# Patient Record
Sex: FEMALE | Race: WHITE | Employment: UNEMPLOYED | URBAN - METROPOLITAN AREA
[De-identification: names, ages, dates, MRNs, and addresses within clinical notes are randomized per-mention and may not be internally consistent; named-entity substitution may affect disease eponyms.]

---

## 2019-10-11 ENCOUNTER — TELEPHONE (OUTPATIENT)
Dept: FAMILY MEDICINE CLINIC | Age: 74
End: 2019-10-11

## 2019-11-04 ENCOUNTER — OFFICE VISIT (OUTPATIENT)
Dept: FAMILY MEDICINE CLINIC | Age: 74
End: 2019-11-04
Payer: MEDICARE

## 2019-11-04 ENCOUNTER — TELEPHONE (OUTPATIENT)
Dept: FAMILY MEDICINE CLINIC | Age: 74
End: 2019-11-04

## 2019-11-04 VITALS
TEMPERATURE: 98.2 F | HEART RATE: 76 BPM | DIASTOLIC BLOOD PRESSURE: 78 MMHG | OXYGEN SATURATION: 98 % | SYSTOLIC BLOOD PRESSURE: 164 MMHG | WEIGHT: 236.38 LBS

## 2019-11-04 DIAGNOSIS — G89.29 CHRONIC MIDLINE LOW BACK PAIN WITHOUT SCIATICA: ICD-10-CM

## 2019-11-04 DIAGNOSIS — G25.81 RESTLESS LEG SYNDROME: ICD-10-CM

## 2019-11-04 DIAGNOSIS — M54.50 CHRONIC MIDLINE LOW BACK PAIN WITHOUT SCIATICA: ICD-10-CM

## 2019-11-04 DIAGNOSIS — K27.9 PEPTIC ULCER: ICD-10-CM

## 2019-11-04 DIAGNOSIS — K58.0 IRRITABLE BOWEL SYNDROME WITH DIARRHEA: ICD-10-CM

## 2019-11-04 DIAGNOSIS — F41.9 ANXIETY: Primary | ICD-10-CM

## 2019-11-04 DIAGNOSIS — I10 ESSENTIAL HYPERTENSION: ICD-10-CM

## 2019-11-04 DIAGNOSIS — G44.89 OTHER HEADACHE SYNDROME: ICD-10-CM

## 2019-11-04 PROBLEM — M54.9 CHRONIC BACK PAIN: Status: ACTIVE | Noted: 2019-11-04

## 2019-11-04 LAB
A/G RATIO: 1.4 (ref 1.1–2.2)
ALBUMIN SERPL-MCNC: 4.6 G/DL (ref 3.4–5)
ALP BLD-CCNC: 87 U/L (ref 40–129)
ALT SERPL-CCNC: 22 U/L (ref 10–40)
ANION GAP SERPL CALCULATED.3IONS-SCNC: 14 MMOL/L (ref 3–16)
AST SERPL-CCNC: 22 U/L (ref 15–37)
BASOPHILS ABSOLUTE: 0.1 K/UL (ref 0–0.2)
BASOPHILS RELATIVE PERCENT: 0.8 %
BILIRUB SERPL-MCNC: 0.6 MG/DL (ref 0–1)
BUN BLDV-MCNC: 8 MG/DL (ref 7–20)
CALCIUM SERPL-MCNC: 9.6 MG/DL (ref 8.3–10.6)
CHLORIDE BLD-SCNC: 92 MMOL/L (ref 99–110)
CO2: 29 MMOL/L (ref 21–32)
CREAT SERPL-MCNC: 0.6 MG/DL (ref 0.6–1.2)
EOSINOPHILS ABSOLUTE: 0.2 K/UL (ref 0–0.6)
EOSINOPHILS RELATIVE PERCENT: 3.5 %
GFR AFRICAN AMERICAN: >60
GFR NON-AFRICAN AMERICAN: >60
GLOBULIN: 3.2 G/DL
GLUCOSE BLD-MCNC: 129 MG/DL (ref 70–99)
HCT VFR BLD CALC: 42.5 % (ref 36–48)
HEMOGLOBIN: 14.2 G/DL (ref 12–16)
LYMPHOCYTES ABSOLUTE: 2 K/UL (ref 1–5.1)
LYMPHOCYTES RELATIVE PERCENT: 28.3 %
MCH RBC QN AUTO: 29.5 PG (ref 26–34)
MCHC RBC AUTO-ENTMCNC: 33.4 G/DL (ref 31–36)
MCV RBC AUTO: 88.4 FL (ref 80–100)
MONOCYTES ABSOLUTE: 0.6 K/UL (ref 0–1.3)
MONOCYTES RELATIVE PERCENT: 8 %
NEUTROPHILS ABSOLUTE: 4.2 K/UL (ref 1.7–7.7)
NEUTROPHILS RELATIVE PERCENT: 59.4 %
PDW BLD-RTO: 13.8 % (ref 12.4–15.4)
PLATELET # BLD: 244 K/UL (ref 135–450)
PMV BLD AUTO: 8.3 FL (ref 5–10.5)
POTASSIUM SERPL-SCNC: 4.1 MMOL/L (ref 3.5–5.1)
RBC # BLD: 4.81 M/UL (ref 4–5.2)
SODIUM BLD-SCNC: 135 MMOL/L (ref 136–145)
TOTAL PROTEIN: 7.8 G/DL (ref 6.4–8.2)
WBC # BLD: 7.1 K/UL (ref 4–11)

## 2019-11-04 PROCEDURE — 90653 IIV ADJUVANT VACCINE IM: CPT | Performed by: NURSE PRACTITIONER

## 2019-11-04 PROCEDURE — G0008 ADMIN INFLUENZA VIRUS VAC: HCPCS | Performed by: NURSE PRACTITIONER

## 2019-11-04 PROCEDURE — 99204 OFFICE O/P NEW MOD 45 MIN: CPT | Performed by: NURSE PRACTITIONER

## 2019-11-04 RX ORDER — DOXAZOSIN MESYLATE 4 MG/1
4 TABLET ORAL NIGHTLY
COMMUNITY
End: 2020-09-29 | Stop reason: SDUPTHER

## 2019-11-04 RX ORDER — SIMVASTATIN 20 MG
20 TABLET ORAL NIGHTLY
COMMUNITY
End: 2020-09-29 | Stop reason: SDUPTHER

## 2019-11-04 RX ORDER — LORAZEPAM 1 MG/1
1 TABLET ORAL DAILY PRN
Qty: 30 TABLET | Refills: 2 | Status: SHIPPED | OUTPATIENT
Start: 2019-11-04 | End: 2019-12-04

## 2019-11-04 RX ORDER — DULOXETIN HYDROCHLORIDE 60 MG/1
60 CAPSULE, DELAYED RELEASE ORAL DAILY
Qty: 30 CAPSULE | Refills: 0 | Status: SHIPPED | OUTPATIENT
Start: 2019-11-04 | End: 2019-12-02 | Stop reason: SDUPTHER

## 2019-11-04 RX ORDER — TRAMADOL HYDROCHLORIDE 50 MG/1
50 TABLET ORAL EVERY 12 HOURS PRN
COMMUNITY
End: 2019-11-04

## 2019-11-04 RX ORDER — ASPIRIN 81 MG/1
81 TABLET ORAL DAILY
COMMUNITY

## 2019-11-04 RX ORDER — ROPINIROLE 1 MG/1
1 TABLET, FILM COATED ORAL NIGHTLY
COMMUNITY
End: 2019-11-04

## 2019-11-04 RX ORDER — METOPROLOL SUCCINATE 100 MG/1
100 TABLET, EXTENDED RELEASE ORAL DAILY
COMMUNITY
End: 2020-09-29 | Stop reason: SDUPTHER

## 2019-11-04 RX ORDER — OMEPRAZOLE 40 MG/1
40 CAPSULE, DELAYED RELEASE ORAL DAILY
COMMUNITY
End: 2020-02-10 | Stop reason: SDUPTHER

## 2019-11-04 RX ORDER — FUROSEMIDE 20 MG/1
20 TABLET ORAL DAILY
COMMUNITY
End: 2020-09-29 | Stop reason: SDUPTHER

## 2019-11-04 RX ORDER — BUTALBITAL, ACETAMINOPHEN AND CAFFEINE 50; 325; 40 MG/1; MG/1; MG/1
1-2 TABLET ORAL DAILY PRN
Qty: 30 TABLET | Refills: 2 | Status: SHIPPED | OUTPATIENT
Start: 2019-11-04 | End: 2019-11-04 | Stop reason: SDUPTHER

## 2019-11-04 RX ORDER — DULOXETIN HYDROCHLORIDE 60 MG/1
60 CAPSULE, DELAYED RELEASE ORAL DAILY
COMMUNITY
End: 2019-11-04

## 2019-11-04 RX ORDER — BUTALBITAL, ACETAMINOPHEN AND CAFFEINE 50; 325; 40 MG/1; MG/1; MG/1
1-2 TABLET ORAL EVERY 6 HOURS PRN
COMMUNITY
End: 2019-11-04 | Stop reason: SDUPTHER

## 2019-11-04 RX ORDER — NITROGLYCERIN 0.4 MG/1
0.4 TABLET SUBLINGUAL EVERY 5 MIN PRN
COMMUNITY
End: 2019-12-03 | Stop reason: SDUPTHER

## 2019-11-04 RX ORDER — LISINOPRIL 40 MG/1
40 TABLET ORAL DAILY
COMMUNITY
End: 2019-12-03 | Stop reason: SDUPTHER

## 2019-11-04 RX ORDER — ISOSORBIDE MONONITRATE 30 MG/1
60 TABLET, EXTENDED RELEASE ORAL DAILY
COMMUNITY
End: 2020-09-29 | Stop reason: SDUPTHER

## 2019-11-04 RX ORDER — DICYCLOMINE HYDROCHLORIDE 10 MG/1
20 CAPSULE ORAL
COMMUNITY
End: 2019-11-04

## 2019-11-04 RX ORDER — LORAZEPAM 1 MG/1
1 TABLET ORAL EVERY 12 HOURS
COMMUNITY
End: 2019-11-04

## 2019-11-04 RX ORDER — BUTALBITAL, ACETAMINOPHEN AND CAFFEINE 50; 325; 40 MG/1; MG/1; MG/1
1-2 TABLET ORAL DAILY PRN
Qty: 10 TABLET | Refills: 0 | Status: SHIPPED | OUTPATIENT
Start: 2019-11-04 | End: 2019-11-12 | Stop reason: SDUPTHER

## 2019-11-04 SDOH — HEALTH STABILITY: MENTAL HEALTH: HOW OFTEN DO YOU HAVE A DRINK CONTAINING ALCOHOL?: NEVER

## 2019-11-04 ASSESSMENT — ENCOUNTER SYMPTOMS
WHEEZING: 0
NAUSEA: 1
BACK PAIN: 1
SHORTNESS OF BREATH: 1
RHINORRHEA: 0
TROUBLE SWALLOWING: 0
PHOTOPHOBIA: 0
DIARRHEA: 1
ABDOMINAL PAIN: 1
COUGH: 0
SORE THROAT: 0
BLOOD IN STOOL: 0

## 2019-11-05 ENCOUNTER — TELEPHONE (OUTPATIENT)
Dept: GASTROENTEROLOGY | Age: 74
End: 2019-11-05

## 2019-11-05 ENCOUNTER — TELEPHONE (OUTPATIENT)
Dept: FAMILY MEDICINE CLINIC | Age: 74
End: 2019-11-05

## 2019-11-07 ENCOUNTER — TELEPHONE (OUTPATIENT)
Dept: FAMILY MEDICINE CLINIC | Age: 74
End: 2019-11-07

## 2019-11-12 DIAGNOSIS — G44.89 OTHER HEADACHE SYNDROME: ICD-10-CM

## 2019-11-12 RX ORDER — BUTALBITAL, ACETAMINOPHEN AND CAFFEINE 50; 325; 40 MG/1; MG/1; MG/1
1-2 TABLET ORAL DAILY PRN
Qty: 180 TABLET | Refills: 1 | Status: SHIPPED | OUTPATIENT
Start: 2019-11-12 | End: 2020-10-11

## 2019-11-19 ENCOUNTER — INITIAL CONSULT (OUTPATIENT)
Dept: GASTROENTEROLOGY | Age: 74
End: 2019-11-19
Payer: MEDICARE

## 2019-11-19 VITALS
HEIGHT: 64 IN | HEART RATE: 68 BPM | DIASTOLIC BLOOD PRESSURE: 78 MMHG | BODY MASS INDEX: 40.63 KG/M2 | WEIGHT: 238 LBS | OXYGEN SATURATION: 97 % | SYSTOLIC BLOOD PRESSURE: 146 MMHG

## 2019-11-19 DIAGNOSIS — R13.19 ESOPHAGEAL DYSPHAGIA: ICD-10-CM

## 2019-11-19 DIAGNOSIS — R12 HEARTBURN: Primary | ICD-10-CM

## 2019-11-19 PROCEDURE — 99204 OFFICE O/P NEW MOD 45 MIN: CPT | Performed by: INTERNAL MEDICINE

## 2019-12-02 ENCOUNTER — ANESTHESIA EVENT (OUTPATIENT)
Dept: ENDOSCOPY | Age: 74
End: 2019-12-02
Payer: MEDICARE

## 2019-12-02 DIAGNOSIS — M54.50 CHRONIC MIDLINE LOW BACK PAIN WITHOUT SCIATICA: ICD-10-CM

## 2019-12-02 DIAGNOSIS — G89.29 CHRONIC MIDLINE LOW BACK PAIN WITHOUT SCIATICA: ICD-10-CM

## 2019-12-02 DIAGNOSIS — G25.81 RESTLESS LEG SYNDROME: ICD-10-CM

## 2019-12-02 RX ORDER — DULOXETIN HYDROCHLORIDE 60 MG/1
CAPSULE, DELAYED RELEASE ORAL
Qty: 90 CAPSULE | Refills: 0 | Status: SHIPPED | OUTPATIENT
Start: 2019-12-02 | End: 2019-12-16

## 2019-12-03 RX ORDER — NITROGLYCERIN 0.4 MG/1
0.4 TABLET SUBLINGUAL EVERY 5 MIN PRN
Qty: 25 TABLET | Refills: 0 | Status: SHIPPED | OUTPATIENT
Start: 2019-12-03 | End: 2020-03-25 | Stop reason: SDUPTHER

## 2019-12-03 RX ORDER — LISINOPRIL 40 MG/1
40 TABLET ORAL DAILY
Qty: 90 TABLET | Refills: 0 | Status: SHIPPED | OUTPATIENT
Start: 2019-12-03 | End: 2020-09-29 | Stop reason: SDUPTHER

## 2019-12-05 ENCOUNTER — HOSPITAL ENCOUNTER (OUTPATIENT)
Age: 74
Discharge: HOME | End: 2019-12-05
Payer: MEDICARE

## 2019-12-05 ENCOUNTER — TELEPHONE (OUTPATIENT)
Dept: SURGERY | Age: 74
End: 2019-12-05

## 2019-12-05 ENCOUNTER — HOSPITAL ENCOUNTER (OUTPATIENT)
Dept: GENERAL RADIOLOGY | Age: 74
Discharge: HOME | End: 2019-12-05
Payer: MEDICARE

## 2019-12-05 ENCOUNTER — OFFICE VISIT (OUTPATIENT)
Dept: FAMILY MEDICINE CLINIC | Age: 74
End: 2019-12-05
Payer: MEDICARE

## 2019-12-05 VITALS
DIASTOLIC BLOOD PRESSURE: 80 MMHG | OXYGEN SATURATION: 98 % | HEART RATE: 79 BPM | SYSTOLIC BLOOD PRESSURE: 156 MMHG | WEIGHT: 233.25 LBS | TEMPERATURE: 97.7 F | BODY MASS INDEX: 40.04 KG/M2
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
## 2019-12-05 DIAGNOSIS — R59.0 LYMPHADENOPATHY OF RIGHT CERVICAL REGION: ICD-10-CM

## 2019-12-05 DIAGNOSIS — R07.9 CHEST PAIN, UNSPECIFIED TYPE: ICD-10-CM

## 2019-12-05 DIAGNOSIS — R05.9 COUGH: ICD-10-CM

## 2019-12-05 DIAGNOSIS — R82.90 ABNORMAL URINE ODOR: ICD-10-CM

## 2019-12-05 LAB
BILIRUBIN, POC: ABNORMAL
BLOOD URINE, POC: ABNORMAL
CLARITY, POC: ABNORMAL
COLOR, POC: YELLOW
GLUCOSE URINE, POC: ABNORMAL
KETONES, POC: ABNORMAL
LEUKOCYTE EST, POC: ABNORMAL
NITRITE, POC: ABNORMAL
PH, POC: 7
PROTEIN, POC: ABNORMAL
SPECIFIC GRAVITY, POC: 1.01
UROBILINOGEN, POC: 0.2

## 2019-12-05 PROCEDURE — 71046 X-RAY EXAM CHEST 2 VIEWS: CPT

## 2019-12-05 PROCEDURE — 99214 OFFICE O/P EST MOD 30 MIN: CPT | Performed by: NURSE PRACTITIONER

## 2019-12-05 PROCEDURE — 81003 URINALYSIS AUTO W/O SCOPE: CPT | Performed by: NURSE PRACTITIONER

## 2019-12-05 RX ORDER — AMOXICILLIN AND CLAVULANATE POTASSIUM 875; 125 MG/1; MG/1
1 TABLET, FILM COATED ORAL 2 TIMES DAILY
Qty: 20 TABLET | Refills: 0 | Status: SHIPPED | OUTPATIENT
Start: 2019-12-05 | End: 2019-12-15

## 2019-12-06 ENCOUNTER — ANESTHESIA (OUTPATIENT)
Dept: ENDOSCOPY | Age: 74
End: 2019-12-06
Payer: MEDICARE

## 2019-12-06 ENCOUNTER — HOSPITAL ENCOUNTER (OUTPATIENT)
Age: 74
Setting detail: OUTPATIENT SURGERY
Discharge: HOME | End: 2019-12-06
Attending: INTERNAL MEDICINE | Admitting: INTERNAL MEDICINE
Payer: MEDICARE

## 2019-12-06 VITALS — OXYGEN SATURATION: 98 % | RESPIRATION RATE: 15 BRPM

## 2019-12-06 VITALS
BODY MASS INDEX: 39.78 KG/M2 | TEMPERATURE: 97.1 F | RESPIRATION RATE: 16 BRPM | SYSTOLIC BLOOD PRESSURE: 139 MMHG | DIASTOLIC BLOOD PRESSURE: 64 MMHG | HEART RATE: 65 BPM | HEIGHT: 64 IN | WEIGHT: 233 LBS | OXYGEN SATURATION: 96 %

## 2019-12-06 PROCEDURE — 2580000003 HC RX 258: Performed by: ANESTHESIOLOGY

## 2019-12-06 PROCEDURE — 2709999900 HC NON-CHARGEABLE SUPPLY: Performed by: INTERNAL MEDICINE

## 2019-12-06 PROCEDURE — 7100000010 HC PHASE II RECOVERY - FIRST 15 MIN: Performed by: INTERNAL MEDICINE

## 2019-12-06 PROCEDURE — 3700000001 HC ADD 15 MINUTES (ANESTHESIA): Performed by: INTERNAL MEDICINE

## 2019-12-06 PROCEDURE — 3700000000 HC ANESTHESIA ATTENDED CARE: Performed by: INTERNAL MEDICINE

## 2019-12-06 PROCEDURE — 43235 EGD DIAGNOSTIC BRUSH WASH: CPT | Performed by: INTERNAL MEDICINE

## 2019-12-06 PROCEDURE — 7100000011 HC PHASE II RECOVERY - ADDTL 15 MIN: Performed by: INTERNAL MEDICINE

## 2019-12-06 PROCEDURE — 2500000003 HC RX 250 WO HCPCS: Performed by: NURSE ANESTHETIST, CERTIFIED REGISTERED

## 2019-12-06 PROCEDURE — 6360000002 HC RX W HCPCS: Performed by: NURSE ANESTHETIST, CERTIFIED REGISTERED

## 2019-12-06 PROCEDURE — 3609017100 HC EGD: Performed by: INTERNAL MEDICINE

## 2019-12-06 RX ORDER — MORPHINE SULFATE 10 MG/ML
2 INJECTION, SOLUTION INTRAMUSCULAR; INTRAVENOUS EVERY 5 MIN PRN
Status: DISCONTINUED | OUTPATIENT
Start: 2019-12-06 | End: 2019-12-06 | Stop reason: HOSPADM

## 2019-12-06 RX ORDER — ONDANSETRON 2 MG/ML
4 INJECTION INTRAMUSCULAR; INTRAVENOUS
Status: DISCONTINUED | OUTPATIENT
Start: 2019-12-06 | End: 2019-12-06 | Stop reason: HOSPADM

## 2019-12-06 RX ORDER — OXYCODONE HYDROCHLORIDE AND ACETAMINOPHEN 5; 325 MG/1; MG/1
1 TABLET ORAL PRN
Status: DISCONTINUED | OUTPATIENT
Start: 2019-12-06 | End: 2019-12-06 | Stop reason: HOSPADM

## 2019-12-06 RX ORDER — LIDOCAINE HYDROCHLORIDE 20 MG/ML
INJECTION, SOLUTION INFILTRATION; PERINEURAL PRN
Status: DISCONTINUED | OUTPATIENT
Start: 2019-12-06 | End: 2019-12-06 | Stop reason: SDUPTHER

## 2019-12-06 RX ORDER — PROPOFOL 10 MG/ML
INJECTION, EMULSION INTRAVENOUS PRN
Status: DISCONTINUED | OUTPATIENT
Start: 2019-12-06 | End: 2019-12-06 | Stop reason: SDUPTHER

## 2019-12-06 RX ORDER — SODIUM CHLORIDE 0.9 % (FLUSH) 0.9 %
10 SYRINGE (ML) INJECTION EVERY 12 HOURS SCHEDULED
Status: DISCONTINUED | OUTPATIENT
Start: 2019-12-06 | End: 2019-12-06 | Stop reason: HOSPADM

## 2019-12-06 RX ORDER — OXYCODONE HYDROCHLORIDE AND ACETAMINOPHEN 5; 325 MG/1; MG/1
2 TABLET ORAL PRN
Status: DISCONTINUED | OUTPATIENT
Start: 2019-12-06 | End: 2019-12-06 | Stop reason: HOSPADM

## 2019-12-06 RX ORDER — SODIUM CHLORIDE, SODIUM LACTATE, POTASSIUM CHLORIDE, CALCIUM CHLORIDE 600; 310; 30; 20 MG/100ML; MG/100ML; MG/100ML; MG/100ML
INJECTION, SOLUTION INTRAVENOUS CONTINUOUS
Status: DISCONTINUED | OUTPATIENT
Start: 2019-12-06 | End: 2019-12-06 | Stop reason: HOSPADM

## 2019-12-06 RX ORDER — MORPHINE SULFATE 10 MG/ML
1 INJECTION, SOLUTION INTRAMUSCULAR; INTRAVENOUS EVERY 5 MIN PRN
Status: DISCONTINUED | OUTPATIENT
Start: 2019-12-06 | End: 2019-12-06 | Stop reason: HOSPADM

## 2019-12-06 RX ORDER — SODIUM CHLORIDE 0.9 % (FLUSH) 0.9 %
10 SYRINGE (ML) INJECTION PRN
Status: DISCONTINUED | OUTPATIENT
Start: 2019-12-06 | End: 2019-12-06 | Stop reason: HOSPADM

## 2019-12-06 RX ADMIN — SODIUM CHLORIDE, POTASSIUM CHLORIDE, SODIUM LACTATE AND CALCIUM CHLORIDE: 600; 310; 30; 20 INJECTION, SOLUTION INTRAVENOUS at 10:08

## 2019-12-06 RX ADMIN — LIDOCAINE HYDROCHLORIDE 50 MG: 20 INJECTION, SOLUTION INFILTRATION; PERINEURAL at 10:13

## 2019-12-06 RX ADMIN — PROPOFOL 100 MG: 10 INJECTION, EMULSION INTRAVENOUS at 10:13

## 2019-12-06 ASSESSMENT — LIFESTYLE VARIABLES: SMOKING_STATUS: 0

## 2019-12-06 ASSESSMENT — PAIN - FUNCTIONAL ASSESSMENT: PAIN_FUNCTIONAL_ASSESSMENT: 0-10

## 2019-12-07 LAB — URINE CULTURE, ROUTINE: NORMAL

## 2019-12-10 ENCOUNTER — TELEPHONE (OUTPATIENT)
Dept: FAMILY MEDICINE CLINIC | Age: 74
End: 2019-12-10

## 2019-12-12 ENCOUNTER — TELEPHONE (OUTPATIENT)
Dept: ADMINISTRATIVE | Age: 74
End: 2019-12-12

## 2019-12-16 ENCOUNTER — OFFICE VISIT (OUTPATIENT)
Dept: FAMILY MEDICINE CLINIC | Age: 74
End: 2019-12-16
Payer: MEDICARE

## 2019-12-16 VITALS
WEIGHT: 238.5 LBS | DIASTOLIC BLOOD PRESSURE: 72 MMHG | HEART RATE: 76 BPM | SYSTOLIC BLOOD PRESSURE: 148 MMHG | OXYGEN SATURATION: 97 % | TEMPERATURE: 97.4 F | BODY MASS INDEX: 40.94 KG/M2

## 2019-12-16 DIAGNOSIS — R59.0 LYMPHADENOPATHY OF RIGHT CERVICAL REGION: ICD-10-CM

## 2019-12-16 DIAGNOSIS — G25.81 RESTLESS LEG SYNDROME: ICD-10-CM

## 2019-12-16 DIAGNOSIS — F32.A DEPRESSIVE DISORDER: Primary | ICD-10-CM

## 2019-12-16 DIAGNOSIS — J41.8 MIXED SIMPLE AND MUCOPURULENT CHRONIC BRONCHITIS (HCC): ICD-10-CM

## 2019-12-16 PROCEDURE — 99214 OFFICE O/P EST MOD 30 MIN: CPT | Performed by: NURSE PRACTITIONER

## 2019-12-16 PROCEDURE — G8431 POS CLIN DEPRES SCRN F/U DOC: HCPCS | Performed by: NURSE PRACTITIONER

## 2019-12-16 RX ORDER — BUDESONIDE AND FORMOTEROL FUMARATE DIHYDRATE 80; 4.5 UG/1; UG/1
2 AEROSOL RESPIRATORY (INHALATION) 2 TIMES DAILY
Qty: 1 INHALER | Refills: 3 | Status: SHIPPED | OUTPATIENT
Start: 2019-12-16 | End: 2020-03-20

## 2019-12-16 RX ORDER — DULOXETIN HYDROCHLORIDE 30 MG/1
30 CAPSULE, DELAYED RELEASE ORAL 2 TIMES DAILY
Qty: 60 CAPSULE | Refills: 3 | Status: SHIPPED | OUTPATIENT
Start: 2019-12-16 | End: 2020-01-09

## 2019-12-16 ASSESSMENT — PATIENT HEALTH QUESTIONNAIRE - PHQ9
1. LITTLE INTEREST OR PLEASURE IN DOING THINGS: 3
9. THOUGHTS THAT YOU WOULD BE BETTER OFF DEAD, OR OF HURTING YOURSELF: 0
8. MOVING OR SPEAKING SO SLOWLY THAT OTHER PEOPLE COULD HAVE NOTICED. OR THE OPPOSITE, BEING SO FIGETY OR RESTLESS THAT YOU HAVE BEEN MOVING AROUND A LOT MORE THAN USUAL: 3
2. FEELING DOWN, DEPRESSED OR HOPELESS: 3
SUM OF ALL RESPONSES TO PHQ QUESTIONS 1-9: 16
6. FEELING BAD ABOUT YOURSELF - OR THAT YOU ARE A FAILURE OR HAVE LET YOURSELF OR YOUR FAMILY DOWN: 0
4. FEELING TIRED OR HAVING LITTLE ENERGY: 3
5. POOR APPETITE OR OVEREATING: 3
7. TROUBLE CONCENTRATING ON THINGS, SUCH AS READING THE NEWSPAPER OR WATCHING TELEVISION: 0
10. IF YOU CHECKED OFF ANY PROBLEMS, HOW DIFFICULT HAVE THESE PROBLEMS MADE IT FOR YOU TO DO YOUR WORK, TAKE CARE OF THINGS AT HOME, OR GET ALONG WITH OTHER PEOPLE: 1
SUM OF ALL RESPONSES TO PHQ9 QUESTIONS 1 & 2: 6
3. TROUBLE FALLING OR STAYING ASLEEP: 1
SUM OF ALL RESPONSES TO PHQ QUESTIONS 1-9: 16

## 2019-12-17 ENCOUNTER — TELEPHONE (OUTPATIENT)
Dept: FAMILY MEDICINE CLINIC | Age: 74
End: 2019-12-17

## 2019-12-17 PROBLEM — I73.9 PVD (PERIPHERAL VASCULAR DISEASE) (HCC): Status: ACTIVE | Noted: 2019-12-17

## 2019-12-17 PROBLEM — M05.79 RHEUMATOID ARTHRITIS INVOLVING MULTIPLE SITES WITH POSITIVE RHEUMATOID FACTOR (HCC): Status: ACTIVE | Noted: 2019-12-17

## 2019-12-17 PROBLEM — R76.8 ANA POSITIVE: Status: ACTIVE | Noted: 2019-12-17

## 2019-12-17 RX ORDER — ALBUTEROL SULFATE 1.25 MG/3ML
1 SOLUTION RESPIRATORY (INHALATION) EVERY 6 HOURS PRN
Qty: 360 ML | Refills: 3 | Status: SHIPPED | OUTPATIENT
Start: 2019-12-17 | End: 2020-07-28

## 2019-12-26 ENCOUNTER — TELEPHONE (OUTPATIENT)
Dept: FAMILY MEDICINE CLINIC | Age: 74
End: 2019-12-26

## 2019-12-26 DIAGNOSIS — R19.7 DIARRHEA, UNSPECIFIED TYPE: Primary | ICD-10-CM

## 2020-01-02 ENCOUNTER — HOSPITAL ENCOUNTER (OUTPATIENT)
Dept: GENERAL RADIOLOGY | Age: 75
Discharge: HOME | End: 2020-01-02
Payer: MEDICARE

## 2020-01-02 ENCOUNTER — HOSPITAL ENCOUNTER (OUTPATIENT)
Age: 75
Discharge: HOME | End: 2020-01-02
Payer: MEDICARE

## 2020-01-02 PROCEDURE — 72100 X-RAY EXAM L-S SPINE 2/3 VWS: CPT

## 2020-01-09 ENCOUNTER — TELEPHONE (OUTPATIENT)
Dept: GASTROENTEROLOGY | Age: 75
End: 2020-01-09

## 2020-01-09 ENCOUNTER — OFFICE VISIT (OUTPATIENT)
Dept: FAMILY MEDICINE CLINIC | Age: 75
End: 2020-01-09
Payer: MEDICARE

## 2020-01-09 VITALS
WEIGHT: 235.13 LBS | BODY MASS INDEX: 40.36 KG/M2 | TEMPERATURE: 97.9 F | OXYGEN SATURATION: 98 % | SYSTOLIC BLOOD PRESSURE: 148 MMHG | HEART RATE: 84 BPM | DIASTOLIC BLOOD PRESSURE: 88 MMHG

## 2020-01-09 PROCEDURE — G0009 ADMIN PNEUMOCOCCAL VACCINE: HCPCS | Performed by: NURSE PRACTITIONER

## 2020-01-09 PROCEDURE — 99214 OFFICE O/P EST MOD 30 MIN: CPT | Performed by: NURSE PRACTITIONER

## 2020-01-09 PROCEDURE — 90732 PPSV23 VACC 2 YRS+ SUBQ/IM: CPT | Performed by: NURSE PRACTITIONER

## 2020-01-09 RX ORDER — GUAIFENESIN 600 MG/1
600 TABLET, EXTENDED RELEASE ORAL 2 TIMES DAILY
Qty: 60 TABLET | Refills: 0 | Status: SHIPPED | OUTPATIENT
Start: 2020-01-09 | End: 2020-02-08

## 2020-01-09 RX ORDER — LEVOCETIRIZINE DIHYDROCHLORIDE 5 MG/1
5 TABLET, FILM COATED ORAL NIGHTLY
Qty: 30 TABLET | Refills: 2 | Status: SHIPPED | OUTPATIENT
Start: 2020-01-09 | End: 2020-01-28 | Stop reason: SINTOL

## 2020-01-09 RX ORDER — DULOXETIN HYDROCHLORIDE 30 MG/1
60 CAPSULE, DELAYED RELEASE ORAL 2 TIMES DAILY
Qty: 120 CAPSULE | Refills: 0 | Status: SHIPPED | OUTPATIENT
Start: 2020-01-09 | End: 2020-09-29 | Stop reason: SDUPTHER

## 2020-01-09 NOTE — TELEPHONE ENCOUNTER
Findings from EGD in December: -normal esophagus, stomach, and duodenum  -hiatal hernia, with mixed sliding and paraesophageal components, large cm in size. On 12/26, she called her PCP's office complaining of diarrhea. PCP ordered c-diff stool study to which pt said, \"she had already done that in the past.\"  So PCP sent Rx for Lomotil and told her to f/u with GI regarding her \"stomach ulcers and diarrhea\". She needs to come back in to the office for f/u with Dr. Jaydon Stovall re: ongoing symptoms.

## 2020-01-09 NOTE — TELEPHONE ENCOUNTER
Patient states she started coughing shortly having the EGD on 12/6/19. States she gets to coughing so bad she vomits. Stated it feels like her insides are going to fall out when coughing. She has been seen by her pcp whom dx with bronchitis on 12/16/19 and seen her again today. States c/o sharp stomach cramping as well. Please advise.

## 2020-01-09 NOTE — PATIENT INSTRUCTIONS
fruit, ½ cup chopped or canned fruit, 1/4 cup dried fruit, or 4 ounces (½ cup) of fruit juice. Choose fruit more often than fruit juice. · Eat 4 to 5 servings of vegetables each day. A serving is 1 cup of lettuce or raw leafy vegetables, ½ cup of chopped or cooked vegetables, or 4 ounces (½ cup) of vegetable juice. Choose vegetables more often than vegetable juice. · Get 2 to 3 servings of low-fat and fat-free dairy each day. A serving is 8 ounces of milk, 1 cup of yogurt, or 1 ½ ounces of cheese. · Eat 6 to 8 servings of grains each day. A serving is 1 slice of bread, 1 ounce of dry cereal, or ½ cup of cooked rice, pasta, or cooked cereal. Try to choose whole-grain products as much as possible. · Limit lean meat, poultry, and fish to 2 servings each day. A serving is 3 ounces, about the size of a deck of cards. · Eat 4 to 5 servings of nuts, seeds, and legumes (cooked dried beans, lentils, and split peas) each week. A serving is 1/3 cup of nuts, 2 tablespoons of seeds, or ½ cup of cooked beans or peas. · Limit fats and oils to 2 to 3 servings each day. A serving is 1 teaspoon of vegetable oil or 2 tablespoons of salad dressing. · Limit sweets and added sugars to 5 servings or less a week. A serving is 1 tablespoon jelly or jam, ½ cup sorbet, or 1 cup of lemonade. · Eat less than 2,300 milligrams (mg) of sodium a day. If you limit your sodium to 1,500 mg a day, you can lower your blood pressure even more. Tips for success  · Start small. Do not try to make dramatic changes to your diet all at once. You might feel that you are missing out on your favorite foods and then be more likely to not follow the plan. Make small changes, and stick with them. Once those changes become habit, add a few more changes. · Try some of the following:  ? Make it a goal to eat a fruit or vegetable at every meal and at snacks. This will make it easy to get the recommended amount of fruits and vegetables each day.   ? Try yogurt topped with fruit and nuts for a snack or healthy dessert. ? Add lettuce, tomato, cucumber, and onion to sandwiches. ? Combine a ready-made pizza crust with low-fat mozzarella cheese and lots of vegetable toppings. Try using tomatoes, squash, spinach, broccoli, carrots, cauliflower, and onions. ? Have a variety of cut-up vegetables with a low-fat dip as an appetizer instead of chips and dip. ? Sprinkle sunflower seeds or chopped almonds over salads. Or try adding chopped walnuts or almonds to cooked vegetables. ? Try some vegetarian meals using beans and peas. Add garbanzo or kidney beans to salads. Make burritos and tacos with mashed hutchinson beans or black beans. Where can you learn more? Go to https://[email or web address] and sign in to your [employer] account. Enter J294 in the [employer] box to learn more about \"DASH Diet: Care Instructions. \"     If you do not have an account, please click on the \"Sign Up Now\" link. Current as of: April 9, 2019  Content Version: 12.3  © 8489-2972 Healthwise, Incorporated. Care instructions adapted under license by [facility] ([facility]). If you have questions about a medical condition or this instruction, always ask your healthcare professional. [place] [address] any warranty or liability for your use of this information.

## 2020-01-09 NOTE — PROGRESS NOTES
Patient: Hi Farnsworth is a 76 y.o. female who presents today with the following Chief Complaint(s):  Chief Complaint   Patient presents with    Cough    Other     Lips and mouth sore    Other     Follow up after procedure         Assessment & Plan:  1. PND (post-nasal drip)  New problem  Likely the cause of cough  Low suspicion for bacterial infection  Symptomatic treatment  Humidifier  Follow-up as needed  - guaiFENesin (MUCINEX) 600 MG extended release tablet; Take 1 tablet by mouth 2 times daily  Dispense: 60 tablet; Refill: 0  - levocetirizine (XYZAL) 5 MG tablet; Take 1 tablet by mouth nightly  Dispense: 30 tablet; Refill: 2    2. Cough  See #1  Low suspicion for pneumonia or bronchitis    3. Anxiety  Uncontrolled  Use Vaseline on lips. Stop picking and chewing on lips. Sore lips are not likely from inhaler use. Likely from picking and tearing off skin  Increase Cymbalta to 60 mg twice daily. Contracts for safety  Like to the reason for heartburn and nausea. Follow-up in 3 months  - DULoxetine (CYMBALTA) 30 MG extended release capsule; Take 2 capsules by mouth 2 times daily  Dispense: 120 capsule; Refill: 0    4. Need for vaccination with PVAX (pneumococcal vaccination)    - PNEUMOVAX 23 subcutaneous/IM (Pneumococcal polysaccharide vaccine 23-valent >= 1yo)      CHRISTIANO Rasmussen is in the office with complaints of cough. She has been coughing intermittently for the past 2 months. Symptoms will go away and then returned. She states that sometimes cough does keep her awake at night. She has had frequent throat clearing. She has had some nasal congestion. She does cough up thick mucus. Mucus does make her nauseous. She denies fever, hemoptysis, dyspnea, chest pain, palpitations, sore throat, decreased appetite, malaise, unilateral leg swelling. She reports that she has had a sore on her lip. She states this started out as a piece of dry skin and then she ripped it off.   Her lips have been very dry and NAILS FORMULA PO) Take by mouth      Multiple Vitamins-Minerals (OCUVITE PO) Take by mouth      Multiple Vitamin (DAILY VITAMIN PO) Take by mouth       No current facility-administered medications for this visit. Patient's past medical history, surgical history, family history, medications,  and allergies  were all reviewed and updated as appropriate today. Review of Systems  See HPI    Physical Exam  Vitals signs and nursing note reviewed. Constitutional:       General: She is not in acute distress. Appearance: She is well-developed. She is not toxic-appearing. HENT:      Head: Normocephalic and atraumatic. Right Ear: Tympanic membrane normal.      Left Ear: Tympanic membrane normal.      Nose: Congestion present. Mouth/Throat:      Mouth: Mucous membranes are moist. No oral lesions. Pharynx: Uvula midline. No posterior oropharyngeal erythema or uvula swelling. Comments: Postnasal drip  Eyes:      Extraocular Movements: Extraocular movements intact. Neck:      Musculoskeletal: Normal range of motion and neck supple. Cardiovascular:      Rate and Rhythm: Normal rate and regular rhythm. Pulses: Normal pulses. Heart sounds: Normal heart sounds. Pulmonary:      Effort: Pulmonary effort is normal.      Breath sounds: Normal breath sounds. No wheezing or rhonchi. Lymphadenopathy:      Cervical: No cervical adenopathy. Skin:     General: Skin is warm and dry. Capillary Refill: Capillary refill takes 2 to 3 seconds. Neurological:      Mental Status: She is alert and oriented to person, place, and time. Psychiatric:         Behavior: Behavior normal.         Thought Content:  Thought content normal.         Vitals:    01/09/20 1016 01/09/20 1018   BP: (!) 160/90 (!) 148/88   Pulse: 84    Temp: 97.9 °F (36.6 °C)    TempSrc: Oral    SpO2: 98%    Weight: 235 lb 2 oz (106.7 kg)            ROCKY Muniz-CNP    The note was completedusing Dragon voice recognition transcription. Every effort was made to ensure accuracy; however, inadvertent  transcription errors may be present despite my best efforts to edit errors.

## 2020-01-28 ENCOUNTER — OFFICE VISIT (OUTPATIENT)
Dept: FAMILY MEDICINE CLINIC | Age: 75
End: 2020-01-28
Payer: MEDICARE

## 2020-01-28 VITALS
TEMPERATURE: 98 F | SYSTOLIC BLOOD PRESSURE: 126 MMHG | DIASTOLIC BLOOD PRESSURE: 78 MMHG | WEIGHT: 224 LBS | OXYGEN SATURATION: 97 % | BODY MASS INDEX: 38.45 KG/M2 | HEART RATE: 86 BPM

## 2020-01-28 PROCEDURE — 99214 OFFICE O/P EST MOD 30 MIN: CPT | Performed by: NURSE PRACTITIONER

## 2020-01-28 NOTE — PATIENT INSTRUCTIONS
Please read the healthy family handout that you were given and share it with your family. Please compare this printed medication list with your medications at home to be sure they are the same. If you have any medications that are different please contact us immediately at [phone]. Also review your allergies that we have listed, these may also include medications that you have not been able to tolerate, make sure everything listed is correct. If you have any allergies that are different please contact us immediately at [phone].

## 2020-01-28 NOTE — PROGRESS NOTES
nebulizer solution Inhale 3 mLs into the lungs every 6 hours as needed for Wheezing 360 mL 3    budesonide-formoterol (SYMBICORT) 80-4.5 MCG/ACT AERO Inhale 2 puffs into the lungs 2 times daily 1 Inhaler 3    nitroGLYCERIN (NITROSTAT) 0.4 MG SL tablet Place 1 tablet under the tongue every 5 minutes as needed for Chest pain up to max of 3 total doses. If no relief after 1 dose, call 911. 25 tablet 0    lisinopril (PRINIVIL;ZESTRIL) 40 MG tablet Take 1 tablet by mouth daily 90 tablet 0    butalbital-acetaminophen-caffeine (FIORICET, ESGIC) -40 MG per tablet Take 1-2 tablets by mouth daily as needed for Headaches 180 tablet 1    Apoaequorin (PREVAGEN EXTRA STRENGTH PO) Take by mouth      furosemide (LASIX) 20 MG tablet Take 20 mg by mouth daily       doxazosin (CARDURA) 4 MG tablet Take 4 mg by mouth nightly      metoprolol succinate (TOPROL XL) 100 MG extended release tablet Take 100 mg by mouth daily      isosorbide mononitrate (IMDUR) 30 MG extended release tablet Take 60 mg by mouth daily       omeprazole (PRILOSEC) 40 MG delayed release capsule Take 40 mg by mouth daily      aspirin 81 MG EC tablet Take 81 mg by mouth daily      simvastatin (ZOCOR) 20 MG tablet Take 20 mg by mouth nightly      Multiple Vitamins-Minerals (HAIR SKIN AND NAILS FORMULA PO) Take by mouth      Multiple Vitamins-Minerals (OCUVITE PO) Take by mouth      Multiple Vitamin (DAILY VITAMIN PO) Take by mouth       No current facility-administered medications for this visit. Patient's past medical history, surgical history, family history, medications,  and allergies  were all reviewed and updated as appropriate today. Review of Systems  See HPI    Physical Exam  Vitals signs reviewed. Constitutional:       General: She is not in acute distress. Appearance: She is well-developed. She is not toxic-appearing. HENT:      Head: Normocephalic.       Nose: Nose normal.      Mouth/Throat:      Mouth: Mucous

## 2020-02-03 ENCOUNTER — HOSPITAL ENCOUNTER (OUTPATIENT)
Dept: GENERAL RADIOLOGY | Age: 75
Discharge: HOME | End: 2020-02-03
Payer: MEDICARE

## 2020-02-03 PROCEDURE — 74240 X-RAY XM UPR GI TRC 1CNTRST: CPT

## 2020-02-10 ENCOUNTER — OFFICE VISIT (OUTPATIENT)
Dept: GASTROENTEROLOGY | Age: 75
End: 2020-02-10
Payer: MEDICARE

## 2020-02-10 VITALS
BODY MASS INDEX: 41.66 KG/M2 | DIASTOLIC BLOOD PRESSURE: 72 MMHG | SYSTOLIC BLOOD PRESSURE: 124 MMHG | HEIGHT: 64 IN | WEIGHT: 244 LBS

## 2020-02-10 PROCEDURE — 99213 OFFICE O/P EST LOW 20 MIN: CPT | Performed by: INTERNAL MEDICINE

## 2020-02-10 RX ORDER — OMEPRAZOLE 40 MG/1
40 CAPSULE, DELAYED RELEASE ORAL
Qty: 60 CAPSULE | Refills: 3 | Status: SHIPPED | OUTPATIENT
Start: 2020-02-10 | End: 2020-03-16 | Stop reason: SDUPTHER

## 2020-02-18 ENCOUNTER — HOSPITAL ENCOUNTER (OUTPATIENT)
Dept: ULTRASOUND IMAGING | Age: 75
Discharge: HOME | End: 2020-02-18
Payer: MEDICARE

## 2020-02-18 PROCEDURE — 93971 EXTREMITY STUDY: CPT

## 2020-02-19 NOTE — RESULT ENCOUNTER NOTE
Please call patient with normal results. Remind about ability to get results, make appointments, and communicate with us through [employer] since not signed up.

## 2020-03-16 RX ORDER — OMEPRAZOLE 40 MG/1
40 CAPSULE, DELAYED RELEASE ORAL
Qty: 180 CAPSULE | Refills: 3 | Status: SHIPPED | OUTPATIENT
Start: 2020-03-16 | End: 2020-09-29 | Stop reason: SDUPTHER

## 2020-03-16 NOTE — TELEPHONE ENCOUNTER
Pt pharmacy called in stating pt needed a 90 day rx of the omeprazole 40mg instead of the 30 day as it is a mail order pharmacy. Please update rx and send back in.

## 2020-03-20 ENCOUNTER — OFFICE VISIT (OUTPATIENT)
Dept: FAMILY MEDICINE CLINIC | Age: 75
End: 2020-03-20
Payer: MEDICARE

## 2020-03-20 VITALS
WEIGHT: 220 LBS | TEMPERATURE: 97.4 F | HEART RATE: 66 BPM | SYSTOLIC BLOOD PRESSURE: 146 MMHG | DIASTOLIC BLOOD PRESSURE: 78 MMHG | OXYGEN SATURATION: 97 % | BODY MASS INDEX: 37.76 KG/M2

## 2020-03-20 PROCEDURE — 99214 OFFICE O/P EST MOD 30 MIN: CPT | Performed by: NURSE PRACTITIONER

## 2020-03-20 RX ORDER — PETROLATUM 42 G/100G
OINTMENT TOPICAL
Qty: 100 G | Refills: 0 | Status: SHIPPED | OUTPATIENT
Start: 2020-03-20 | End: 2020-10-08 | Stop reason: SDUPTHER

## 2020-03-20 RX ORDER — BENZONATATE 100 MG/1
100 CAPSULE ORAL 3 TIMES DAILY PRN
Qty: 15 CAPSULE | Refills: 0 | Status: SHIPPED | OUTPATIENT
Start: 2020-03-20 | End: 2020-03-25

## 2020-03-20 RX ORDER — HYDROCODONE BITARTRATE AND ACETAMINOPHEN 5; 325 MG/1; MG/1
TABLET ORAL
COMMUNITY
Start: 2020-03-17 | End: 2020-10-11

## 2020-03-20 ASSESSMENT — ENCOUNTER SYMPTOMS
VOMITING: 0
DIARRHEA: 0
CHEST TIGHTNESS: 0
ABDOMINAL PAIN: 0
ABDOMINAL DISTENTION: 0
NAUSEA: 0
SHORTNESS OF BREATH: 0
COUGH: 1
WHEEZING: 0
RHINORRHEA: 0
SORE THROAT: 0

## 2020-03-20 NOTE — PROGRESS NOTES
CHIEF COMPLAINT  Chief Complaint   Patient presents with    Cough        HPI   Marija Duron is a 76 y.o. female who presents to the office complaining of intermittent productive cough since before Christmas. Patient reports that she was diagnosed with bronchitis then but symptoms are not getting any better. Patient reports that sometimes coughs up clear foamy sputum. No dizziness or lightheadedness. No headache or blurred vision. No fever or chills. Patient denies any chest pain or chest tightness. No shortness of breath. Patient reports seeing cardiology this week with a well checkup and does not have to see them for 4 more months. No complaints of numbness or tingling in extremities. No unilateral weakness. Patient reports that she did have diarrhea before Christmas but that has cleared up since seeing gastroenterologist.  No nausea, vomiting, or diarrhea. No abdominal pain or discomfort. No urinary complaints. Patient reports taking medications as prescribed. Patient reports that she is had issues ongoing with her lips where they become dry and peel. Patient reports that she is biting on them and has used Vaseline with no relief. Patient also says that she feels like the end of her tongue is numb over the past few months. Patient denies any known sick contacts. Patient reports that she tries to stay healthy and washes her hands frequently. Patient reports that she needs a new nebulizer hose and cup. No hemoptysis. No other complaints, modifying factors or associated symptoms. Nursing notes reviewed.    Past Medical History:   Diagnosis Date    CHF (congestive heart failure) (HCC)     Chronic back pain     Hypertension     Stroke (cerebrum) (HCC)     Ulcer, gastric, acute      Past Surgical History:   Procedure Laterality Date    UPPER GASTROINTESTINAL ENDOSCOPY  12/06/2019    hiatal hernia    UPPER GASTROINTESTINAL ENDOSCOPY N/A 12/6/2019    EGD W/ANES. (9:20) performed by Alexandra Bautista MD at [id number]     No family history on file. Social History     Socioeconomic History    Marital status:       Spouse name: Not on file    Number of children: Not on file    Years of education: Not on file    Highest education level: Not on file   Occupational History    Not on file   Social Needs    Financial resource strain: Not on file    Food insecurity     Worry: Not on file     Inability: Not on file    Transportation needs     Medical: Not on file     Non-medical: Not on file   Tobacco Use    Smoking status: Former Smoker     Years: 35.00     Types: Cigarettes     Last attempt to quit: 1994     Years since quittin.3    Smokeless tobacco: Never Used   Substance and Sexual Activity    Alcohol use: Never     Frequency: Never    Drug use: Never    Sexual activity: Not Currently   Lifestyle    Physical activity     Days per week: Not on file     Minutes per session: Not on file    Stress: Not on file   Relationships    Social connections     Talks on phone: Not on file     Gets together: Not on file     Attends Alevism service: Not on file     Active member of club or organization: Not on file     Attends meetings of clubs or organizations: Not on file     Relationship status: Not on file    Intimate partner violence     Fear of current or ex partner: Not on file     Emotionally abused: Not on file     Physically abused: Not on file     Forced sexual activity: Not on file   Other Topics Concern    Not on file   Social History Narrative    Not on file     Current Outpatient Medications   Medication Sig Dispense Refill    HYDROcodone-acetaminophen (NORCO) 5-325 MG per tablet TAKE 1 TABLET BY MOUTH AS NEEDED DAILY      Probiotic Product (PROBIOTIC DAILY PO) Take by mouth      benzonatate (TESSALON PERLES) 100 MG capsule Take 1 capsule by mouth 3 times daily as needed for Cough 15 capsule 0    mineral oil-hydrophilic petrolatum (HYDROPHOR) chest tightness, shortness of breath and wheezing. Cardiovascular: Negative for chest pain and leg swelling. Gastrointestinal: Negative for abdominal distention, abdominal pain, diarrhea, nausea and vomiting. Genitourinary: Negative for dysuria, frequency, hematuria and urgency. Musculoskeletal: Negative for gait problem and myalgias. Skin: Negative for rash. Dried peeling skin to lower lip   Neurological: Negative for dizziness, weakness, light-headedness, numbness and headaches. Psychiatric/Behavioral: Negative for sleep disturbance. PHYSICAL EXAM  BP (!) 146/78   Pulse 66   Temp 97.4 °F (36.3 °C) (Oral)   Wt 220 lb (99.8 kg)   SpO2 97%   BMI 37.76 kg/m²   Physical Exam  Vitals signs reviewed. Constitutional:       General: She is not in acute distress. Appearance: Normal appearance. She is well-developed. She is not diaphoretic. HENT:      Head: Normocephalic and atraumatic. Right Ear: Tympanic membrane normal.      Left Ear: Tympanic membrane normal.      Nose: Nose normal.      Mouth/Throat:      Mouth: Mucous membranes are moist.      Pharynx: Oropharynx is clear. No oropharyngeal exudate or posterior oropharyngeal erythema. Eyes:      General: No scleral icterus. Right eye: No discharge. Left eye: No discharge. Pupils: Pupils are equal, round, and reactive to light. Neck:      Musculoskeletal: Normal range of motion and neck supple. Vascular: No JVD. Cardiovascular:      Rate and Rhythm: Normal rate and regular rhythm. Pulses: Normal pulses. Pulmonary:      Effort: Pulmonary effort is normal. No respiratory distress. Breath sounds: No stridor. No wheezing, rhonchi or rales. Chest:      Chest wall: No tenderness. Abdominal:      General: Bowel sounds are normal. There is no distension. Palpations: Abdomen is soft. Tenderness: There is no abdominal tenderness. There is no guarding.    Musculoskeletal: Normal

## 2020-03-20 NOTE — PATIENT INSTRUCTIONS
Please read the healthy family handout that you were given and share it with your family. Please compare this printed medication list with your medications at home to be sure they are the same. If you have any medications that are different please contact us immediately at [phone]. Also review your allergies that we have listed, these may also include medications that you have not been able to tolerate, make sure everything listed is correct. If you have any allergies that are different please contact us immediately at [phone].

## 2020-03-25 RX ORDER — NITROGLYCERIN 0.4 MG/1
0.4 TABLET SUBLINGUAL EVERY 5 MIN PRN
Qty: 25 TABLET | Refills: 0 | Status: SHIPPED | OUTPATIENT
Start: 2020-03-25

## 2020-04-02 ENCOUNTER — VIRTUAL VISIT (OUTPATIENT)
Dept: FAMILY MEDICINE CLINIC | Age: 75
End: 2020-04-02
Payer: MEDICARE

## 2020-04-02 PROCEDURE — 99442 PR PHYS/QHP TELEPHONE EVALUATION 11-20 MIN: CPT | Performed by: NURSE PRACTITIONER

## 2020-04-02 RX ORDER — GUAIFENESIN AND CODEINE PHOSPHATE 100; 10 MG/5ML; MG/5ML
5 SOLUTION ORAL 2 TIMES DAILY PRN
Qty: 70 ML | Refills: 0 | Status: SHIPPED | OUTPATIENT
Start: 2020-04-02 | End: 2020-04-09

## 2020-04-02 NOTE — PROGRESS NOTES
the patient and/or health care decision maker about ongoing chronic cough, dry cracked lips, bad taste in mouth, and esophageal dysphasia. Patient has seen GI as well as myself for same symptoms but reports no improvement despite all prescribed medications and over-the-counter therapy. .   Details of this discussion including any medical advice provided: I did speak with patient regarding cough suppressant medication and she reports that [employer] do not work therefore I did speak with patient regarding guaifenesin codeine cough syrup to take only as needed for cough suppressants. Patient verbalizes and acknowledges and aware of side effects that could cause drowsiness. Patient reports that she is coughing nonstop and needs some sort of relief. I did speak with patient regarding home prescribed medications and side effects. Patient has been on lisinopril per her reports for nearly 10 years. I did speak with patient regarding side effects contributed with lisinopril that could be related to her cough. I offered to change her blood pressure medication due to possible side effects and reevaluate however patient declines and does not want to change medication. Patient reports that cough has only been since December. I did speak with patient regarding environmental allergies or irritants that may be causing cough however patient refuses that that is the case as well. Patient instructed to follow-up in 4 weeks regarding cough and we could rediscuss medication changes at that time. Patient was also instructed to follow-up with GI regarding further issues with esophageal dysphasia and decreased appetite. Patient verbalizes and acknowledges. I did speak with patient regarding nystatin solution to see if that would help with poor taste in mouth. Patient unsure if it is thrush or not. I did discuss risks associated with taking medications as well as if improvement to let us know.   Patient verbalizes and acknowledges. I affirm this is a Patient Initiated Episode with an Established Patient who has not had a related appointment within my department in the past 7 days or scheduled within the next 24 hours. Total Time: minutes: 11-20 minutes    Note: not billable if this call serves to triage the patient into an appointment for the relevant concern      [place]       This telephone encounter visit was done with patient's consent to reduce the risk of exposure to COVID-19 and provide continuity of   care for an established patient. This chart was generated using dragon.

## 2020-04-07 ENCOUNTER — TELEPHONE (OUTPATIENT)
Dept: FAMILY MEDICINE CLINIC | Age: 75
End: 2020-04-07

## 2020-04-09 ENCOUNTER — HOSPITAL ENCOUNTER (OUTPATIENT)
Dept: GENERAL RADIOLOGY | Age: 75
Discharge: HOME | End: 2020-04-09
Payer: MEDICARE

## 2020-04-09 ENCOUNTER — HOSPITAL ENCOUNTER (OUTPATIENT)
Age: 75
Discharge: HOME | End: 2020-04-09
Payer: MEDICARE

## 2020-04-09 PROCEDURE — 71046 X-RAY EXAM CHEST 2 VIEWS: CPT

## 2020-04-14 ENCOUNTER — HOSPITAL ENCOUNTER (EMERGENCY)
Age: 75
Discharge: HOME | End: 2020-04-14
Attending: EMERGENCY MEDICINE
Payer: MEDICARE

## 2020-04-14 ENCOUNTER — APPOINTMENT (OUTPATIENT)
Dept: CT IMAGING | Age: 75
End: 2020-04-14
Payer: MEDICARE

## 2020-04-14 ENCOUNTER — VIRTUAL VISIT (OUTPATIENT)
Dept: FAMILY MEDICINE CLINIC | Age: 75
End: 2020-04-14
Payer: MEDICARE

## 2020-04-14 VITALS
TEMPERATURE: 98.9 F | BODY MASS INDEX: 38.45 KG/M2 | OXYGEN SATURATION: 98 % | RESPIRATION RATE: 18 BRPM | DIASTOLIC BLOOD PRESSURE: 82 MMHG | HEIGHT: 64 IN | WEIGHT: 225.25 LBS | SYSTOLIC BLOOD PRESSURE: 187 MMHG | HEART RATE: 64 BPM

## 2020-04-14 LAB
A/G RATIO: 1.1 (ref 1.1–2.2)
ALBUMIN SERPL-MCNC: 4.1 G/DL (ref 3.4–5)
ALP BLD-CCNC: 88 U/L (ref 40–129)
ALT SERPL-CCNC: 13 U/L (ref 10–40)
ANION GAP SERPL CALCULATED.3IONS-SCNC: 12 MMOL/L (ref 3–16)
AST SERPL-CCNC: 19 U/L (ref 15–37)
BASOPHILS ABSOLUTE: 0.1 K/UL (ref 0–0.2)
BASOPHILS RELATIVE PERCENT: 1 %
BILIRUB SERPL-MCNC: 0.4 MG/DL (ref 0–1)
BUN BLDV-MCNC: 14 MG/DL (ref 7–20)
CALCIUM SERPL-MCNC: 10 MG/DL (ref 8.3–10.6)
CHLORIDE BLD-SCNC: 97 MMOL/L (ref 99–110)
CO2: 31 MMOL/L (ref 21–32)
CREAT SERPL-MCNC: 0.6 MG/DL (ref 0.6–1.2)
EOSINOPHILS ABSOLUTE: 0.6 K/UL (ref 0–0.6)
EOSINOPHILS RELATIVE PERCENT: 9.3 %
GFR AFRICAN AMERICAN: >60
GFR NON-AFRICAN AMERICAN: >60
GLOBULIN: 3.7 G/DL
GLUCOSE BLD-MCNC: 102 MG/DL (ref 70–99)
HCT VFR BLD CALC: 43.7 % (ref 36–48)
HEMOGLOBIN: 14.3 G/DL (ref 12–16)
LYMPHOCYTES ABSOLUTE: 2 K/UL (ref 1–5.1)
LYMPHOCYTES RELATIVE PERCENT: 33 %
MCH RBC QN AUTO: 28.7 PG (ref 26–34)
MCHC RBC AUTO-ENTMCNC: 32.8 G/DL (ref 31–36)
MCV RBC AUTO: 87.7 FL (ref 80–100)
MONOCYTES ABSOLUTE: 0.6 K/UL (ref 0–1.3)
MONOCYTES RELATIVE PERCENT: 9.1 %
NEUTROPHILS ABSOLUTE: 2.9 K/UL (ref 1.7–7.7)
NEUTROPHILS RELATIVE PERCENT: 47.6 %
PDW BLD-RTO: 14 % (ref 12.4–15.4)
PLATELET # BLD: 226 K/UL (ref 135–450)
PMV BLD AUTO: 8.3 FL (ref 5–10.5)
POTASSIUM REFLEX MAGNESIUM: 4.1 MMOL/L (ref 3.5–5.1)
RBC # BLD: 4.99 M/UL (ref 4–5.2)
SODIUM BLD-SCNC: 140 MMOL/L (ref 136–145)
TOTAL PROTEIN: 7.8 G/DL (ref 6.4–8.2)
TROPONIN: <0.01 NG/ML
WBC # BLD: 6.1 K/UL (ref 4–11)

## 2020-04-14 PROCEDURE — 85025 COMPLETE CBC W/AUTO DIFF WBC: CPT

## 2020-04-14 PROCEDURE — G2012 BRIEF CHECK IN BY MD/QHP: HCPCS | Performed by: NURSE PRACTITIONER

## 2020-04-14 PROCEDURE — 70450 CT HEAD/BRAIN W/O DYE: CPT

## 2020-04-14 PROCEDURE — 6360000002 HC RX W HCPCS: Performed by: EMERGENCY MEDICINE

## 2020-04-14 PROCEDURE — 2580000003 HC RX 258: Performed by: EMERGENCY MEDICINE

## 2020-04-14 PROCEDURE — 93005 ELECTROCARDIOGRAM TRACING: CPT | Performed by: EMERGENCY MEDICINE

## 2020-04-14 PROCEDURE — 96374 THER/PROPH/DIAG INJ IV PUSH: CPT

## 2020-04-14 PROCEDURE — 80053 COMPREHEN METABOLIC PANEL: CPT

## 2020-04-14 PROCEDURE — 99284 EMERGENCY DEPT VISIT MOD MDM: CPT

## 2020-04-14 PROCEDURE — 36415 COLL VENOUS BLD VENIPUNCTURE: CPT

## 2020-04-14 PROCEDURE — 84484 ASSAY OF TROPONIN QUANT: CPT

## 2020-04-14 RX ORDER — KETOROLAC TROMETHAMINE 30 MG/ML
15 INJECTION, SOLUTION INTRAMUSCULAR; INTRAVENOUS ONCE
Status: COMPLETED | OUTPATIENT
Start: 2020-04-14 | End: 2020-04-14

## 2020-04-14 RX ORDER — 0.9 % SODIUM CHLORIDE 0.9 %
1000 INTRAVENOUS SOLUTION INTRAVENOUS ONCE
Status: COMPLETED | OUTPATIENT
Start: 2020-04-14 | End: 2020-04-14

## 2020-04-14 RX ADMIN — SODIUM CHLORIDE 1000 ML: 9 INJECTION, SOLUTION INTRAVENOUS at 17:17

## 2020-04-14 RX ADMIN — KETOROLAC TROMETHAMINE 15 MG: 30 INJECTION, SOLUTION INTRAMUSCULAR at 17:16

## 2020-04-14 ASSESSMENT — PAIN DESCRIPTION - PAIN TYPE
TYPE: ACUTE PAIN
TYPE: CHRONIC PAIN

## 2020-04-14 ASSESSMENT — PAIN DESCRIPTION - LOCATION
LOCATION: BACK
LOCATION: HEAD

## 2020-04-14 ASSESSMENT — PAIN SCALES - GENERAL
PAINLEVEL_OUTOF10: 9
PAINLEVEL_OUTOF10: 9
PAINLEVEL_OUTOF10: 3

## 2020-04-14 NOTE — ED PROVIDER NOTES
[address]. [address] [facility] COMPLAINT  Headache (pt did tele-visit with pcp today. reports has been having pain in top left side of her head. they have been on and off x 1 week. reports has had 2 episodes of dizziness since headaches started. pt alert and oriented. amb to bed 8 with cane. denies any falls. also concerned of bilat leg lumps in lower legs that are painful. denies any known injury. denies any fall. neuro intact)       HISTORY OF PRESENT ILLNESS  Arden Riley is a 76 y.o. female with past medical history including anxiety, irritable bowel syndrome with diarrhea, chronic low back pain, CHF, hypertension, stroke, gastric ulcer, and as below who presents to the ED with complaint of headache and \"knots\" in bilateral lower extremities. Chart review reveals a lesion completed a tele-visit with her PCP this afternoon. She described a left-sided headache and neck pain over the past month, with symptoms occurring intermittently. She denied vision change or unilateral weakness. She does describe 2 episodes of dizziness without fall, head injury, or loss of consciousness. The leg complaints are chronic problem and in fact patient did undergo a duplex ultrasound of the right lower extremity after complaining of this to her gastroenterologist in February. The study was negative for DVT or SVT. The patient was advised to present to the emergency department for further evaluation and treatment of the headaches and dizzy spells. To me, the patient complains of intermittent headache x2 weeks, poorly characterized, located to the left side of her head, mild to moderate in intensity, no radiation, no exacerbating alleviating factors, associated with occasional lightheadedness. Denies fever, chills, chest pain, shortness of breath, nausea, vomiting, diarrhea, abdominal pain, focal weakness or numbness, vision change. She complains of small lumps in the bilateral lower extremities, as above.   No recent trauma. No other complaints, modifying factors or associated symptoms. I have reviewed the following from the nursing documentation:    Past Medical History:   Diagnosis Date    CHF (congestive heart failure) (HCC)     Chronic back pain     Hypertension     Stroke (cerebrum) (HCC)     Ulcer, gastric, acute      Past Surgical History:   Procedure Laterality Date    UPPER GASTROINTESTINAL ENDOSCOPY  2019    hiatal hernia    UPPER GASTROINTESTINAL ENDOSCOPY N/A 2019    EGD W/ANES. (9:20) performed by Maeve Means MD at [address] [place] [address] Real     History reviewed. No pertinent family history. Social History     Socioeconomic History    Marital status:       Spouse name: Not on file    Number of children: Not on file    Years of education: Not on file    Highest education level: Not on file   Occupational History    Not on file   Social Needs    Financial resource strain: Not on file    Food insecurity     Worry: Not on file     Inability: Not on file    Transportation needs     Medical: Not on file     Non-medical: Not on file   Tobacco Use    Smoking status: Former Smoker     Years: 35.00     Types: Cigarettes     Last attempt to quit: 1994     Years since quittin.4    Smokeless tobacco: Never Used   Substance and Sexual Activity    Alcohol use: Never     Frequency: Never    Drug use: Never    Sexual activity: Not Currently   Lifestyle    Physical activity     Days per week: Not on file     Minutes per session: Not on file    Stress: Not on file   Relationships    Social connections     Talks on phone: Not on file     Gets together: Not on file     Attends [employer] service: Not on file     Active member of club or organization: Not on file     Attends meetings of clubs or organizations: Not on file     Relationship status: Not on file    Intimate partner violence     Fear of current or ex partner: Not on file     Emotionally abused: Not on file Physically abused: Not on file     Forced sexual activity: Not on file   Other Topics Concern    Not on file   Social History Narrative    Not on file     No current facility-administered medications for this encounter. Current Outpatient Medications   Medication Sig Dispense Refill    Cholecalciferol (VITAMIN D3 PO) Take by mouth      nitroGLYCERIN (NITROSTAT) 0.4 MG SL tablet Place 1 tablet under the tongue every 5 minutes as needed for Chest pain up to max of 3 total doses.  If no relief after 1 dose, call 911. 25 tablet 0    HYDROcodone-acetaminophen (NORCO) 5-325 MG per tablet TAKE 1 TABLET BY MOUTH AS NEEDED DAILY      Probiotic Product (PROBIOTIC DAILY PO) Take by mouth      omeprazole (PRILOSEC) 40 MG delayed release capsule Take 1 capsule by mouth 2 times daily (before meals) 180 capsule 3    DULoxetine (CYMBALTA) 30 MG extended release capsule Take 2 capsules by mouth 2 times daily 120 capsule 0    lisinopril (PRINIVIL;ZESTRIL) 40 MG tablet Take 1 tablet by mouth daily 90 tablet 0    butalbital-acetaminophen-caffeine (FIORICET, ESGIC) -40 MG per tablet Take 1-2 tablets by mouth daily as needed for Headaches 180 tablet 1    furosemide (LASIX) 20 MG tablet Take 20 mg by mouth daily       doxazosin (CARDURA) 4 MG tablet Take 4 mg by mouth nightly      metoprolol succinate (TOPROL XL) 100 MG extended release tablet Take 100 mg by mouth daily      isosorbide mononitrate (IMDUR) 30 MG extended release tablet Take 60 mg by mouth daily       aspirin 81 MG EC tablet Take 81 mg by mouth daily      simvastatin (ZOCOR) 20 MG tablet Take 20 mg by mouth nightly      Multiple Vitamins-Minerals (HAIR SKIN AND NAILS FORMULA PO) Take by mouth      Multiple Vitamins-Minerals (OCUVITE PO) Take by mouth      Multiple Vitamin (DAILY VITAMIN PO) Take by mouth      nystatin (MYCOSTATIN) 570858 UNIT/ML suspension Take 5 mLs by mouth 4 times daily 200 mL 0    mineral oil-hydrophilic petrolatum 36.0 - 48.0 %    MCV 87.7 80.0 - 100.0 fL    MCH 28.7 26.0 - 34.0 pg    MCHC 32.8 31.0 - 36.0 g/dL    RDW 14.0 12.4 - 15.4 %    Platelets 149 274 - 720 K/uL    MPV 8.3 5.0 - 10.5 fL    Neutrophils % 47.6 %    Lymphocytes % 33.0 %    Monocytes % 9.1 %    Eosinophils % 9.3 %    Basophils % 1.0 %    Neutrophils Absolute 2.9 1.7 - 7.7 K/uL    Lymphocytes Absolute 2.0 1.0 - 5.1 K/uL    Monocytes Absolute 0.6 0.0 - 1.3 K/uL    Eosinophils Absolute 0.6 0.0 - 0.6 K/uL    Basophils Absolute 0.1 0.0 - 0.2 K/uL   Comprehensive Metabolic Panel w/ Reflex to MG   Result Value Ref Range    Sodium 140 136 - 145 mmol/L    Potassium reflex Magnesium 4.1 3.5 - 5.1 mmol/L    Chloride 97 (L) 99 - 110 mmol/L    CO2 31 21 - 32 mmol/L    Anion Gap 12 3 - 16    Glucose 102 (H) 70 - 99 mg/dL    BUN 14 7 - 20 mg/dL    CREATININE 0.6 0.6 - 1.2 mg/dL    GFR Non-African American >60 >60    GFR African American >60 >60    Calcium 10.0 8.3 - 10.6 mg/dL    Total Protein 7.8 6.4 - 8.2 g/dL    Alb 4.1 3.4 - 5.0 g/dL    Albumin/Globulin Ratio 1.1 1.1 - 2.2    Total Bilirubin 0.4 0.0 - 1.0 mg/dL    Alkaline Phosphatase 88 40 - 129 U/L    ALT 13 10 - 40 U/L    AST 19 15 - 37 U/L    Globulin 3.7 g/dL   Troponin   Result Value Ref Range    Troponin <0.01 <0.01 ng/mL   EKG 12 Lead   Result Value Ref Range    Ventricular Rate 62 BPM    Atrial Rate 62 BPM    P-R Interval 160 ms    QRS Duration 80 ms    Q-T Interval 436 ms    QTc Calculation (Bazett) 442 ms    P Axis 36 degrees    R Axis -11 degrees    T Axis 18 degrees    Diagnosis       Normal sinus rhythmMinimal voltage criteria for LVH, may be normal variantNonspecific ST abnormalityNo previous ECGs availableConfirmed by RON SAMAYOA MD ([id number]) on 4/15/2020 8:16:11 AM       RADIOLOGY  I have reviewed all radiographic studies for this visit. CT Head WO Contrast   Final Result   No acute intracranial findings. ECG  EKG interpreted by myself.    Rate: normal  Rhythm: NSR with minimal voltage criteria for LVH  Axis: normal  Intervals: within normal limits  ST Segments: no acute abnormality  T waves: no acute abnormality  Comparison: no prior  Impression: NSR with minimal voltage criteria for LVH       ED COURSE/MDM  Patient seen and evaluated. Old records reviewed. Labs and imaging reviewed and results discussed with patient/family to extent possible. This is a 66-year-old female who presents with complaint of intermittent headache, intermittent lightheadedness, and bilateral lower extremity \"knots\". On arrival, the patient is mildly hypertensive with otherwise reassuring vital signs. She is afebrile and nontoxic. Her head is atraumatic. Her neurological exam is nonfocal.  With respect to the complaint of headache, the symptoms are intermittent. Headaches are not worst, not first, not sudden in onset. Will obtain CT head to rule out mass. Presentation is not consistent with intracranial hemorrhage and I do not believe the patient would benefit from either lumbar puncture or CT angiography of the head or neck at this time. With respect to the complaint of lightheadedness, the patient is not symptomatic with respect to such at this time. I do not believe her presentation is consistent with TIA. Chart review reveals patient underwent echocardiography in November 2019 that revealed normal ejection fraction with diastolic dysfunction, LVH and dilated left atrium, mild aortic insufficiency and mitral regurgitation. I do not believe the patient would necessitate admission for acquisition of a repeat echocardiogram as her presentation seems less consistent with presyncope and there are no episodes of syncope described. We will also obtain EKG and screening laboratory studies. Will administer IV fluids and Toradol for symptom control.   If labs and imaging diagnostics are reassuring, given reassuring exam and vital signs and only mild symptoms at present, believe the patient would be appropriate for discharged home with follow-up with her PCP in the outpatient setting. She is already prescribed Fioricet for headaches and has not taken the Fioricet today. PCP may consider a neurology referral if patient continues to have headache symptoms. With respect to complaint of lower extremity discomfort, there is no significant peripheral edema and the patient is already undergone duplex ultrasound of the right lower extremity that was negative for DVT. I do not believe the patient is consistent with DVT. The bumps she describes likely represent venous calcifications. There is nothing objective on my examination and there is no history of trauma. No indication for lower extremity imaging at this time. The patient was already advised by her cardiologist to attempt warm compresses to try to treat her symptoms and she has not been doing this consistently. I advised the same. Panel reveals no significant electrolyte abnormalities or creatinine elevation. Glucose is within normal limits. Troponin within normal limits. CBC without leukocytosis or significant anemia. Given reassuring vital signs, exam, diagnostics, believe patient is appropriate for discharged home with further management in the outpatient setting. Advised close follow-up with PCP in several days and usual strict return precautions for new or worsening symptoms communicated. During the patient's ED course, the patient was given:  Medications   ketorolac (TORADOL) injection 15 mg (15 mg Intravenous Given 4/14/20 1716)   0.9 % sodium chloride bolus (0 mLs Intravenous Stopped 4/14/20 1751)        All questions were answered and the patient/family expressed understanding and agreement with the plan. PROCEDURES  None    CRITICAL CARE  N/A    CLINICAL IMPRESSION  1.  Nonintractable episodic headache, unspecified headache type        DISPOSITION   discharge    Condition: stable    Roberto Remy MD    Note: This chart was created using voice recognition dictation software. Efforts were made by me to ensure accuracy, however some errors may be present due to limitations of this technology and occasionally words are not transcribed correctly.         Roberto Remy MD  04/15/20 2006

## 2020-04-14 NOTE — ED NOTES
Instructed to take regular pain  meds fioricet and hydrocodone as needed. Home and rest. Push fluids. F/u with pcp this week. To car in w/c. Pt v/u instructed to take bp daily in am and record and take to pcp this week.  Pt v/u     Luis Kunz RN  04/14/20 Tima Elizondo

## 2020-04-15 ENCOUNTER — CARE COORDINATION (OUTPATIENT)
Dept: CARE COORDINATION | Age: 75
End: 2020-04-15

## 2020-04-15 LAB
EKG ATRIAL RATE: 62 BPM
EKG DIAGNOSIS: NORMAL
EKG P AXIS: 36 DEGREES
EKG P-R INTERVAL: 160 MS
EKG Q-T INTERVAL: 436 MS
EKG QRS DURATION: 80 MS
EKG QTC CALCULATION (BAZETT): 442 MS
EKG R AXIS: -11 DEGREES
EKG T AXIS: 18 DEGREES
EKG VENTRICULAR RATE: 62 BPM

## 2020-04-15 PROCEDURE — 93010 ELECTROCARDIOGRAM REPORT: CPT | Performed by: INTERNAL MEDICINE

## 2020-04-16 ENCOUNTER — TELEPHONE (OUTPATIENT)
Dept: PULMONOLOGY | Age: 75
End: 2020-04-16

## 2020-04-18 ENCOUNTER — CARE COORDINATION (OUTPATIENT)
Dept: CARE COORDINATION | Age: 75
End: 2020-04-18

## 2020-04-18 NOTE — CARE COORDINATION
Patient contacted regarding COVID-19 risk and screening. Care Transition Nurse/ Ambulatory Care Manager contacted the patient by telephone to perform follow-up assessment. Verified name and  with patient as identifiers. Patient has following risk factors of: PVD; HTN, rheumatoid arthritis w/rheumatoid factor; reports she was told she has Lupus also self reports HF. Symptoms reviewed with patient who verbalized the following symptoms: pain or aching joints, cough and no new symptomsStates her cough has been present since the fall. Not worse, not better. Reports pain of joints is severe, rates 9/10    States pain disrupts sleep. Pain has brought her to tears. Out of Norco. Tylenol not sufficiently addressing pain. Spoke w/PCP care team earlier this week, but does not have upcoming appt scheduled w/PCP. States \"I've had a provider tell me every time I talk to them it's something different - I can't help that. \"  States her only upcoming provider visit scheduled is w/[facility] for ophthalmology f/u scheduled 20  Struggling w/ADLs d/t joint pain. Cg support has been spotty - assigned cg's are not consistent in delivery of service. One is more consistent that the other who often reports car problems - she has not reported to Rian Gabriel RN CM @ [address] d/t does not want the cg to lose her job (says cg aide has children). She does have a close friend who helps w/transportation support, taking her to grocery store and other ADLs (taking out garbage) when he is capable. He also has limited availability & functional ability - has unilateral AKA. ACM unable to reach anyone @ [address], noting likeliness d/t no wknd hours. No answering machine activated. ACM encouraged pt to review w/CM nurse Rian Gabriel @ [facility] re concerns about inconsistent cg service. ACM will also reattempt f/u w/[facility] early this coming week. Due to worsening symptoms encounter was routed to provider for escalation.

## 2020-04-20 NOTE — TELEPHONE ENCOUNTER
Patient can establish follow-up appointment if needed however I did have an appointment with her on 4/14 for a follow-up as well as new issues in which she was sent to the emergency department for evaluation and discharged home. Patient has been sent to referrals for other chronic issues and declined the assistance. Also verify if patient is in pain management or who is prescribing Norco and what for?

## 2020-04-20 NOTE — TELEPHONE ENCOUNTER
I called Patient advised her she would need to contact pain management to get her [place] refill. Also advised her we need to schedule her regular appointment with cristal sometime in May when we are able to see patient's back in the office.

## 2020-04-23 ENCOUNTER — CARE COORDINATION (OUTPATIENT)
Dept: CARE COORDINATION | Age: 75
End: 2020-04-23

## 2020-05-19 ENCOUNTER — TELEPHONE (OUTPATIENT)
Dept: FAMILY MEDICINE CLINIC | Age: 75
End: 2020-05-19

## 2020-05-26 ENCOUNTER — OFFICE VISIT (OUTPATIENT)
Dept: FAMILY MEDICINE CLINIC | Age: 75
End: 2020-05-26
Payer: MEDICARE

## 2020-05-26 VITALS
BODY MASS INDEX: 39.65 KG/M2 | OXYGEN SATURATION: 98 % | SYSTOLIC BLOOD PRESSURE: 117 MMHG | WEIGHT: 231 LBS | DIASTOLIC BLOOD PRESSURE: 66 MMHG | TEMPERATURE: 97.3 F | HEART RATE: 62 BPM

## 2020-05-26 PROCEDURE — 99214 OFFICE O/P EST MOD 30 MIN: CPT | Performed by: NURSE PRACTITIONER

## 2020-05-26 ASSESSMENT — ENCOUNTER SYMPTOMS
WHEEZING: 0
COUGH: 1
DIARRHEA: 0
RHINORRHEA: 0
ABDOMINAL PAIN: 0
NAUSEA: 0
SORE THROAT: 0
CHEST TIGHTNESS: 0
SHORTNESS OF BREATH: 0
ABDOMINAL DISTENTION: 0
VOMITING: 0

## 2020-05-26 NOTE — PROGRESS NOTES
[facility]     Ulcer, gastric, acute      Past Surgical History:   Procedure Laterality Date    UPPER GASTROINTESTINAL ENDOSCOPY  2019    hiatal hernia    UPPER GASTROINTESTINAL ENDOSCOPY N/A 2019    EGD W/RUSSELL. (9:20) performed by Jigar Silva MD at [address]     No family history on file. Social History     Socioeconomic History    Marital status:       Spouse name: Not on file    Number of children: Not on file    Years of education: Not on file    Highest education level: Not on file   Occupational History    Not on file   Social Needs    Financial resource strain: Not on file    Food insecurity     Worry: Not on file     Inability: Not on file    Transportation needs     Medical: Not on file     Non-medical: Not on file   Tobacco Use    Smoking status: Former Smoker     Years: 35.00     Types: Cigarettes     Last attempt to quit: 1994     Years since quittin.5    Smokeless tobacco: Never Used   Substance and Sexual Activity    Alcohol use: Never     Frequency: Never    Drug use: Never    Sexual activity: Not Currently   Lifestyle    Physical activity     Days per week: Not on file     Minutes per session: Not on file    Stress: Not on file   Relationships    Social connections     Talks on phone: Not on file     Gets together: Not on file     Attends Scientologist service: Not on file     Active member of club or organization: Not on file     Attends meetings of clubs or organizations: Not on file     Relationship status: Not on file    Intimate partner violence     Fear of current or ex partner: Not on file     Emotionally abused: Not on file     Physically abused: Not on file     Forced sexual activity: Not on file   Other Topics Concern    Not on file   Social History Narrative    Not on file     Current Outpatient Medications   Medication Sig Dispense Refill    Cholecalciferol (VITAMIN D3 PO) Take by mouth      nystatin (MYCOSTATIN) 042148 UNIT/ML suspension Take 5 mLs by mouth 4 times daily 200 mL 0    nitroGLYCERIN (NITROSTAT) 0.4 MG SL tablet Place 1 tablet under the tongue every 5 minutes as needed for Chest pain up to max of 3 total doses. If no relief after 1 dose, call 911. 25 tablet 0    HYDROcodone-acetaminophen (NORCO) 5-325 MG per tablet TAKE 1 TABLET BY MOUTH AS NEEDED DAILY      Probiotic Product (PROBIOTIC DAILY PO) Take by mouth      mineral oil-hydrophilic petrolatum (HYDROPHOR) ointment Apply topically as needed. 100 g 0    omeprazole (PRILOSEC) 40 MG delayed release capsule Take 1 capsule by mouth 2 times daily (before meals) 180 capsule 3    hydrocortisone 2.5 % cream Apply topically 2 times daily. 28 g 1    DULoxetine (CYMBALTA) 30 MG extended release capsule Take 2 capsules by mouth 2 times daily 120 capsule 0    albuterol (ACCUNEB) 1.25 MG/3ML nebulizer solution Inhale 3 mLs into the lungs every 6 hours as needed for Wheezing 360 mL 3    lisinopril (PRINIVIL;ZESTRIL) 40 MG tablet Take 1 tablet by mouth daily 90 tablet 0    butalbital-acetaminophen-caffeine (FIORICET, ESGIC) -40 MG per tablet Take 1-2 tablets by mouth daily as needed for Headaches 180 tablet 1    furosemide (LASIX) 20 MG tablet Take 20 mg by mouth daily       doxazosin (CARDURA) 4 MG tablet Take 4 mg by mouth nightly      metoprolol succinate (TOPROL XL) 100 MG extended release tablet Take 100 mg by mouth daily      isosorbide mononitrate (IMDUR) 30 MG extended release tablet Take 60 mg by mouth daily       aspirin 81 MG EC tablet Take 81 mg by mouth daily      simvastatin (ZOCOR) 20 MG tablet Take 20 mg by mouth nightly      Multiple Vitamins-Minerals (HAIR SKIN AND NAILS FORMULA PO) Take by mouth      Multiple Vitamins-Minerals (OCUVITE PO) Take by mouth      Multiple Vitamin (DAILY VITAMIN PO) Take by mouth       No current facility-administered medications for this visit.       Allergies   Allergen Reactions    Adhesive Tape

## 2020-07-09 ENCOUNTER — OFFICE VISIT (OUTPATIENT)
Dept: VASCULAR SURGERY | Age: 75
End: 2020-07-09
Payer: MEDICARE

## 2020-07-09 VITALS
DIASTOLIC BLOOD PRESSURE: 88 MMHG | WEIGHT: 230 LBS | SYSTOLIC BLOOD PRESSURE: 176 MMHG | HEIGHT: 64 IN | HEART RATE: 76 BPM | BODY MASS INDEX: 39.27 KG/M2 | TEMPERATURE: 96.7 F

## 2020-07-09 PROCEDURE — 99204 OFFICE O/P NEW MOD 45 MIN: CPT | Performed by: SURGERY

## 2020-07-09 ASSESSMENT — ENCOUNTER SYMPTOMS
EYES NEGATIVE: 1
BACK PAIN: 1
GASTROINTESTINAL NEGATIVE: 1

## 2020-07-09 NOTE — PROGRESS NOTES
Subjective:      Patient ID: Naresh Riddle is a 76 y.o. female. HPI Referral from Cade Mills CNP for evaluation of B leg pain and varicose veins. Patient complains of varicose veins and spider veins being present for a significantly long amount of time to the point where she cannot even remember how long they have been there. Her complaints are of bilateral calf and thigh fatigue heaviness and achy discomfort. She also complains of some radiation of pain down her legs. History of significant degenerative back disease for which she has had no treatment other than spinal injections. No history of previous venous interventions. She has not worn compression stockings and reports some swelling in her feet and ankles. Mobility is limited and she spends significant time with her feet dependent. No history of bleeding, ankle ulcerations, dermatitis or SVT. Past Medical History:   Diagnosis Date    Arthritis     [facility])     Chronic back pain     Hypertension     Lupus ([address]     Stroke (cerebrum) ([facility])     Ulcer, gastric, acute      Past Surgical History:   Procedure Laterality Date    UPPER GASTROINTESTINAL ENDOSCOPY  12/06/2019    hiatal hernia    UPPER GASTROINTESTINAL ENDOSCOPY N/A 12/6/2019    EGD W/ANES. (9:20) performed by Dawit Love MD at [facility]   Allergen Reactions    Adhesive Tape      Current Outpatient Medications   Medication Sig Dispense Refill    Cholecalciferol (VITAMIN D3 PO) Take by mouth      nystatin (MYCOSTATIN) 689164 UNIT/ML suspension Take 5 mLs by mouth 4 times daily 200 mL 0    nitroGLYCERIN (NITROSTAT) 0.4 MG SL tablet Place 1 tablet under the tongue every 5 minutes as needed for Chest pain up to max of 3 total doses.  If no relief after 1 dose, call 911. 25 tablet 0    HYDROcodone-acetaminophen (NORCO) 5-325 MG per tablet TAKE 1 TABLET BY MOUTH AS NEEDED DAILY      Probiotic Product (PROBIOTIC DAILY PO) Take by mouth      mineral oil-hydrophilic petrolatum (HYDROPHOR) ointment Apply topically as needed. 100 g 0    hydrocortisone 2.5 % cream Apply topically 2 times daily. 28 g 1    DULoxetine (CYMBALTA) 30 MG extended release capsule Take 2 capsules by mouth 2 times daily 120 capsule 0    albuterol (ACCUNEB) 1.25 MG/3ML nebulizer solution Inhale 3 mLs into the lungs every 6 hours as needed for Wheezing 360 mL 3    lisinopril (PRINIVIL;ZESTRIL) 40 MG tablet Take 1 tablet by mouth daily 90 tablet 0    butalbital-acetaminophen-caffeine (FIORICET, ESGIC) -40 MG per tablet Take 1-2 tablets by mouth daily as needed for Headaches 180 tablet 1    furosemide (LASIX) 20 MG tablet Take 20 mg by mouth daily       doxazosin (CARDURA) 4 MG tablet Take 4 mg by mouth nightly      metoprolol succinate (TOPROL XL) 100 MG extended release tablet Take 100 mg by mouth daily      isosorbide mononitrate (IMDUR) 30 MG extended release tablet Take 60 mg by mouth daily       aspirin 81 MG EC tablet Take 81 mg by mouth daily      simvastatin (ZOCOR) 20 MG tablet Take 20 mg by mouth nightly      Multiple Vitamins-Minerals (HAIR SKIN AND NAILS FORMULA PO) Take by mouth      Multiple Vitamins-Minerals (OCUVITE PO) Take by mouth      Multiple Vitamin (DAILY VITAMIN PO) Take by mouth      omeprazole (PRILOSEC) 40 MG delayed release capsule Take 1 capsule by mouth 2 times daily (before meals) 180 capsule 3     No current facility-administered medications for this visit. Social History     Socioeconomic History    Marital status:       Spouse name: Not on file    Number of children: Not on file    Years of education: Not on file    Highest education level: Not on file   Occupational History    Not on file   Social Needs    Financial resource strain: Not on file    Food insecurity     Worry: Not on file     Inability: Not on file    Transportation needs     Medical: Not on file     Non-medical: Not on file Tobacco Use    Smoking status: Former Smoker     Years: 35.00     Types: Cigarettes     Last attempt to quit: 1994     Years since quittin.6    Smokeless tobacco: Never Used   Substance and Sexual Activity    Alcohol use: Never     Frequency: Never    Drug use: Never    Sexual activity: Not Currently   Lifestyle    Physical activity     Days per week: Not on file     Minutes per session: Not on file    Stress: Not on file   Relationships    Social connections     Talks on phone: Not on file     Gets together: Not on file     Attends Hinduism service: Not on file     Active member of club or organization: Not on file     Attends meetings of clubs or organizations: Not on file     Relationship status: Not on file    Intimate partner violence     Fear of current or ex partner: Not on file     Emotionally abused: Not on file     Physically abused: Not on file     Forced sexual activity: Not on file   Other Topics Concern    Not on file   Social History Narrative    Not on file     No family history on file. Review of Systems   Constitutional: Negative. HENT: Negative. Eyes: Negative. Cardiovascular: Positive for leg swelling. Gastrointestinal: Negative. Endocrine: Negative. Musculoskeletal: Positive for back pain and joint swelling. Skin: Negative. Allergic/Immunologic: Positive for environmental allergies. Neurological: Negative. Hematological: Bruises/bleeds easily. Bruise    Psychiatric/Behavioral: Negative. 15 point review of systems confirmed by this physician personally. Objective:   Physical Exam  Vitals signs and nursing note reviewed. Constitutional:       Appearance: She is obese. HENT:      Head: Normocephalic and atraumatic. Right Ear: External ear normal.      Left Ear: External ear normal.      Nose: Nose normal.      Mouth/Throat:      Mouth: Mucous membranes are moist.      Pharynx: Oropharynx is clear.    Eyes:      Extraocular Movements: Extraocular movements intact. Conjunctiva/sclera: Conjunctivae normal.   Neck:      Musculoskeletal: Normal range of motion and neck supple. Cardiovascular:      Rate and Rhythm: Normal rate and regular rhythm. Pulses: Normal pulses. Heart sounds: Normal heart sounds. Pulmonary:      Effort: Pulmonary effort is normal.      Breath sounds: Normal breath sounds. Abdominal:      General: Bowel sounds are normal.      Palpations: Abdomen is soft. There is no mass. Musculoskeletal:      Right lower leg: Edema (trace) present. Left lower leg: Edema (trace) present. Comments: Water bottle shaped legs   Skin:     General: Skin is warm and dry. Capillary Refill: Capillary refill takes less than 2 seconds. Findings: No lesion or rash. Neurological:      General: No focal deficit present. Mental Status: She is alert. Cranial Nerves: No cranial nerve deficit. Sensory: No sensory deficit. Motor: No weakness. Coordination: Coordination normal.      Gait: Gait normal.   Psychiatric:         Mood and Affect: Mood normal.         Behavior: Behavior normal.         Thought Content: Thought content normal.         Judgment: Judgment normal.       R size  L size   ++  Spider  telangiectasias ++    +  Reticular veins +    Thigh/calf 5-7 mm Varicose   veins Thigh/calf 5-7 mm       Assessment:      1) Nonspecific pain B legs - DDx multiple and unclear  2) Varicose veins B legs  3) Spider veins B legs  4) Swelling B legs      Plan:      In this patient her constellation of symptoms and other medical comorbidities make it difficult to definitively state whether or not she has symptoms attributable both to venous disease that would warrant intervention. She is honest in saying she is against any surgical intervention. Would recommend initiation of knee-high 2030 compression stockings.   A prescription was given and she was encouraged to wear these on a daily basis. Would proceed in the future only if she has complications of venous disease such as stasis dermatitis, stasis ulceration, bleeding or SVT. Patient understands my rationale and will follow-up as needed in the future.         Devang Carpenter MA

## 2020-07-09 NOTE — Clinical Note
Ms. Alfredo Driscoll saw Beena Melton in the office today for evaluation of her leg complaints, varicose veins, spider veins and some edema. While her symptoms may be related to venous insufficiency she has multiple other reasons to explain her uncomfortable legs and edema. At this point I would not recommend any venous intervention but instead have recommended she begin wearing prescription grade compression stockings. A prescription was provided. I will see her back in the future as needed. Thanks for asked me to see her and please let me know if I can help you with any other patients in the future.   Maryjane Cedeño

## 2020-07-28 ENCOUNTER — APPOINTMENT (OUTPATIENT)
Dept: GENERAL RADIOLOGY | Age: 75
End: 2020-07-28
Payer: MEDICARE

## 2020-07-28 ENCOUNTER — HOSPITAL ENCOUNTER (EMERGENCY)
Age: 75
Discharge: HOME | End: 2020-07-28
Attending: EMERGENCY MEDICINE
Payer: MEDICARE

## 2020-07-28 ENCOUNTER — APPOINTMENT (OUTPATIENT)
Dept: CT IMAGING | Age: 75
End: 2020-07-28
Payer: MEDICARE

## 2020-07-28 VITALS
HEIGHT: 64 IN | BODY MASS INDEX: 38.93 KG/M2 | HEART RATE: 89 BPM | OXYGEN SATURATION: 98 % | WEIGHT: 228 LBS | DIASTOLIC BLOOD PRESSURE: 79 MMHG | TEMPERATURE: 98.6 F | RESPIRATION RATE: 16 BRPM | SYSTOLIC BLOOD PRESSURE: 189 MMHG

## 2020-07-28 PROCEDURE — 99284 EMERGENCY DEPT VISIT MOD MDM: CPT

## 2020-07-28 PROCEDURE — 73590 X-RAY EXAM OF LOWER LEG: CPT

## 2020-07-28 PROCEDURE — 6360000002 HC RX W HCPCS: Performed by: EMERGENCY MEDICINE

## 2020-07-28 PROCEDURE — 70450 CT HEAD/BRAIN W/O DYE: CPT

## 2020-07-28 PROCEDURE — 72125 CT NECK SPINE W/O DYE: CPT

## 2020-07-28 PROCEDURE — 90471 IMMUNIZATION ADMIN: CPT | Performed by: EMERGENCY MEDICINE

## 2020-07-28 PROCEDURE — 90715 TDAP VACCINE 7 YRS/> IM: CPT | Performed by: EMERGENCY MEDICINE

## 2020-07-28 RX ADMIN — TETANUS TOXOID, REDUCED DIPHTHERIA TOXOID AND ACELLULAR PERTUSSIS VACCINE, ADSORBED 0.5 ML: 5; 2.5; 8; 8; 2.5 SUSPENSION INTRAMUSCULAR at 20:34

## 2020-07-28 ASSESSMENT — PAIN DESCRIPTION - DESCRIPTORS: DESCRIPTORS: ACHING;DISCOMFORT;HEADACHE

## 2020-07-28 ASSESSMENT — PAIN DESCRIPTION - LOCATION: LOCATION: HEAD;KNEE;BACK

## 2020-07-28 NOTE — ED TRIAGE NOTES
Chief Complaint   Patient presents with    Fall     patient tripped and fell hitting her head on deck railing around one o'clock today    Head Injury

## 2020-07-28 NOTE — ED PROVIDER NOTES
[address] [facility] COMPLAINT  Fall (patient tripped and fell hitting her head on deck railing around one o'clock today) and Head Injury       HISTORY OF PRESENT ILLNESS  Rocky Lin is a 76 y.o. female who presents to the ED following fall. The patient was ambulating and tripped over a piece of wood. Dorothy Bibles to ground onto knees and struck her head on the railing around the deck. Denies LOC. Complains of mild neck pain. Denies new weakness/numbness. Denies chest pain, SOB, nausea, vomiting, diarrhea, abdominal pain. Ambulatory since the accident, which occurred around 1300. No other complaints, modifying factors or associated symptoms. I have reviewed the following from the nursing documentation:    Past Medical History:   Diagnosis Date    Arthritis     [facility]     Chronic back pain     Hypertension     Lupus ([address]     Stroke (cerebrum) ([facility]     Ulcer, gastric, acute      Past Surgical History:   Procedure Laterality Date    UPPER GASTROINTESTINAL ENDOSCOPY  2019    hiatal hernia    UPPER GASTROINTESTINAL ENDOSCOPY N/A 2019    EGD W/ANES. (9:20) performed by Christiano Clayton MD at [id number] [place] [address] Real     History reviewed. No pertinent family history. Social History     Socioeconomic History    Marital status:       Spouse name: Not on file    Number of children: Not on file    Years of education: Not on file    Highest education level: Not on file   Occupational History    Not on file   Social Needs    Financial resource strain: Not on file    Food insecurity     Worry: Not on file     Inability: Not on file    Transportation needs     Medical: Not on file     Non-medical: Not on file   Tobacco Use    Smoking status: Former Smoker     Years: 35.00     Types: Cigarettes     Last attempt to quit: 1994     Years since quittin.7    Smokeless tobacco: Never Used   Substance and Sexual Activity    Alcohol use: Never Frequency: Never    Drug use: Never    Sexual activity: Not Currently   Lifestyle    Physical activity     Days per week: Not on file     Minutes per session: Not on file    Stress: Not on file   Relationships    Social connections     Talks on phone: Not on file     Gets together: Not on file     Attends Jainism service: Not on file     Active member of club or organization: Not on file     Attends meetings of clubs or organizations: Not on file     Relationship status: Not on file    Intimate partner violence     Fear of current or ex partner: Not on file     Emotionally abused: Not on file     Physically abused: Not on file     Forced sexual activity: Not on file   Other Topics Concern    Not on file   Social History Narrative    Not on file     No current facility-administered medications for this encounter. Current Outpatient Medications   Medication Sig Dispense Refill    Cholecalciferol (VITAMIN D3 PO) Take by mouth      nitroGLYCERIN (NITROSTAT) 0.4 MG SL tablet Place 1 tablet under the tongue every 5 minutes as needed for Chest pain up to max of 3 total doses.  If no relief after 1 dose, call 911. 25 tablet 0    HYDROcodone-acetaminophen (NORCO) 5-325 MG per tablet TAKE 1 TABLET BY MOUTH AS NEEDED DAILY      Probiotic Product (PROBIOTIC DAILY PO) Take by mouth      omeprazole (PRILOSEC) 40 MG delayed release capsule Take 1 capsule by mouth 2 times daily (before meals) 180 capsule 3    DULoxetine (CYMBALTA) 30 MG extended release capsule Take 2 capsules by mouth 2 times daily 120 capsule 0    butalbital-acetaminophen-caffeine (FIORICET, ESGIC) -40 MG per tablet Take 1-2 tablets by mouth daily as needed for Headaches 180 tablet 1    furosemide (LASIX) 20 MG tablet Take 20 mg by mouth daily       doxazosin (CARDURA) 4 MG tablet Take 4 mg by mouth nightly      metoprolol succinate (TOPROL XL) 100 MG extended release tablet Take 100 mg by mouth daily      isosorbide mononitrate (IMDUR) 30 MG extended release tablet Take 60 mg by mouth daily       aspirin 81 MG EC tablet Take 81 mg by mouth daily      simvastatin (ZOCOR) 20 MG tablet Take 20 mg by mouth nightly      Multiple Vitamins-Minerals (HAIR SKIN AND NAILS FORMULA PO) Take by mouth      Multiple Vitamins-Minerals (OCUVITE PO) Take by mouth      Multiple Vitamin (DAILY VITAMIN PO) Take by mouth      nystatin (MYCOSTATIN) 933024 UNIT/ML suspension Take 5 mLs by mouth 4 times daily 200 mL 0    mineral oil-hydrophilic petrolatum (HYDROPHOR) ointment Apply topically as needed. 100 g 0    hydrocortisone 2.5 % cream Apply topically 2 times daily. 28 g 1    lisinopril (PRINIVIL;ZESTRIL) 40 MG tablet Take 1 tablet by mouth daily 90 tablet 0     Allergies   Allergen Reactions    Adhesive Tape        REVIEW OF SYSTEMS  10 systems reviewed, pertinent positives and negatives per HPI, otherwise noted to be negative. PHYSICAL EXAM  ED Triage Vitals [07/28/20 1918]   Enc Vitals Group      BP (!) 168/82      Pulse 72      Resp 18      Temp 98.6 °F (37 °C)      Temp Source Oral      SpO2 97 %      Weight 228 lb (103.4 kg)      Height 5' 4\" (1.626 m)      Head Circumference       Peak Flow       Pain Score       Pain Loc       Pain Edu? Excl. in [address]? General appearance: Awake and alert. Cooperative. No acute distress. HENT: Normocephalic. Left frontal contusion without underlying skull defect. Mucous membranes are moist.  Neck: Supple. TTP at the base of the cervical spine without step off or deformity. Eyes: PERRL. EOMI. Heart/Chest: RRR. No murmurs. Lungs: Respirations unlabored. CTAB. Good air exchange. Speaking comfortably in full sentences. Abdomen: Soft. Non-tender. Non-distended. No rebound or guarding. Musculoskeletal: No extremity edema. No deformity. Mild tenderness at the bilateral knees with small ecchymosis overlying the left patella.   Knees are stable to anterior posterior drawer test and valgus and varus stresses. 2+ dorsalis pedis and posterior tibialis pulses. Also mild tenderness to palpation in the bilateral anterior tib-fib without gross deformity noted. All extremities neurovascularly intact. Skin: Warm and dry. No acute rashes. Neurological: Alert and oriented. CN II-XII intact. Strength 5/5 bilateral upper and lower extremities. Sensation intact to light touch. Gait normal.  Psychiatric: Mood/affect: normal    LABS  I have reviewed all labs for this visit. No results found for this visit on 07/28/20. RADIOLOGY  I have reviewed all radiographic studies for this visit. XR TIBIA FIBULA LEFT (2 VIEWS)   Final Result   No acute osseous abnormality of the left tib-fib. Tricompartment   osteoarthritic changes at the knee. XR TIBIA FIBULA RIGHT (2 VIEWS)   Final Result   No acute osseous injury of the right tibia or fibula is identified. CT CERVICAL SPINE WO CONTRAST   Final Result   No acute abnormality of the cervical spine. Multilevel degenerative disc disease and facet arthropathy. CT HEAD WO CONTRAST   Final Result   No acute intracranial abnormality. ED COURSE/MDM  Patient seen and evaluated. Old records reviewed. Labs and imaging reviewed and results discussed with patient/family to extent possible. This is a 69-year-old female who presents following fall. Fall clearly mechanical by history. On arrival patient is GCS 15 with vital signs notable only for mild hypertension. Primary survey intact. Secondary survey notable for contusion at the left forehead, tenderness to palpation at the base of the cervical spine with full strength and sensation of the bilateral upper and lower extremities. Also knee and bilateral lower leg tenderness. Noncontrast head CT no acute intracranial abnormality. CT cervical spine no acute abnormality of the cervical spine.   X-ray of the bilateral lower extremities shows no acute injury of the left or right tibia or fibula. The left knee shows tricompartment osteoarthritic changes of the knee. The right knee shows nothing acute. Tertiary exam with no additional injury identified. Tetanus status updated. Given reassuring exam, vital signs, diagnostics, believe patient appropriate for discharge to home with supportive care, expectant management, and usual strict return precautions for new or worsening symptoms communicated. During the patient's ED course, the patient was given:  Medications   Tetanus-Diphth-Acell Pertussis (BOOSTRIX) injection 0.5 mL (0.5 mLs Intramuscular Given 7/28/20 2034)        All questions were answered and the patient/family expressed understanding and agreement with the plan. PROCEDURES  None    CRITICAL CARE  N/A    CLINICAL IMPRESSION  1. Fall, initial encounter    2. Forehead contusion, initial encounter    3. Knee abrasion, left, initial encounter        DISPOSITION   Discharge     Kris Weir MD    Note: This chart was created using voice recognition dictation software. Efforts were made by me to ensure accuracy, however some errors may be present due to limitations of this technology and occasionally words are not transcribed correctly.        Kris Weir MD  07/28/20 6693

## 2020-08-07 ENCOUNTER — TELEPHONE (OUTPATIENT)
Dept: VASCULAR SURGERY | Age: 75
End: 2020-08-07

## 2020-08-07 NOTE — TELEPHONE ENCOUNTER
Called patient regarding scheduling the venous duplex scan per Dr Walt Kilpatrick. Scheduled Monday at 4:00pm at [facility].  Arrive at 3:30pm.walker

## 2020-08-10 ENCOUNTER — HOSPITAL ENCOUNTER (OUTPATIENT)
Dept: VASCULAR LAB | Age: 75
Discharge: HOME | End: 2020-08-10
Payer: MEDICARE

## 2020-08-10 PROCEDURE — 93971 EXTREMITY STUDY: CPT

## 2020-08-27 ENCOUNTER — OFFICE VISIT (OUTPATIENT)
Dept: FAMILY MEDICINE CLINIC | Age: 75
End: 2020-08-27
Payer: MEDICARE

## 2020-08-27 VITALS
WEIGHT: 229.25 LBS | OXYGEN SATURATION: 95 % | BODY MASS INDEX: 39.35 KG/M2 | TEMPERATURE: 98.5 F | SYSTOLIC BLOOD PRESSURE: 149 MMHG | HEART RATE: 73 BPM | DIASTOLIC BLOOD PRESSURE: 78 MMHG

## 2020-08-27 LAB
A/G RATIO: 1.4 (ref 1.1–2.2)
ALBUMIN SERPL-MCNC: 4 G/DL (ref 3.4–5)
ALP BLD-CCNC: 93 U/L (ref 40–129)
ALT SERPL-CCNC: 14 U/L (ref 10–40)
ANION GAP SERPL CALCULATED.3IONS-SCNC: 11 MMOL/L (ref 3–16)
AST SERPL-CCNC: 17 U/L (ref 15–37)
BASOPHILS ABSOLUTE: 0 K/UL (ref 0–0.2)
BASOPHILS RELATIVE PERCENT: 0.5 %
BILIRUB SERPL-MCNC: 0.3 MG/DL (ref 0–1)
BUN BLDV-MCNC: 16 MG/DL (ref 7–20)
CALCIUM SERPL-MCNC: 9.4 MG/DL (ref 8.3–10.6)
CHLORIDE BLD-SCNC: 97 MMOL/L (ref 99–110)
CHOLESTEROL, FASTING: 155 MG/DL (ref 0–199)
CO2: 30 MMOL/L (ref 21–32)
CREAT SERPL-MCNC: 0.6 MG/DL (ref 0.6–1.2)
EOSINOPHILS ABSOLUTE: 0.4 K/UL (ref 0–0.6)
EOSINOPHILS RELATIVE PERCENT: 5.2 %
GFR AFRICAN AMERICAN: >60
GFR NON-AFRICAN AMERICAN: >60
GLOBULIN: 2.8 G/DL
GLUCOSE BLD-MCNC: 120 MG/DL (ref 70–99)
HCT VFR BLD CALC: 41.5 % (ref 36–48)
HDLC SERPL-MCNC: 57 MG/DL (ref 40–60)
HEMOGLOBIN: 13.9 G/DL (ref 12–16)
LDL CHOLESTEROL CALCULATED: 80 MG/DL
LYMPHOCYTES ABSOLUTE: 1.7 K/UL (ref 1–5.1)
LYMPHOCYTES RELATIVE PERCENT: 24.1 %
MCH RBC QN AUTO: 30.1 PG (ref 26–34)
MCHC RBC AUTO-ENTMCNC: 33.6 G/DL (ref 31–36)
MCV RBC AUTO: 89.7 FL (ref 80–100)
MONOCYTES ABSOLUTE: 0.5 K/UL (ref 0–1.3)
MONOCYTES RELATIVE PERCENT: 7.7 %
NEUTROPHILS ABSOLUTE: 4.4 K/UL (ref 1.7–7.7)
NEUTROPHILS RELATIVE PERCENT: 62.5 %
PDW BLD-RTO: 13.8 % (ref 12.4–15.4)
PLATELET # BLD: 178 K/UL (ref 135–450)
PMV BLD AUTO: 8.8 FL (ref 5–10.5)
POTASSIUM SERPL-SCNC: 4.2 MMOL/L (ref 3.5–5.1)
RBC # BLD: 4.63 M/UL (ref 4–5.2)
SODIUM BLD-SCNC: 138 MMOL/L (ref 136–145)
TOTAL PROTEIN: 6.8 G/DL (ref 6.4–8.2)
TRIGLYCERIDE, FASTING: 88 MG/DL (ref 0–150)
VLDLC SERPL CALC-MCNC: 18 MG/DL
WBC # BLD: 7 K/UL (ref 4–11)

## 2020-08-27 PROCEDURE — 36415 COLL VENOUS BLD VENIPUNCTURE: CPT | Performed by: NURSE PRACTITIONER

## 2020-08-27 PROCEDURE — 99215 OFFICE O/P EST HI 40 MIN: CPT | Performed by: NURSE PRACTITIONER

## 2020-08-27 ASSESSMENT — ENCOUNTER SYMPTOMS
SORE THROAT: 0
ABDOMINAL DISTENTION: 0
RHINORRHEA: 0
ABDOMINAL PAIN: 0
CHEST TIGHTNESS: 0
DIARRHEA: 0
SHORTNESS OF BREATH: 0
NAUSEA: 0
WHEEZING: 0
VOMITING: 0

## 2020-08-27 ASSESSMENT — PATIENT HEALTH QUESTIONNAIRE - PHQ9
SUM OF ALL RESPONSES TO PHQ QUESTIONS 1-9: 2
2. FEELING DOWN, DEPRESSED OR HOPELESS: 1
SUM OF ALL RESPONSES TO PHQ9 QUESTIONS 1 & 2: 2
1. LITTLE INTEREST OR PLEASURE IN DOING THINGS: 1
SUM OF ALL RESPONSES TO PHQ QUESTIONS 1-9: 2

## 2020-08-27 NOTE — PROGRESS NOTES
Cardiovascular: Negative for chest pain and leg swelling. Gastrointestinal: Negative for abdominal distention, abdominal pain, diarrhea, nausea and vomiting. Genitourinary: Negative for dysuria, frequency, hematuria and urgency. Musculoskeletal: Positive for arthralgias. Negative for gait problem and myalgias. Skin: Negative for rash. Neurological: Negative for dizziness, weakness, light-headedness, numbness and headaches. Psychiatric/Behavioral: Negative for self-injury and sleep disturbance. PHYSICAL EXAM  BP (!) 149/78   Pulse 73   Temp 98.5 °F (36.9 °C) (Oral)   Wt 229 lb 4 oz (104 kg)   SpO2 95%   BMI 39.35 kg/m²   Physical Exam  Vitals signs reviewed. Constitutional:       General: She is not in acute distress. Appearance: Normal appearance. She is well-developed. She is obese. She is not diaphoretic. HENT:      Head: Normocephalic and atraumatic. Right Ear: Tympanic membrane normal.      Left Ear: Tympanic membrane normal.      Nose: Nose normal.      Mouth/Throat:      Mouth: Mucous membranes are moist.      Pharynx: Oropharynx is clear. No oropharyngeal exudate or posterior oropharyngeal erythema. Eyes:      General:         Right eye: No discharge. Left eye: No discharge. Pupils: Pupils are equal, round, and reactive to light. Neck:      Musculoskeletal: Normal range of motion and neck supple. Vascular: No JVD. Cardiovascular:      Rate and Rhythm: Normal rate and regular rhythm. Pulses: Normal pulses. Pulmonary:      Effort: Pulmonary effort is normal. No respiratory distress. Breath sounds: No stridor. No wheezing, rhonchi or rales. Chest:      Chest wall: No tenderness. Abdominal:      General: Bowel sounds are normal. There is no distension. Palpations: Abdomen is soft. Tenderness: There is no abdominal tenderness. There is no guarding. Musculoskeletal:         General: No swelling or deformity.    Skin: discomfort. Patient also has intermittent episodes of dizziness but are not related to chest discomfort. Patient instructed that if symptoms were to worsen or change she would need to be reevaluated and I do recommend seeing cardiology sooner. Patient verbalized and acknowledges. Continue with current treatment and management follow-up in 6 months, sooner for new or worsening symptoms.  - CBC Auto Differential  - COMPREHENSIVE METABOLIC PANEL    4. Restless leg syndrome  Stable. Patient takes hydrocodone scribed by pain management. No new or worsening symptoms. Continue with current treatment and management follow-up in 6 months, sooner for new or worsening symptoms. 5. Anxiety  Stable. No new or worsening anxiety or panic attacks. Patient compliant with Cymbalta 30 mg twice daily. Continue with current treatment management follow-up in 6 months, sooner for new or worsening symptoms. 6. PVD (peripheral vascular disease) (HCC)  Stable. Patient did have venous duplex performed of lower extremities this month which was negative for DVT. Patient has been wearing compression stockings but reports that they are difficult to stay up and wrinkles down. We did discuss using alternative options such as Ace wraps. Patient verbalized and acknowledges. Patient aware that if pain were to persist or worsen she will need to follow-up with previously given referral to vascular. 7. Chronic midline low back pain without sciatica  Stable. Patient sees pain management on a regular basis for treatment. Patient is scheduled to see them on 9/17/2020. Patient does use a cane for ambulatory needs. Patient did have recent fall in July where she was evaluated in the emergency department with no acute findings. Patient currently denies any new or worsening back pain, numbness, tingling in extremities. No loss of bowel or bladder functions.   Continue with current treatment and management follow-up in 6 months, sooner for new or worsening symptoms. 8. Other headache syndrome  Stable. Patient denies any syncope or blurred vision associated with headaches. Patient reports that she does have occultly with vision and has an eye appointment on 9/7 to get new glasses. Patient has intermittent episodes of dizziness but does not associate this with her headaches. Patient takes Fioricet for headaches as needed. No nausea, vomiting, or diarrhea. Continue with current treatment and management follow-up in 6 months, sooner for new or worsening symptoms. 9. Lipid screening  Patient due for lipid screening. Patient compliant with simvastatin 20 mg nightly. No adverse side effects or missed doses. Patient is not compliant with monitoring dietary intake. I did recommend low saturated fat, high-fiber diet, exercise and weight loss. Labs ordered and pending. Follow-up in 6 months, sooner for new or worsening symptoms.  - Lipid, Fasting    10. Mammogram declined  Screening discussed with patient patient declines. Patient aware of risks associated with not having testing performed. 11. Colon cancer screening declined  Discussed with patient, patient declines. Patient aware of risks associated with not having proper screening performed. The note was completed using Dragon voice recognition transcription. Every effort was made to ensure accuracy; however, inadvertent  transcription errors may be present despite my best efforts to edit errors.     Jaciel Hendrickson, ROCKY - CNP

## 2020-09-29 RX ORDER — FUROSEMIDE 20 MG/1
20 TABLET ORAL DAILY
Qty: 90 TABLET | Refills: 1 | Status: SHIPPED | OUTPATIENT
Start: 2020-09-29 | End: 2020-10-02 | Stop reason: SDUPTHER

## 2020-09-29 RX ORDER — DOXAZOSIN MESYLATE 4 MG/1
4 TABLET ORAL NIGHTLY
Qty: 90 TABLET | Refills: 1 | Status: SHIPPED | OUTPATIENT
Start: 2020-09-29 | End: 2020-10-02 | Stop reason: SDUPTHER

## 2020-09-29 RX ORDER — ISOSORBIDE MONONITRATE 30 MG/1
60 TABLET, EXTENDED RELEASE ORAL DAILY
Qty: 180 TABLET | Refills: 1 | Status: SHIPPED | OUTPATIENT
Start: 2020-09-29 | End: 2020-10-02 | Stop reason: SDUPTHER

## 2020-09-29 RX ORDER — SIMVASTATIN 20 MG
20 TABLET ORAL NIGHTLY
Qty: 90 TABLET | Refills: 1 | Status: SHIPPED | OUTPATIENT
Start: 2020-09-29 | End: 2020-10-02 | Stop reason: SDUPTHER

## 2020-09-29 RX ORDER — OMEPRAZOLE 40 MG/1
40 CAPSULE, DELAYED RELEASE ORAL
Qty: 180 CAPSULE | Refills: 3 | Status: SHIPPED | OUTPATIENT
Start: 2020-09-29 | End: 2020-10-02 | Stop reason: SDUPTHER

## 2020-09-29 RX ORDER — LISINOPRIL 40 MG/1
40 TABLET ORAL DAILY
Qty: 90 TABLET | Refills: 1 | Status: SHIPPED | OUTPATIENT
Start: 2020-09-29 | End: 2020-10-02 | Stop reason: SDUPTHER

## 2020-09-29 RX ORDER — METOPROLOL SUCCINATE 100 MG/1
100 TABLET, EXTENDED RELEASE ORAL DAILY
Qty: 90 TABLET | Refills: 1 | Status: SHIPPED | OUTPATIENT
Start: 2020-09-29 | End: 2020-10-02 | Stop reason: SDUPTHER

## 2020-09-29 RX ORDER — DULOXETIN HYDROCHLORIDE 30 MG/1
60 CAPSULE, DELAYED RELEASE ORAL 2 TIMES DAILY
Qty: 120 CAPSULE | Refills: 0 | Status: SHIPPED | OUTPATIENT
Start: 2020-09-29 | End: 2020-10-02 | Stop reason: SDUPTHER

## 2020-09-29 NOTE — TELEPHONE ENCOUNTER
Patient states Lorazepam does not help her nerves and is requesting a different medication for her nerves.      Future appt Visit date 3-2-21    Last appt 5-26-20

## 2020-10-02 RX ORDER — ISOSORBIDE MONONITRATE 30 MG/1
60 TABLET, EXTENDED RELEASE ORAL DAILY
Qty: 180 TABLET | Refills: 1 | Status: SHIPPED | OUTPATIENT
Start: 2020-10-02 | End: 2021-02-02

## 2020-10-02 RX ORDER — METOPROLOL SUCCINATE 100 MG/1
100 TABLET, EXTENDED RELEASE ORAL DAILY
Qty: 90 TABLET | Refills: 1 | Status: SHIPPED | OUTPATIENT
Start: 2020-10-02

## 2020-10-02 RX ORDER — LISINOPRIL 40 MG/1
40 TABLET ORAL DAILY
Qty: 90 TABLET | Refills: 1 | Status: SHIPPED | OUTPATIENT
Start: 2020-10-02 | End: 2021-02-02 | Stop reason: ALTCHOICE

## 2020-10-02 RX ORDER — DOXAZOSIN MESYLATE 4 MG/1
4 TABLET ORAL NIGHTLY
Qty: 90 TABLET | Refills: 1 | Status: SHIPPED | OUTPATIENT
Start: 2020-10-02 | End: 2021-02-02

## 2020-10-02 RX ORDER — OMEPRAZOLE 40 MG/1
40 CAPSULE, DELAYED RELEASE ORAL
Qty: 180 CAPSULE | Refills: 3 | Status: SHIPPED | OUTPATIENT
Start: 2020-10-02 | End: 2021-01-28 | Stop reason: SDUPTHER

## 2020-10-02 RX ORDER — SIMVASTATIN 20 MG
20 TABLET ORAL NIGHTLY
Qty: 90 TABLET | Refills: 1 | Status: SHIPPED | OUTPATIENT
Start: 2020-10-02 | End: 2021-06-07

## 2020-10-02 RX ORDER — DULOXETIN HYDROCHLORIDE 30 MG/1
60 CAPSULE, DELAYED RELEASE ORAL 2 TIMES DAILY
Qty: 120 CAPSULE | Refills: 0 | Status: SHIPPED | OUTPATIENT
Start: 2020-10-02 | End: 2020-10-06 | Stop reason: SDUPTHER

## 2020-10-02 RX ORDER — FUROSEMIDE 20 MG/1
20 TABLET ORAL DAILY
Qty: 90 TABLET | Refills: 1 | Status: SHIPPED | OUTPATIENT
Start: 2020-10-02 | End: 2021-02-02

## 2020-10-06 RX ORDER — ALBUTEROL SULFATE 1.25 MG/3ML
1 SOLUTION RESPIRATORY (INHALATION) EVERY 6 HOURS PRN
Qty: 360 ML | Refills: 3 | Status: SHIPPED | OUTPATIENT
Start: 2020-10-06 | End: 2021-04-12

## 2020-10-06 RX ORDER — DULOXETIN HYDROCHLORIDE 30 MG/1
60 CAPSULE, DELAYED RELEASE ORAL 2 TIMES DAILY
Qty: 360 CAPSULE | Refills: 0 | Status: SHIPPED | OUTPATIENT
Start: 2020-10-06 | End: 2021-03-22

## 2020-10-06 NOTE — TELEPHONE ENCOUNTER
Patient needs her Cymbalta sent to mail order because [facility] won't deliver. And she is restarting her nebulizer for her cough.

## 2020-10-08 RX ORDER — PETROLATUM 42 G/100G
OINTMENT TOPICAL
Qty: 454 G | Refills: 0 | Status: SHIPPED | OUTPATIENT
Start: 2020-10-08 | End: 2021-04-12

## 2020-10-09 ENCOUNTER — TELEPHONE (OUTPATIENT)
Dept: FAMILY MEDICINE CLINIC | Age: 75
End: 2020-10-09

## 2020-10-09 RX ORDER — MOMETASONE FUROATE 1 MG/ML
SOLUTION TOPICAL
Qty: 180 ML | Refills: 1 | Status: SHIPPED | OUTPATIENT
Start: 2020-10-09 | End: 2021-04-12

## 2020-10-09 NOTE — TELEPHONE ENCOUNTER
Patient is requesting prescription for Mometasone Furoate 1% lotion she has used this for very long time but we have never prescribed this for her. She says she uses for her the veins in her legs that are painful and used this for many years.

## 2020-10-11 ENCOUNTER — APPOINTMENT (OUTPATIENT)
Dept: CT IMAGING | Age: 75
End: 2020-10-11
Payer: MEDICARE

## 2020-10-11 ENCOUNTER — APPOINTMENT (OUTPATIENT)
Dept: GENERAL RADIOLOGY | Age: 75
End: 2020-10-11
Payer: MEDICARE

## 2020-10-11 ENCOUNTER — HOSPITAL ENCOUNTER (EMERGENCY)
Age: 75
Discharge: HOME | End: 2020-10-12
Attending: EMERGENCY MEDICINE
Payer: MEDICARE

## 2020-10-11 LAB
A/G RATIO: 1.3 (ref 1.1–2.2)
ALBUMIN SERPL-MCNC: 4 G/DL (ref 3.4–5)
ALP BLD-CCNC: 92 U/L (ref 40–129)
ALT SERPL-CCNC: 18 U/L (ref 10–40)
ANION GAP SERPL CALCULATED.3IONS-SCNC: 9 MMOL/L (ref 3–16)
AST SERPL-CCNC: 21 U/L (ref 15–37)
BASOPHILS ABSOLUTE: 0.1 K/UL (ref 0–0.2)
BASOPHILS RELATIVE PERCENT: 1 %
BILIRUB SERPL-MCNC: <0.2 MG/DL (ref 0–1)
BUN BLDV-MCNC: 9 MG/DL (ref 7–20)
CALCIUM SERPL-MCNC: 8.9 MG/DL (ref 8.3–10.6)
CHLORIDE BLD-SCNC: 101 MMOL/L (ref 99–110)
CO2: 32 MMOL/L (ref 21–32)
CREAT SERPL-MCNC: <0.5 MG/DL (ref 0.6–1.2)
EOSINOPHILS ABSOLUTE: 0.5 K/UL (ref 0–0.6)
EOSINOPHILS RELATIVE PERCENT: 7.3 %
GFR AFRICAN AMERICAN: >60
GFR NON-AFRICAN AMERICAN: >60
GLOBULIN: 3.2 G/DL
GLUCOSE BLD-MCNC: 108 MG/DL (ref 70–99)
HCT VFR BLD CALC: 40.4 % (ref 36–48)
HEMOGLOBIN: 13.4 G/DL (ref 12–16)
LYMPHOCYTES ABSOLUTE: 2.1 K/UL (ref 1–5.1)
LYMPHOCYTES RELATIVE PERCENT: 28.9 %
MCH RBC QN AUTO: 29.7 PG (ref 26–34)
MCHC RBC AUTO-ENTMCNC: 33.3 G/DL (ref 31–36)
MCV RBC AUTO: 89.1 FL (ref 80–100)
MONOCYTES ABSOLUTE: 0.5 K/UL (ref 0–1.3)
MONOCYTES RELATIVE PERCENT: 7.6 %
NEUTROPHILS ABSOLUTE: 4 K/UL (ref 1.7–7.7)
NEUTROPHILS RELATIVE PERCENT: 55.2 %
PDW BLD-RTO: 13.7 % (ref 12.4–15.4)
PLATELET # BLD: 183 K/UL (ref 135–450)
PMV BLD AUTO: 8 FL (ref 5–10.5)
POTASSIUM REFLEX MAGNESIUM: 3.9 MMOL/L (ref 3.5–5.1)
PRO-BNP: 484 PG/ML (ref 0–449)
RBC # BLD: 4.53 M/UL (ref 4–5.2)
SODIUM BLD-SCNC: 142 MMOL/L (ref 136–145)
TOTAL PROTEIN: 7.2 G/DL (ref 6.4–8.2)
TROPONIN: <0.01 NG/ML
WBC # BLD: 7.2 K/UL (ref 4–11)

## 2020-10-11 PROCEDURE — 80053 COMPREHEN METABOLIC PANEL: CPT

## 2020-10-11 PROCEDURE — 84484 ASSAY OF TROPONIN QUANT: CPT

## 2020-10-11 PROCEDURE — 85025 COMPLETE CBC W/AUTO DIFF WBC: CPT

## 2020-10-11 PROCEDURE — 2580000003 HC RX 258: Performed by: EMERGENCY MEDICINE

## 2020-10-11 PROCEDURE — 70450 CT HEAD/BRAIN W/O DYE: CPT

## 2020-10-11 PROCEDURE — 96375 TX/PRO/DX INJ NEW DRUG ADDON: CPT

## 2020-10-11 PROCEDURE — 86735 MUMPS ANTIBODY: CPT

## 2020-10-11 PROCEDURE — 36415 COLL VENOUS BLD VENIPUNCTURE: CPT

## 2020-10-11 PROCEDURE — 93005 ELECTROCARDIOGRAM TRACING: CPT | Performed by: EMERGENCY MEDICINE

## 2020-10-11 PROCEDURE — 71045 X-RAY EXAM CHEST 1 VIEW: CPT

## 2020-10-11 PROCEDURE — 96374 THER/PROPH/DIAG INJ IV PUSH: CPT

## 2020-10-11 PROCEDURE — 6360000002 HC RX W HCPCS: Performed by: EMERGENCY MEDICINE

## 2020-10-11 PROCEDURE — 83880 ASSAY OF NATRIURETIC PEPTIDE: CPT

## 2020-10-11 PROCEDURE — 99284 EMERGENCY DEPT VISIT MOD MDM: CPT

## 2020-10-11 PROCEDURE — 70491 CT SOFT TISSUE NECK W/DYE: CPT

## 2020-10-11 PROCEDURE — 6360000004 HC RX CONTRAST MEDICATION: Performed by: EMERGENCY MEDICINE

## 2020-10-11 RX ORDER — CYANOCOBALAMIN/FOLIC AC/VIT B6 0.5-2.2-25
TABLET ORAL
COMMUNITY
End: 2022-02-17

## 2020-10-11 RX ORDER — ONDANSETRON 2 MG/ML
4 INJECTION INTRAMUSCULAR; INTRAVENOUS ONCE
Status: COMPLETED | OUTPATIENT
Start: 2020-10-11 | End: 2020-10-11

## 2020-10-11 RX ORDER — BUTALBITAL, ACETAMINOPHEN AND CAFFEINE 50; 325; 40 MG/1; MG/1; MG/1
2 TABLET ORAL EVERY 4 HOURS PRN
COMMUNITY
End: 2021-02-02

## 2020-10-11 RX ORDER — 0.9 % SODIUM CHLORIDE 0.9 %
500 INTRAVENOUS SOLUTION INTRAVENOUS ONCE
Status: COMPLETED | OUTPATIENT
Start: 2020-10-11 | End: 2020-10-12

## 2020-10-11 RX ORDER — MORPHINE SULFATE 4 MG/ML
4 INJECTION, SOLUTION INTRAMUSCULAR; INTRAVENOUS ONCE
Status: COMPLETED | OUTPATIENT
Start: 2020-10-11 | End: 2020-10-11

## 2020-10-11 RX ORDER — HYDROCODONE BITARTRATE AND ACETAMINOPHEN 7.5; 325 MG/1; MG/1
1 TABLET ORAL EVERY 8 HOURS PRN
COMMUNITY
End: 2021-04-12

## 2020-10-11 RX ADMIN — SODIUM CHLORIDE 500 ML: 9 INJECTION, SOLUTION INTRAVENOUS at 23:54

## 2020-10-11 RX ADMIN — MORPHINE SULFATE 4 MG: 4 INJECTION INTRAVENOUS at 23:55

## 2020-10-11 RX ADMIN — ONDANSETRON HYDROCHLORIDE 4 MG: 2 INJECTION, SOLUTION INTRAMUSCULAR; INTRAVENOUS at 23:55

## 2020-10-11 ASSESSMENT — PAIN DESCRIPTION - PAIN TYPE: TYPE: ACUTE PAIN

## 2020-10-11 ASSESSMENT — PAIN SCALES - GENERAL: PAINLEVEL_OUTOF10: 8

## 2020-10-12 VITALS
HEART RATE: 92 BPM | SYSTOLIC BLOOD PRESSURE: 175 MMHG | TEMPERATURE: 97.8 F | WEIGHT: 229 LBS | OXYGEN SATURATION: 92 % | DIASTOLIC BLOOD PRESSURE: 75 MMHG | RESPIRATION RATE: 16 BRPM | BODY MASS INDEX: 39.09 KG/M2 | HEIGHT: 64 IN

## 2020-10-12 LAB
EKG ATRIAL RATE: 75 BPM
EKG DIAGNOSIS: NORMAL
EKG P AXIS: 40 DEGREES
EKG P-R INTERVAL: 154 MS
EKG Q-T INTERVAL: 418 MS
EKG QRS DURATION: 84 MS
EKG QTC CALCULATION (BAZETT): 466 MS
EKG R AXIS: 7 DEGREES
EKG T AXIS: 9 DEGREES
EKG VENTRICULAR RATE: 75 BPM

## 2020-10-12 PROCEDURE — 6360000004 HC RX CONTRAST MEDICATION: Performed by: EMERGENCY MEDICINE

## 2020-10-12 PROCEDURE — 93010 ELECTROCARDIOGRAM REPORT: CPT | Performed by: INTERNAL MEDICINE

## 2020-10-12 RX ADMIN — IOPAMIDOL 75 ML: 755 INJECTION, SOLUTION INTRAVENOUS at 00:03

## 2020-10-12 ASSESSMENT — PAIN SCALES - GENERAL: PAINLEVEL_OUTOF10: 3

## 2020-10-12 NOTE — DISCHARGE INSTR - COC
Continuity of Care Form    Patient Name: Maame Skinner   :  1945  MRN:  [id number]    Admit date:  10/11/2020  Discharge date:  ***    Code Status Order: No Order   Advance Directives:     Admitting Physician:  No admitting provider for patient encounter.   PCP: ROCKY Sanders CNP    Discharging Nurse: [facility] Unit/Room#:   Discharging Unit Phone Number: ***    Emergency Contact:   Extended Emergency Contact Information  Primary Emergency Contact: [address] [facility] Phone: [phone]  Relation: Other  Secondary Emergency Contact: zechariah thomas  Home Phone: [phone]  Relation: Other    Past Surgical History:  Past Surgical History:   Procedure Laterality Date    UPPER GASTROINTESTINAL ENDOSCOPY  2019    hiatal hernia    UPPER GASTROINTESTINAL ENDOSCOPY N/A 2019    EGD W/ANES. (9:20) performed by Loki Weinberg MD at [address] [facility]       Immunization History:   Immunization History   Administered Date(s) Administered    Influenza, High Dose (Fluzone 65 yrs and older) 10/22/2018    Influenza, [place], IM, PF (6 mo and older Fluzone, Flulaval, Fluarix, and 3 yrs and older Afluria) 2017    Influenza, Triv, inactivated, subunit, adjuvanted, IM (Fluad 65 yrs and older) 2019    Pneumococcal Conjugate 13-valent ([id number]) 01/10/2018    Pneumococcal Polysaccharide (Oxtrjlgsx24) 2020    Tdap (Boostrix, Adacel) 2020       Active Problems:  Patient Active Problem List   Diagnosis Code    Other headache syndrome G44.89    Irritable bowel syndrome with diarrhea K58.0    Chronic back pain M54.9, G89.29    Restless leg syndrome G25.81    Essential hypertension I10    Peptic ulcer K27.9    Anxiety F41.9    Esophageal dysphagia R13.10    Nausea R11.0    PVD (peripheral vascular disease) (HCC) I73.9    NELIA positive R76.8    Rheumatoid arthritis involving multiple sites with positive rheumatoid factor ([facility] [address] M05.79

## 2020-10-12 NOTE — ED PROVIDER NOTES
CHIEF COMPLAINT  Jaw Pain (right neck pain radiating into shoulder. Left arm pain with \"swelling\". Has not taken anything for the pain because \"it don't do no good\") and Arm Pain      HISTORY OF PRESENT ILLNESS  Anders Alvarez is a 76 y.o. female presents to the ED with multiple complaints, R neck/jaw pain, going down toward shoulder, also w/ L arm discomfort, but feels swollen as well, no chest pain, no SOB outside her normal, has hx of CHF, but no LE edema, always has SOMMER, no fevers, reports it is causing a headache, R sided, temporal, no vision changes, no numbness/weakness, no speech changes, no facial droop. Reports she didn't take anything for pain, she has arthritis and lupus and asked that I explain what lupus is because she asks every doctor she sees, since she's had multiple doctors explain it, but she still doesn't understand, she often only takes her medications when she thinks she needs them, no cough/covid concerns, no dysuria/urinary changes, no bowel changes, also c/o L shoulder pain that has been going on for quite a while, saw ortho in the past and had her R shoulder worked on, No other complaints, modifying factors or associated symptoms. I have reviewed the following from the nursing documentation. Past Medical History:   Diagnosis Date    Arthritis     Bronchitis     CHF (congestive heart failure) (HCC)     Chronic back pain     Chronic cough     Hyperlipidemia     Hypertension     Lupus (HCC)     Lupus (HCC)     Restless legs syndrome (RLS)     Stroke (cerebrum) (HCC)     Ulcer, gastric, acute      Past Surgical History:   Procedure Laterality Date    UPPER GASTROINTESTINAL ENDOSCOPY  12/06/2019    hiatal hernia    UPPER GASTROINTESTINAL ENDOSCOPY N/A 12/6/2019    EGD W/ANES. (9:20) performed by Donald Rich MD at [address] [facility]     History reviewed. No pertinent family history. Social History     Socioeconomic History    Marital status:       Spouse name: Not on file    Number of children: Not on file    Years of education: Not on file    Highest education level: Not on file   Occupational History    Not on file   Social Needs    Financial resource strain: Not on file    Food insecurity     Worry: Not on file     Inability: Not on file    Transportation needs     Medical: Not on file     Non-medical: Not on file   Tobacco Use    Smoking status: Former Smoker     Years: 35.00     Types: Cigarettes     Last attempt to quit: 1994     Years since quittin.9    Smokeless tobacco: Never Used   Substance and Sexual Activity    Alcohol use: Never     Frequency: Never    Drug use: Never    Sexual activity: Not Currently   Lifestyle    Physical activity     Days per week: Not on file     Minutes per session: Not on file    Stress: Not on file   Relationships    Social connections     Talks on phone: Not on file     Gets together: Not on file     Attends [employer]ism service: Not on file     Active member of club or organization: Not on file     Attends meetings of clubs or organizations: Not on file     Relationship status: Not on file    Intimate partner violence     Fear of current or ex partner: Not on file     Emotionally abused: Not on file     Physically abused: Not on file     Forced sexual activity: Not on file   Other Topics Concern    Not on file   Social History Narrative    Not on file     No current facility-administered medications for this encounter. Current Outpatient Medications   Medication Sig Dispense Refill    HYDROcodone-acetaminophen (NORCO) 7.5-325 MG per tablet Take 1 tablet by mouth every 8 hours as needed for Pain.       Folic Acid-Vit B9-CQD T11 (FA-VITAMIN B-6-VITAMIN B-12) 2.2-25-0.5 MG TABS Take by mouth      butalbital-acetaminophen-caffeine (FIORICET, ESGIC) -40 MG per tablet Take 2 tablets by mouth every 4 hours as needed for Headaches      DULoxetine (CYMBALTA) 30 MG extended release capsule Take 2 capsules by mouth 2 times daily 360 capsule 0    albuterol (ACCUNEB) 1.25 MG/3ML nebulizer solution Inhale 3 mLs into the lungs every 6 hours as needed for Wheezing 360 mL 3    omeprazole (PRILOSEC) 40 MG delayed release capsule Take 1 capsule by mouth 2 times daily (before meals) 180 capsule 3    furosemide (LASIX) 20 MG tablet Take 1 tablet by mouth daily 90 tablet 1    simvastatin (ZOCOR) 20 MG tablet Take 1 tablet by mouth nightly 90 tablet 1    metoprolol succinate (TOPROL XL) 100 MG extended release tablet Take 1 tablet by mouth daily 90 tablet 1    isosorbide mononitrate (IMDUR) 30 MG extended release tablet Take 2 tablets by mouth daily 180 tablet 1    lisinopril (PRINIVIL;ZESTRIL) 40 MG tablet Take 1 tablet by mouth daily 90 tablet 1    doxazosin (CARDURA) 4 MG tablet Take 1 tablet by mouth nightly 90 tablet 1    Cholecalciferol (VITAMIN D3 PO) Take 400 Units by mouth daily       aspirin 81 MG EC tablet Take 81 mg by mouth daily      Multiple Vitamins-Minerals (HAIR SKIN AND NAILS FORMULA PO) Take by mouth      Multiple Vitamins-Minerals (OCUVITE PO) Take by mouth      Multiple Vitamin (DAILY VITAMIN PO) Take by mouth      mometasone (ELOCON) 0.1 % lotion Apply topically daily. 180 mL 1    mineral oil-hydrophilic petrolatum (HYDROPHOR) ointment Apply topically as needed. 454 g 0    nystatin (MYCOSTATIN) 664133 UNIT/ML suspension Take 5 mLs by mouth 4 times daily 200 mL 0    nitroGLYCERIN (NITROSTAT) 0.4 MG SL tablet Place 1 tablet under the tongue every 5 minutes as needed for Chest pain up to max of 3 total doses. If no relief after 1 dose, call 911. 25 tablet 0    Probiotic Product (PROBIOTIC DAILY PO) Take by mouth      hydrocortisone 2.5 % cream Apply topically 2 times daily. 28 g 1     Allergies   Allergen Reactions    Adhesive Tape        REVIEW OF SYSTEMS  10 systems reviewed, pertinent positives per HPI otherwise noted to be negative.     PHYSICAL EXAM  Vitals:    10/11/20 2300 10/12/20 0002 10/12/20 0101 10/12/20 0145   BP: (!) 205/84 (!) 179/79 (!) 188/96 (!) 175/75   Pulse: 76 74 76 92   Resp: 16 16 18 16   Temp:       TempSrc:       SpO2: 96% 95% 94% 92%   Weight:       Height:         GENERAL APPEARANCE: Awake and alert. Cooperative. No acute distress  HEAD: Normocephalic. Atraumatic. EYES: PERRL. EOM's grossly intact. No conjunctival injection  ENT: Mucous membranes are moist. Airway patent, no stridor, o oropharyngeal erythema/edema, no exudates, midline uvula, no ulcers/lesions, R posterior mandibular region and superior R neck w/ trace edema, and TTP, no erythema/warmth, no induration or fluctuance, no adenopathy  NECK: Supple. No rigidity, no midline TTP  HEART: RRR. No murmurs  LUNGS: Respirations nonlabored. Lungs are w/ coarse breath sounds, no notable wheezes/crackles/rhonchi, no cough. ABDOMEN: Soft. Non-distended. Non-tender. No guarding or rebound. obese  EXTREMITIES: No peripheral edema. B/l shoulder ROM limited by pain. All extremities neurovascularly intact. 2+ distal pulses, distal sensation intact  SKIN: Warm and dry. No acute rashes. NEUROLOGICAL: Alert and oriented x4. No gross facial drooping. Strength 5/5, sensation intact. No truncal ataxia. Normal spech  PSYCHIATRIC: Normal mood and affect. Irritable at times, argumentative and frequently interrupting my questions/explanations. LABS  I have reviewed all labs for this visit.    Results for orders placed or performed during the hospital encounter of 10/11/20   CBC auto differential   Result Value Ref Range    WBC 7.2 4.0 - 11.0 K/uL    RBC 4.53 4.00 - 5.20 M/uL    Hemoglobin 13.4 12.0 - 16.0 g/dL    Hematocrit 40.4 36.0 - 48.0 %    MCV 89.1 80.0 - 100.0 fL    MCH 29.7 26.0 - 34.0 pg    MCHC 33.3 31.0 - 36.0 g/dL    RDW 13.7 12.4 - 15.4 %    Platelets 324 208 - 375 K/uL    MPV 8.0 5.0 - 10.5 fL    Neutrophils % 55.2 %    Lymphocytes % 28.9 %    Monocytes % 7.6 %    Eosinophils % 7.3 %    Basophils % 1.0 % Neutrophils Absolute 4.0 1.7 - 7.7 K/uL    Lymphocytes Absolute 2.1 1.0 - 5.1 K/uL    Monocytes Absolute 0.5 0.0 - 1.3 K/uL    Eosinophils Absolute 0.5 0.0 - 0.6 K/uL    Basophils Absolute 0.1 0.0 - 0.2 K/uL   Comprehensive Metabolic Panel w/ Reflex to MG   Result Value Ref Range    Sodium 142 136 - 145 mmol/L    Potassium reflex Magnesium 3.9 3.5 - 5.1 mmol/L    Chloride 101 99 - 110 mmol/L    CO2 32 21 - 32 mmol/L    Anion Gap 9 3 - 16    Glucose 108 (H) 70 - 99 mg/dL    BUN 9 7 - 20 mg/dL    CREATININE <0.5 (L) 0.6 - 1.2 mg/dL    GFR Non-African American >60 >60    GFR African American >60 >60    Calcium 8.9 8.3 - 10.6 mg/dL    Total Protein 7.2 6.4 - 8.2 g/dL    Alb 4.0 3.4 - 5.0 g/dL    Albumin/Globulin Ratio 1.3 1.1 - 2.2    Total Bilirubin <0.2 0.0 - 1.0 mg/dL    Alkaline Phosphatase 92 40 - 129 U/L    ALT 18 10 - 40 U/L    AST 21 15 - 37 U/L    Globulin 3.2 g/dL   Troponin   Result Value Ref Range    Troponin <0.01 <0.01 ng/mL   Brain Natriuretic Peptide   Result Value Ref Range    Pro- (H) 0 - 449 pg/mL   Mumps antibody, IgG   Result Value Ref Range    Mumps AB IGG Immune    Mumps antibody, IgM   Result Value Ref Range    Mumps IgM 0.13 <=0.79 IV   EKG 12 Lead   Result Value Ref Range    Ventricular Rate 75 BPM    Atrial Rate 75 BPM    P-R Interval 154 ms    QRS Duration 84 ms    Q-T Interval 418 ms    QTc Calculation (Bazett) 466 ms    P Axis 40 degrees    R Axis 7 degrees    T Axis 9 degrees    Diagnosis       Normal sinus rhythmMinimal voltage criteria for LVH, may be normal variantInferior infarct , age undeterminedAnterior infarct , age undeterminedAbnormal ECGsignificant changes since 4.14.20Confirmed by Kendrick Ba MD, [address] ([id number]) on 10/12/2020 7:06:45 AM         RADIOLOGY  Ct Head Wo Contrast    Result Date: 10/12/2020  EXAMINATION: CT OF THE NECK SOFT TISSUE WITH CONTRAST  10/11/2020 TECHNIQUE: CT of the neck was performed with the administration of intravenous contrast. Multiplanar reformatted vasculature. No evidence of hydrocephalus. No acute abnormality seen of the orbits, paranasal sinuses or mastoid air cell. No acute abnormality seen of the calvarium. PHARYNX/LARYNX:  The upper aerodigestive tract appears grossly symmetric. No discrete mass clearly identified. SALIVARY GLANDS/THYROID:  Mild inflammatory changes are seen involving the right parotid gland extending along the right sternocleidomastoid muscle. No drainable abscess. The left parotid and bilateral submandibular glands demonstrate no acute abnormality. The thyroid gland appears unremarkable. LYMPH NODES:  No cervical lymphadenopathy by size criteria. Carotid atherosclerosis is seen bilaterally. LUNG APICES/SUPERIOR MEDIASTINUM:  Mild reticular opacities are seen within the lung apices bilaterally. There is a mildly prominent AP window lymph node measuring up to 11 mm. BONES:  No aggressive appearing lytic or blastic bony lesion. 1. No acute intracranial abnormality. 2. Minimal global parenchymal volume loss with chronic microvascular ischemic changes. 3. Mild inflammatory changes involving the right parotid gland extending along the right sternocleidomastoid muscle. Findings may represent parotiditis. 4. Otherwise, no acute abnormality identified of the soft tissue structures of the neck. 5. Mild reticular opacities in the lung apices bilaterally. 6. Mildly prominent AP window lymph node measuring up to 11 mm. Xr Chest Portable    Result Date: 10/11/2020  EXAMINATION: ONE XRAY VIEW OF THE CHEST 10/11/2020 10:37 pm COMPARISON: None. HISTORY: ORDERING SYSTEM PROVIDED HISTORY: cough TECHNOLOGIST PROVIDED HISTORY: Reason for exam:->cough Reason for Exam: cough, jaw and arm pain Acuity: Acute Type of Exam: Initial Relevant Medical/Surgical History: chf bronchitis FINDINGS: There are interstitial infiltrates within both lungs. The heart size is magnified by portable technique.   There is a calcified lymph node in the mediastinum and there are calcified lymph nodes in the right hilum. Bibasilar interstitial infiltrates. ED COURSE/MDM  Patient seen and evaluated. Old records reviewed. Labs and imaging reviewed and results discussed with patient. 77yo F with numerous concerns, which we discussed at length, but when I asked what bothered her most, she states it is the jaw/neck pain and swelling, CT obtained due to patient's reported severity of neck/head pain, noted w/ concern for parotitis, did not have classic mumps appearance but sent for mumps antibodies, lower suspicion this is infectious, no erythema/warmth, afebrile, I am more suspicion this is sialolithiasis, but I could not see visible stone in ducts, discussed sucking on sour candy to help w/ this, she stated she wasn't supposed to eat candy, encouraged to suck on a lemon instead and she stated she would need to talk to her GI doctor because they told her not to eat anything acidic, any recommendations I gave the patient regarding her care she questioned or argued, I kindly explained that I have low suspicion for an emergent medical condition at this time, though she does have multiple chronic/subacute problems, so I did contact her primary care provider to encourage prompt follow up, she verbalized understanding, but I am concerned about the patient's noncompliance with her medication regimen, she felt better after morphine here, low suspicion for ACS/PE/dissection, her CXR was similar to prior- low suspicion for acute CHF, she had 60% EF last year on echo, HTN uncontrolled- but no current evidence of EOD, she has pain meds at home she can take, Strict return precautions given, all questions answered, will return if any worsening symptoms or new concerns, see AVS for further discharge information, patient verbalized understanding of plan, felt comfortable going home. Friend/neighbor here to take her home.         Orders Placed This Encounter   Procedures    XR CHEST PORTABLE    CT HEAD WO CONTRAST    CT SOFT TISSUE NECK W CONTRAST    CBC auto differential    Comprehensive Metabolic Panel w/ Reflex to MG    Troponin    Brain Natriuretic Peptide    Mumps antibody, IgG    Mumps antibody, IgM    EKG 12 Lead    Saline lock IV     Orders Placed This Encounter   Medications    0.9 % sodium chloride bolus    morphine sulfate (PF) injection 4 mg    ondansetron (ZOFRAN) injection 4 mg    iopamidol (ISOVUE-370) 76 % injection 75 mL     ED Course as of Oct 16 2258   Sun Oct 11, 2020   2246 EKG interpretation by me: Normal sinus rhythm, rate 75, QTc 466, LVH criteria, question left axis deviation, likely old inferior/anterior infarct, no ST segment or T wave changes indicative of acute ischemia   EKG 12 Lead [SY]   Mon Oct 12, 2020   0056 Went to update patient, she was in the bathroom. [SY]      ED Course User Index  [SY] Kemi Ortega,        CLINICAL IMPRESSION  1. Neck pain on right side    2. Parotitis, acute    3. Sialolithiasis    4. Left shoulder pain, unspecified chronicity    5. History of chronic CHF    6. Poor compliance with medication    7. Uncontrolled hypertension    8. Chronic prescription opiate use        Blood pressure (!) 175/75, pulse 92, temperature 97.8 °F (36.6 °C), temperature source Oral, resp. rate 16, height 5' 4\" (1.626 m), weight 229 lb (103.9 kg), SpO2 92 %. DISPOSITION  Nicol Davis was discharged to home in stable condition.                    Kemi Ortega,   10/16/20 0426

## 2020-10-13 LAB — MUMPS AB IGG: NORMAL

## 2020-10-14 LAB — MUMPS IGM ANTIBODY: 0.13 IV

## 2020-11-04 ENCOUNTER — TELEPHONE (OUTPATIENT)
Dept: FAMILY MEDICINE CLINIC | Age: 75
End: 2020-11-04

## 2020-11-04 NOTE — TELEPHONE ENCOUNTER
Patient is asking for medication for thrush. . She states she has white patches on tongue. She has a sore on her nose and says her stomach is cramping. I advised patient she needed to be seen but she states she has no transportation.

## 2020-11-10 ENCOUNTER — OFFICE VISIT (OUTPATIENT)
Dept: FAMILY MEDICINE CLINIC | Age: 75
End: 2020-11-10
Payer: MEDICARE

## 2020-11-10 VITALS
OXYGEN SATURATION: 97 % | SYSTOLIC BLOOD PRESSURE: 120 MMHG | WEIGHT: 234 LBS | TEMPERATURE: 98.3 F | DIASTOLIC BLOOD PRESSURE: 71 MMHG | BODY MASS INDEX: 40.17 KG/M2 | HEART RATE: 71 BPM

## 2020-11-10 PROCEDURE — 90694 VACC AIIV4 NO PRSRV 0.5ML IM: CPT | Performed by: NURSE PRACTITIONER

## 2020-11-10 PROCEDURE — G0008 ADMIN INFLUENZA VIRUS VAC: HCPCS | Performed by: NURSE PRACTITIONER

## 2020-11-10 PROCEDURE — 99214 OFFICE O/P EST MOD 30 MIN: CPT | Performed by: NURSE PRACTITIONER

## 2020-11-10 RX ORDER — BUTALBITAL, ACETAMINOPHEN AND CAFFEINE 50; 325; 40 MG/1; MG/1; MG/1
1-2 TABLET ORAL DAILY PRN
Qty: 180 TABLET | Refills: 0 | Status: SHIPPED | OUTPATIENT
Start: 2020-11-10 | End: 2020-11-24 | Stop reason: SDUPTHER

## 2020-11-10 ASSESSMENT — ENCOUNTER SYMPTOMS
WHEEZING: 0
VOMITING: 0
SHORTNESS OF BREATH: 0
SORE THROAT: 0
COUGH: 0
NAUSEA: 0
DIARRHEA: 0
RHINORRHEA: 0
TROUBLE SWALLOWING: 1
ABDOMINAL PAIN: 1

## 2020-11-10 NOTE — PROGRESS NOTES
on file    Years of education: Not on file    Highest education level: Not on file   Occupational History    Not on file   Social Needs    Financial resource strain: Not on file    Food insecurity     Worry: Not on file     Inability: Not on file    Transportation needs     Medical: Not on file     Non-medical: Not on file   Tobacco Use    Smoking status: Former Smoker     Years: 35.00     Types: Cigarettes     Last attempt to quit: 1994     Years since quittin.9    Smokeless tobacco: Never Used   Substance and Sexual Activity    Alcohol use: Never     Frequency: Never    Drug use: Never    Sexual activity: Not Currently   Lifestyle    Physical activity     Days per week: Not on file     Minutes per session: Not on file    Stress: Not on file   Relationships    Social connections     Talks on phone: Not on file     Gets together: Not on file     Attends Protestant service: Not on file     Active member of club or organization: Not on file     Attends meetings of clubs or organizations: Not on file     Relationship status: Not on file    Intimate partner violence     Fear of current or ex partner: Not on file     Emotionally abused: Not on file     Physically abused: Not on file     Forced sexual activity: Not on file   Other Topics Concern    Not on file   Social History Narrative    Not on file     Current Outpatient Medications   Medication Sig Dispense Refill    nystatin (MYCOSTATIN) 570369 UNIT/ML suspension Take 5 mLs by mouth 4 times daily for 10 days 200 mL 0    HYDROcodone-acetaminophen (NORCO) 7.5-325 MG per tablet Take 1 tablet by mouth every 8 hours as needed for Pain.  Folic Acid-Vit T6-EVG O22 (FA-VITAMIN B-6-VITAMIN B-12) 2.2-25-0.5 MG TABS Take by mouth      butalbital-acetaminophen-caffeine (FIORICET, ESGIC) -40 MG per tablet Take 2 tablets by mouth every 4 hours as needed for Headaches      mometasone (ELOCON) 0.1 % lotion Apply topically daily.  180 mL 1    mineral oil-hydrophilic petrolatum (HYDROPHOR) ointment Apply topically as needed. 454 g 0    DULoxetine (CYMBALTA) 30 MG extended release capsule Take 2 capsules by mouth 2 times daily 360 capsule 0    albuterol (ACCUNEB) 1.25 MG/3ML nebulizer solution Inhale 3 mLs into the lungs every 6 hours as needed for Wheezing 360 mL 3    omeprazole (PRILOSEC) 40 MG delayed release capsule Take 1 capsule by mouth 2 times daily (before meals) 180 capsule 3    furosemide (LASIX) 20 MG tablet Take 1 tablet by mouth daily 90 tablet 1    simvastatin (ZOCOR) 20 MG tablet Take 1 tablet by mouth nightly 90 tablet 1    metoprolol succinate (TOPROL XL) 100 MG extended release tablet Take 1 tablet by mouth daily 90 tablet 1    isosorbide mononitrate (IMDUR) 30 MG extended release tablet Take 2 tablets by mouth daily 180 tablet 1    lisinopril (PRINIVIL;ZESTRIL) 40 MG tablet Take 1 tablet by mouth daily 90 tablet 1    doxazosin (CARDURA) 4 MG tablet Take 1 tablet by mouth nightly 90 tablet 1    Cholecalciferol (VITAMIN D3 PO) Take 400 Units by mouth daily       nitroGLYCERIN (NITROSTAT) 0.4 MG SL tablet Place 1 tablet under the tongue every 5 minutes as needed for Chest pain up to max of 3 total doses. If no relief after 1 dose, call 911. 25 tablet 0    Probiotic Product (PROBIOTIC DAILY PO) Take by mouth      hydrocortisone 2.5 % cream Apply topically 2 times daily. 28 g 1    aspirin 81 MG EC tablet Take 81 mg by mouth daily      Multiple Vitamins-Minerals (HAIR SKIN AND NAILS FORMULA PO) Take by mouth      Multiple Vitamins-Minerals (OCUVITE PO) Take by mouth      Multiple Vitamin (DAILY VITAMIN PO) Take by mouth       No current facility-administered medications for this visit. Allergies   Allergen Reactions    Adhesive Tape        REVIEW OF SYSTEMS  Review of Systems   Constitutional: Negative for activity change, appetite change, chills and fever. HENT: Positive for ear pain and trouble swallowing.  Negative for congestion, rhinorrhea and sore throat. Eyes: Negative for visual disturbance. Respiratory: Negative for cough, shortness of breath and wheezing. Cardiovascular: Negative for chest pain and leg swelling. Gastrointestinal: Positive for abdominal pain. Negative for diarrhea, nausea and vomiting. Genitourinary: Negative for dysuria, frequency, hematuria and urgency. Musculoskeletal: Negative for gait problem and myalgias. Skin: Positive for rash. Neurological: Negative for dizziness, weakness, light-headedness, numbness and headaches. Psychiatric/Behavioral: Negative for sleep disturbance. PHYSICAL EXAM  /71   Pulse 71   Temp 98.3 °F (36.8 °C) (Oral)   Wt 234 lb (106.1 kg)   SpO2 97%   BMI 40.17 kg/m²   Physical Exam  Vitals signs reviewed. Constitutional:       General: She is not in acute distress. Appearance: Normal appearance. She is well-developed. She is obese. She is not diaphoretic. HENT:      Head: Normocephalic and atraumatic. Comments: Scattered scabbed areas noted to scalp. Skin is excoriated and dry     Right Ear: Tympanic membrane normal. No drainage or swelling. Tympanic membrane is not erythematous. Left Ear: Tympanic membrane normal. No drainage or swelling. Tympanic membrane is not erythematous. Ears:        Nose: Nose normal.      Mouth/Throat:      Lips: Pink. No lesions. Mouth: Mucous membranes are moist.      Tongue: No lesions. Pharynx: Oropharynx is clear. No oropharyngeal exudate or posterior oropharyngeal erythema. Comments: No cracks, peeling, fissures noted to lips. Eyes:      General:         Right eye: No discharge. Left eye: No discharge. Pupils: Pupils are equal, round, and reactive to light. Neck:      Musculoskeletal: Normal range of motion and neck supple. Vascular: No JVD. Cardiovascular:      Rate and Rhythm: Normal rate and regular rhythm. Pulses: Normal pulses.    Pulmonary: Effort: Pulmonary effort is normal. No respiratory distress. Breath sounds: No stridor. No wheezing, rhonchi or rales. Chest:      Chest wall: No tenderness. Abdominal:      General: Bowel sounds are normal. There is no distension. Palpations: Abdomen is soft. Tenderness: There is no abdominal tenderness. There is no guarding. Musculoskeletal:         General: No swelling or deformity. Right lower leg: Edema present. Left lower leg: Edema present. Skin:     General: Skin is warm and dry. Capillary Refill: Capillary refill takes 2 to 3 seconds. Coloration: Skin is not pale. Findings: No bruising, lesion or rash. Comments: Multiple varicosities noted scattered bilateral lower extremities. No erythema, circumferential swelling, or streaking noted. Pain upon palpation of varicosities. No unilateral leg swelling. Pulses intact. Neurological:      General: No focal deficit present. Mental Status: She is alert and oriented to person, place, and time. Motor: No weakness. Gait: Gait normal.   Psychiatric:         Mood and Affect: Mood normal.         Behavior: Behavior normal.         ASSESSMENT/PLAN:   1. Esophageal dysphagia  Stable versus chronic condition. Patient has seen Dr. Kal Iraheta in the past.  EGD 2019. Patient reports ongoing difficulty swallowing certain foods and her pills. Patient reports getting abdominal pain after she eats. No nausea, vomiting, diarrhea, choking or difficulty controlling secretions. Patient reports that she was told they cannot do anything else for her. Patient does take omeprazole daily. I did recommend her following up with GI again for further evaluation and possible treatment. I did recommend patient taking pills individually and drinking plenty of fluid in between. Patient also advised to eat smaller of food and make sure she is chewing them up completely prior to swallowing.   Patient will call and schedule appointment. Follow-up as needed. 2. Otalgia of both ears  Patient reports ongoing ear pain since this summer. Patient reports feeling like bugs are crawling on her of her ears as well. Patient reports sometimes she will get earwax out of them. Patient has been itching her left ear with her fingernail which there is evidence of excoriation noted. No exudate, cerumen impaction, insects, bulging TM noted on exam.  Patient encouraged to refrain from scratching ears with fingernails. Continue to monitor and follow-up for any new or worsening symptoms. 3. Bug bite, initial encounter  Patient reports ongoing problem with bugs at her house. Patient reports that they are beetle-like insects that fly in her house that are coming in from outside. Patient does provide a container full of bugs at today's appointment. I did recommend that patient call an  to have her house treated. Patient reports that her insurance will pay for it therefore I did recommend her calling and getting treatment done soon as possible. Patient reports that multiple bug bites to appear on her body. Patient reports some areas on her scalp currently. I did recommend her using hydrocortisone ointments or antihistamines to help with the itching. Patient ports that her chair has the bugs in them as well therefore I did recommend her getting rid of her chair. Patient reports that she is going to get a new chair here soon. Patient will call the office if she needs a note from the provider regarding  for insurance purposes. 4. Need for influenza vaccination  - INFLUENZA, QUADV, ADJUVANTED, 65 YRS =, IM, PF, PREFILL SYR, 0.5ML (FLUAD)       25 minutes was spent with the patient today, greater than 50% of the time was spent in direct face-to-face counseling with the patient in regards to the diagnosis, treatment, and management of current issues.     The note was completed using Dragon voice recognition transcription. Every effort was made to ensure accuracy; however, inadvertent  transcription errors may be present despite my best efforts to edit errors.     ROCKY Delaney - CNP

## 2020-11-10 NOTE — PATIENT INSTRUCTIONS
Please read the healthy family handout that you were given and share it with your family. Please compare this printed medication list with your medications at home to be sure they are the same. If you have any medications that are different please contact us immediately at [phone]. Also review your allergies that we have listed, these may also include medications that you have not been able to tolerate, make sure everything listed is correct. If you have any allergies that are different please contact us immediately at [phone]. Patient Education        Earache: Care Instructions  Your Care Instructions     Even though infection is a common cause of ear pain, not all ear pain means an infection. If you have ear pain and don't have an infection, it could be because of a jaw problem, such as temporomandibular joint (TMJ) pain. Or it could be because of a neck problem. When ear discomfort or pain is mild or comes and goes without other symptoms, home treatment may be all you need. Follow-up care is a key part of your treatment and safety. Be sure to make and go to all appointments, and call your doctor if you are having problems. It's also a good idea to know your test results and keep a list of the medicines you take. How can you care for yourself at home? · Apply heat on the ear to ease pain. To apply heat, put a warm water bottle, a heating pad set on low, or a warm cloth on your ear. Do not go to sleep with a heating pad on your skin. · Take an over-the-counter pain medicine, such as acetaminophen (Tylenol), ibuprofen (Advil, Motrin), or naproxen (Aleve). Be safe with medicines. Read and follow all instructions on the label. · Do not take two or more pain medicines at the same time unless the doctor told you to. Many pain medicines have acetaminophen, which is Tylenol. Too much acetaminophen (Tylenol) can be harmful.   · Never insert anything, such as a cotton swab or a ashley pin, into the ear.  When should you call for help? Call your doctor now or seek immediate medical care if:    · You have new or worse symptoms of infection, such as:  ? Increased pain, swelling, warmth, or redness. ? Red streaks leading from the area. ? Pus draining from the area. ? A fever. Watch closely for changes in your health, and be sure to contact your doctor if:    · You have new or worse discharge coming from the ear.     · You do not get better as expected. Where can you learn more? Go to https://Double RoboticspepicTerabitzeb.[employer]. org and sign in to your OrdrIt account. Enter Y662 in the [employer] box to learn more about \"Earache: Care Instructions. \"     If you do not have an account, please click on the \"Sign Up Now\" link. Current as of: April 15, 2020               Content Version: 12.6  © 2006-2020 [employer]. Care instructions adapted under license by [facility] ([facility]). If you have questions about a medical condition or this instruction, always ask your healthcare professional. [place] [address] any warranty or liability for your use of this information. Patient Education        Learning About Swallowing Problems  What are swallowing problems? Certain health problems that affect the nervous system can cause trouble swallowing. These conditions include stroke, ALS (also known as Nidia Gehrig's disease), Parkinson's disease, and multiple sclerosis. The muscles and nerves that help move food through the throat and esophagus may not work right. Growths, such as cancer, and other problems with your esophagus can also make it hard to swallow. The esophagus is the tube that leads from your throat to your stomach. How are swallowing problems diagnosed? A doctor or speech therapist will examine you to check for swallowing problems. You may get swallowing tests to check how well your throat muscles work.  For these tests, you swallow a special liquid that helps the doctor see your throat and esophagus on an X-ray or video screen. Other tests use a thin, flexible tube called a scope to check for problems with your esophagus. The doctor puts the scope in your mouth and down your throat to look at your esophagus. What are the symptoms? Symptoms of swallowing problems may include:  · Trouble getting food or liquids to go down on the first try. · Gagging, choking, or coughing when you swallow. · Having food or liquids come back up through your throat, mouth, or nose after you swallow. · Feeling like foods or liquids are stuck in some part of your throat or chest.  · Pain when you swallow. How are swallowing problems treated? How swallowing problems are treated depends on the cause. The main goals of treatment will be to help you eat and swallow safely and get good nutrition. This is important for your health and quality of life. You may learn exercises to train your throat muscles to work together so you're able to swallow better. Learning certain ways to put food in your mouth or to position your head while eating may also help. Your doctor or a speech therapist may recommend changes to your diet to help make it easier to swallow. You may need to avoid certain foods or liquids. You also may need to change the thickness of foods or liquids in your diet. To eat and swallow safely, follow any instructions you get from your doctor or therapist. These ideas may help:  · Sit upright when eating, drinking, and taking pills. · Take small bites of food. Chew completely and swallow before taking another bite. · Take small sips of liquids. · If eating makes you tired, eat smaller but more frequent meals. · Tip your chin down when there is food in your mouth. Where can you learn more? Go to https://sid.[employer]. org and sign in to your eFuelDepot account. Enter O896 in the [facility] box to learn more about \"Learning About Swallowing Problems. \" If you do not have an account, please click on the \"Sign Up Now\" link. Current as of: June 26, 2019               Content Version: 12.6  © 2006-2020 [employer] [place]. Care instructions adapted under license by [facility] ([facility]). If you have questions about a medical condition or this instruction, always ask your healthcare professional. [address] any warranty or liability for your use of this information. Patient Education        Insect Stings and Bites: Care Instructions  Your Care Instructions  Stings and bites from bees, wasps, ants, and other insects often cause pain, swelling, redness, and itching. In some people, especially children, the redness and swelling may be worse. It may extend several inches beyond the affected area. But in most cases, stings and bites don't cause reactions all over the body. If you have had a reaction to an insect sting or bite, you are at risk for a reaction if you get stung or bitten again. Follow-up care is a key part of your treatment and safety. Be sure to make and go to all appointments, and call your doctor if you are having problems. It's also a good idea to know your test results and keep a list of the medicines you take. How can you care for yourself at home? · Do not scratch or rub the skin where the sting or bite occurred. · Put a cold pack or ice cube on the area. Put a thin cloth between the ice and your skin. For some people, a paste of baking soda mixed with a little water helps relieve pain and decrease the reaction. · Take an over-the-counter antihistamine, such as diphenhydramine (Benadryl) or loratadine (Claritin), to relieve swelling, redness, and itching. Calamine lotion or hydrocortisone cream may also help. Do not give antihistamines to your child unless you have checked with the doctor first.  · Be safe with medicines. If your doctor prescribed medicine for your allergy, take it exactly as prescribed.  Call your doctor if you think you are having a problem with your medicine. You will get more details on the specific medicines your doctor prescribes. · Your doctor may prescribe a shot of epinephrine to carry with you in case you have a severe reaction. Learn how and when to give yourself the shot, and keep it with you at all times. Make sure it has not . · Go to the emergency room anytime you have a severe reaction. Go even if you have given yourself epinephrine and are feeling better. Symptoms can come back. When should you call for help? Call 911 anytime you think you may need emergency care. For example, call if:    · You have symptoms of a severe allergic reaction. These may include:  ? Sudden raised, red areas (hives) all over your body. ? Swelling of the throat, mouth, lips, or tongue. ? Trouble breathing. ? Passing out (losing consciousness). Or you may feel very lightheaded or suddenly feel weak, confused, or restless. Call your doctor now or seek immediate medical care if:    · You have symptoms of an allergic reaction not right at the sting or bite, such as:  ? A rash or small area of hives (raised, red areas on the skin). ? Itching. ? Swelling. ? Belly pain, nausea, or vomiting.     · You have a lot of swelling around the site (such as your entire arm or leg is swollen).     · You have signs of infection, such as:  ? Increased pain, swelling, redness, or warmth around the sting. ? Red streaks leading from the area. ? Pus draining from the sting. ? A fever. Watch closely for changes in your health, and be sure to contact your doctor if:    · You do not get better as expected. Where can you learn more? Go to https://inEarthpemikePrime Advantageeb.[employer]. org and sign in to your MyVR account. Enter P390 in the [employer] box to learn more about \"Insect Stings and Bites: Care Instructions. \"     If you do not have an account, please click on the \"Sign Up Now\" link.   Current as of: June 26, 2019               Content Version: 12.6  © 6804-1176 [employer]. Care instructions adapted under license by [facility] ([facility]). If you have questions about a medical condition or this instruction, always ask your healthcare professional. [address] any warranty or liability for your use of this information.

## 2020-11-10 NOTE — PROGRESS NOTES
Read flu shot information to patient. She stated she can not see small print.     She verbalized understanding to all

## 2020-11-18 ENCOUNTER — TELEPHONE (OUTPATIENT)
Dept: ADMINISTRATIVE | Age: 75
End: 2020-11-18

## 2020-11-18 NOTE — TELEPHONE ENCOUNTER
PA SUBMITTED VIA [facility] FOR Butalbital-APAP-Caffeine -40MG tablets  Key: [id number] - PA Case ID: [id number] - Rx #: [id number]  STATUS: PENDING

## 2020-11-19 NOTE — TELEPHONE ENCOUNTER
Butalbital-APAP-Caffeine -40MG tablets  Approved on November 18  PA Case: [id number], Status: Approved, Coverage Starts on: 8/19/2020 12:00:00 AM, Coverage Ends on: 11/18/2021 12:00:00 AM

## 2020-11-24 RX ORDER — BUTALBITAL, ACETAMINOPHEN AND CAFFEINE 50; 325; 40 MG/1; MG/1; MG/1
1-2 TABLET ORAL DAILY PRN
Qty: 180 TABLET | Refills: 0 | Status: SHIPPED | OUTPATIENT
Start: 2020-11-24 | End: 2021-03-22

## 2020-11-24 NOTE — TELEPHONE ENCOUNTER
Just verify that prescription was canceled at [facility] prior to submitting new mail order.

## 2020-11-24 NOTE — TELEPHONE ENCOUNTER
Patient is requesting to have her Fioricet sent to mail order, it was refilled on 11/10 but was sent to [facility] but she wanted it to mail order.   I will call and cancel with [facility] if you can resent to [address] [facility]

## 2021-01-13 ENCOUNTER — TELEPHONE (OUTPATIENT)
Dept: PULMONOLOGY | Age: 76
End: 2021-01-13

## 2021-01-13 NOTE — TELEPHONE ENCOUNTER
Patient cancelled appointment on 1/26/21 with Dr. Stoney Owusu for NPT cough ref by Blair Philip. Reason: Pt is unable to do VV    Patient did reschedule appointment. Appointment rescheduled for 4/20/21.      Last OV n/a

## 2021-01-18 NOTE — TELEPHONE ENCOUNTER
Spoke to patient and she said that she does not want to go to [facility] she would like to stay with Dr. Marleni Gan.

## 2021-01-28 ENCOUNTER — TELEPHONE (OUTPATIENT)
Dept: FAMILY MEDICINE CLINIC | Age: 76
End: 2021-01-28

## 2021-01-28 DIAGNOSIS — R12 HEARTBURN: ICD-10-CM

## 2021-01-28 DIAGNOSIS — K44.9 HIATAL HERNIA: ICD-10-CM

## 2021-01-28 DIAGNOSIS — R13.19 ESOPHAGEAL DYSPHAGIA: ICD-10-CM

## 2021-01-28 RX ORDER — OMEPRAZOLE 40 MG/1
40 CAPSULE, DELAYED RELEASE ORAL
Qty: 180 CAPSULE | Refills: 0 | Status: SHIPPED | OUTPATIENT
Start: 2021-01-28 | End: 2021-03-29

## 2021-01-28 NOTE — TELEPHONE ENCOUNTER
Patient wanted to advise that cardiology changed some of her medications. She said he switched her from Lisinopril to Losartan 100 mg daily, Isosorbide was changed to 120 mg daily, Furosemide was changed to 20 mg 2 daily and Doxazosin was changed to 4 mg 1/2 tab daily. Also she said she is not going to take any of the headache medicine because someone told her that I was bad for her liver.   She said she poured water over them in a bottle so they would all \"melt\" to get rid of them

## 2021-02-02 RX ORDER — ISOSORBIDE MONONITRATE 120 MG/1
120 TABLET, EXTENDED RELEASE ORAL DAILY
COMMUNITY
Start: 2021-02-02

## 2021-02-02 RX ORDER — DOXAZOSIN MESYLATE 4 MG/1
2 TABLET ORAL NIGHTLY
COMMUNITY
Start: 2021-02-02

## 2021-02-02 RX ORDER — FUROSEMIDE 20 MG/1
40 TABLET ORAL DAILY
COMMUNITY
Start: 2021-02-02 | End: 2021-03-29

## 2021-02-02 RX ORDER — LOSARTAN POTASSIUM 100 MG/1
100 TABLET ORAL DAILY
COMMUNITY
Start: 2021-02-02 | End: 2022-02-18 | Stop reason: SDUPTHER

## 2021-03-01 ENCOUNTER — TELEPHONE (OUTPATIENT)
Dept: FAMILY MEDICINE CLINIC | Age: 76
End: 2021-03-01

## 2021-03-01 NOTE — TELEPHONE ENCOUNTER
----- Message from Baljit Melgoza sent at 3/1/2021 10:56 AM EST -----  Subject: Message to Provider    QUESTIONS  Information for Provider? Patient is wanting to advise the provider she   cancelled her 6 month follow up due to her sisters death.   ---------------------------------------------------------------------------  --------------  CALL BACK INFO  What is the best way for the office to contact you? OK to leave message on   voicemail  Preferred Call Back Phone Number? [phone]  ---------------------------------------------------------------------------  --------------  SCRIPT ANSWERS  Relationship to Patient?  Self

## 2021-03-10 ENCOUNTER — OFFICE VISIT (OUTPATIENT)
Dept: FAMILY MEDICINE CLINIC | Age: 76
End: 2021-03-10
Payer: MEDICARE

## 2021-03-10 VITALS
HEART RATE: 74 BPM | SYSTOLIC BLOOD PRESSURE: 133 MMHG | DIASTOLIC BLOOD PRESSURE: 77 MMHG | TEMPERATURE: 98.3 F | OXYGEN SATURATION: 98 % | WEIGHT: 230.2 LBS | BODY MASS INDEX: 39.51 KG/M2

## 2021-03-10 DIAGNOSIS — R30.0 DYSURIA: Primary | ICD-10-CM

## 2021-03-10 LAB
BILIRUBIN, POC: ABNORMAL
BLOOD URINE, POC: ABNORMAL
CLARITY, POC: YELLOW
COLOR, POC: ABNORMAL
GLUCOSE URINE, POC: ABNORMAL
KETONES, POC: ABNORMAL
LEUKOCYTE EST, POC: ABNORMAL
NITRITE, POC: ABNORMAL
PH, POC: 5
PROTEIN, POC: 30
SPECIFIC GRAVITY, POC: 1
UROBILINOGEN, POC: 0.2

## 2021-03-10 PROCEDURE — 81003 URINALYSIS AUTO W/O SCOPE: CPT | Performed by: FAMILY MEDICINE

## 2021-03-10 PROCEDURE — 99213 OFFICE O/P EST LOW 20 MIN: CPT | Performed by: FAMILY MEDICINE

## 2021-03-10 RX ORDER — NITROFURANTOIN 25; 75 MG/1; MG/1
100 CAPSULE ORAL 2 TIMES DAILY
Qty: 14 CAPSULE | Refills: 0 | Status: SHIPPED | OUTPATIENT
Start: 2021-03-10 | End: 2021-03-17

## 2021-03-10 ASSESSMENT — PATIENT HEALTH QUESTIONNAIRE - PHQ9
SUM OF ALL RESPONSES TO PHQ9 QUESTIONS 1 & 2: 1
SUM OF ALL RESPONSES TO PHQ QUESTIONS 1-9: 1

## 2021-03-10 NOTE — PATIENT INSTRUCTIONS
Please read the healthy family handout that you were given and share it with your family. Please compare this printed medication list with your medications at home to be sure they are the same. If you have any medications that are different please contact us immediately at [phone]. Also review your allergies that we have listed, these may also include medications that you have not been able to tolerate, make sure everything listed is correct. If you have any allergies that are different please contact us immediately at [phone].

## 2021-03-10 NOTE — PROGRESS NOTES
Assessment and plan  1. Dysuria - likely UTI  - Culture, Urine  - POCT Urinalysis No Micro (Auto)  - nitrofurantoin, macrocrystal-monohydrate, (MACROBID) 100 MG capsule; Take 1 capsule by mouth 2 times daily for 7 days  Dispense: 14 capsule; Refill: 0    RTC prn or call if symptoms don't improve or resolve as discussed    Subjective  Patient presents with a 1 week history of urinary frequency urgency dysuria but no fever or chills. No nausea vomiting or abdominal pain. She has back pain but she reports it is chronic. At the end of the exam she requested a refill her alprazolam that her cardiologist had given her. I advised her she needed to follow-up with her primary care physician for any treatment plan for anxiety    Objective  /77   Pulse 74   Temp 98.3 °F (36.8 °C) (Oral)   Wt 230 lb 3.2 oz (104.4 kg)   SpO2 98%   BMI 39.51 kg/m²   Patient in no acute distress. She does not appear ill. There is no tenderness to percussion of her flanks. Abdomen is obese but soft and nontender. Urinalysis is quite abnormal.    Rakan Austin MD    The note was completed using Dragon voice recognition transcription. Every effort was made to ensure accuracy; however, inadvertent  transcription errors may be present despite my best efforts to edit errors.

## 2021-03-12 LAB
ORGANISM: ABNORMAL
URINE CULTURE, ROUTINE: ABNORMAL

## 2021-03-21 DIAGNOSIS — F41.9 ANXIETY: ICD-10-CM

## 2021-03-21 DIAGNOSIS — G44.89 OTHER HEADACHE SYNDROME: ICD-10-CM

## 2021-03-22 RX ORDER — DULOXETIN HYDROCHLORIDE 30 MG/1
CAPSULE, DELAYED RELEASE ORAL
Qty: 360 CAPSULE | Refills: 0 | Status: SHIPPED | OUTPATIENT
Start: 2021-03-22 | End: 2021-06-07

## 2021-03-22 RX ORDER — BUTALBITAL, ACETAMINOPHEN AND CAFFEINE 50; 325; 40 MG/1; MG/1; MG/1
TABLET ORAL
Qty: 180 TABLET | Refills: 0 | Status: SHIPPED | OUTPATIENT
Start: 2021-03-22 | End: 2021-04-12

## 2021-03-22 NOTE — TELEPHONE ENCOUNTER
Date of last refill of this med was 11/24/2020, # of pills given 180 and # of refills given 0. Their next appointment is 03/30/2021, the last date patient was seen was 08/27/2020. Does patient have medication agreement on file? No  Has drug screen been done in last 12 months if needed?  no

## 2021-03-27 DIAGNOSIS — R13.19 ESOPHAGEAL DYSPHAGIA: ICD-10-CM

## 2021-03-27 DIAGNOSIS — K44.9 HIATAL HERNIA: ICD-10-CM

## 2021-03-27 DIAGNOSIS — R12 HEARTBURN: ICD-10-CM

## 2021-03-29 RX ORDER — FUROSEMIDE 20 MG/1
TABLET ORAL
Qty: 90 TABLET | Refills: 1 | Status: SHIPPED | OUTPATIENT
Start: 2021-03-29 | End: 2021-10-28

## 2021-03-29 RX ORDER — OMEPRAZOLE 40 MG/1
CAPSULE, DELAYED RELEASE ORAL
Qty: 180 CAPSULE | Refills: 0 | Status: SHIPPED | OUTPATIENT
Start: 2021-03-29 | End: 2021-07-26

## 2021-03-30 ENCOUNTER — TELEPHONE (OUTPATIENT)
Dept: FAMILY MEDICINE CLINIC | Age: 76
End: 2021-03-30

## 2021-04-12 ENCOUNTER — OFFICE VISIT (OUTPATIENT)
Dept: FAMILY MEDICINE CLINIC | Age: 76
End: 2021-04-12
Payer: MEDICARE

## 2021-04-12 VITALS
OXYGEN SATURATION: 97 % | SYSTOLIC BLOOD PRESSURE: 111 MMHG | DIASTOLIC BLOOD PRESSURE: 74 MMHG | WEIGHT: 233.25 LBS | HEART RATE: 96 BPM | BODY MASS INDEX: 40.04 KG/M2 | TEMPERATURE: 98.1 F

## 2021-04-12 DIAGNOSIS — E78.5 HYPERLIPIDEMIA, UNSPECIFIED HYPERLIPIDEMIA TYPE: ICD-10-CM

## 2021-04-12 DIAGNOSIS — K58.0 IRRITABLE BOWEL SYNDROME WITH DIARRHEA: ICD-10-CM

## 2021-04-12 DIAGNOSIS — I10 ESSENTIAL HYPERTENSION: Primary | ICD-10-CM

## 2021-04-12 DIAGNOSIS — I73.9 PVD (PERIPHERAL VASCULAR DISEASE) (HCC): ICD-10-CM

## 2021-04-12 DIAGNOSIS — G25.81 RESTLESS LEG SYNDROME: ICD-10-CM

## 2021-04-12 DIAGNOSIS — L29.9 ITCHY SCALP: ICD-10-CM

## 2021-04-12 DIAGNOSIS — M05.79 RHEUMATOID ARTHRITIS INVOLVING MULTIPLE SITES WITH POSITIVE RHEUMATOID FACTOR (HCC): ICD-10-CM

## 2021-04-12 PROCEDURE — 99214 OFFICE O/P EST MOD 30 MIN: CPT | Performed by: NURSE PRACTITIONER

## 2021-04-12 RX ORDER — ALPRAZOLAM 0.5 MG/1
TABLET ORAL
COMMUNITY
Start: 2021-03-20

## 2021-04-12 RX ORDER — BUSPIRONE HYDROCHLORIDE 10 MG/1
15 TABLET ORAL 2 TIMES DAILY
COMMUNITY
Start: 2021-03-19

## 2021-04-12 ASSESSMENT — ENCOUNTER SYMPTOMS
SHORTNESS OF BREATH: 1
VOMITING: 0
COUGH: 1
CHEST TIGHTNESS: 0
ABDOMINAL PAIN: 0
SORE THROAT: 0
NAUSEA: 0
DIARRHEA: 0
RHINORRHEA: 0
WHEEZING: 0

## 2021-04-12 NOTE — PATIENT INSTRUCTIONS
pressure. · Place the blood pressure cuff on the bare skin of your upper arm. You may have to roll up your sleeve, remove your arm from the sleeve, or take your shirt off. · Wrap the blood pressure cuff around your upper arm so that the lower edge of the cuff is about 1 inch above the bend of your elbow. · Do not move, talk, or text while you take your blood pressure. Follow the instructions that came with your blood pressure monitor. They might be different from the following. · Press the on/off button on the automatic monitor. Then you may need to wait until the screen says the monitor is ready. · Press the start button. The cuff will inflate and deflate by itself. · Your blood pressure numbers will appear on the screen. · Wait one minute and take your blood pressure again. · If your monitor does not automatically save your numbers, write them in your log book, along with the date and time. Follow-up care is a key part of your treatment and safety. Be sure to make and go to all appointments, and call your doctor if you are having problems. It's also a good idea to keep a list of the medicines you take. Where can you learn more? Go to https://V2contact.FanLib. org and sign in to your ZUGGI account. Enter C427 in the [facility] box to learn more about \"Home Blood Pressure Test: About This Test.\"     If you do not have an account, please click on the \"Sign Up Now\" link. Current as of: August 31, 2020               Content Version: 12.8  © 2006-2021 Healthwise, Tomorrow. Care instructions adapted under license by [facility] ([facility]). If you have questions about a medical condition or this instruction, always ask your healthcare professional. [place] [address] any warranty or liability for your use of this information.          Patient Education        DASH Diet: Care Instructions  Your Care Instructions     The DASH diet is an eating plan that can help lower your blood pressure. DASH stands for Dietary Approaches to Stop Hypertension. Hypertension is high blood pressure. The DASH diet focuses on eating foods that are high in calcium, potassium, and magnesium. These nutrients can lower blood pressure. The foods that are highest in these nutrients are fruits, vegetables, low-fat dairy products, nuts, seeds, and legumes. But taking calcium, potassium, and magnesium supplements instead of eating foods that are high in those nutrients does not have the same effect. The DASH diet also includes whole grains, fish, and poultry. The DASH diet is one of several lifestyle changes your doctor may recommend to lower your high blood pressure. Your doctor may also want you to decrease the amount of sodium in your diet. Lowering sodium while following the DASH diet can lower blood pressure even further than just the DASH diet alone. Follow-up care is a key part of your treatment and safety. Be sure to make and go to all appointments, and call your doctor if you are having problems. It's also a good idea to know your test results and keep a list of the medicines you take. How can you care for yourself at home? Following the DASH diet  · Eat 4 to 5 servings of fruit each day. A serving is 1 medium-sized piece of fruit, ½ cup chopped or canned fruit, 1/4 cup dried fruit, or 4 ounces (½ cup) of fruit juice. Choose fruit more often than fruit juice. · Eat 4 to 5 servings of vegetables each day. A serving is 1 cup of lettuce or raw leafy vegetables, ½ cup of chopped or cooked vegetables, or 4 ounces (½ cup) of vegetable juice. Choose vegetables more often than vegetable juice. · Get 2 to 3 servings of low-fat and fat-free dairy each day. A serving is 8 ounces of milk, 1 cup of yogurt, or 1 ½ ounces of cheese. · Eat 6 to 8 servings of grains each day.  A serving is 1 slice of bread, 1 ounce of dry cereal, or ½ cup of cooked rice, pasta, or cooked cereal. Try to choose whole-grain products as much as possible. · Limit lean meat, poultry, and fish to 2 servings each day. A serving is 3 ounces, about the size of a deck of cards. · Eat 4 to 5 servings of nuts, seeds, and legumes (cooked dried beans, lentils, and split peas) each week. A serving is 1/3 cup of nuts, 2 tablespoons of seeds, or ½ cup of cooked beans or peas. · Limit fats and oils to 2 to 3 servings each day. A serving is 1 teaspoon of vegetable oil or 2 tablespoons of salad dressing. · Limit sweets and added sugars to 5 servings or less a week. A serving is 1 tablespoon jelly or jam, ½ cup sorbet, or 1 cup of lemonade. · Eat less than 2,300 milligrams (mg) of sodium a day. If you limit your sodium to 1,500 mg a day, you can lower your blood pressure even more. · Be aware that all of these are the suggested number of servings for people who eat 1,800 to 2,000 calories a day. Your recommended number of servings may be different if you need more or fewer calories. Tips for success  · Start small. Do not try to make dramatic changes to your diet all at once. You might feel that you are missing out on your favorite foods and then be more likely to not follow the plan. Make small changes, and stick with them. Once those changes become habit, add a few more changes. · Try some of the following:  ? Make it a goal to eat a fruit or vegetable at every meal and at snacks. This will make it easy to get the recommended amount of fruits and vegetables each day. ? Try yogurt topped with fruit and nuts for a snack or healthy dessert. ? Add lettuce, tomato, cucumber, and onion to sandwiches. ? Combine a ready-made pizza crust with low-fat mozzarella cheese and lots of vegetable toppings. Try using tomatoes, squash, spinach, broccoli, carrots, cauliflower, and onions. ? Have a variety of cut-up vegetables with a low-fat dip as an appetizer instead of chips and dip. ? Sprinkle sunflower seeds or chopped almonds over salads.  Or try adding chopped walnuts or almonds to cooked vegetables. ? Try some vegetarian meals using beans and peas. Add garbanzo or kidney beans to salads. Make burritos and tacos with mashed hutchinson beans or black beans. Where can you learn more? Go to https://chpepiceweb.healthN-able Technologies. org and sign in to your MyBuyshart account. Enter U111 in the [facility] box to learn more about \"DASH Diet: Care Instructions. \"     If you do not have an account, please click on the \"Sign Up Now\" link. Current as of: August 31, 2020               Content Version: 12.8  © 2006-2021 [employer]. Care instructions adapted under license by [facility] ([facility]). If you have questions about a medical condition or this instruction, always ask your healthcare professional. [place] [address] any warranty or liability for your use of this information. Patient Education        High Blood Pressure: Care Instructions  Overview     It's normal for blood pressure to go up and down throughout the day. But if it stays up, you have high blood pressure. Another name for high blood pressure is hypertension. Despite what a lot of people think, high blood pressure usually doesn't cause headaches or make you feel dizzy or lightheaded. It usually has no symptoms. But it does increase your risk of stroke, heart attack, and other problems. You and your doctor will talk about your risks of these problems based on your blood pressure. Your doctor will give you a goal for your blood pressure. Your goal will be based on your health and your age. Lifestyle changes, such as eating healthy and being active, are always important to help lower blood pressure. You might also take medicine to reach your blood pressure goal.  Follow-up care is a key part of your treatment and safety. Be sure to make and go to all appointments, and call your doctor if you are having problems.  It's also a good idea to know your test results and keep a list of the medicines you take. How can you care for yourself at home? Medical treatment  · If you stop taking your medicine, your blood pressure will go back up. You may take one or more types of medicine to lower your blood pressure. Be safe with medicines. Take your medicine exactly as prescribed. Call your doctor if you think you are having a problem with your medicine. · Talk to your doctor before you start taking aspirin every day. Aspirin can help certain people lower their risk of a heart attack or stroke. But taking aspirin isn't right for everyone, because it can cause serious bleeding. · See your doctor regularly. You may need to see the doctor more often at first or until your blood pressure comes down. · If you are taking blood pressure medicine, talk to your doctor before you take decongestants or anti-inflammatory medicine, such as ibuprofen. Some of these medicines can raise blood pressure. · Learn how to check your blood pressure at home. Lifestyle changes  · Stay at a healthy weight. This is especially important if you put on weight around the waist. Losing even 10 pounds can help you lower your blood pressure. · If your doctor recommends it, get more exercise. Walking is a good choice. Bit by bit, increase the amount you walk every day. Try for at least 30 minutes on most days of the week. You also may want to swim, bike, or do other activities. · Avoid or limit alcohol. Talk to your doctor about whether you can drink any alcohol. · Try to limit how much sodium you eat to less than 2,300 milligrams (mg) a day. Your doctor may ask you to try to eat less than 1,500 mg a day. · Eat plenty of fruits (such as bananas and oranges), vegetables, legumes, whole grains, and low-fat dairy products. · Lower the amount of saturated fat in your diet. Saturated fat is found in animal products such as milk, cheese, and meat.  Limiting these foods may help you lose weight and also lower your risk for heart and safety. Be sure to make and go to all appointments, and call your doctor if you are having problems. It's also a good idea to know your test results and keep a list of the medicines you take. How can you care for yourself at home? · Eat a variety of foods every day. Good choices include fruits, vegetables, whole grains (like oatmeal), dried beans and peas, nuts and seeds, soy products (like tofu), and fat-free or low-fat dairy products. · Replace butter, margarine, and hydrogenated or partially hydrogenated oils with olive and canola oils. (Canola oil margarine without trans fat is fine.)  · Replace red meat with fish, poultry, and soy protein (like tofu). · Limit processed and packaged foods like chips, crackers, and cookies. · Bake, broil, or steam foods. Don't hurtado them. · Be physically active. Get at least 30 minutes of exercise on most days of the week. Walking is a good choice. You also may want to do other activities, such as running, swimming, cycling, or playing tennis or team sports. · Stay at a healthy weight or lose weight by making the changes in eating and physical activity listed above. Losing just a small amount of weight, even 5 to 10 pounds, can reduce your risk for having a heart attack or stroke. · Do not smoke. When should you call for help? Watch closely for changes in your health, and be sure to contact your doctor if:    · You need help making lifestyle changes.     · You have questions about your medicine. Where can you learn more? Go to https://Vineloopderrick.Tunes.com. org and sign in to your Curioos account. Enter K257 in the [facility] box to learn more about \"High Cholesterol: Care Instructions. \"     If you do not have an account, please click on the \"Sign Up Now\" link. Current as of: August 31, 2020               Content Version: 12.8  © 0034-7429 Healthwise, Incorporated. Care instructions adapted under license by [facility] ([facility]).  If you have questions about a medical condition or this instruction, always ask your healthcare professional. [place] [address] any warranty or liability for your use of this information.

## 2021-04-12 NOTE — PROGRESS NOTES
CHIEF COMPLAINT  Chief Complaint   Patient presents with    Check-Up     htn, anxiety        HPI   Rashaad Oviedo is a 68 y.o. female who presents to the office checkup. Patient reports seeing cardiologist multiple times since last visit with multiple changes in her medications. Patient reports taking them as prescribed. No new unusual fatigue or unexplained weight loss. Patient denies any chest pain or worsening shortness of breath. No abdominal pain or discomfort. Patient reports chronic lower extremity edema but no new or worsening symptoms. Patient reports itching of the scalp. Patient reports that when she goes outside bugs are attracted to her hair and so she feels that they are biting her scalp causing itching. No other complaints, modifying factors or associated symptoms. Nursing notes reviewed. Past Medical History:   Diagnosis Date    Arthritis     Bronchitis     CHF (congestive heart failure) (HCC)     Chronic back pain     Chronic cough     Hyperlipidemia     Hypertension     Lupus (HCC)     Lupus (HCC)     Restless legs syndrome (RLS)     Stroke (cerebrum) (HCC)     Ulcer, gastric, acute      Past Surgical History:   Procedure Laterality Date    UPPER GASTROINTESTINAL ENDOSCOPY  12/06/2019    hiatal hernia    UPPER GASTROINTESTINAL ENDOSCOPY N/A 12/6/2019    EGD W/ANES. (9:20) performed by Dakota Bryson MD at [address] [facility]     No family history on file. Social History     Socioeconomic History    Marital status:       Spouse name: Not on file    Number of children: Not on file    Years of education: Not on file    Highest education level: Not on file   Occupational History    Not on file   Social Needs    Financial resource strain: Not on file    Food insecurity     Worry: Not on file     Inability: Not on file    Transportation needs     Medical: Not on file     Non-medical: Not on file   Tobacco Use    Smoking status: Former Smoker     Years: 35.00     Types: Cigarettes     Quit date: 1994     Years since quittin.4    Smokeless tobacco: Never Used   Substance and Sexual Activity    Alcohol use: Never     Frequency: Never    Drug use: Never    Sexual activity: Not Currently   Lifestyle    Physical activity     Days per week: Not on file     Minutes per session: Not on file    Stress: Not on file   Relationships    Social connections     Talks on phone: Not on file     Gets together: Not on file     Attends Uatsdin service: Not on file     Active member of club or organization: Not on file     Attends meetings of clubs or organizations: Not on file     Relationship status: Not on file    Intimate partner violence     Fear of current or ex partner: Not on file     Emotionally abused: Not on file     Physically abused: Not on file     Forced sexual activity: Not on file   Other Topics Concern    Not on file   Social History Narrative    Not on file     Current Outpatient Medications   Medication Sig Dispense Refill    busPIRone (BUSPAR) 10 MG tablet       ALPRAZolam (XANAX) 0.5 MG tablet       omeprazole (PRILOSEC) 40 MG delayed release capsule TAKE 1 CAPSULE TWICE DAILY BEFORE MEALS 180 capsule 0    furosemide (LASIX) 20 MG tablet TAKE 1 TABLET DAILY 90 tablet 1    DULoxetine (CYMBALTA) 30 MG extended release capsule TAKE 2 CAPSULES TWICE DAILY 360 capsule 0    isosorbide mononitrate (IMDUR) 120 MG extended release tablet Take 1 tablet by mouth daily      doxazosin (CARDURA) 4 MG tablet Take 0.5 tablets by mouth nightly      losartan (COZAAR) 100 MG tablet Take 1 tablet by mouth daily      Folic Acid-Vit B9-IHD Y35 (FA-VITAMIN B-6-VITAMIN B-12) 2.2-25-0.5 MG TABS Take by mouth      simvastatin (ZOCOR) 20 MG tablet Take 1 tablet by mouth nightly 90 tablet 1    metoprolol succinate (TOPROL XL) 100 MG extended release tablet Take 1 tablet by mouth daily 90 tablet 1    Cholecalciferol (VITAMIN D3 PO) Take 400 Units by mouth daily       nitroGLYCERIN (NITROSTAT) 0.4 MG SL tablet Place 1 tablet under the tongue every 5 minutes as needed for Chest pain up to max of 3 total doses. If no relief after 1 dose, call 911. 25 tablet 0    Probiotic Product (PROBIOTIC DAILY PO) Take by mouth      hydrocortisone 2.5 % cream Apply topically 2 times daily. 28 g 1    aspirin 81 MG EC tablet Take 81 mg by mouth daily      Multiple Vitamins-Minerals (HAIR SKIN AND NAILS FORMULA PO) Take by mouth      Multiple Vitamins-Minerals (OCUVITE PO) Take by mouth      Multiple Vitamin (DAILY VITAMIN PO) Take by mouth       No current facility-administered medications for this visit. Allergies   Allergen Reactions    Adhesive Tape        REVIEW OF SYSTEMS  Review of Systems   Constitutional: Negative for activity change, appetite change, chills and fever. HENT: Negative for congestion, rhinorrhea and sore throat. Eyes: Negative for visual disturbance. Respiratory: Positive for cough and shortness of breath. Negative for chest tightness and wheezing. Cardiovascular: Positive for leg swelling. Negative for chest pain. Gastrointestinal: Negative for abdominal pain, diarrhea, nausea and vomiting. Genitourinary: Negative for dysuria, frequency, hematuria and urgency. Musculoskeletal: Negative for gait problem and myalgias. Skin: Positive for rash. Neurological: Negative for dizziness, weakness, light-headedness, numbness and headaches. Psychiatric/Behavioral: Negative for sleep disturbance. PHYSICAL EXAM  /74   Pulse 96   Temp 98.1 °F (36.7 °C) (Oral)   Wt 233 lb 4 oz (105.8 kg)   SpO2 97%   BMI 40.04 kg/m²   Physical Exam  Vitals signs reviewed. Constitutional:       General: She is not in acute distress. Appearance: Normal appearance. She is well-developed. She is not diaphoretic. HENT:      Head: Normocephalic and atraumatic. Comments: Multiple areas of dry skin noted on scalp.   No evidence of cellulitis or areas of fluctuance. Right Ear: Tympanic membrane normal.      Left Ear: Tympanic membrane normal.      Nose: Nose normal.      Mouth/Throat:      Mouth: Mucous membranes are moist.      Pharynx: Oropharynx is clear. No oropharyngeal exudate or posterior oropharyngeal erythema. Eyes:      General:         Right eye: No discharge. Left eye: No discharge. Pupils: Pupils are equal, round, and reactive to light. Neck:      Musculoskeletal: Normal range of motion and neck supple. Vascular: No JVD. Cardiovascular:      Rate and Rhythm: Normal rate and regular rhythm. Pulses: Normal pulses. Pulmonary:      Effort: Pulmonary effort is normal. No respiratory distress. Breath sounds: No stridor. No wheezing, rhonchi or rales. Chest:      Chest wall: No tenderness. Abdominal:      General: Bowel sounds are normal. There is no distension. Palpations: Abdomen is soft. Tenderness: There is no abdominal tenderness. There is no guarding. Musculoskeletal: Normal range of motion. General: No swelling or deformity. Right lower leg: Edema present. Left lower leg: Edema present. Skin:     General: Skin is warm and dry. Capillary Refill: Capillary refill takes less than 2 seconds. Coloration: Skin is not pale. Findings: No bruising, lesion or rash. Neurological:      Mental Status: She is alert and oriented to person, place, and time. Psychiatric:         Mood and Affect: Mood normal.         Behavior: Behavior normal.        ASSESSMENT/PLAN:   1. Essential hypertension  Stable. Currently on isosorbide 120 mg daily, doxazosin 2 mg daily, and losartan 100 mg daily. Patient was previously on lisinopril but had ongoing chronic cough therefore cardiologist discontinued and placed her on losartan. Patient reports that she still has a cough denies any significant changes with medication.   Patient denies any episodes of chest pain or reports ongoing episodes of itching of the scalp. Patient reports that when she goes outside she has bugs that fly into her hair and bite her. Patient reports that this causes itching of the scalp and irritation. Patient reports using prescription for shampoo but still does not help. I did recommend avoiding itching and using insect repellent. I also recommended deep addition in treatment/moisture to hair to help with itching of the scalp. Follow-up for any new or worsening symptoms. DEXA scan discussed with patient patient declined. The note was completed using Dragon voice recognition transcription. Every effort was made to ensure accuracy; however, inadvertent  transcription errors may be present despite my best efforts to edit errors.     Rachel Vences, APRN - CNP

## 2021-04-20 ENCOUNTER — APPOINTMENT (OUTPATIENT)
Dept: CT IMAGING | Age: 76
End: 2021-04-20
Payer: MEDICARE

## 2021-04-20 ENCOUNTER — TELEPHONE (OUTPATIENT)
Dept: FAMILY MEDICINE CLINIC | Age: 76
End: 2021-04-20

## 2021-04-20 ENCOUNTER — OFFICE VISIT (OUTPATIENT)
Dept: PULMONOLOGY | Age: 76
End: 2021-04-20
Payer: MEDICARE

## 2021-04-20 ENCOUNTER — HOSPITAL ENCOUNTER (EMERGENCY)
Age: 76
Discharge: HOME | End: 2021-04-20
Attending: EMERGENCY MEDICINE
Payer: MEDICARE

## 2021-04-20 VITALS
HEART RATE: 61 BPM | RESPIRATION RATE: 22 BRPM | TEMPERATURE: 97.5 F | OXYGEN SATURATION: 98 % | BODY MASS INDEX: 39.78 KG/M2 | SYSTOLIC BLOOD PRESSURE: 127 MMHG | DIASTOLIC BLOOD PRESSURE: 76 MMHG | HEIGHT: 64 IN | WEIGHT: 233 LBS

## 2021-04-20 VITALS
BODY MASS INDEX: 39.78 KG/M2 | OXYGEN SATURATION: 98 % | HEART RATE: 82 BPM | SYSTOLIC BLOOD PRESSURE: 156 MMHG | HEIGHT: 64 IN | TEMPERATURE: 98.6 F | DIASTOLIC BLOOD PRESSURE: 74 MMHG | WEIGHT: 233 LBS | RESPIRATION RATE: 18 BRPM

## 2021-04-20 DIAGNOSIS — M54.50 CHRONIC BILATERAL LOW BACK PAIN WITHOUT SCIATICA: ICD-10-CM

## 2021-04-20 DIAGNOSIS — M51.36 DEGENERATIVE DISC DISEASE, LUMBAR: ICD-10-CM

## 2021-04-20 DIAGNOSIS — R10.84 GENERALIZED ABDOMINAL PAIN: Primary | ICD-10-CM

## 2021-04-20 DIAGNOSIS — R05.9 COUGH: Primary | ICD-10-CM

## 2021-04-20 DIAGNOSIS — G89.29 CHRONIC BILATERAL LOW BACK PAIN WITHOUT SCIATICA: ICD-10-CM

## 2021-04-20 LAB
A/G RATIO: 1.2 (ref 1.1–2.2)
ALBUMIN SERPL-MCNC: 3.9 G/DL (ref 3.4–5)
ALP BLD-CCNC: 90 U/L (ref 40–129)
ALT SERPL-CCNC: 17 U/L (ref 10–40)
ANION GAP SERPL CALCULATED.3IONS-SCNC: 10 MMOL/L (ref 3–16)
AST SERPL-CCNC: 24 U/L (ref 15–37)
BASOPHILS ABSOLUTE: 0.1 K/UL (ref 0–0.2)
BASOPHILS RELATIVE PERCENT: 0.8 %
BILIRUB SERPL-MCNC: 0.6 MG/DL (ref 0–1)
BILIRUBIN URINE: NEGATIVE
BLOOD, URINE: NEGATIVE
BUN BLDV-MCNC: 7 MG/DL (ref 7–20)
CALCIUM SERPL-MCNC: 9.6 MG/DL (ref 8.3–10.6)
CHLORIDE BLD-SCNC: 99 MMOL/L (ref 99–110)
CLARITY: CLEAR
CO2: 33 MMOL/L (ref 21–32)
COLOR: NORMAL
CREAT SERPL-MCNC: 0.7 MG/DL (ref 0.6–1.2)
EKG ATRIAL RATE: 73 BPM
EKG DIAGNOSIS: NORMAL
EKG P AXIS: 38 DEGREES
EKG P-R INTERVAL: 168 MS
EKG Q-T INTERVAL: 436 MS
EKG QRS DURATION: 84 MS
EKG QTC CALCULATION (BAZETT): 480 MS
EKG R AXIS: -9 DEGREES
EKG T AXIS: 15 DEGREES
EKG VENTRICULAR RATE: 73 BPM
EOSINOPHILS ABSOLUTE: 0.5 K/UL (ref 0–0.6)
EOSINOPHILS RELATIVE PERCENT: 6.4 %
GFR AFRICAN AMERICAN: >60
GFR NON-AFRICAN AMERICAN: >60
GLOBULIN: 3.3 G/DL
GLUCOSE BLD-MCNC: 125 MG/DL (ref 70–99)
GLUCOSE URINE: NEGATIVE MG/DL
HCT VFR BLD CALC: 40.7 % (ref 36–48)
HEMOGLOBIN: 13.3 G/DL (ref 12–16)
KETONES, URINE: NEGATIVE MG/DL
LEUKOCYTE ESTERASE, URINE: NEGATIVE
LIPASE: 11 U/L (ref 13–60)
LYMPHOCYTES ABSOLUTE: 2 K/UL (ref 1–5.1)
LYMPHOCYTES RELATIVE PERCENT: 28.1 %
MCH RBC QN AUTO: 29.2 PG (ref 26–34)
MCHC RBC AUTO-ENTMCNC: 32.6 G/DL (ref 31–36)
MCV RBC AUTO: 89.7 FL (ref 80–100)
MICROSCOPIC EXAMINATION: NORMAL
MONOCYTES ABSOLUTE: 0.5 K/UL (ref 0–1.3)
MONOCYTES RELATIVE PERCENT: 6.4 %
NEUTROPHILS ABSOLUTE: 4.2 K/UL (ref 1.7–7.7)
NEUTROPHILS RELATIVE PERCENT: 58.3 %
NITRITE, URINE: NEGATIVE
PDW BLD-RTO: 13.7 % (ref 12.4–15.4)
PH UA: 6.5 (ref 5–8)
PLATELET # BLD: 218 K/UL (ref 135–450)
PMV BLD AUTO: 8.2 FL (ref 5–10.5)
POTASSIUM REFLEX MAGNESIUM: 4.2 MMOL/L (ref 3.5–5.1)
PROTEIN UA: NEGATIVE MG/DL
RBC # BLD: 4.54 M/UL (ref 4–5.2)
SODIUM BLD-SCNC: 142 MMOL/L (ref 136–145)
SPECIFIC GRAVITY UA: <=1.005 (ref 1–1.03)
TOTAL PROTEIN: 7.2 G/DL (ref 6.4–8.2)
URINE TYPE: NORMAL
UROBILINOGEN, URINE: 0.2 E.U./DL
WBC # BLD: 7.1 K/UL (ref 4–11)

## 2021-04-20 PROCEDURE — 36415 COLL VENOUS BLD VENIPUNCTURE: CPT

## 2021-04-20 PROCEDURE — 85025 COMPLETE CBC W/AUTO DIFF WBC: CPT

## 2021-04-20 PROCEDURE — 93005 ELECTROCARDIOGRAM TRACING: CPT | Performed by: EMERGENCY MEDICINE

## 2021-04-20 PROCEDURE — 6360000004 HC RX CONTRAST MEDICATION: Performed by: EMERGENCY MEDICINE

## 2021-04-20 PROCEDURE — 80053 COMPREHEN METABOLIC PANEL: CPT

## 2021-04-20 PROCEDURE — 81003 URINALYSIS AUTO W/O SCOPE: CPT

## 2021-04-20 PROCEDURE — 6370000000 HC RX 637 (ALT 250 FOR IP): Performed by: EMERGENCY MEDICINE

## 2021-04-20 PROCEDURE — 99284 EMERGENCY DEPT VISIT MOD MDM: CPT

## 2021-04-20 PROCEDURE — 74177 CT ABD & PELVIS W/CONTRAST: CPT

## 2021-04-20 PROCEDURE — 83690 ASSAY OF LIPASE: CPT

## 2021-04-20 PROCEDURE — 93010 ELECTROCARDIOGRAM REPORT: CPT | Performed by: INTERNAL MEDICINE

## 2021-04-20 PROCEDURE — 99203 OFFICE O/P NEW LOW 30 MIN: CPT | Performed by: INTERNAL MEDICINE

## 2021-04-20 RX ORDER — LORATADINE 10 MG/1
10 TABLET ORAL DAILY
Qty: 90 TABLET | Refills: 0 | Status: SHIPPED | OUTPATIENT
Start: 2021-04-20

## 2021-04-20 RX ORDER — METHOCARBAMOL 500 MG/1
500 TABLET, FILM COATED ORAL ONCE
Status: COMPLETED | OUTPATIENT
Start: 2021-04-20 | End: 2021-04-20

## 2021-04-20 RX ADMIN — METHOCARBAMOL TABLETS 500 MG: 500 TABLET, COATED ORAL at 12:36

## 2021-04-20 RX ADMIN — IOPAMIDOL 75 ML: 755 INJECTION, SOLUTION INTRAVENOUS at 13:21

## 2021-04-20 ASSESSMENT — PAIN SCALES - GENERAL: PAINLEVEL_OUTOF10: 9

## 2021-04-20 ASSESSMENT — PAIN DESCRIPTION - LOCATION: LOCATION: FLANK
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
## 2021-04-20 NOTE — TELEPHONE ENCOUNTER
Patient called and stated she was having lower abdominal pain around to her kidneys that she could hardly walk. I advised patient to go to the ER.
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
## 2021-04-23 ENCOUNTER — OFFICE VISIT (OUTPATIENT)
Dept: FAMILY MEDICINE CLINIC | Age: 76
End: 2021-04-23
Payer: MEDICARE

## 2021-04-23 VITALS
WEIGHT: 226.13 LBS | OXYGEN SATURATION: 97 % | BODY MASS INDEX: 38.81 KG/M2 | HEART RATE: 68 BPM | TEMPERATURE: 98 F | DIASTOLIC BLOOD PRESSURE: 90 MMHG | SYSTOLIC BLOOD PRESSURE: 170 MMHG

## 2021-04-23 DIAGNOSIS — M54.50 BILATERAL LOW BACK PAIN WITHOUT SCIATICA, UNSPECIFIED CHRONICITY: ICD-10-CM

## 2021-04-23 DIAGNOSIS — R10.84 GENERALIZED ABDOMINAL PAIN: ICD-10-CM

## 2021-04-23 DIAGNOSIS — Z09 HOSPITAL DISCHARGE FOLLOW-UP: Primary | ICD-10-CM

## 2021-04-23 PROCEDURE — 99213 OFFICE O/P EST LOW 20 MIN: CPT | Performed by: NURSE PRACTITIONER

## 2021-04-23 ASSESSMENT — ENCOUNTER SYMPTOMS
COUGH: 1
CHEST TIGHTNESS: 0
ABDOMINAL PAIN: 0
VOMITING: 0
RHINORRHEA: 0
BACK PAIN: 1
NAUSEA: 0
WHEEZING: 0
SORE THROAT: 0
DIARRHEA: 0
SHORTNESS OF BREATH: 0

## 2021-04-23 NOTE — ED PROVIDER NOTES
Relation Age of Onset    Asthma Neg Hx     Cancer Neg Hx     Diabetes Neg Hx     Emphysema Neg Hx     Sleep Apnea Neg Hx     Hypertension Neg Hx     Heart Failure Neg Hx      Social History     Socioeconomic History    Marital status:      Spouse name: Not on file    Number of children: Not on file    Years of education: Not on file    Highest education level: Not on file   Occupational History    Not on file   Social Needs    Financial resource strain: Not on file    Food insecurity     Worry: Not on file     Inability: Not on file    Transportation needs     Medical: Not on file     Non-medical: Not on file   Tobacco Use    Smoking status: Former Smoker     Packs/day: 1.00     Years: 35.00     Pack years: 35.00     Types: Cigarettes     Quit date: 1994     Years since quittin.4    Smokeless tobacco: Never Used   Substance and Sexual Activity    Alcohol use: Never     Frequency: Never    Drug use: Never    Sexual activity: Not Currently   Lifestyle    Physical activity     Days per week: Not on file     Minutes per session: Not on file    Stress: Not on file   Relationships    Social connections     Talks on phone: Not on file     Gets together: Not on file     Attends Mosque service: Not on file     Active member of club or organization: Not on file     Attends meetings of clubs or organizations: Not on file     Relationship status: Not on file    Intimate partner violence     Fear of current or ex partner: Not on file     Emotionally abused: Not on file     Physically abused: Not on file     Forced sexual activity: Not on file   Other Topics Concern    Not on file   Social History Narrative    Not on file     No current facility-administered medications for this encounter.       Current Outpatient Medications   Medication Sig Dispense Refill    loratadine (CLARITIN) 10 MG tablet Take 1 tablet by mouth daily 90 tablet 0    busPIRone (BUSPAR) 10 MG tablet       ALPRAZolam (XANAX) 0.5 MG tablet       omeprazole (PRILOSEC) 40 MG delayed release capsule TAKE 1 CAPSULE TWICE DAILY BEFORE MEALS 180 capsule 0    furosemide (LASIX) 20 MG tablet TAKE 1 TABLET DAILY 90 tablet 1    DULoxetine (CYMBALTA) 30 MG extended release capsule TAKE 2 CAPSULES TWICE DAILY 360 capsule 0    isosorbide mononitrate (IMDUR) 120 MG extended release tablet Take 1 tablet by mouth daily      doxazosin (CARDURA) 4 MG tablet Take 0.5 tablets by mouth nightly      losartan (COZAAR) 100 MG tablet Take 1 tablet by mouth daily      Folic Acid-Vit Z4-AXI F56 (FA-VITAMIN B-6-VITAMIN B-12) 2.2-25-0.5 MG TABS Take by mouth      simvastatin (ZOCOR) 20 MG tablet Take 1 tablet by mouth nightly 90 tablet 1    metoprolol succinate (TOPROL XL) 100 MG extended release tablet Take 1 tablet by mouth daily 90 tablet 1    Cholecalciferol (VITAMIN D3 PO) Take 400 Units by mouth daily       nitroGLYCERIN (NITROSTAT) 0.4 MG SL tablet Place 1 tablet under the tongue every 5 minutes as needed for Chest pain up to max of 3 total doses. If no relief after 1 dose, call 911. 25 tablet 0    Probiotic Product (PROBIOTIC DAILY PO) Take by mouth      hydrocortisone 2.5 % cream Apply topically 2 times daily. 28 g 1    aspirin 81 MG EC tablet Take 81 mg by mouth daily      Multiple Vitamins-Minerals (HAIR SKIN AND NAILS FORMULA PO) Take by mouth      Multiple Vitamins-Minerals (OCUVITE PO) Take by mouth      Multiple Vitamin (DAILY VITAMIN PO) Take by mouth       Allergies   Allergen Reactions    Adhesive Tape        REVIEW OF SYSTEMS  10 systems reviewed, pertinent positives per HPI otherwise noted to be negative. PHYSICAL EXAM  BP (!) 156/74   Pulse 82   Temp 98.6 °F (37 °C) (Oral)   Resp 18   Ht 5' 4\" (1.626 m)   Wt 233 lb (105.7 kg)   SpO2 98%   BMI 39.99 kg/m²    Physical exam:  General appearance: awake and cooperative. no distress. Non toxic appearing. Skin: Warm and dry. No rashes or lesions.    HENT: Normocephalic. Atraumatic. Mucus membranes are moist  Neck: supple  Eyes: KHLOE. EOM intact. Heart: RRR. No murmurs. Lungs: Respirations unlabored. CTAB. No wheezes, rales, or rhonchi. Good air exchange  Abdomen: no appreciable tenderness to abdomen. No CVA tenderness. Soft. Non distended. No peritoneal signs. Musculoskeletal:  there is tenderness to lumbar spine diffusely. No extremity edema. Compartments soft. No deformity. No tenderness in the extremities. All extremities neurovascularly intact. Radial, Dp, and PT pulses +2/4 bilaterally  Neurological: Alert and oriented. No focal deficits. No aphasia or dysarthria. No gait ataxia. Psychiatric: Normal mood and affect. LABS  I have reviewed all labs for this visit.    Results for orders placed or performed during the hospital encounter of 04/20/21   Urinalysis   Result Value Ref Range    Color, UA Straw Straw/Yellow    Clarity, UA Clear Clear    Glucose, Ur Negative Negative mg/dL    Bilirubin Urine Negative Negative    Ketones, Urine Negative Negative mg/dL    Specific Gravity, UA <=1.005 1.005 - 1.030    Blood, Urine Negative Negative    pH, UA 6.5 5.0 - 8.0    Protein, UA Negative Negative mg/dL    Urobilinogen, Urine 0.2 <2.0 E.U./dL    Nitrite, Urine Negative Negative    Leukocyte Esterase, Urine Negative Negative    Microscopic Examination Not Indicated     Urine Type NotGiven    CBC Auto Differential   Result Value Ref Range    WBC 7.1 4.0 - 11.0 K/uL    RBC 4.54 4.00 - 5.20 M/uL    Hemoglobin 13.3 12.0 - 16.0 g/dL    Hematocrit 40.7 36.0 - 48.0 %    MCV 89.7 80.0 - 100.0 fL    MCH 29.2 26.0 - 34.0 pg    MCHC 32.6 31.0 - 36.0 g/dL    RDW 13.7 12.4 - 15.4 %    Platelets 152 310 - 356 K/uL    MPV 8.2 5.0 - 10.5 fL    Neutrophils % 58.3 %    Lymphocytes % 28.1 %    Monocytes % 6.4 %    Eosinophils % 6.4 %    Basophils % 0.8 %    Neutrophils Absolute 4.2 1.7 - 7.7 K/uL    Lymphocytes Absolute 2.0 1.0 - 5.1 K/uL    Monocytes Absolute 0.5 0.0 - 1.3 K/uL    Eosinophils Absolute 0.5 0.0 - 0.6 K/uL    Basophils Absolute 0.1 0.0 - 0.2 K/uL   Comprehensive Metabolic Panel w/ Reflex to MG   Result Value Ref Range    Sodium 142 136 - 145 mmol/L    Potassium reflex Magnesium 4.2 3.5 - 5.1 mmol/L    Chloride 99 99 - 110 mmol/L    CO2 33 (H) 21 - 32 mmol/L    Anion Gap 10 3 - 16    Glucose 125 (H) 70 - 99 mg/dL    BUN 7 7 - 20 mg/dL    CREATININE 0.7 0.6 - 1.2 mg/dL    GFR Non-African American >60 >60    GFR African American >60 >60    Calcium 9.6 8.3 - 10.6 mg/dL    Total Protein 7.2 6.4 - 8.2 g/dL    Albumin 3.9 3.4 - 5.0 g/dL    Albumin/Globulin Ratio 1.2 1.1 - 2.2    Total Bilirubin 0.6 0.0 - 1.0 mg/dL    Alkaline Phosphatase 90 40 - 129 U/L    ALT 17 10 - 40 U/L    AST 24 15 - 37 U/L    Globulin 3.3 g/dL   Lipase   Result Value Ref Range    Lipase 11.0 (L) 13.0 - 60.0 U/L   EKG 12 Lead   Result Value Ref Range    Ventricular Rate 73 BPM    Atrial Rate 73 BPM    P-R Interval 168 ms    QRS Duration 84 ms    Q-T Interval 436 ms    QTc Calculation (Bazett) 480 ms    P Axis 38 degrees    R Axis -9 degrees    T Axis 15 degrees    Diagnosis       Normal sinus rhythmNormal ECGConfirmed by CHIDI Blancas MD ([id number]) on 4/20/2021 4:05:36 PM       ECG  The Ekg interpreted by me shows  normal sinus rhythm with a rate of 73  Axis is   Normal  QTc is  within an acceptable range  Intervals and Durations are unremarkable. ST Segments: normal      RADIOLOGY  Ct Abdomen Pelvis W Iv Contrast Additional Contrast? None    Result Date: 4/20/2021  EXAMINATION: CT OF THE ABDOMEN AND PELVIS WITH CONTRAST 4/20/2021 1:04 pm TECHNIQUE: CT of the abdomen and pelvis was performed with the administration of intravenous contrast. Multiplanar reformatted images are provided for review. Dose modulation, iterative reconstruction, and/or weight based adjustment of the mA/kV was utilized to reduce the radiation dose to as low as reasonably achievable. COMPARISON: None.  HISTORY: ORDERING SYSTEM PROVIDED HISTORY: mid abd pain radiatiing to back TECHNOLOGIST PROVIDED HISTORY: Additional Contrast?->None Reason for exam:->mid abd pain radiatiing to back Decision Support Exception->Emergency Medical Condition (MA) Reason for Exam: bilateral flank pain right greater than left, mid abd pain around waist symptoms x 5 days, hx kidney stones, hx uti Acuity: Acute Type of Exam: Initial FINDINGS: Lower Chest: There is bibasilar scarring. Coronary artery calcifications are a marker of atherosclerosis. A moderate hiatal hernia is noted. Organs: The liver, spleen, pancreas, adrenal glands and kidneys are unremarkable. The liver is diffusely low in attenuation consistent with hepatic steatosis. There is bilateral cortical renal scarring with left simple renal cysts. GI/Bowel: There is no bowel obstruction. The appendix is within normal limits. Pelvis: Status post hysterectomy. Peritoneum/Retroperitoneum: There is no evidence of free fluid or adenopathy. Diffuse atherosclerosis involves the abdominal aorta. Bones/Soft Tissues: Degenerative changes involve the thoracolumbar spine and bilateral hips. The bones are demineralized. There is grade 1 retrolisthesis of L3 on L4.     1. No acute abnormality. ED COURSE/MDM  Patient seen and evaluated. Old records reviewed. Labs and imaging reviewed and results discussed with patient. This is a 75-year-old female presenting with abdominal and back pain. Her vital signs are within normal limits. She is nontoxic-appearing on exam. Does not appear to be in any distress. Her abdomen is not tender on exam and overall her abdominal exam is benign. She describes back pain, I feel this is more chronic in nature and likely unrelated to her pain today. She does not exhibit signs of cauda equina or spinal epidural abscess. UA is clear, I do not suspect kidney stone or pyelonephritis.  Otherwise lipase is within normal limits, she does not have any vomiting and started with is

## 2021-06-05 DIAGNOSIS — F41.9 ANXIETY: ICD-10-CM

## 2021-06-07 RX ORDER — DULOXETIN HYDROCHLORIDE 30 MG/1
CAPSULE, DELAYED RELEASE ORAL
Qty: 360 CAPSULE | Refills: 1 | Status: SHIPPED | OUTPATIENT
Start: 2021-06-07

## 2021-06-07 RX ORDER — SIMVASTATIN 20 MG
TABLET ORAL
Qty: 90 TABLET | Refills: 1 | Status: SHIPPED | OUTPATIENT
Start: 2021-06-07 | End: 2021-10-28

## 2021-06-07 NOTE — TELEPHONE ENCOUNTER
Refilled medication per verbal order from provider.   Future appt scheduled 10/18/2021  Last appt 04/12/2021

## 2021-07-23 DIAGNOSIS — R13.19 ESOPHAGEAL DYSPHAGIA: ICD-10-CM

## 2021-07-23 DIAGNOSIS — R12 HEARTBURN: ICD-10-CM

## 2021-07-23 DIAGNOSIS — K44.9 HIATAL HERNIA: ICD-10-CM

## 2021-07-26 RX ORDER — OMEPRAZOLE 40 MG/1
CAPSULE, DELAYED RELEASE ORAL
Qty: 180 CAPSULE | Refills: 1 | Status: SHIPPED | OUTPATIENT
Start: 2021-07-26 | End: 2022-01-17

## 2021-09-14 ENCOUNTER — TELEPHONE (OUTPATIENT)
Dept: FAMILY MEDICINE CLINIC | Age: 76
End: 2021-09-14

## 2021-09-14 NOTE — TELEPHONE ENCOUNTER
----- Message from Justen Serum sent at 9/14/2021  2:53 PM EDT -----  Subject: Message to Provider    QUESTIONS  Information for Provider? Pt is wanting to know if he doctor will call her   in a prrescrption for pain because she said she pulled a muscle 2 weeks   ago and its affecting her lower back. ---------------------------------------------------------------------------  --------------  [facility] INFO  What is the best way for the office to contact you? OK to leave message on   voicemail  Preferred Call Back Phone Number? [phone]  ---------------------------------------------------------------------------  --------------  SCRIPT ANSWERS  Relationship to Patient?  Self

## 2021-09-14 NOTE — TELEPHONE ENCOUNTER
Patient states she pulled a muscle and is having right sided back pain. Pain does not radiated or no numbness. It hurts when she movies She has not taken any Ibuprofen or tylenol. States she was told not to take Tylenol per her heart doctor.  Not sure why?

## 2021-10-22 ENCOUNTER — OFFICE VISIT (OUTPATIENT)
Dept: FAMILY MEDICINE CLINIC | Age: 76
End: 2021-10-22
Payer: MEDICARE

## 2021-10-22 VITALS
OXYGEN SATURATION: 96 % | TEMPERATURE: 99.1 F | DIASTOLIC BLOOD PRESSURE: 85 MMHG | BODY MASS INDEX: 40.04 KG/M2 | WEIGHT: 233.25 LBS | HEART RATE: 88 BPM | SYSTOLIC BLOOD PRESSURE: 134 MMHG

## 2021-10-22 DIAGNOSIS — K58.0 IRRITABLE BOWEL SYNDROME WITH DIARRHEA: ICD-10-CM

## 2021-10-22 DIAGNOSIS — M25.551 RIGHT HIP PAIN: ICD-10-CM

## 2021-10-22 DIAGNOSIS — E78.5 HYPERLIPIDEMIA, UNSPECIFIED HYPERLIPIDEMIA TYPE: ICD-10-CM

## 2021-10-22 DIAGNOSIS — R21 RASH AND NONSPECIFIC SKIN ERUPTION: ICD-10-CM

## 2021-10-22 DIAGNOSIS — I10 ESSENTIAL HYPERTENSION: Primary | ICD-10-CM

## 2021-10-22 DIAGNOSIS — F41.9 ANXIETY: ICD-10-CM

## 2021-10-22 DIAGNOSIS — I73.9 PVD (PERIPHERAL VASCULAR DISEASE) (HCC): ICD-10-CM

## 2021-10-22 DIAGNOSIS — G25.81 RESTLESS LEG SYNDROME: ICD-10-CM

## 2021-10-22 PROCEDURE — 99214 OFFICE O/P EST MOD 30 MIN: CPT | Performed by: NURSE PRACTITIONER

## 2021-10-22 ASSESSMENT — ENCOUNTER SYMPTOMS
SORE THROAT: 0
RHINORRHEA: 0
COUGH: 0
NAUSEA: 0
CHEST TIGHTNESS: 0
ABDOMINAL PAIN: 0
VOMITING: 0
WHEEZING: 0
DIARRHEA: 0
SHORTNESS OF BREATH: 1

## 2021-10-22 NOTE — PATIENT INSTRUCTIONS
Please read the healthy family handout that you were given and share it with your family. Please compare this printed medication list with your medications at home to be sure they are the same. If you have any medications that are different please contact us immediately at [phone]. Also review your allergies that we have listed, these may also include medications that you have not been able to tolerate, make sure everything listed is correct. If you have any allergies that are different please contact us immediately at [phone].
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
## 2021-10-28 RX ORDER — SIMVASTATIN 20 MG
TABLET ORAL
Qty: 90 TABLET | Refills: 1 | Status: SHIPPED | OUTPATIENT
Start: 2021-10-28

## 2021-10-28 RX ORDER — FUROSEMIDE 20 MG/1
TABLET ORAL
Qty: 90 TABLET | Refills: 1 | Status: SHIPPED | OUTPATIENT
Start: 2021-10-28

## 2021-12-12 ENCOUNTER — APPOINTMENT (OUTPATIENT)
Dept: CT IMAGING | Age: 76
End: 2021-12-12
Payer: MEDICARE

## 2021-12-12 ENCOUNTER — APPOINTMENT (OUTPATIENT)
Dept: GENERAL RADIOLOGY | Age: 76
End: 2021-12-12
Payer: MEDICARE

## 2021-12-12 ENCOUNTER — HOSPITAL ENCOUNTER (EMERGENCY)
Age: 76
Discharge: HOME | End: 2021-12-12
Attending: EMERGENCY MEDICINE
Payer: MEDICARE

## 2021-12-12 VITALS
RESPIRATION RATE: 18 BRPM | BODY MASS INDEX: 39.95 KG/M2 | HEIGHT: 64 IN | OXYGEN SATURATION: 99 % | WEIGHT: 234 LBS | TEMPERATURE: 98.3 F | HEART RATE: 74 BPM | DIASTOLIC BLOOD PRESSURE: 74 MMHG | SYSTOLIC BLOOD PRESSURE: 116 MMHG

## 2021-12-12 DIAGNOSIS — M54.50 LUMBAR PAIN: ICD-10-CM

## 2021-12-12 DIAGNOSIS — S09.90XA CLOSED HEAD INJURY, INITIAL ENCOUNTER: ICD-10-CM

## 2021-12-12 DIAGNOSIS — M25.511 CHRONIC RIGHT SHOULDER PAIN: ICD-10-CM

## 2021-12-12 DIAGNOSIS — R03.0 ELEVATED BLOOD PRESSURE READING: ICD-10-CM

## 2021-12-12 DIAGNOSIS — R10.30 LOWER ABDOMINAL PAIN: ICD-10-CM

## 2021-12-12 DIAGNOSIS — G89.29 CHRONIC RIGHT SHOULDER PAIN: ICD-10-CM

## 2021-12-12 DIAGNOSIS — R94.31 QT PROLONGATION: ICD-10-CM

## 2021-12-12 DIAGNOSIS — R51.9 HEADACHE, UNSPECIFIED HEADACHE TYPE: ICD-10-CM
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
## 2021-12-12 DIAGNOSIS — S16.1XXA CERVICAL STRAIN, ACUTE, INITIAL ENCOUNTER: ICD-10-CM

## 2021-12-12 DIAGNOSIS — M50.30 DDD (DEGENERATIVE DISC DISEASE), CERVICAL: ICD-10-CM

## 2021-12-12 LAB
A/G RATIO: 1.1 (ref 1.1–2.2)
ALBUMIN SERPL-MCNC: 4.2 G/DL (ref 3.4–5)
ALP BLD-CCNC: 98 U/L (ref 40–129)
ALT SERPL-CCNC: 22 U/L (ref 10–40)
ANION GAP SERPL CALCULATED.3IONS-SCNC: 10 MMOL/L (ref 3–16)
AST SERPL-CCNC: 29 U/L (ref 15–37)
BASOPHILS ABSOLUTE: 0 K/UL (ref 0–0.2)
BASOPHILS RELATIVE PERCENT: 0.4 %
BILIRUB SERPL-MCNC: 0.6 MG/DL (ref 0–1)
BILIRUBIN URINE: NEGATIVE
BLOOD, URINE: NEGATIVE
BUN BLDV-MCNC: 9 MG/DL (ref 7–20)
CALCIUM SERPL-MCNC: 9.8 MG/DL (ref 8.3–10.6)
CHLORIDE BLD-SCNC: 93 MMOL/L (ref 99–110)
CLARITY: CLEAR
CO2: 31 MMOL/L (ref 21–32)
COLOR: YELLOW
CREAT SERPL-MCNC: 0.7 MG/DL (ref 0.6–1.2)
EKG ATRIAL RATE: 85 BPM
EKG DIAGNOSIS: NORMAL
EKG P AXIS: 32 DEGREES
EKG P-R INTERVAL: 166 MS
EKG Q-T INTERVAL: 500 MS
EKG QRS DURATION: 174 MS
EKG QTC CALCULATION (BAZETT): 595 MS
EKG R AXIS: -17 DEGREES
EKG T AXIS: 21 DEGREES
EKG VENTRICULAR RATE: 85 BPM
EOSINOPHILS ABSOLUTE: 0 K/UL (ref 0–0.6)
EOSINOPHILS RELATIVE PERCENT: 0.1 %
GFR AFRICAN AMERICAN: >60
GFR NON-AFRICAN AMERICAN: >60
GLUCOSE BLD-MCNC: 128 MG/DL (ref 70–99)
GLUCOSE URINE: NEGATIVE MG/DL
HCT VFR BLD CALC: 40.7 % (ref 36–48)
HEMOGLOBIN: 13.4 G/DL (ref 12–16)
KETONES, URINE: NEGATIVE MG/DL
LEUKOCYTE ESTERASE, URINE: NEGATIVE
LYMPHOCYTES ABSOLUTE: 1.4 K/UL (ref 1–5.1)
LYMPHOCYTES RELATIVE PERCENT: 18.6 %
MCH RBC QN AUTO: 29.3 PG (ref 26–34)
MCHC RBC AUTO-ENTMCNC: 33 G/DL (ref 31–36)
MCV RBC AUTO: 88.8 FL (ref 80–100)
MICROSCOPIC EXAMINATION: NORMAL
MONOCYTES ABSOLUTE: 0.6 K/UL (ref 0–1.3)
MONOCYTES RELATIVE PERCENT: 7.7 %
NEUTROPHILS ABSOLUTE: 5.6 K/UL (ref 1.7–7.7)
NEUTROPHILS RELATIVE PERCENT: 73.2 %
NITRITE, URINE: NEGATIVE
PDW BLD-RTO: 14.6 % (ref 12.4–15.4)
PH UA: 7.5 (ref 5–8)
PLATELET # BLD: 224 K/UL (ref 135–450)
PMV BLD AUTO: 7.5 FL (ref 5–10.5)
POTASSIUM REFLEX MAGNESIUM: 4 MMOL/L (ref 3.5–5.1)
PROTEIN UA: NEGATIVE MG/DL
RBC # BLD: 4.58 M/UL (ref 4–5.2)
SODIUM BLD-SCNC: 134 MMOL/L (ref 136–145)
SPECIFIC GRAVITY UA: 1.01 (ref 1–1.03)
TOTAL CK: 216 U/L (ref 26–192)
TOTAL PROTEIN: 8 G/DL (ref 6.4–8.2)
TROPONIN: <0.01 NG/ML
URINE REFLEX TO CULTURE: NORMAL
URINE TYPE: NORMAL
UROBILINOGEN, URINE: 0.2 E.U./DL
WBC # BLD: 7.6 K/UL (ref 4–11)

## 2021-12-12 PROCEDURE — 51702 INSERT TEMP BLADDER CATH: CPT

## 2021-12-12 PROCEDURE — 6360000004 HC RX CONTRAST MEDICATION: Performed by: PHYSICIAN ASSISTANT

## 2021-12-12 PROCEDURE — 72131 CT LUMBAR SPINE W/O DYE: CPT

## 2021-12-12 PROCEDURE — 93005 ELECTROCARDIOGRAM TRACING: CPT | Performed by: PHYSICIAN ASSISTANT

## 2021-12-12 PROCEDURE — 99285 EMERGENCY DEPT VISIT HI MDM: CPT

## 2021-12-12 PROCEDURE — 84484 ASSAY OF TROPONIN QUANT: CPT

## 2021-12-12 PROCEDURE — 74177 CT ABD & PELVIS W/CONTRAST: CPT

## 2021-12-12 PROCEDURE — 80053 COMPREHEN METABOLIC PANEL: CPT

## 2021-12-12 PROCEDURE — 70450 CT HEAD/BRAIN W/O DYE: CPT

## 2021-12-12 PROCEDURE — 36415 COLL VENOUS BLD VENIPUNCTURE: CPT

## 2021-12-12 PROCEDURE — 82550 ASSAY OF CK (CPK): CPT

## 2021-12-12 PROCEDURE — 72125 CT NECK SPINE W/O DYE: CPT

## 2021-12-12 PROCEDURE — 85025 COMPLETE CBC W/AUTO DIFF WBC: CPT

## 2021-12-12 PROCEDURE — 6370000000 HC RX 637 (ALT 250 FOR IP): Performed by: PHYSICIAN ASSISTANT

## 2021-12-12 PROCEDURE — 93010 ELECTROCARDIOGRAM REPORT: CPT | Performed by: INTERNAL MEDICINE

## 2021-12-12 PROCEDURE — 81003 URINALYSIS AUTO W/O SCOPE: CPT

## 2021-12-12 PROCEDURE — 71045 X-RAY EXAM CHEST 1 VIEW: CPT

## 2021-12-12 PROCEDURE — 73030 X-RAY EXAM OF SHOULDER: CPT

## 2021-12-12 RX ORDER — ACETAMINOPHEN 500 MG
500 TABLET ORAL ONCE
Status: COMPLETED | OUTPATIENT
Start: 2021-12-12 | End: 2021-12-12

## 2021-12-12 RX ORDER — ACETAMINOPHEN 500 MG
500 TABLET ORAL EVERY 6 HOURS PRN
Qty: 20 TABLET | Refills: 1 | Status: SHIPPED | OUTPATIENT
Start: 2021-12-12

## 2021-12-12 RX ADMIN — ACETAMINOPHEN 500 MG: 500 TABLET ORAL at 15:45

## 2021-12-12 RX ADMIN — IOPAMIDOL 75 ML: 755 INJECTION, SOLUTION INTRAVENOUS at 15:31

## 2021-12-12 RX ADMIN — ACETAMINOPHEN 500 MG: 500 TABLET ORAL at 17:19

## 2021-12-12 ASSESSMENT — ENCOUNTER SYMPTOMS
SHORTNESS OF BREATH: 0
BACK PAIN: 1
VOMITING: 0
ABDOMINAL PAIN: 1

## 2021-12-12 ASSESSMENT — PAIN SCALES - GENERAL
PAINLEVEL_OUTOF10: 10
PAINLEVEL_OUTOF10: 5
PAINLEVEL_OUTOF10: 3
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
## 2021-12-12 NOTE — ED PROVIDER NOTES
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 since she last spoke with her. Patient does not know what happened or what made her fall. Complains of headache, neck pain, low back pain, lower abdominal pain right shoulder pain. States her shoulder has bothered her for a long time and she gets frequent headaches. She is awake alert oriented x3 on exam.  GCS 15.  Was given Tylenol for pain. CT brain no acute intracranial abnormality no hemorrhage no swelling, chronic small vessel ischemic changes. Cervical spine degenerative disc disease, no fracture or subluxation. CT abdomen pelvis no acute intra-abdominal finding, lumbar spine no fracture, right shoulder no fracture.   Chest x-ray chronic granulomatous disease, rotated film borderline enlarged cardiomediastinal silhouette, small right pleural effusion with adjacent airspace disease. She is not complaining of any cough or fevers. She denies any chest pain or shortness of breath. No symptoms of TAD. Labs checked. Glucose 128. Blood counts are normal.  CK is 216. Troponin is normal.  Urinalysis negative for infection. 5:06 PM EST Offered admission into the hospital for further evaluation but she is declining she does not want to be admitted. Patient is alert and oriented x3 able to make her own decisions. Family at bedside. Advised close follow-up with her doctor 24 to 48 hours. Tylenol for pain. Return for any worsening. I estimate there is LOW risk for CAUDA EQUINA or CENTRAL CORD SYNDROME, COMPARTMENT SYNDROME, CORD COMPRESSION,  INTRACRANIAL HEMORRHAGE OR EDEMA, INTRA-ABDOMINAL INJURY, PERFORATED BOWEL, SUBDURAL OR EPIDURAL HEMATOMA, TENDON or NEUROVASCULAR INJURY, PNEUMOTHORAX, HEMOTHORAX, PERICARDIAL TAMPONADE, CARDIAC CONTUSION, or a THORACIC AORTIC DISSECTION. I estimate there is LOW risk for PULMONARY EMBOLISM, ACUTE CORONARY SYNDROME, THORACIC AORTIC DISSECTION, STROKE, TRANSIENT ISCHEMIC ATTACK, HEMORRHAGE, OR CARDIAC ARRHYTHMIA. FINAL IMPRESSION      1. Fall, initial encounter    2. Closed head injury, initial encounter    3. Headache, unspecified headache type    4. Cervical strain, acute, initial encounter    5. Lumbar pain    6. DDD (degenerative disc disease), cervical    7. Chronic right shoulder pain    8. Elevated blood pressure reading    9. Lower abdominal pain          DISPOSITION/PLAN   DISPOSITION     PATIENT REFERREDTO:  Sen Logan, ROCKY - [facility]  [address] [place]  [phone]    Call in 1 day  call to arrange follow up appointment within 48 hours    [facility] Emergency Department  [address] [place] [address]  [phone]    If symptoms worsen      DISCHARGE MEDICATIONS:  Discharge Medication List as of 12/12/2021 5:15 PM          DISCONTINUED MEDICATIONS:  Discharge Medication List as of 12/12/2021  5:15 PM                 (Please note that portions ofthis note were completed with a voice recognition program.  Efforts were made to edit the dictations but occasionally words are mis-transcribed.)    Yosi Cerda PA-C (electronically signed)            Cassy Vera PA-C  12/12/21 9480

## 2021-12-12 NOTE — ED PROVIDER NOTES
I independently evaluated and obtained a history and physical on Amor Flores. All diagnostic, treatment, and disposition assistants were made to myself in conjunction the advanced practice provider. For further details of this patient's emergency department encounter, please see the advanced practice provider's documentation. History: The patient is a 30-year-old female with a history of CHF, lupus, and previous CVA who was brought to emergency [facility] after she was found laying on the floor this morning. Patient reports that she remembers being in her recliner and trying to fully recline in the next and she knew she was on the ground. Patient does not fully remember the incident. Patient complains of a headache, neck pain, low back pain, and low abdominal pain and diffuse arm and leg pain. She has any fever, cough, numbness or vomiting    Physician Exam: Pleasant  female in no acute distress. Regular rate and rhythm. Intact distal pulses. Patient is alert and oriented to person, place, and year. Sensation intact in all 4 extremities. 5 and 5 strength in all 4 extremities. Patient ambulatory with a cane 50 feet in the emergency department on her own. No facial droop. MDM:    The Ekg interpreted by me shows  normal sinus rhythm with a rate of 85  Axis is   Normal  QTc is  595ms  Intervals and Durations are unremarkable. ST Segments: LVH  Widening of QRS since previous EKG on 4/20/2021      Work in the emergency department including imaging of the head and abdomen laboratory evaluation is unremarkable except for prolonged QTC. Patient does not have severe electrolyte abnormality. I have recommended admission based on patient's age and the fact that she was found on the ground and is unsure of events. Patient adamantly refuses, states she feels much better, denies any palpitations lightheadedness or confusion, and states she wants to be discharged home.   This conversation is had with the patient with her daughter present. I feel the patient adequately understands the risk, benefits, alternatives to her decision has capacity to participate in shared medical decision making. Close primary care follow-up and strict ER return precautions are given. At 2:30 PM on 1214 I have called the patient was a follow-up call. The patient reports she is feeling well, denies any palpitations, further falls, or concerns. She reports she feels she is back to her baseline health. I have encouraged her to see her primary care doctor and seek immediate in person evaluation if her symptoms return. FINAL IMPRESSION      1. Fall, initial encounter    2. Closed head injury, initial encounter    3. Headache, unspecified headache type    4. Cervical strain, acute, initial encounter    5. Lumbar pain    6. DDD (degenerative disc disease), cervical    7. Chronic right shoulder pain    8. Elevated blood pressure reading    9. Lower abdominal pain    10.  QT prolongation             Meg Newberry MD  12/14/21 9353

## 2021-12-12 NOTE — ED NOTES
Pt noted with approx 1200 cc of clear lt yellow urine to ramon bag upon d/c of ramon cath     Jeny Espinal RN  12/12/21 5644

## 2022-01-15 DIAGNOSIS — K44.9 HIATAL HERNIA: ICD-10-CM

## 2022-01-15 DIAGNOSIS — R12 HEARTBURN: ICD-10-CM

## 2022-01-15 DIAGNOSIS — R13.19 ESOPHAGEAL DYSPHAGIA: ICD-10-CM

## 2022-01-17 RX ORDER — OMEPRAZOLE 40 MG/1
CAPSULE, DELAYED RELEASE ORAL
Qty: 180 CAPSULE | Refills: 1 | Status: SHIPPED | OUTPATIENT
Start: 2022-01-17 | End: 2022-08-03 | Stop reason: SDUPTHER

## 2022-01-17 NOTE — TELEPHONE ENCOUNTER
Future appt scheduled 4/5/22  Last appt 10/22/21 Patient : Ladonna Brantley Age: 82 year old Sex: female   MRN: [id number] Encounter Date: 12/12/2021      History     Chief Complaint   Patient presents with   • Hypertension   • Head Pain     82-year-old female past medical history of diabetes, hypertension, and hypothyroidism presents in the ED for generalized weakness.  Patient states that since yesterday she has been feeling unwell.  Describes generalized weakness, nausea, a few episodes of NB/NB vomiting, headaches, and lightheadedness.  Patient states that when her son took her blood pressure the last couple days it was very high with measurements in the 180s to 200s systolic.  States that her blood sugars have also been high despite her not wanting to eat.  Denies chest pain, difficulty breathing, fever, dysuria, hematuria, abdominal pain, and any fall or injury.  States that today she feels so weak that she has been unable to walk.          Note to patient: The 21st Century Cures Act requires that medical notes like this be available to patients in the interest of transparency. However, be advised this is a medical document. It is intended as peer to peer communication. It is written in medical language and may contain abbreviations or verbiage that are unfamiliar. It may appear blunt or direct. Medical documents are intended to carry relevant information, facts as evident, and the clinical opinion of the practitioner.    No Known Allergies    Current Discharge Medication List      Prior to Admission Medications    Details   blood glucose (Accu-Chek Radha Plus) test strip daily. Test blood sugar one times daily. Diagnosis: e11.9. Meter: has  Qty: 100 strip, Refills: 3      enalapril (VASOTEC) 5 MG tablet Take 1 tablet by mouth daily.  Qty: 90 tablet, Refills: 3      glimepiride (AMARYL) 4 MG tablet Take 1 tablet by mouth daily (before breakfast).  Qty: 90 tablet, Refills: 2      hydrochlorothiazide (HYDRODIURIL) 12.5 MG tablet Take 1 tablet by mouth daily.  Qty:  90 tablet, Refills: 3      metformin (GLUCOPHAGE) 1000 MG tablet Take 1 tablet by mouth 2 times daily (with meals).  Qty: 180 tablet, Refills: 2      omeprazole (PriLOSEC) 40 MG capsule Take 1 capsule by mouth daily as needed (gerd).  Qty: 90 capsule, Refills: 0      atorvastatin (LIPITOR) 40 MG tablet TOME CLARISA TABLETA TODOS LOS MONROY  Qty: 90 tablet, Refills: 3      Multiple Vitamins-Minerals (WOMENS 50+ MULTI VITAMIN/MIN PO) Take by mouth daily.      Ascorbic Acid (VITAMIN C PO) Take by mouth 3 times daily.      levothyroxine 75 MCG tablet Take 1 tablet by mouth daily.  Qty: 90 tablet, Refills: 3      fexofenadine (ALLEGRA) 180 MG tablet Take 1 tablet by mouth daily.  Qty: 30 tablet, Refills: 5             Current Discharge Medication List          Past Medical History:   Diagnosis Date   • Anemia    • Arthritis    • Cataract    • Diabetes mellitus (CMS/HCC)    • Essential (primary) hypertension    • GERD (gastroesophageal reflux disease)    • Osteopenia after menopause    • Parotid mass 9/11/2019   • Thyroid disease        Past Surgical History:   Procedure Laterality Date   • CHOLECYSTECTOMY     • HYSTERECTOMY     • OVARIAN CYST REMOVAL     • [place] ESWL FOR GALLSTONES      no eswl       Family History   Problem Relation Age of Onset   • Congestive Heart Failure Sister    • Heart disease Mother    • Natural Causes Father        Social History     Tobacco Use   • Smoking status: Never Smoker   • Smokeless tobacco: Never Used   Vaping Use   • Vaping Use: never used   Substance Use Topics   • Alcohol use: Never   • Drug use: Never       Review of Systems   Constitutional: Positive for appetite change and fever. Negative for chills.        Generalized weakness   HENT: Negative for rhinorrhea and sore throat.    Eyes: Negative for discharge and redness.   Respiratory: Negative for cough and shortness of breath.    Cardiovascular: Negative for chest pain and leg swelling.   Gastrointestinal: Positive for nausea and  vomiting. Negative for abdominal pain, blood in stool and diarrhea.   Genitourinary: Negative for dysuria and hematuria.   Musculoskeletal: Negative for arthralgias and myalgias.   Skin: Negative for rash and wound.   Neurological: Positive for light-headedness and headaches. Negative for dizziness and syncope.       Physical Exam     ED Triage Vitals [12/12/21 1432]   ED Triage Vitals Group      Temp 98.2 °F (36.8 °C)      Heart Rate 80      Resp 18      BP (!) 146/67      SpO2 98 %      EtCO2 mmHg       Height       Weight       Weight Scale Used       BMI (Calculated)       IBW/kg (Calculated)        Physical Exam    Constitutional: Awake, alert, NAD  Head: Atraumatic, normocephalic  Eyes: PERRL, EOMI, sclera non-icteric  ENT: Mucous membranes moist, no pharyngeal erythema or exudate  Neck: Supple, trachea midline  CVS: RRR, no murmurs, 2+ radial pulses  Respiratory/chest: No respiratory distress, breathing not labored, normal chest rise, lungs CTA bilaterally, no wheezing  Abdomen: soft, non-tender, non-distended, no rebound or guarding  Back: No step-offs, no CVA tenderness  Neuro: A&Ox3, no facial droop, normal speech, moves all extremities equally, sensation grossly intact, no pronator drift, 5/5 strength in all 4 extremities  Extremities: Normal ROM, no swelling, no deformities  Skin: Warm, dry  Psych: Cooperative, normal mood, normal affect    Lab Results     Results for orders placed or performed during the hospital encounter of 12/12/21   Comprehensive Metabolic Panel   Result Value Ref Range    Fasting Status      Sodium 138 135 - 145 mmol/L    Potassium 5.4 (H) 3.4 - 5.1 mmol/L    Chloride 103 98 - 107 mmol/L    Carbon Dioxide 23 21 - 32 mmol/L    Anion Gap 17 10 - 20 mmol/L    Glucose 115 (H) 70 - 99 mg/dL    BUN 20 6 - 20 mg/dL    Creatinine 0.97 (H) 0.51 - 0.95 mg/dL    Glomerular Filtration Rate 55 (L) >=60    BUN/ Creatinine Ratio 21 7 - 25    Calcium 9.4 8.4 - 10.2 mg/dL    Bilirubin, Total 0.4  0.2 - 1.0 mg/dL    GOT/AST 26 <=37 Units/L    GPT/ALT 31 <64 Units/L    Alkaline Phosphatase 87 45 - 117 Units/L    Albumin 3.6 3.6 - 5.1 g/dL    Protein, Total 7.9 6.4 - 8.2 g/dL    Globulin 4.3 (H) 2.0 - 4.0 g/dL    A/G Ratio 0.8 (L) 1.0 - 2.4   TROPONIN I, HIGH SENSITIVITY   Result Value Ref Range    Troponin I, High Sensitivity 8 <52 ng/L   CBC with Automated Differential (performable only)   Result Value Ref Range    WBC 10.6 4.2 - 11.0 K/mcL    RBC 4.16 4.00 - 5.20 mil/mcL    HGB 12.1 12.0 - 15.5 g/dL    HCT 37.8 36.0 - 46.5 %    MCV 90.9 78.0 - 100.0 fl    MCH 29.1 26.0 - 34.0 pg    MCHC 32.0 32.0 - 36.5 g/dL    RDW-CV 13.5 11.0 - 15.0 %    RDW-SD 45.1 39.0 - 50.0 fL     140 - 450 K/mcL    NRBC 0 <=0 /100 WBC    Neutrophil, Percent 56 %    Lymphocytes, Percent 31 %    Mono, Percent 8 %    Eosinophils, Percent 3 %    Basophils, Percent 1 %    Immature Granulocytes 1 %    Absolute Neutrophils 5.9 1.8 - 7.7 K/mcL    Absolute Lymphocytes 3.3 1.0 - 4.0 K/mcL    Absolute Monocytes 0.9 0.3 - 0.9 K/mcL    Absolute Eosinophils  0.4 0.0 - 0.5 K/mcL    Absolute Basophils 0.1 0.0 - 0.3 K/mcL    Absolute Immmature Granulocytes 0.1 0.0 - 0.2 K/mcL   Urinalysis & Reflex Microscopy With Culture If Indicated   Result Value Ref Range    COLOR, URINALYSIS Straw     APPEARANCE, URINALYSIS Clear     GLUCOSE, URINALYSIS Negative Negative mg/dL    BILIRUBIN, URINALYSIS Negative Negative    KETONES, URINALYSIS Negative Negative mg/dL    SPECIFIC GRAVITY, URINALYSIS 1.005 1.005 - 1.030    OCCULT BLOOD, URINALYSIS Negative Negative    PH, URINALYSIS 6.0 5.0 - 7.0    PROTEIN, URINALYSIS Negative Negative mg/dL    UROBILINOGEN, URINALYSIS 0.2 0.2, 1.0 mg/dL    NITRITE, URINALYSIS Negative Negative    LEUKOCYTE ESTERASE, URINALYSIS Moderate (A) Negative    SQUAMOUS EPITHELIAL, URINALYSIS 1 to 5 None Seen, 1 to 5 /hpf    ERYTHROCYTES, URINALYSIS 1 to 2 None Seen, 1 to 2 /hpf    LEUKOCYTES, URINALYSIS 6 to 10 (A) None Seen, 1 to 5  /hpf    BACTERIA, URINALYSIS Few (A) None Seen /hpf    HYALINE CASTS, URINALYSIS None Seen None Seen, 1 to 5 /lpf   Rapid SARS-CoV-2 by PCR    Specimen: Nasal, Mid-turbinate; Swab   Result Value Ref Range    Rapid SARS-COV-2 by PCR Not Detected Not Detected / Detected / Presumptive Positive / Inhibitors present    Isolation Guidelines      Procedural Comment         EKG Results     EKG Interpretation  Rate: 78  Rhythm: normal sinus rhythm   Abnormality:   No ectopy, QTc 412, no ST elevation or depression  No significant change from EKG on 12/12/2019  Normal EKG.  No STEMI    EKG tracing interpreted by ED physician    Radiology Results     Imaging Results          CT HEAD WO CONTRAST (Final result)  Result time 12/12/21 19:01:18    Final result                 Impression:    No acute intracranial abnormality.  Mild cerebellar atrophy.    Noncontrast enhanced CT is a screening survey. Conditions such as vascular  malformations, aneurysms, low grade, tumors, meningitis, subtle  subarachnoid hemorrhages, etc., may not be demonstrated. Followup studies  as clinically indicated may be necessary.     Dictated by: PRATIK FINNEY DO  Dictated on: 12/12/2021 6:43 PM     IMAGUE M.D., have reviewed the images and report and concur with  these findings interpreted by PRATIK FINNEY DO.    Electronically Signed by: MAGUE CHIU M.D.   Signed on: 12/12/2021 7:01 PM                Narrative:    CT BRAIN WITHOUT CONTRAST    CLINICAL INDICATION: Dizziness    COMPARISON: CT head March 27, 2019    TECHNIQUE: Multiple axial images of the head were obtained from the base of  the skull to the vertex. Automated exposure control employed as radiation  dose reduction strategy on this patient. Coronal reconstructions and bone  windows were also performed.    FINDINGS:   The gray-white matter differentiation is maintained. There is no  intracranial parenchymal or extra-parenchymal hemorrhage.     The ventricles and sulci are  within normal limits for patient age. There is  no midline shift or mass effect.  Cerebral sulci and basal cisterns remain  normal size.  Cerebellar sulci are mildly prominent.  The craniocervical  junction is unremarkable.     The orbital structures appear symmetric and unremarkable. The paranasal  sinuses and mastoid air cells are clear. There are no aggressive bony  lesions.                                XR CHEST PA OR AP 1 VIEW (Final result)  Result time 12/12/21 15:17:32   Procedure changed from XR CHEST PA AND LATERAL 2 VIEWS     Final result                 Impression:       Normal heart size.  No mediastinal widening.  Atherosclerotic aorta.  No  lung consolidation, effusions or cephalization of vessels.  No change  compared to prior study.      Electronically Signed by: COCO CORCORAN M.D.   Signed on: 12/12/2021 3:17 PM                Narrative:    EXAM: XR CHEST PA OR AP 1 VIEW    CLINICAL INDICATION: Chest pain    COMPARISON: 12/12/2019    FINDINGS: See impression                                ED Medication Orders (From admission, onward)    Ordered Start     Status Ordering Provider    12/12/21 2141 12/12/21 2142  cefTRIAXone (ROCEPHIN) syringe 1,000 mg  ONCE         Last MAR action: Given RAMIRES, ALEXIA A            MDM  Pt w/ hx and physical exam as above.  Patient presenting for generalized weakness with vomiting, headache, and inability to ambulate.  No chest pain, dyspnea, or syncope.  On exam patient mildly hypertensive, vitals otherwise normal, no significant hypoxia or tachycardia, no focal neuro deficits, abdominal exam benign with no signs of peritonitis, exam otherwise benign.  Patient's presentation most concerning for possible acute infectious process sources including UTI, pneumonia, viral syndrome, less likely ACS is patient with no chest pain or dyspnea, unlikely acute CVA as patient with no focal neuro deficits.  Plan for CBC, CMP, troponin, EKG, UA, chest x-ray, and CT head.   Patient declines analgesia at the time of initial evaluation.  Will administer IV fluids.    Patient's labs showing stable CKD, mild hyperkalemia that this is likely due to slight hemolysis of the sample, no other significant electrolyte derangements, troponin not elevated and EKG nonischemic so unlikely ACS.  Imaging negative for acute process.  UA does appear acutely infected so will cover with ceftriaxone.  Plan for admission given persistent weakness causing difficulty ambulating and UTI.  Discussed all results with the patient and discussed plan for admission.  Patient understands and agrees with the plan.      ED Course       ED Course as of 12/12/21 2232   Sun Dec 12, 2021   2141 Discussion w/ the [facility] hospitalist, Dr. Barahona. Will admit the pt. [AA]      ED Course User Index  [AA] Dolly Cesar MD       Procedures    Clinical Impression     ED Diagnosis   1. Weakness     2. Acquired hypothyroidism     3. Class 1 obesity due to excess calories without serious comorbidity with body mass index (BMI) of 33.0 to 33.9 in adult     4. Essential hypertension, benign     5. Gastroesophageal reflux disease without esophagitis     6. Nausea and vomiting in adult     7. Parotid mass     8. Stage 3a chronic kidney disease (CMS/HCC)     9. Type 2 diabetes mellitus with stage 3a chronic kidney disease, without long-term current use of insulin (CMS/[facility], [facility])     10. Urine abnormality         Disposition        Admit 12/12/2021  9:07 PM  Telemetry Bed?: No  Admitting Physician: NIURKA BARAHONA [[id number]]  Comments: No COVID Symptoms  Is this a telephone or verbal order?: This is a telephone order from the admitting physician      Case discussed with and pt evaluated by attending Dr. Contreras. Refer to attending note for further documentation.      Dolly Cesar MD  ED PGY-3     Dolly Cesar MD  Resident  12/12/21 2232

## 2022-02-17 ENCOUNTER — OFFICE VISIT (OUTPATIENT)
Dept: FAMILY MEDICINE CLINIC | Age: 77
End: 2022-02-17
Payer: MEDICARE

## 2022-02-17 VITALS
OXYGEN SATURATION: 96 % | BODY MASS INDEX: 40.99 KG/M2 | DIASTOLIC BLOOD PRESSURE: 80 MMHG | SYSTOLIC BLOOD PRESSURE: 124 MMHG | WEIGHT: 238.8 LBS | HEART RATE: 74 BPM

## 2022-02-17 DIAGNOSIS — B37.0 THRUSH: ICD-10-CM

## 2022-02-17 DIAGNOSIS — Z76.89 ENCOUNTER TO ESTABLISH CARE: Primary | ICD-10-CM

## 2022-02-17 DIAGNOSIS — M32.9 LUPUS (HCC): ICD-10-CM

## 2022-02-17 DIAGNOSIS — K62.5 RECTAL BLEEDING: ICD-10-CM

## 2022-02-17 DIAGNOSIS — M05.79 RHEUMATOID ARTHRITIS INVOLVING MULTIPLE SITES WITH POSITIVE RHEUMATOID FACTOR (HCC): ICD-10-CM

## 2022-02-17 DIAGNOSIS — Z13.220 LIPID SCREENING: ICD-10-CM

## 2022-02-17 PROCEDURE — 99214 OFFICE O/P EST MOD 30 MIN: CPT

## 2022-02-17 RX ORDER — ASCORBIC ACID 500 MG
500 TABLET ORAL DAILY
COMMUNITY

## 2022-02-17 ASSESSMENT — ENCOUNTER SYMPTOMS
SORE THROAT: 0
EYE PAIN: 0
SHORTNESS OF BREATH: 0
RHINORRHEA: 0
EYE DISCHARGE: 0
TROUBLE SWALLOWING: 0
CONSTIPATION: 0
COUGH: 0
ABDOMINAL DISTENTION: 0
ANAL BLEEDING: 1
CHOKING: 0
COLOR CHANGE: 0
WHEEZING: 0
CHEST TIGHTNESS: 0
DIARRHEA: 0
ABDOMINAL PAIN: 0
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
## 2022-02-18 ENCOUNTER — NURSE ONLY (OUTPATIENT)
Dept: FAMILY MEDICINE CLINIC | Age: 77
End: 2022-02-18
Payer: MEDICARE

## 2022-02-18 ENCOUNTER — TELEPHONE (OUTPATIENT)
Dept: FAMILY MEDICINE CLINIC | Age: 77
End: 2022-02-18

## 2022-02-18 DIAGNOSIS — Z13.220 LIPID SCREENING: ICD-10-CM

## 2022-02-18 DIAGNOSIS — I10 ESSENTIAL HYPERTENSION: Primary | ICD-10-CM

## 2022-02-18 LAB
CHOLESTEROL, TOTAL: 149 MG/DL (ref 0–199)
HDLC SERPL-MCNC: 56 MG/DL (ref 40–60)
LDL CHOLESTEROL CALCULATED: 77 MG/DL
TRIGL SERPL-MCNC: 81 MG/DL (ref 0–150)
VLDLC SERPL CALC-MCNC: 16 MG/DL

## 2022-02-18 PROCEDURE — 36415 COLL VENOUS BLD VENIPUNCTURE: CPT

## 2022-02-18 RX ORDER — LOSARTAN POTASSIUM 100 MG/1
100 TABLET ORAL DAILY
Qty: 30 TABLET | Refills: 0 | Status: SHIPPED | OUTPATIENT
Start: 2022-02-18

## 2022-02-18 NOTE — TELEPHONE ENCOUNTER
Received on call communication. Reports BP is elevated to 880 systolic. Has been out of medication for unknown period of time. Will send in refill to pharmacy. Discussed if symptoms needs evaluated.

## 2022-02-18 NOTE — PROGRESS NOTES
Blood drawn per order. Needle size: 21 g  Site: L Antecubital.  First attempt successful Yes    Second attempt no    Pressure applied until bleeding stopped. Pressure applied. Patient informed to call office or return if bleeding reoccurs and unable to stop.     Tubes drawn: 1 purple     1 red

## 2022-02-28 ENCOUNTER — TELEPHONE (OUTPATIENT)
Dept: FAMILY MEDICINE CLINIC | Age: 77
End: 2022-02-28

## 2022-02-28 NOTE — TELEPHONE ENCOUNTER
----- Message from Getachew Encisoers sent at 2/28/2022 11:43 AM EST -----  Subject: Message to Provider    QUESTIONS  Information for Provider? patient was in the office a week ago and Joselyn Corrales   gave Pt. paperwork at the last visit Pt received some paperwork and not   sure what it is for, need explanation .   ---------------------------------------------------------------------------  --------------  CALL BACK INFO  What is the best way for the office to contact you? Do not leave any   message, patient will call back for answer  Preferred Call Back Phone Number? [phone]  ---------------------------------------------------------------------------  --------------  SCRIPT ANSWERS  Relationship to Patient?  Self

## 2022-02-28 NOTE — TELEPHONE ENCOUNTER
I do not see any reason medically why the patient cannot have the EGD or colonoscopy. Patient should contact her previous GI specialist given her circumstance in office during our visit. Thank you for the update.

## 2022-02-28 NOTE — TELEPHONE ENCOUNTER
Pt informed that it was an AVS and the referrals were for GI and Rheum. Does not want to see the GI b/c she had a scope before and was told that she didn't wake up real easy and she has heart failure. Won't do that again. Will call the Rheum doctor though.

## 2022-03-01 NOTE — TELEPHONE ENCOUNTER
Patient called back and states that she is not having anything done that involves needles. She states that she doesn't want to die on the table. I informed her that if she is having rectal bleeding that something could be going on and she would benefit from a colonoscopy. I informed her to call her GI specialist and see what her options are since she doesn't want to do a colonoscopy.

## 2022-03-10 ENCOUNTER — OFFICE VISIT (OUTPATIENT)
Dept: RHEUMATOLOGY | Age: 77
End: 2022-03-10
Payer: MEDICARE

## 2022-03-10 VITALS
SYSTOLIC BLOOD PRESSURE: 132 MMHG | WEIGHT: 238 LBS | BODY MASS INDEX: 40.63 KG/M2 | DIASTOLIC BLOOD PRESSURE: 82 MMHG | HEIGHT: 64 IN

## 2022-03-10 DIAGNOSIS — M15.9 GENERALIZED OSTEOARTHRITIS: Primary | ICD-10-CM

## 2022-03-10 PROCEDURE — 99204 OFFICE O/P NEW MOD 45 MIN: CPT | Performed by: INTERNAL MEDICINE

## 2022-03-10 NOTE — PROGRESS NOTES
[address], [place]                                                           [address] [place] [address] 51, [address] [place] [address]                                                              (P) [phone] (F)      Note is transcribed using voice recognition software. Inadvertent computerized transcription errors may be present. Patient identification: karishma Gallardo : ,17 y.o. REASON FOR CONSULTATION:  Patient is being seen at the request of  ROCKY David - CAL / ROCKY Howard*for evaluation and management of joint pain. HISTORY OF PRESENT ILLNESS:  68 y.o. female for history of congestive heart failure, hyperlipidemia, hypertension, GERD, anxiety depression states that she was told to have rheumatoid arthritis in the past, saw provider at [facility], had back injections which she did not like. She continues to have chronic musculoskeletal pain from head to toe-every part of her body hurts per patient. Neck, shoulder blade, entire spine and paraspinal area, upper and lower extremities-widespread pain, and has bony deformities in the fingers and to joints. Has bone-on-bone knee osteoarthritis. Very limited mobility, mostly sits in front of the television all day, per patient. Tylenol arthritis does not help. Duloxetine-she is taking for depression and anxiety, does not really help with arthritis. She had taken pain medication in the past did not help. Anti-inflammatories-she is concerned about taking because of heart disease. No overtly swollen or inflamed joints. No psoriasis, inflammatory bowel diseases. PMH, PSH,Social history , Meds reviewed. Craig Alcaraz had rheumatoid arthritis.     PHYSICAL EXAM:    Vitals:    /82   Ht 5' 4\" (1.626 m)   Wt 238 lb (108 kg)   BMI 40.85 kg/m²   AA)x3 well nourished, and well groomed, normal judgement. MKS: Classical findings of osteoarthritis with Heberden's, Kelsey's nodules, CMC joint OA changes, bilateral bony crepitus, valgus deformities, and weight changes in her mid feet and distal feet joints. No appreciable focally tender swollen inflamed joints in upper or lower extremities. Range of motion are full in all peripheral joints. All her fibromyalgia tender points are also positive. Skin: No rashes, no induration or skin thickening or nodules. HEENT: Normal lids, lacrimal glands and pupils. No oral or nasal ulcers. Neck: Supple no adenopathy. Chest: Normal effort  Heart: S1-S2 regular no edema. Abdomen:   Lymph nodes:   Neurologic:     DATA:       ASSESSMENT AND PLAN:  Bonnie Abdalla was seen today for establish care and joint pain. Diagnoses and all orders for this visit:    Generalized osteoarthritis    Other orders  -     diclofenac sodium (VOLTAREN) 1 % GEL; Apply 2 g topically 3 times daily        History, clinical findings are consistent with generalized osteoarthritis. Reassured patient that she does not have rheumatoid arthritis. Plan-  Explained diagnosis and management options. No DMARDs available. She is already on Cymbalta, recommend continuing it. We discussed about utilizing diclofenac gel for knees and finger joints. Sparing use of over-the-counter Aleve or Advil. Should also benefit by physical reconditioning, we discussed about physical therapy and aquatic exercises, is not keen on it at this time. Nothing much to add in her care from my end, recommend following up with primary care provider. #################################################################################################  Patient indicates understanding and agrees with the management plan.   Total time 45 minutes that includes the following-  Preparing to see the patient such as reviewing patients

## 2022-03-10 NOTE — PATIENT INSTRUCTIONS
OSTEOARTHRITIS:                Osteoarthritis is a joint disease that most often affects middle-age to elderly people. It is commonly referred to as OA or as \"wear and tear\" of the joints, but we now know that OA is a disease of the entire joint, involving the cartilage, joint lining, ligaments, and bone. Although it is more common in older people, it is not really accurate to say that the joints are just \"wearing out. \"  About 27 million Americans are living with OA, the most common form of joint disease. The lifetime risk of developing OA of the knee is about 46%, and the lifetime risk of developing OA of the hip is 25%, according to the [facility], a long-term study from the [employer] and sponsored by [employer] for [employer] and [employer] (often called the [employer]) and the [employer]. OA is a top cause of disability in older people. The goal of treatment in OA is to reduce pain and improve function. There is no cure for the disease, but some treatments attempt to slow disease progression. Fast facts  OA is the most common form of joint disease, and is a leading cause of disability in elderly people. This arthritis tends to occur in the hand joints, spine, hips, knees, and great toes. It is characterized by breakdown of the cartilage (the tissue that cushions the ends of the bones between joints), bony changes of the joints, deterioration of tendons and ligaments, and various degrees of inflammation of the synovium (joint lining). Though some of the joint changes are irreversible, most patients will not need joint replacement surgery. OA symptoms (what you feel) can vary greatly among patients. A rheumatologist can detect arthritis and prescribe the proper treatment. In osteoarthritis, the cartilage between the bones in the joint breaks down (left image). Slowly, affected bones get bigger, as in the hand at right.   What is osteoarthritis? OA is a frequently slowly progressive joint disease typically seen in middle-aged to elderly people. The disease occurs when the joint cartilage breaks down often because of mechanical stress or biochemical alterations, causing the bone underneath to fail. OA can occur together with other types of arthritis, such as gout or rheumatoid arthritis. OA tends to affect commonly used joints such as the hands and spine, and the weight-bearing joints such as the hips and knees. Symptoms include:   Joint pain and stiffness   Knobby swelling at the joint   Cracking or grinding noise with joint movement   Decreased function of the joint  Who gets osteoarthritis? OA affects people of all races and both sexes. Most often, it occurs in  patients age 36 and above. However, it can occur sooner if you have  other risk factors (things that raise the risk of getting OA). Risk factors include:   Older age   Having family members with OA   Obesity   Joint injury or repetitive use (overuse) of joints   Joint deformity such as unequal leg length, bowlegs or knocked knees  How is osteoarthritis diagnosed? Most often doctors detect OA based on the typical symptoms (described earlier) and on results of the physical exam. In some cases, X-rays or other imaging tests may be useful to tell the extent of disease or to help rule out other joint problems. Circles indicate joints that osteoarthritis most often affects. How is osteoarthritis treated? There is no proven treatment yet that can reverse joint damage from OA. The goal of treatment is to reduce pain and improve function of the affected joints. Most often, this is possible with a mixture of physical measures and drug therapy and, sometimes, surgery. Physical measures - Weight loss and exercise are useful in OA. Excess weight puts stress on your knee joints and hips and low back.  For every 10 pounds of weight you lose over 10 years, you can reduce the chance of developing knee OA by up to 50%. Exercise can improve your muscle strength, decrease joint pain and stiffness, and lower the chance of disability due to OA. Also helpful are support (\"assistive\") devices, such as braces or a walking cane, that help you do daily activities. Heat or cold therapy can help relieve OA symptoms for a short time. Certain alternative treatments such as spa (hot tub), massage, acupuncture and chiropractic manipulation can help relieve pain for a short time. They can be costly, though, and require repeated treatments. Also, the long-term benefits of these alternative (sometimes called complementary or integrative) medicine treatments are unproven but are under study. Drug Therapy - Forms of drug therapy include topical, oral (by mouth) and injections (shots). You apply topical drugs directly on the skin over the affected joints. These medicines include capsaicin cream, lidocaine and diclofenac gel. Oral pain relievers such as acetaminophen are common first treatments. So are nonsteroidal anti-inflammatory drugs (often called NSAIDs), which decrease swelling and pain. In 2010, the government (FDA) approved the use of duloxetine (Cymbalta) for chronic (long-term) musculoskeletal pain including from OA. This oral drug is not new. It also is in use for other health concerns, such as mood disorders, nerve pain and fibromyalgia. Patients with more serious pain may need stronger medications, such as prescription narcotics. Joint injections with corticosteroids (sometimes called cortisone shots) or with a form of lubricant called hyaluronic acid can give months of pain relief from OA. This lubricant is given in the knee, and these shots may help delay the need for a knee replacement by a few years in some patients. Surgery - Surgical treatment becomes an option for severe cases.  This includes when the joint has serious damage, or when medical treatment fails to relieve pain and you have major loss of function. Surgery may involve arthroscopy, repair of the joint done through small incisions (cuts). If the joint damage cannot be repaired, you may need a joint replacement. Supplements - Many over-the-counter nutrition supplements have been used for treatment of OA. Most lack good research data to support their effectiveness and safety. Among the most widely used are glucosamine/chondroitin sulfate, calcium and vitamin D, and omega-3 fatty acids. To ensure safety and avoid drug interactions, consult your doctor or pharmacist before using any of these supplements. This is especially true when you are combining these supplements with prescribed drugs. Living with Osteoarthritis  There is no cure for OA, but you can manage how it affects your lifestyle. Some tips include:  Properly position and support your neck and back while sitting or sleeping. Adjust furniture, such as raising a chair or toilet seat. Avoid repeated motions of the joint, especially frequent bending. Lose weight if you are overweight or obese, which can reduce pain and slow progression of OA. Exercise each day. Use arthritis support devices that will help you do daily activities. You might want to work with a physical therapist or occupational therapist to learn the best exercises and to choose arthritis assistive devices. Points to remember  OA is the most common form of arthritis and can occur together with other types of arthritis. The goal of treatment in OA is to reduce pain and improve function. Exercise is an important part of OA treatment because it can decrease joint pain and improve function. At present, there is no treatment that can reverse the damage of OA in the joints. Researchers are trying to find ways to slow or reverse this joint damage.    The rheumatologist's role in the treatment of osteoarthritis  Rheumatologists are doctors who are experts in diagnosing and treating arthritis and other diseases of the joints, muscles and bones. You may also need to see other health care providers, for instance, physical or occupational therapists and orthopedic doctors. To find a rheumatologist  For a list of rheumatologists in your area, click here. Learn more about rheumatologists and rheumatology health professionals. For more information  The [employer] of Rhe[employer] has compiled this list to give you a starting point for your own additional research. The A[employer] does not endorse or maintain these Web sites, and is not responsible for any information or claims provided on them. It is always best to talk with your rheumatologist for more information and before making any decisions about your care. Arth[employer]  ww.arthritis. [facility] Arthritis and Musculoskeletal and Skin Diseases   www.niams. nih.gov  Rheumatology [address] [place] [address] advances research and training to improve the health of people with rheumatic diseases.      © 2012 [employer]

## 2022-03-28 ENCOUNTER — HOSPITAL ENCOUNTER (EMERGENCY)
Age: 77
Discharge: HOME | End: 2022-03-28
Attending: EMERGENCY MEDICINE
Payer: MEDICARE

## 2022-03-28 ENCOUNTER — APPOINTMENT (OUTPATIENT)
Dept: GENERAL RADIOLOGY | Age: 77
End: 2022-03-28
Payer: MEDICARE

## 2022-03-28 ENCOUNTER — APPOINTMENT (OUTPATIENT)
Dept: CT IMAGING | Age: 77
End: 2022-03-28
Payer: MEDICARE

## 2022-03-28 VITALS
OXYGEN SATURATION: 96 % | HEART RATE: 68 BPM | WEIGHT: 234 LBS | HEIGHT: 64 IN | RESPIRATION RATE: 18 BRPM | DIASTOLIC BLOOD PRESSURE: 66 MMHG | BODY MASS INDEX: 39.95 KG/M2 | TEMPERATURE: 98.2 F | SYSTOLIC BLOOD PRESSURE: 183 MMHG

## 2022-03-28 DIAGNOSIS — R51.9 RECURRENT HEADACHE: ICD-10-CM

## 2022-03-28 DIAGNOSIS — R42 EPISODE OF DIZZINESS: Primary | ICD-10-CM

## 2022-03-28 DIAGNOSIS — I10 UNCONTROLLED HYPERTENSION: ICD-10-CM

## 2022-03-28 LAB
A/G RATIO: 1.2 (ref 1.1–2.2)
ALBUMIN SERPL-MCNC: 4 G/DL (ref 3.4–5)
ALP BLD-CCNC: 99 U/L (ref 40–129)
ALT SERPL-CCNC: 28 U/L (ref 10–40)
ANION GAP SERPL CALCULATED.3IONS-SCNC: 14 MMOL/L (ref 3–16)
AST SERPL-CCNC: 31 U/L (ref 15–37)
BACTERIA: ABNORMAL /HPF
BASOPHILS ABSOLUTE: 0.1 K/UL (ref 0–0.2)
BASOPHILS RELATIVE PERCENT: 1.1 %
BILIRUB SERPL-MCNC: 0.3 MG/DL (ref 0–1)
BILIRUBIN URINE: NEGATIVE
BLOOD, URINE: NEGATIVE
BUN BLDV-MCNC: 10 MG/DL (ref 7–20)
CALCIUM SERPL-MCNC: 9 MG/DL (ref 8.3–10.6)
CHLORIDE BLD-SCNC: 98 MMOL/L (ref 99–110)
CLARITY: CLEAR
CO2: 28 MMOL/L (ref 21–32)
COLOR: YELLOW
CREAT SERPL-MCNC: 0.8 MG/DL (ref 0.6–1.2)
EOSINOPHILS ABSOLUTE: 0.4 K/UL (ref 0–0.6)
EOSINOPHILS RELATIVE PERCENT: 7.7 %
EPITHELIAL CELLS, UA: ABNORMAL /HPF (ref 0–5)
ETHANOL: NORMAL MG/DL (ref 0–0.08)
GFR AFRICAN AMERICAN: >60
GFR NON-AFRICAN AMERICAN: >60
GLUCOSE BLD-MCNC: 115 MG/DL (ref 70–99)
GLUCOSE URINE: NEGATIVE MG/DL
HCT VFR BLD CALC: 39.9 % (ref 36–48)
HEMOGLOBIN: 13.2 G/DL (ref 12–16)
KETONES, URINE: NEGATIVE MG/DL
LACTIC ACID: 1.3 MMOL/L (ref 0.4–2)
LEUKOCYTE ESTERASE, URINE: ABNORMAL
LYMPHOCYTES ABSOLUTE: 1.9 K/UL (ref 1–5.1)
LYMPHOCYTES RELATIVE PERCENT: 34 %
MCH RBC QN AUTO: 28.9 PG (ref 26–34)
MCHC RBC AUTO-ENTMCNC: 33 G/DL (ref 31–36)
MCV RBC AUTO: 87.7 FL (ref 80–100)
MICROSCOPIC EXAMINATION: YES
MONOCYTES ABSOLUTE: 0.6 K/UL (ref 0–1.3)
MONOCYTES RELATIVE PERCENT: 10 %
NEUTROPHILS ABSOLUTE: 2.6 K/UL (ref 1.7–7.7)
NEUTROPHILS RELATIVE PERCENT: 47.2 %
NITRITE, URINE: NEGATIVE
PDW BLD-RTO: 14.5 % (ref 12.4–15.4)
PH UA: 6.5 (ref 5–8)
PLATELET # BLD: 198 K/UL (ref 135–450)
PMV BLD AUTO: 7.8 FL (ref 5–10.5)
POTASSIUM REFLEX MAGNESIUM: 4.9 MMOL/L (ref 3.5–5.1)
PRO-BNP: 567 PG/ML (ref 0–449)
PROTEIN UA: NEGATIVE MG/DL
RBC # BLD: 4.55 M/UL (ref 4–5.2)
RBC UA: ABNORMAL /HPF (ref 0–4)
SODIUM BLD-SCNC: 140 MMOL/L (ref 136–145)
SPECIFIC GRAVITY UA: 1.01 (ref 1–1.03)
TOTAL PROTEIN: 7.4 G/DL (ref 6.4–8.2)
TROPONIN: <0.01 NG/ML
URINE REFLEX TO CULTURE: ABNORMAL
URINE TYPE: ABNORMAL
UROBILINOGEN, URINE: 0.2 E.U./DL
WBC # BLD: 5.6 K/UL (ref 4–11)
WBC UA: ABNORMAL /HPF (ref 0–5)

## 2022-03-28 PROCEDURE — 70450 CT HEAD/BRAIN W/O DYE: CPT

## 2022-03-28 PROCEDURE — 93005 ELECTROCARDIOGRAM TRACING: CPT | Performed by: EMERGENCY MEDICINE

## 2022-03-28 PROCEDURE — 71045 X-RAY EXAM CHEST 1 VIEW: CPT

## 2022-03-28 PROCEDURE — 87086 URINE CULTURE/COLONY COUNT: CPT

## 2022-03-28 PROCEDURE — 81001 URINALYSIS AUTO W/SCOPE: CPT

## 2022-03-28 PROCEDURE — 83880 ASSAY OF NATRIURETIC PEPTIDE: CPT

## 2022-03-28 PROCEDURE — 80307 DRUG TEST PRSMV CHEM ANLYZR: CPT

## 2022-03-28 PROCEDURE — 6370000000 HC RX 637 (ALT 250 FOR IP): Performed by: EMERGENCY MEDICINE

## 2022-03-28 PROCEDURE — 82077 ASSAY SPEC XCP UR&BREATH IA: CPT

## 2022-03-28 PROCEDURE — 85025 COMPLETE CBC W/AUTO DIFF WBC: CPT

## 2022-03-28 PROCEDURE — 84484 ASSAY OF TROPONIN QUANT: CPT

## 2022-03-28 PROCEDURE — 83605 ASSAY OF LACTIC ACID: CPT

## 2022-03-28 PROCEDURE — 36415 COLL VENOUS BLD VENIPUNCTURE: CPT

## 2022-03-28 PROCEDURE — 99285 EMERGENCY DEPT VISIT HI MDM: CPT

## 2022-03-28 PROCEDURE — 80053 COMPREHEN METABOLIC PANEL: CPT

## 2022-03-28 RX ORDER — FUROSEMIDE 20 MG/1
TABLET ORAL
Qty: 90 TABLET | Refills: 1 | OUTPATIENT
Start: 2022-03-28

## 2022-03-28 RX ORDER — ACETAMINOPHEN 500 MG
1000 TABLET ORAL ONCE
Status: COMPLETED | OUTPATIENT
Start: 2022-03-28 | End: 2022-03-28

## 2022-03-28 RX ADMIN — ACETAMINOPHEN 1000 MG: 500 TABLET ORAL at 21:34

## 2022-03-28 ASSESSMENT — PAIN SCALES - GENERAL
PAINLEVEL_OUTOF10: 4
PAINLEVEL_OUTOF10: 1
PAINLEVEL_OUTOF10: 4

## 2022-03-28 ASSESSMENT — PAIN DESCRIPTION - LOCATION: LOCATION: HEAD

## 2022-03-28 ASSESSMENT — PAIN - FUNCTIONAL ASSESSMENT: PAIN_FUNCTIONAL_ASSESSMENT: 0-10

## 2022-03-28 ASSESSMENT — PAIN DESCRIPTION - DESCRIPTORS: DESCRIPTORS: HEADACHE

## 2022-03-28 ASSESSMENT — PAIN DESCRIPTION - PAIN TYPE: TYPE: ACUTE PAIN

## 2022-03-29 LAB
AMPHETAMINE SCREEN, URINE: NORMAL
BARBITURATE SCREEN URINE: NORMAL
BENZODIAZEPINE SCREEN, URINE: NORMAL
CANNABINOID SCREEN URINE: NORMAL
COCAINE METABOLITE SCREEN URINE: NORMAL
EKG ATRIAL RATE: 66 BPM
EKG DIAGNOSIS: NORMAL
EKG P AXIS: 49 DEGREES
EKG P-R INTERVAL: 172 MS
EKG Q-T INTERVAL: 436 MS
EKG QRS DURATION: 84 MS
EKG QTC CALCULATION (BAZETT): 457 MS
EKG R AXIS: 1 DEGREES
EKG T AXIS: 33 DEGREES
EKG VENTRICULAR RATE: 66 BPM
Lab: NORMAL
METHADONE SCREEN, URINE: NORMAL
OPIATE SCREEN URINE: NORMAL
OXYCODONE URINE: NORMAL
PH UA: 6.5
PHENCYCLIDINE SCREEN URINE: NORMAL
PROPOXYPHENE SCREEN: NORMAL

## 2022-03-29 PROCEDURE — 93010 ELECTROCARDIOGRAM REPORT: CPT | Performed by: INTERNAL MEDICINE
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
## 2022-03-30 LAB — URINE CULTURE, ROUTINE: NORMAL

## 2022-04-20 RX ORDER — FUROSEMIDE 20 MG/1
TABLET ORAL
Qty: 90 TABLET | Refills: 1 | OUTPATIENT
Start: 2022-04-20

## 2022-05-04 ENCOUNTER — TELEPHONE (OUTPATIENT)
Dept: FAMILY MEDICINE CLINIC | Age: 77
End: 2022-05-04

## 2022-05-04 DIAGNOSIS — R19.7 DIARRHEA, UNSPECIFIED TYPE: Primary | ICD-10-CM

## 2022-05-04 NOTE — TELEPHONE ENCOUNTER
Pt called and said she has had diarrhea for three weeks. She cant leave the house and barely makes it to her chair cant go to the store or nothing. Took 5 pill imodium in all and it does nothing. Legs are swollen diarrhea is all colored water. Has had no change in meds.

## 2022-05-04 NOTE — TELEPHONE ENCOUNTER
Spoke to pt informed about the medication. I also asked about eating something around that time and pt said no.

## 2022-05-04 NOTE — TELEPHONE ENCOUNTER
Thank you for the update. Please have him follow-up with office if symptoms fail to improve or worsen. Adequate hydration through water replacement and electrolyte placement from sources such as Gatorade or Pedialyte.

## 2022-05-19 ENCOUNTER — TELEPHONE (OUTPATIENT)
Dept: FAMILY MEDICINE CLINIC | Age: 77
End: 2022-05-19

## 2022-05-19 DIAGNOSIS — K58.0 IRRITABLE BOWEL SYNDROME WITH DIARRHEA: ICD-10-CM

## 2022-05-19 DIAGNOSIS — K58.0 IRRITABLE BOWEL SYNDROME WITH DIARRHEA: Primary | ICD-10-CM

## 2022-05-19 DIAGNOSIS — R19.7 DIARRHEA, UNSPECIFIED TYPE: ICD-10-CM

## 2022-05-19 DIAGNOSIS — K62.5 RECTAL BLEEDING: Primary | ICD-10-CM

## 2022-05-19 DIAGNOSIS — R19.7 DIARRHEA, UNSPECIFIED TYPE: Primary | ICD-10-CM

## 2022-05-19 NOTE — PROGRESS NOTES
Order was changed to [address] for quicker  and start. Pt needs to have stool studies done as well as have GI rosalinda. I provided pt with Dr.Debo desai in February for rectal bleeding.  Needs to be seen for evaluation if symptoms persist.

## 2022-05-19 NOTE — TELEPHONE ENCOUNTER
Please also clarify with the patient if she has been on antibiotics recently that were not prescribed by myself or Dr. Jennifer Blunt. We may need to do stool study in the interim while the patient is waiting to see GI.

## 2022-05-19 NOTE — TELEPHONE ENCOUNTER
Patient was advised in February when I saw her to follow-up with GI for rectal bleeding. I do not see any notes where the patient saw GI specialist.  I think the patient needs to follow-up with Dr. Oralia Norris which is referral I provided to the patient.

## 2022-05-23 DIAGNOSIS — K58.0 IRRITABLE BOWEL SYNDROME WITH DIARRHEA: ICD-10-CM

## 2022-05-23 DIAGNOSIS — R19.7 DIARRHEA, UNSPECIFIED TYPE: ICD-10-CM

## 2022-05-23 NOTE — TELEPHONE ENCOUNTER
----- Message from Yonny Solano sent at 5/23/2022  3:26 PM EDT -----  Subject: Refill Request    QUESTIONS  Name of Medication? rifAXIMin (XIFAXAN) 550 MG tablet  Patient-reported dosage and instructions? 3 day  How many days do you have left? 0  [facility]erred [facility]? [facility] phone number (if available)? [phone]  ---------------------------------------------------------------------------  --------------  Charity MCKINNEY  What is the best way for the office to contact you? OK to leave message on   voicemail  Preferred Call Back Phone Number? [phone]  ---------------------------------------------------------------------------  --------------  SCRIPT ANSWERS  Relationship to Patient?  Self

## 2022-05-24 ENCOUNTER — TELEPHONE (OUTPATIENT)
Dept: FAMILY MEDICINE CLINIC | Age: 77
End: 2022-05-24

## 2022-05-24 DIAGNOSIS — K58.0 IRRITABLE BOWEL SYNDROME WITH DIARRHEA: Primary | ICD-10-CM

## 2022-05-24 NOTE — TELEPHONE ENCOUNTER
Given the patient's history of IBS is why she was prescribed the high-dose which was reordered after her initial 200 mg dosing. It was not noted in her medical history of IBS until I reviewed previous records in her chart. That is why she initially got to 200 mg dose. The prescribed treatment for IBS is the high dose that she is referring to that I reordered after discovering she has a history of IBS. I will reorder the 200 mg dosing. But for future reference the patient does not control what I order and she will not direct me on what I will order for her moving forward. It will be a mutual discussion and aggred upon decision. Since the patient is not agreeable to the high dose if her symptoms persist after the 200 mg dosing and she will need to be seen in the ED for any complaints.

## 2022-05-24 NOTE — TELEPHONE ENCOUNTER
Pt still has diarrhea, but she feels about 75% better. We sent Xifaxan last week for her an it it 550mg. Pt does not think she needs that much. Should she cut them in half or can you prescribe the 200mg again?

## 2022-05-24 NOTE — TELEPHONE ENCOUNTER
Pt said that she does NOT need the higher strength. She only wants the 200mg. She's out. She's almost over the issue. Uses [facility]/ORTIZ. \"She said that if you give her the higher dose, we'll have to use dynamite to blow it out of her. \"

## 2022-05-31 ENCOUNTER — TELEPHONE (OUTPATIENT)
Dept: FAMILY MEDICINE CLINIC | Age: 77
End: 2022-05-31

## 2022-05-31 ENCOUNTER — HOSPITAL ENCOUNTER (EMERGENCY)
Age: 77
Discharge: HOME | End: 2022-05-31
Payer: MEDICARE

## 2022-05-31 VITALS
HEART RATE: 75 BPM | DIASTOLIC BLOOD PRESSURE: 73 MMHG | WEIGHT: 236 LBS | OXYGEN SATURATION: 93 % | BODY MASS INDEX: 40.51 KG/M2 | SYSTOLIC BLOOD PRESSURE: 141 MMHG | TEMPERATURE: 99.2 F | RESPIRATION RATE: 18 BRPM

## 2022-05-31 DIAGNOSIS — R19.7 DIARRHEA, UNSPECIFIED TYPE: Primary | ICD-10-CM

## 2022-05-31 LAB
A/G RATIO: 1 (ref 1.1–2.2)
ALBUMIN SERPL-MCNC: 3.5 G/DL (ref 3.4–5)
ALP BLD-CCNC: 87 U/L (ref 40–129)
ALT SERPL-CCNC: 17 U/L (ref 10–40)
ANION GAP SERPL CALCULATED.3IONS-SCNC: 9 MMOL/L (ref 3–16)
AST SERPL-CCNC: 21 U/L (ref 15–37)
BACTERIA: ABNORMAL /HPF
BASOPHILS ABSOLUTE: 0.1 K/UL (ref 0–0.2)
BASOPHILS RELATIVE PERCENT: 1 %
BILIRUB SERPL-MCNC: <0.2 MG/DL (ref 0–1)
BILIRUBIN URINE: NEGATIVE
BLOOD, URINE: NEGATIVE
BUN BLDV-MCNC: 13 MG/DL (ref 7–20)
CALCIUM SERPL-MCNC: 9 MG/DL (ref 8.3–10.6)
CHLORIDE BLD-SCNC: 100 MMOL/L (ref 99–110)
CLARITY: CLEAR
CO2: 32 MMOL/L (ref 21–32)
COLOR: YELLOW
CREAT SERPL-MCNC: 0.7 MG/DL (ref 0.6–1.2)
EOSINOPHILS ABSOLUTE: 0.4 K/UL (ref 0–0.6)
EOSINOPHILS RELATIVE PERCENT: 8.5 %
EPITHELIAL CELLS, UA: ABNORMAL /HPF (ref 0–5)
GFR AFRICAN AMERICAN: >60
GFR NON-AFRICAN AMERICAN: >60
GLUCOSE BLD-MCNC: 89 MG/DL (ref 70–99)
GLUCOSE URINE: NEGATIVE MG/DL
HCT VFR BLD CALC: 38 % (ref 36–48)
HEMOGLOBIN: 12.4 G/DL (ref 12–16)
KETONES, URINE: NEGATIVE MG/DL
LACTIC ACID, SEPSIS: 1.1 MMOL/L (ref 0.4–1.9)
LEUKOCYTE ESTERASE, URINE: ABNORMAL
LIPASE: 12 U/L (ref 13–60)
LYMPHOCYTES ABSOLUTE: 1.8 K/UL (ref 1–5.1)
LYMPHOCYTES RELATIVE PERCENT: 34.7 %
MAGNESIUM: 1.9 MG/DL (ref 1.8–2.4)
MCH RBC QN AUTO: 28.8 PG (ref 26–34)
MCHC RBC AUTO-ENTMCNC: 32.7 G/DL (ref 31–36)
MCV RBC AUTO: 88.1 FL (ref 80–100)
MICROSCOPIC EXAMINATION: YES
MONOCYTES ABSOLUTE: 0.5 K/UL (ref 0–1.3)
MONOCYTES RELATIVE PERCENT: 9.2 %
NEUTROPHILS ABSOLUTE: 2.4 K/UL (ref 1.7–7.7)
NEUTROPHILS RELATIVE PERCENT: 46.6 %
NITRITE, URINE: NEGATIVE
PDW BLD-RTO: 14.5 % (ref 12.4–15.4)
PH UA: 6 (ref 5–8)
PLATELET # BLD: 200 K/UL (ref 135–450)
PMV BLD AUTO: 7.3 FL (ref 5–10.5)
POTASSIUM SERPL-SCNC: 4.7 MMOL/L (ref 3.5–5.1)
PROTEIN UA: NEGATIVE MG/DL
RBC # BLD: 4.31 M/UL (ref 4–5.2)
RBC UA: ABNORMAL /HPF (ref 0–4)
SODIUM BLD-SCNC: 141 MMOL/L (ref 136–145)
SPECIFIC GRAVITY UA: 1.01 (ref 1–1.03)
TOTAL PROTEIN: 7 G/DL (ref 6.4–8.2)
URINE REFLEX TO CULTURE: YES
URINE TYPE: ABNORMAL
UROBILINOGEN, URINE: 0.2 E.U./DL
WBC # BLD: 5.1 K/UL (ref 4–11)
WBC UA: ABNORMAL /HPF (ref 0–5)

## 2022-05-31 PROCEDURE — 99283 EMERGENCY DEPT VISIT LOW MDM: CPT

## 2022-05-31 PROCEDURE — 81001 URINALYSIS AUTO W/SCOPE: CPT

## 2022-05-31 PROCEDURE — 87086 URINE CULTURE/COLONY COUNT: CPT

## 2022-05-31 PROCEDURE — 83690 ASSAY OF LIPASE: CPT

## 2022-05-31 PROCEDURE — 85025 COMPLETE CBC W/AUTO DIFF WBC: CPT

## 2022-05-31 PROCEDURE — 80053 COMPREHEN METABOLIC PANEL: CPT

## 2022-05-31 PROCEDURE — 83735 ASSAY OF MAGNESIUM: CPT

## 2022-05-31 PROCEDURE — 83605 ASSAY OF LACTIC ACID: CPT

## 2022-05-31 PROCEDURE — 36415 COLL VENOUS BLD VENIPUNCTURE: CPT

## 2022-05-31 ASSESSMENT — PAIN - FUNCTIONAL ASSESSMENT: PAIN_FUNCTIONAL_ASSESSMENT: NONE - DENIES PAIN

## 2022-05-31 NOTE — TELEPHONE ENCOUNTER
At this time I would advise the patient to be seen in the ED. At this time I do believe she is going on 5 weeks of frequent diarrhea.

## 2022-05-31 NOTE — TELEPHONE ENCOUNTER
Patient informed of recs, stated multiple times that she just doesn't want to go to the ed, she just needs the medication. I advised medication is not going to help if we dont figure out the cause.  She finally agreed to go to [facility] ed

## 2022-05-31 NOTE — TELEPHONE ENCOUNTER
Pt called. Still with diarrhea. Already 4x today and her bottom is very sore. She's using vaseline on her bottom. Needs more medication. She said whatever you want to give her. She's never been so miserable.      Uses [facility]

## 2022-05-31 NOTE — ED TRIAGE NOTES
Pt has a hx of IBS and has been getting medication for the last 5 weeks from PCP. Per PCP note, pt was advised to see GI doc and give stool samples back in February. Pt states she has not followed up as directed because she has too much diarrhea to go into the office. Pt thought she was coming to ER to see her PCP, she was under the impression that he was a doctor here. Pt advised that her PCP was not here, this was an emergency department. Pt agreed to stay and be seen by ER provider.

## 2022-06-01 ASSESSMENT — ENCOUNTER SYMPTOMS
ABDOMINAL PAIN: 0
DIARRHEA: 1
RECENT COUGH: 0
VOMITING: 0
SHORTNESS OF BREATH: 0
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
## 2022-06-02 ENCOUNTER — HOSPITAL ENCOUNTER (OUTPATIENT)
Age: 77
Setting detail: SPECIMEN
Discharge: HOME | End: 2022-06-02
Payer: MEDICARE

## 2022-06-02 DIAGNOSIS — R19.7 DIARRHEA, UNSPECIFIED TYPE: ICD-10-CM

## 2022-06-02 LAB — URINE CULTURE, ROUTINE: NORMAL

## 2022-06-02 PROCEDURE — 87505 NFCT AGENT DETECTION GI: CPT

## 2022-06-02 PROCEDURE — 87328 CRYPTOSPORIDIUM AG IA: CPT

## 2022-06-03 LAB
CRYPTOSPORIDIUM ANTIGEN STOOL: NORMAL
GI BACTERIAL PATHOGENS BY PCR: NORMAL
GIARDIA ANTIGEN STOOL: NORMAL

## 2022-07-07 ENCOUNTER — OFFICE VISIT (OUTPATIENT)
Dept: FAMILY MEDICINE CLINIC | Age: 77
End: 2022-07-07
Payer: MEDICARE

## 2022-07-07 VITALS
SYSTOLIC BLOOD PRESSURE: 136 MMHG | DIASTOLIC BLOOD PRESSURE: 82 MMHG | OXYGEN SATURATION: 91 % | WEIGHT: 237 LBS | BODY MASS INDEX: 40.68 KG/M2 | HEART RATE: 80 BPM

## 2022-07-07 DIAGNOSIS — E55.9 VITAMIN D DEFICIENCY: ICD-10-CM

## 2022-07-07 DIAGNOSIS — I10 ESSENTIAL HYPERTENSION: Primary | ICD-10-CM

## 2022-07-07 DIAGNOSIS — M05.79 RHEUMATOID ARTHRITIS INVOLVING MULTIPLE SITES WITH POSITIVE RHEUMATOID FACTOR (HCC): ICD-10-CM

## 2022-07-07 DIAGNOSIS — F41.9 ANXIETY: ICD-10-CM

## 2022-07-07 DIAGNOSIS — R73.01 IFG (IMPAIRED FASTING GLUCOSE): ICD-10-CM

## 2022-07-07 DIAGNOSIS — E78.5 HYPERLIPIDEMIA, UNSPECIFIED HYPERLIPIDEMIA TYPE: ICD-10-CM

## 2022-07-07 DIAGNOSIS — I73.9 PVD (PERIPHERAL VASCULAR DISEASE) (HCC): ICD-10-CM

## 2022-07-07 DIAGNOSIS — M32.9 LUPUS (HCC): ICD-10-CM

## 2022-07-07 PROCEDURE — 1123F ACP DISCUSS/DSCN MKR DOCD: CPT

## 2022-07-07 PROCEDURE — 99215 OFFICE O/P EST HI 40 MIN: CPT

## 2022-07-07 PROCEDURE — 36415 COLL VENOUS BLD VENIPUNCTURE: CPT

## 2022-07-07 ASSESSMENT — ENCOUNTER SYMPTOMS
RHINORRHEA: 0
CHOKING: 0
COUGH: 0
COLOR CHANGE: 0
CHEST TIGHTNESS: 0
BACK PAIN: 1
EYE PAIN: 0
DIARRHEA: 0
CONSTIPATION: 0
ABDOMINAL PAIN: 0
ABDOMINAL DISTENTION: 0
EYE DISCHARGE: 0
WHEEZING: 0
SHORTNESS OF BREATH: 0
TROUBLE SWALLOWING: 0
SORE THROAT: 0

## 2022-07-07 ASSESSMENT — PATIENT HEALTH QUESTIONNAIRE - PHQ9
1. LITTLE INTEREST OR PLEASURE IN DOING THINGS: 0
2. FEELING DOWN, DEPRESSED OR HOPELESS: 0
SUM OF ALL RESPONSES TO PHQ QUESTIONS 1-9: 0
SUM OF ALL RESPONSES TO PHQ QUESTIONS 1-9: 0
SUM OF ALL RESPONSES TO PHQ9 QUESTIONS 1 & 2: 0
SUM OF ALL RESPONSES TO PHQ QUESTIONS 1-9: 0
SUM OF ALL RESPONSES TO PHQ QUESTIONS 1-9: 0
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
## 2022-07-08 DIAGNOSIS — K44.9 HIATAL HERNIA: ICD-10-CM

## 2022-07-08 DIAGNOSIS — R13.19 ESOPHAGEAL DYSPHAGIA: ICD-10-CM

## 2022-07-08 DIAGNOSIS — R12 HEARTBURN: ICD-10-CM

## 2022-07-08 LAB
A/G RATIO: 1.4 (ref 1.1–2.2)
ALBUMIN SERPL-MCNC: 3.9 G/DL (ref 3.4–5)
ALP BLD-CCNC: 81 U/L (ref 40–129)
ALT SERPL-CCNC: 17 U/L (ref 10–40)
ANION GAP SERPL CALCULATED.3IONS-SCNC: 14 MMOL/L (ref 3–16)
AST SERPL-CCNC: 22 U/L (ref 15–37)
BILIRUB SERPL-MCNC: 0.6 MG/DL (ref 0–1)
BUN BLDV-MCNC: 16 MG/DL (ref 7–20)
CALCIUM SERPL-MCNC: 9 MG/DL (ref 8.3–10.6)
CHLORIDE BLD-SCNC: 98 MMOL/L (ref 99–110)
CHOLESTEROL, TOTAL: 144 MG/DL (ref 0–199)
CO2: 27 MMOL/L (ref 21–32)
CREAT SERPL-MCNC: 0.7 MG/DL (ref 0.6–1.2)
ESTIMATED AVERAGE GLUCOSE: 134.1 MG/DL
GFR AFRICAN AMERICAN: >60
GFR NON-AFRICAN AMERICAN: >60
GLUCOSE BLD-MCNC: 104 MG/DL (ref 70–99)
HBA1C MFR BLD: 6.3 %
HDLC SERPL-MCNC: 56 MG/DL (ref 40–60)
LDL CHOLESTEROL CALCULATED: 76 MG/DL
POTASSIUM SERPL-SCNC: 3.9 MMOL/L (ref 3.5–5.1)
SODIUM BLD-SCNC: 139 MMOL/L (ref 136–145)
TOTAL PROTEIN: 6.7 G/DL (ref 6.4–8.2)
TRIGL SERPL-MCNC: 59 MG/DL (ref 0–150)
VITAMIN D 25-HYDROXY: 55.4 NG/ML
VLDLC SERPL CALC-MCNC: 12 MG/DL

## 2022-07-08 RX ORDER — OMEPRAZOLE 40 MG/1
CAPSULE, DELAYED RELEASE ORAL
Qty: 180 CAPSULE | Refills: 1 | OUTPATIENT
Start: 2022-07-08

## 2022-07-24 DIAGNOSIS — R13.19 ESOPHAGEAL DYSPHAGIA: ICD-10-CM

## 2022-07-24 DIAGNOSIS — R12 HEARTBURN: ICD-10-CM

## 2022-07-24 DIAGNOSIS — K44.9 HIATAL HERNIA: ICD-10-CM

## 2022-07-25 RX ORDER — OMEPRAZOLE 40 MG/1
CAPSULE, DELAYED RELEASE ORAL
Qty: 180 CAPSULE | Refills: 1 | OUTPATIENT
Start: 2022-07-25

## 2022-07-28 NOTE — PROGRESS NOTES
[address] ,  [address] [facility]  Phone: [phone] [facility]     Chief Complaint   Patient presents with    Follow-up     UGI results; pt is concerned about a her lips recently peeling along with rashes on her hands; she is also concerned about a possible blood clot in right lower leg       HPI     Thank you ROCKY Cardenas CNP for asking me to see Sylvia Rivas in consultation. She is a  [5] White [1] 76 y.o. [place] Siskin female seen independently who presents with the following GI complaints: Wily Hua  Has ongoing dysphagia. EGD and UGI with significant hiatal hernia. She has only tried a ppi once a day. She sleeps in a recliner. There is also suspected esophageal motility disorder. She is concerned about a blood clot in her right leg and a discolored lip. FL UGI  Narrative: EXAMINATION:  SINGLE CONTRAST UPPER GI SERIES    2/3/2020    HISTORY:  ORDERING SYSTEM PROVIDED HISTORY: Hiatal hernia  TECHNOLOGIST PROVIDED HISTORY:  Reason for Exam: hiatial hernia  Acuity: Unknown  Type of Exam: Initial    COMPARISON:  None. TECHNIQUE:  Multiple single contrast images of the esophagus, gastroesophageal junction  and stomach were obtained following the oral administration of water soluble  contrast    FLUOROSCOPY DOSE AND TYPE OR TIME AND EXPOSURES:  21 seconds. 11 images    FINDINGS:  Positioning is limited. Patient reports dizziness and fear of falling. The  patient only tolerates upright/semi upright positions. Multiple tertiary contractions are noted in the esophagus. There is  thickening of the distal muscular ring of the esophagus. There is a small to  moderate-sized hiatal hernia. Gastric folds appear normal in thickness. No erosion or protrusion of  contrast is identified. The duodenal bulb is normal in caliber. There is intermittent postbulbar  spasm.   The contrast column is not followed beyond the 2nd portion of the  duodenum. Impression: Moderate-sized hiatal hernia and hypertrophy of the distal esophageal  muscular ring (Schatzki's ring). Moderate esophageal dysmotility. No gastric erosion or stricture is identified. HPI elements: location, severity, timing, modifying factors, quality, duration, context and associated signs/symptoms. Last Encounter Reviewed: 11/19/2019  Winter Bigger  complains of refractory nausea, regurgitation, reflux and cough despite omeprazole 40mg daily. There is esophageal dysphagia with foreign body sensation at times. Not following a gerd diet. Concerned about previous ulcers. Most recent EGD below. No pertinent GI family history. Also has a h/o ibs-d that is not presently bothering her. Bentyl did not help. Ativan helps. She is not the best historian and told her pcp that she never had an EGD. Takes asa daily.     Last Encounter Reviewed: none  Pertinent PMH, FH, SH is reviewed below. Last EGD: 12/6/2019 large hiatal hernia 6/19/2018 tortuous esophagus, prominent hiatal hernia, nodular gastritis  Last Colonoscopy: 2013-14 per patient, NA    Review of available records reveals:   Wt Readings from Last 50 Encounters:   02/10/20 244 lb (110.7 kg)   01/28/20 224 lb (101.6 kg)   01/09/20 235 lb 2 oz (106.7 kg)   12/16/19 238 lb 8 oz (108.2 kg)   12/06/19 233 lb (105.7 kg)   12/05/19 233 lb 4 oz (105.8 kg)   11/19/19 238 lb (108 kg)   11/04/19 236 lb 6 oz (107.2 kg)       No components found for: HGBA1C  BP Readings from Last 3 Encounters:   02/10/20 124/72   01/28/20 126/78   01/09/20 (!) 148/88     Health Maintenance   Topic Date Due    Hepatitis C screen  1945    Lipid screen  03[birth year]19/1955    DTaP/Tdap/Td vaccine (1 - Tdap) 03/19/1956    Breast cancer screen  03/19/1995    Shingles Vaccine (1 of 2) 03/19/1995    Colon cancer screen colonoscopy  03/19/1995    DEXA (modify frequency per FRAX score)  03/19/2010    Annual Wellness Visit (AWV)  11/10/2019    Potassium monitoring  2020    Creatinine monitoring  2020    Flu vaccine  Completed    Pneumococcal 65+ years Vaccine  Completed    Hepatitis A vaccine  Aged Out    Hepatitis B vaccine  Aged Out    Hib vaccine  Aged Out    Meningococcal (ACWY) vaccine  Aged Out       No components found for: [employer]     PAST MEDICAL HISTORY     Past Medical History:   Diagnosis Date    [facility]estive [facility] failure) ([facility] [address]     Chronic back pain     Hypertension     Stroke (cerebrum) ([facility] [address] [facility]     Ulcer, gastric, acute      FAMILY HISTORY   History reviewed. No pertinent family history. SOCIAL HISTORY     Social History     Socioeconomic History    Marital status:       Spouse name: Not on file    Number of children: Not on file    Years of education: Not on file    Highest education level: Not on file   Occupational History    Not on file   Social Needs    Financial resource strain: Not on file    Food insecurity:     Worry: Not on file     Inability: Not on file    Transportation needs:     Medical: Not on file     Non-medical: Not on file   Tobacco Use    Smoking status: Former Smoker     Years: 35.00     Types: Cigarettes     Last attempt to quit: 1994     Years since quittin.2    Smokeless tobacco: Never Used   Substance and Sexual Activity    Alcohol use: Never     Frequency: Never    Drug use: Never    Sexual activity: Not Currently   Lifestyle    Physical activity:     Days per week: Not on file     Minutes per session: Not on file    Stress: Not on file   Relationships    Social connections:     Talks on phone: Not on file     Gets together: Not on file     Attends [employer] service: Not on file     Active member of club or organization: Not on file     Attends meetings of clubs or organizations: Not on file     Relationship status: Not on file    Intimate partner violence:     Fear of current or ex partner: Not on file     Emotionally abused: Not on file Physically abused: Not on file     Forced sexual activity: Not on file   Other Topics Concern    Not on file   Social History Narrative    Not on file     SURGICAL HISTORY     Past Surgical History:   Procedure Laterality Date    UPPER GASTROINTESTINAL ENDOSCOPY  12/06/2019    hiatal hernia    UPPER GASTROINTESTINAL ENDOSCOPY N/A 12/6/2019    EGD W/RUSSELL. (9:20) performed by Debbie Gan MD at [address] [place] [address]   (This list may include medications prescribed during this encounter as epic can not insert only the list prior to this encounter.)  Current Outpatient Rx   Medication Sig Dispense Refill    omeprazole (PRILOSEC) 40 MG delayed release capsule Take 1 capsule by mouth 2 times daily (before meals) 60 capsule 3    hydrocortisone 2.5 % cream Apply topically 2 times daily. 28 g 1    albuterol (ACCUNEB) 1.25 MG/3ML nebulizer solution Inhale 3 mLs into the lungs every 6 hours as needed for Wheezing 360 mL 3    budesonide-formoterol (SYMBICORT) 80-4.5 MCG/ACT AERO Inhale 2 puffs into the lungs 2 times daily 1 Inhaler 3    nitroGLYCERIN (NITROSTAT) 0.4 MG SL tablet Place 1 tablet under the tongue every 5 minutes as needed for Chest pain up to max of 3 total doses.  If no relief after 1 dose, call 911. 25 tablet 0    lisinopril (PRINIVIL;ZESTRIL) 40 MG tablet Take 1 tablet by mouth daily 90 tablet 0    butalbital-acetaminophen-caffeine (FIORICET, ESGIC) -40 MG per tablet Take 1-2 tablets by mouth daily as needed for Headaches 180 tablet 1    Apoaequorin (PREVAGEN EXTRA STRENGTH PO) Take by mouth      furosemide (LASIX) 20 MG tablet Take 20 mg by mouth daily       doxazosin (CARDURA) 4 MG tablet Take 4 mg by mouth nightly      metoprolol succinate (TOPROL XL) 100 MG extended release tablet Take 100 mg by mouth daily      isosorbide mononitrate (IMDUR) 30 MG extended release tablet Take 60 mg by mouth daily       aspirin 81 MG EC tablet Take 81 mg by mouth daily      appearance. HENT: Normocephalic, Atraumatic, Bilateral external ears normal, Oropharynx moist, No oral exudates, Nose normal.   Eyes: Conjunctiva normal, No discharge. Neck: Normal range of motion, No tenderness, Supple, No stridor. Lymphatic: No cervical, subclavian, or axillary lymphadenopathy. Cardiovascular: Normal heart rate, Normal rhythm, No murmurs, No rubs, No gallops. Thorax & Lungs: Normal breath sounds, No respiratory distress, No wheezing, No chest tenderness. No gynecomastia. Abdomen: scars consistent with stated surgeries, no hernias, no HSM, soft NTND   Rectal:  Deferred. Skin: Warm, Dry, No erythema, No rash. No bruising. No spider hemangiomas. Back: No tenderness, No CVA tenderness. Lower Extremities: Intact distal pulses, No edema, No tenderness, No cyanosis, No clubbing. Neurologic: Alert & oriented x 3, Normal motor function, Normal sensory function, No focal deficits noted. No asterixis. RADIOLOGY/PROCEDURES       FINAL IMPRESSION     Orders Placed This Encounter   Procedures    US DUP LOWER EXTREMITY RIGHT BASSEM     Standing Status:   Future     Standing Expiration Date:   2/10/2021     Lincoln Horne was seen today for follow-up. Diagnoses and all orders for this visit:    Hiatal hernia  -     omeprazole (PRILOSEC) 40 MG delayed release capsule; Take 1 capsule by mouth 2 times daily (before meals)    Heartburn  -     omeprazole (PRILOSEC) 40 MG delayed release capsule; Take 1 capsule by mouth 2 times daily (before meals)    Esophageal dysphagia  -     omeprazole (PRILOSEC) 40 MG delayed release capsule; Take 1 capsule by mouth 2 times daily (before meals)    Right leg swelling  -     US DUP LOWER EXTREMITY RIGHT BASSEM; Future      Offered referral for surgery but need to have esophageal manometry first.  She does not think she could tolerate the manometry and is concerned about surgical risks given her heart disease.   Asked her to speak with her cardiologist about her cardiac risks especially if no better with high dose bid ppi therapy. ORDERED FUTURE/PENDING TESTS     Lab Frequency Next Occurrence   C DIFF TOXIN/ANTIGEN Once 01/31/2020       FOLLOWUP   Return in about 1 year (around 2/10/2021), or if symptoms worsen or fail to improve.           Aramis Birch 2/10/20 3:26 PM    CC:  Heath Soliz, APRN - CNP no

## 2022-08-02 ENCOUNTER — TELEPHONE (OUTPATIENT)
Dept: FAMILY MEDICINE CLINIC | Age: 77
End: 2022-08-02

## 2022-08-02 DIAGNOSIS — M79.10 MUSCLE SORENESS: Primary | ICD-10-CM

## 2022-08-02 DIAGNOSIS — S09.93XA FACIAL INJURY, INITIAL ENCOUNTER: Primary | ICD-10-CM

## 2022-08-02 RX ORDER — METHYLPREDNISOLONE 4 MG/1
TABLET ORAL
Qty: 1 KIT | Refills: 0 | Status: SHIPPED | OUTPATIENT
Start: 2022-08-02 | End: 2022-08-08

## 2022-08-02 NOTE — TELEPHONE ENCOUNTER
Instead of a muscle relaxer I sent the patient a Medrol Dosepak to her pharmacy. Muscle relaxers can cause some lightheadedness/dizziness which could increase the patient risk for an additional fall. Does the patient need x-ray of her face to ensure there is no orbital fractures?

## 2022-08-02 NOTE — TELEPHONE ENCOUNTER
Patient is calling and states that she fell on Friday at the bank and messed up her face and she is really sore and having a hard time moving around. Patient did not go to ER. She is asking for a muscle relaxer to be sent to [facility].

## 2022-08-03 DIAGNOSIS — R12 HEARTBURN: ICD-10-CM

## 2022-08-03 DIAGNOSIS — R13.19 ESOPHAGEAL DYSPHAGIA: ICD-10-CM

## 2022-08-03 DIAGNOSIS — K44.9 HIATAL HERNIA: ICD-10-CM

## 2022-08-03 RX ORDER — OMEPRAZOLE 40 MG/1
CAPSULE, DELAYED RELEASE ORAL
Qty: 180 CAPSULE | Refills: 1 | Status: SHIPPED | OUTPATIENT
Start: 2022-08-03

## 2022-08-05 ENCOUNTER — HOSPITAL ENCOUNTER (OUTPATIENT)
Dept: GENERAL RADIOLOGY | Age: 77
Discharge: HOME | End: 2022-08-05
Payer: MEDICARE

## 2022-08-05 ENCOUNTER — HOSPITAL ENCOUNTER (OUTPATIENT)
Age: 77
Discharge: HOME | End: 2022-08-05
Payer: MEDICARE

## 2022-08-05 DIAGNOSIS — S09.93XA FACIAL INJURY, INITIAL ENCOUNTER: ICD-10-CM

## 2022-08-05 PROCEDURE — 70150 X-RAY EXAM OF FACIAL BONES: CPT

## 2022-10-10 ENCOUNTER — TELEPHONE (OUTPATIENT)
Dept: FAMILY MEDICINE CLINIC | Age: 77
End: 2022-10-10

## 2022-10-10 ENCOUNTER — OFFICE VISIT (OUTPATIENT)
Dept: FAMILY MEDICINE CLINIC | Age: 77
End: 2022-10-10
Payer: MEDICARE

## 2022-10-10 VITALS
BODY MASS INDEX: 39.65 KG/M2 | DIASTOLIC BLOOD PRESSURE: 78 MMHG | SYSTOLIC BLOOD PRESSURE: 134 MMHG | HEART RATE: 78 BPM | WEIGHT: 231 LBS | OXYGEN SATURATION: 94 %

## 2022-10-10 DIAGNOSIS — I50.9 OTHER CONGESTIVE HEART FAILURE (HCC): ICD-10-CM

## 2022-10-10 DIAGNOSIS — M32.9 LUPUS (HCC): ICD-10-CM

## 2022-10-10 DIAGNOSIS — Z71.89 COUNSELING FOR LIVING WILL: Primary | ICD-10-CM

## 2022-10-10 DIAGNOSIS — M54.2 CHRONIC NECK PAIN: ICD-10-CM

## 2022-10-10 DIAGNOSIS — E78.5 HYPERLIPIDEMIA, UNSPECIFIED HYPERLIPIDEMIA TYPE: ICD-10-CM

## 2022-10-10 DIAGNOSIS — I10 ESSENTIAL HYPERTENSION: Primary | ICD-10-CM

## 2022-10-10 DIAGNOSIS — G89.29 CHRONIC NECK PAIN: ICD-10-CM

## 2022-10-10 DIAGNOSIS — I63.9 CEREBROVASCULAR ACCIDENT (CVA), UNSPECIFIED MECHANISM (HCC): ICD-10-CM

## 2022-10-10 DIAGNOSIS — R73.03 PRE-DIABETES: ICD-10-CM

## 2022-10-10 LAB
A/G RATIO: 1.4 (ref 1.1–2.2)
ALBUMIN SERPL-MCNC: 4.2 G/DL (ref 3.4–5)
ALP BLD-CCNC: 88 U/L (ref 40–129)
ALT SERPL-CCNC: 20 U/L (ref 10–40)
ANION GAP SERPL CALCULATED.3IONS-SCNC: 15 MMOL/L (ref 3–16)
AST SERPL-CCNC: 27 U/L (ref 15–37)
BILIRUB SERPL-MCNC: <0.2 MG/DL (ref 0–1)
BUN BLDV-MCNC: 8 MG/DL (ref 7–20)
CALCIUM SERPL-MCNC: 9.4 MG/DL (ref 8.3–10.6)
CHLORIDE BLD-SCNC: 98 MMOL/L (ref 99–110)
CHOLESTEROL, TOTAL: 166 MG/DL (ref 0–199)
CO2: 28 MMOL/L (ref 21–32)
CREAT SERPL-MCNC: 0.9 MG/DL (ref 0.6–1.2)
GFR AFRICAN AMERICAN: >60
GFR NON-AFRICAN AMERICAN: >60
GLUCOSE BLD-MCNC: 122 MG/DL (ref 70–99)
HDLC SERPL-MCNC: 57 MG/DL (ref 40–60)
LDL CHOLESTEROL CALCULATED: 92 MG/DL
POTASSIUM SERPL-SCNC: 3.8 MMOL/L (ref 3.5–5.1)
SODIUM BLD-SCNC: 141 MMOL/L (ref 136–145)
TOTAL PROTEIN: 7.3 G/DL (ref 6.4–8.2)
TRIGL SERPL-MCNC: 87 MG/DL (ref 0–150)
VLDLC SERPL CALC-MCNC: 17 MG/DL

## 2022-10-10 PROCEDURE — 1123F ACP DISCUSS/DSCN MKR DOCD: CPT

## 2022-10-10 PROCEDURE — 99215 OFFICE O/P EST HI 40 MIN: CPT

## 2022-10-10 PROCEDURE — 36415 COLL VENOUS BLD VENIPUNCTURE: CPT

## 2022-10-10 RX ORDER — METHYLPREDNISOLONE 4 MG/1
TABLET ORAL
Qty: 1 KIT | Refills: 0 | Status: SHIPPED | OUTPATIENT
Start: 2022-10-10 | End: 2022-10-16

## 2022-10-10 ASSESSMENT — ENCOUNTER SYMPTOMS
RESPIRATORY NEGATIVE: 1
GASTROINTESTINAL NEGATIVE: 1

## 2022-10-10 NOTE — PROGRESS NOTES
Chief Complaint   Patient presents with    Hypertension    Leg Swelling       HPI:  Sabra Glasgow is a 68 y.o. (: 1945) here today   for 3-month follow-up. History of stroke: Following cardiology [facility] 50.. Lupus: states had lupus when I saw her new initially. Sent pt to Rheumatology DR. Benita Stevenson. Note does not address Lupus on 3/10/22    Hypertension: BP today 134/78. Taking losartan, Lasix, Toprol, Imdur. Following [facility] 50. cardiologist in [facility]. Last saw 2022. Was seeing again in 6 months. Hyperlipidemia: Currently on simvastatin    CHF: Following cardiology as above. C/o leg swelling today. Is on Lasix 20mg daily. Leg swelling is not new but more swollen today than normal but only slightly. [place]ed on the ground. Since then neck has been hurting. Denies hitting anything and did not hit head or neck on the ground. Think the jarring motion from the fall is what is still causing her pain. Pain described as just hurling. Will cause H/A at times. Pain also radiates to right shoulder. On CT of C-spine on 21 showed moderate multi level cspine degenerative changes with bony neural foraminal narrowing. Patient's medications, allergies, past medical, surgical, social and family histories were reviewed and updated as appropriate. ROS:  Review of Systems   Constitutional: Negative. HENT: Negative. Respiratory: Negative. Cardiovascular:  Positive for leg swelling. Gastrointestinal: Negative. Genitourinary: Negative. Musculoskeletal:  Positive for arthralgias, neck pain and neck stiffness. Skin: Negative. Neurological:  Positive for headaches. Psychiatric/Behavioral: Negative.          Hemoglobin A1C (%)   Date Value   2022 6.3     Microscopic Examination (no units)   Date Value   2022 YES     LDL Calculated (mg/dL)   Date Value   2022 76       Past Medical History:   Diagnosis Date Arthritis     Bronchitis     CHF (congestive heart failure) (HCC)     Chronic back pain     Chronic cough     Hyperlipidemia     Hypertension     Irritable bowel syndrome with diarrhea     Lupus (HCC)     Lupus (HCC)     Restless legs syndrome (RLS)     Stroke (cerebrum) ([facility])     Ulcer, gastric, acute        Family History   Problem Relation Age of Onset    Asthma Neg Hx     Cancer Neg Hx     Diabetes Neg Hx     Emphysema Neg Hx     Sleep Apnea Neg Hx     Hypertension Neg Hx     Heart Failure Neg Hx        Social History     Socioeconomic History    Marital status:      Spouse name: Not on file    Number of children: Not on file    Years of education: Not on file    Highest education level: Not on file   Occupational History    Not on file   Tobacco Use    Smoking status: Former     Packs/day: 1.00     Years: 35.00     Pack years: 35.00     Types: Cigarettes     Quit date: 1994     Years since quittin.9    Smokeless tobacco: Never   Vaping Use    Vaping Use: Never used   Substance and Sexual Activity    Alcohol use: Never    Drug use: Never    Sexual activity: Not Currently   Other Topics Concern    Not on file   Social History Narrative    Not on file     Social Determinants of Health     Financial Resource Strain: Not on file   Food Insecurity: Not on file   Transportation Needs: Not on file   Physical Activity: Not on file   Stress: Not on file   Social Connections: Not on file   Intimate Partner Violence: Not on file   Housing Stability: Not on file       Prior to Visit Medications    Medication Sig Taking? Authorizing Provider   methylPREDNISolone (MEDROL DOSEPACK) 4 MG tablet Take by mouth.  Yes ROCKY Horan CNP   omeprazole (PRILOSEC) 40 MG delayed release capsule TAKE 1 CAPSULE TWICE DAILY BEFORE MEALS Yes ROCKY Horan CNP   diclofenac sodium (VOLTAREN) 1 % GEL Apply 2 g topically 3 times daily Yes Leonidas Johnson MD   losartan (COZAAR) 100 MG tablet Take 1 tablet by mouth daily Yes Tamika Colbert,    vitamin C (ASCORBIC ACID) 500 MG tablet Take 500 mg by mouth daily Yes Historical Provider, MD   acetaminophen (TYLENOL) 500 MG tablet Take 1 tablet by mouth every 6 hours as needed for Pain Yes Yahir Simmons PA-C   furosemide (LASIX) 20 MG tablet TAKE 1 TABLET DAILY Yes ROCKY Sood CNP   simvastatin (ZOCOR) 20 MG tablet TAKE 1 TABLET NIGHTLY Yes ROCKY Sood CNP   Misc Natural Products (NEURIVA PO) Take by mouth Yes Historical Provider, MD   DULoxetine (CYMBALTA) 30 MG extended release capsule TAKE 2 CAPSULES TWICE DAILY Yes ROCKY Sood CNP   loratadine (CLARITIN) 10 MG tablet Take 1 tablet by mouth daily Yes Maykel Segura MD   busPIRone (BUSPAR) 10 MG tablet Take 15 mg by mouth 2 times daily  Yes Historical Provider, MD   ALPRAZolam Jane Nest) 0.5 MG tablet  Yes Historical Provider, MD   isosorbide mononitrate (IMDUR) 120 MG extended release tablet Take 1 tablet by mouth daily Yes ROCKY Sood CNP   doxazosin (CARDURA) 4 MG tablet Take 0.5 tablets by mouth nightly Yes ROCKY Sood CNP   metoprolol succinate (TOPROL XL) 100 MG extended release tablet Take 1 tablet by mouth daily Yes ROCKY Sood CNP   Cholecalciferol (VITAMIN D3 PO) Take 400 Units by mouth daily  Yes Historical Provider, MD   nitroGLYCERIN (NITROSTAT) 0.4 MG SL tablet Place 1 tablet under the tongue every 5 minutes as needed for Chest pain up to max of 3 total doses. If no relief after 1 dose, call 911. Yes ROCKY Sood CNP   Probiotic Product (PROBIOTIC DAILY PO) Take by mouth Yes Historical Provider, MD   hydrocortisone 2.5 % cream Apply topically 2 times daily.  Yes ROCKY Licona CNP   aspirin 81 MG EC tablet Take 81 mg by mouth daily Yes Historical Provider, MD   Multiple Vitamins-Minerals (HAIR SKIN AND NAILS FORMULA PO) Take by mouth Yes Historical Provider, MD   Multiple Vitamins-Minerals (OCUVITE PO) Take by mouth Yes Historical Provider, MD   Multiple Vitamin (DAILY VITAMIN PO) Take by mouth Yes Historical Provider, MD       Allergies   Allergen Reactions    Adhesive Tape        OBJECTIVE:    /78   Pulse 78   Wt 231 lb (104.8 kg)   SpO2 94%   BMI 39.65 kg/m²     BP Readings from Last 2 Encounters:   10/10/22 134/78   07/07/22 136/82       Wt Readings from Last 3 Encounters:   10/10/22 231 lb (104.8 kg)   07/07/22 237 lb (107.5 kg)   05/31/22 236 lb (107 kg)       Physical Exam  Constitutional:       Appearance: Normal appearance. HENT:      Head: Normocephalic and atraumatic. Cardiovascular:      Rate and Rhythm: Normal rate and regular rhythm. Pulses: Normal pulses. Heart sounds: Normal heart sounds. No murmur heard. Pulmonary:      Effort: Pulmonary effort is normal.      Breath sounds: No wheezing. Abdominal:      General: Bowel sounds are normal.   Musculoskeletal:      Cervical back: Neck supple. Right lower leg: Edema present. Left lower leg: Edema present. Comments: +1 pitting BLE. Skin:     Capillary Refill: Capillary refill takes less than 2 seconds. Findings: No rash. Neurological:      General: No focal deficit present. Mental Status: She is alert and oriented to person, place, and time. Psychiatric:         Mood and Affect: Mood normal.         ASSESSMENT/PLAN:    1. Essential hypertension  BP stable in office today at 134/78. Continue on current medication regimen as ordered. Repeat labs ordered today. - Comprehensive Metabolic Panel  - LIPID PANEL    2. Other congestive heart failure [facility])  Patient following cardiology in [facility]. See above HPI. Patient complaining of increased leg swelling today. In office I noted +1 pitting edema in bilateral lower extremities. Encouraged patient to use Ace wrap compression as needed during daytime hours to help with leg swelling. I am going to assess CMP panel today which will result potassium level.   I discussed with the patient there is a possibility of may have her take an extra dose of Lasix pending lab test results. Overall think the patient's symptoms are similar to her normal presentation. No new shortness of breath noted. 3. Hyperlipidemia, unspecified hyperlipidemia type  Repeat labs ordered today. For the time being continue on current medication regimen as ordered. - Comprehensive Metabolic Panel  - LIPID PANEL    4. Cerebrovascular accident (CVA), unspecified mechanism [facility])  Patient following cardiology. On statin therapy. 5. Lupus ([address]  I sent the patient to rheumatology during our initial new patient appointment. Nowhere in the rheumatology note do I see lupus being addressed. Patient reported that she had a diagnosis of lupus in the past.  I am going to assess NELIA given the fact that rheumatology did not order or address lupus. If NELIA positive may send the patient back to rheumatology for further management of her multiple joint pains. Rheumatology note stated osteoarthritis. - NELIA    6. Chronic neck pain  See above HPI. Patient has noted chronic neck pain on CT scan as mentioned above in HPI. We will trial her on a Medrol Dosepak to see if she has any symptom improvement. Patient declined seeing spine specialist at any point for steroid injections. - methylPREDNISolone (MEDROL DOSEPACK) 4 MG tablet; Take by mouth. Dispense: 1 kit; Refill: 0    7. Pre-diabetes  Repeat lab ordered today  - Hemoglobin A1C        40 min spent on pt care. This document was prepared by a combination of typing and transcription through a voice recognition software.     ROCKY Walsh - CNP

## 2022-10-10 NOTE — TELEPHONE ENCOUNTER
SW attempted contact with patient to follow-up on Primary Care First referral from PCP. Pt had no voice mail so SW was unable to leave a message. SW will try again another day or reach an alternative number for pt.

## 2022-10-11 LAB
ANTI-NUCLEAR ANTIBODY (ANA): NEGATIVE
ESTIMATED AVERAGE GLUCOSE: 128.4 MG/DL
HBA1C MFR BLD: 6.1 %

## 2022-10-12 ENCOUNTER — TELEMEDICINE (OUTPATIENT)
Dept: FAMILY MEDICINE CLINIC | Age: 77
End: 2022-10-12
Payer: MEDICARE

## 2022-10-12 ENCOUNTER — TELEPHONE (OUTPATIENT)
Dept: FAMILY MEDICINE CLINIC | Age: 77
End: 2022-10-12

## 2022-10-12 DIAGNOSIS — Z00.00 INITIAL MEDICARE ANNUAL WELLNESS VISIT: Primary | ICD-10-CM

## 2022-10-12 PROCEDURE — G0438 PPPS, INITIAL VISIT: HCPCS | Performed by: FAMILY MEDICINE

## 2022-10-12 PROCEDURE — 1123F ACP DISCUSS/DSCN MKR DOCD: CPT | Performed by: FAMILY MEDICINE

## 2022-10-12 SDOH — ECONOMIC STABILITY: FOOD INSECURITY: WITHIN THE PAST 12 MONTHS, YOU WORRIED THAT YOUR FOOD WOULD RUN OUT BEFORE YOU GOT MONEY TO BUY MORE.: NEVER TRUE

## 2022-10-12 SDOH — ECONOMIC STABILITY: FOOD INSECURITY: WITHIN THE PAST 12 MONTHS, THE FOOD YOU BOUGHT JUST DIDN'T LAST AND YOU DIDN'T HAVE MONEY TO GET MORE.: NEVER TRUE

## 2022-10-12 ASSESSMENT — LIFESTYLE VARIABLES
HOW OFTEN DO YOU HAVE A DRINK CONTAINING ALCOHOL: NEVER
HOW MANY STANDARD DRINKS CONTAINING ALCOHOL DO YOU HAVE ON A TYPICAL DAY: PATIENT DOES NOT DRINK

## 2022-10-12 ASSESSMENT — PATIENT HEALTH QUESTIONNAIRE - PHQ9
SUM OF ALL RESPONSES TO PHQ9 QUESTIONS 1 & 2: 1
SUM OF ALL RESPONSES TO PHQ QUESTIONS 1-9: 1
SUM OF ALL RESPONSES TO PHQ QUESTIONS 1-9: 1
2. FEELING DOWN, DEPRESSED OR HOPELESS: 1
1. LITTLE INTEREST OR PLEASURE IN DOING THINGS: 0
SUM OF ALL RESPONSES TO PHQ QUESTIONS 1-9: 1
SUM OF ALL RESPONSES TO PHQ QUESTIONS 1-9: 1

## 2022-10-12 ASSESSMENT — SOCIAL DETERMINANTS OF HEALTH (SDOH): HOW HARD IS IT FOR YOU TO PAY FOR THE VERY BASICS LIKE FOOD, HOUSING, MEDICAL CARE, AND HEATING?: SOMEWHAT HARD

## 2022-10-12 NOTE — PROGRESS NOTES
Medicare Annual Wellness Visit    Jeanne Ramirez is here for Medicare AWV    Assessment & Plan   Initial Medicare annual wellness visit    Recommendations for Preventive Services Due: see orders and patient instructions/AVS.  Recommended screening schedule for the next 5-10 years is provided to the patient in written form: see Patient Instructions/AVS.     No follow-ups on file. Subjective       Patient's complete Health Risk Assessment and screening values have been reviewed and are found in Flowsheets. The following problems were reviewed today and where indicated follow up appointments were made and/or referrals ordered.     Positive Risk Factor Screenings with Interventions:    Fall Risk:  Do you feel unsteady or are you worried about falling? : no  2 or more falls in past year?: (!) yes  Fall with injury in past year?: (!) yes   Fall Risk Interventions:    Patient declines any further evaluation/treatment for this issue            General Health and ACP:  General  In general, how would you say your health is?: (!) Poor  In the past 7 days, have you experienced any of the following: New or Increased Pain, New or Increased Fatigue, Loneliness, Social Isolation, Stress or Anger?: (!) Yes  Select all that apply: (!) New or Increased Pain  Do you get the social and emotional support that you need?: (!) No  Do you have a Living Will?: (!) No    Advance Directives       Power of  Living Will ACP-Advance Directive ACP-Power of     Not on File Not on File Not on File Not on File          General Health Risk Interventions:  No Living Will: Patient declines ACP discussion/assistance    Health Habits/Nutrition:  Physical Activity: Inactive    Days of Exercise per Week: 0 days    Minutes of Exercise per Session: 0 min     Have you lost any weight without trying in the past 3 months?: No     Have you seen the dentist within the past year?: (!) No  Health Habits/Nutrition Interventions:  Dental exam overdue: patient encouraged to make appointment with his/her dentist    Hearing/Vision:  Do you or your family notice any trouble with your hearing that hasn't been managed with hearing aids?: (!) Yes  Do you have difficulty driving, watching TV, or doing any of your daily activities because of your eyesight?: No  Have you had an eye exam within the past year?: Yes  No results found. Hearing/Vision Interventions:  Hearing concerns:  patient declines any further evaluation/treatment for hearing issues     ADLs:  In the past 7 days, did you need help from others to perform any of the following everyday activities: Eating, dressing, grooming, bathing, toileting, or walking/balance?: (!) Yes  Select all that apply: (!) Bathing  In the past 7 days, did you need help from others to take care of any of the following: Laundry, housekeeping, banking/finances, shopping, telephone use, food preparation, transportation, or taking medications?: No (nothing more than usual at this time)  ADL Interventions:  Patient declines any further evaluation/treatment for this issue          Objective      Patient-Reported Vitals  Patient-Reported Systolic (Top): 772 mmHg  Patient-Reported Diastolic (Bottom): 78 mmHg  Patient-Reported Pulse: 78  Patient-Reported Weight: 230 lbs            Allergies   Allergen Reactions    Adhesive Tape      Prior to Visit Medications    Medication Sig Taking? Authorizing Provider   methylPREDNISolone (MEDROL DOSEPACK) 4 MG tablet Take by mouth.   ROCKY Rangel CNP   omeprazole (PRILOSEC) 40 MG delayed release capsule TAKE 1 CAPSULE TWICE DAILY BEFORE MEALS  ROCKY Rangel CNP   diclofenac sodium (VOLTAREN) 1 % GEL Apply 2 g topically 3 times daily  Dylan Lopez MD   losartan (COZAAR) 100 MG tablet Take 1 tablet by mouth daily  Deri Litten, DO   vitamin C (ASCORBIC ACID) 500 MG tablet Take 500 mg by mouth daily  Historical Provider, MD   acetaminophen (TYLENOL) 500 MG tablet Take 1 tablet by mouth every 6 hours as needed for Pain  Fredle SOFY Quick   furosemide (LASIX) 20 MG tablet TAKE 1 TABLET DAILY  Brayan Hiss, APRN - CNP   simvastatin (ZOCOR) 20 MG tablet TAKE 1 TABLET NIGHTLY  Brayan Hiss, ROCKY Langston CNP   Misc Natural Products (NEURIVA PO) Take by mouth  Historical Provider, MD   DULoxetine (CYMBALTA) 30 MG extended release capsule TAKE 2 CAPSULES TWICE DAILY  Brayan Hiss, APRN - CNP   loratadine (CLARITIN) 10 MG tablet Take 1 tablet by mouth daily  Rocky Angulo MD   busPIRone (BUSPAR) 10 MG tablet Take 15 mg by mouth 2 times daily   Historical Provider, MD   ALPRAZolam [place] Carton) 0.5 MG tablet   Historical Provider, MD   isosorbide mononitrate (IMDUR) 120 MG extended release tablet Take 1 tablet by mouth daily  Brayan Hiss, ROCKY Langston CNP   doxazosin (CARDURA) 4 MG tablet Take 0.5 tablets by mouth nightly  Brayan Hiss, ROCKY Langston CNP   metoprolol succinate (TOPROL XL) 100 MG extended release tablet Take 1 tablet by mouth daily  Brayan Hiss, ROCKY Langston CNP   Cholecalciferol (VITAMIN D3 PO) Take 400 Units by mouth daily   Historical Provider, MD   nitroGLYCERIN (NITROSTAT) 0.4 MG SL tablet Place 1 tablet under the tongue every 5 minutes as needed for Chest pain up to max of 3 total doses. If no relief after 1 dose, call 911. Brayan Hiss, ROCKY Langston CNP   Probiotic Product (PROBIOTIC DAILY PO) Take by mouth  Historical Provider, MD   hydrocortisone 2.5 % cream Apply topically 2 times daily.   ROCKY Alexander CNP   aspirin 81 MG EC tablet Take 81 mg by mouth daily  Historical Provider, MD   Multiple Vitamins-Minerals (HAIR SKIN AND NAILS FORMULA PO) Take by mouth  Historical Provider, MD   Multiple Vitamins-Minerals (OCUVITE PO) Take by mouth  Historical Provider, MD   Multiple Vitamin (DAILY VITAMIN PO) Take by mouth  Historical ProviderMD Foss (Including outside providers/suppliers regularly involved in providing care):   Patient Care Team:  ROCKY Olivo CNP as PCP - General (Certified Nurse Practitioner)  ROCKY Moore - CNP as PCP - [facility] Empaneled Provider     Reviewed and updated this visit:  Allergies  Meds           Quenten Wilfrido, was evaluated through a synchronous (real-time) telephone encounter. The patient (or guardian if applicable) is aware that this is a billable service, which includes applicable co-pays. This Virtual Visit was conducted with patient's (and/or legal guardian's) consent. The visit was conducted pursuant to the emergency declaration under the [facility], [address] [place] [address] waiver authority and the [employer] Act. Patient identification was verified, and a caregiver was present when appropriate. The patient was located at Home: [address] [place] [id number]. Provider was located at [facility] ([address] [place] [address]): [address] [facility],  [address] [place] [address]. Jorge Luis Nicolas LPN, 62/84/9327, performed the documented evaluation under the direct supervision of the attending physician. This encounter was performed under Melvin merritt MDs, direct supervision, 10/12/2022.

## 2022-10-12 NOTE — PATIENT INSTRUCTIONS
Personalized Preventive Plan for Jeanne Ramirez - 10/12/2022  Medicare offers a range of preventive health benefits. Some of the tests and screenings are paid in full while other may be subject to a deductible, co-insurance, and/or copay. Some of these benefits include a comprehensive review of your medical history including lifestyle, illnesses that may run in your family, and various assessments and screenings as appropriate. After reviewing your medical record and screening and assessments performed today your provider may have ordered immunizations, labs, imaging, and/or referrals for you. A list of these orders (if applicable) as well as your Preventive Care list are included within your After Visit Summary for your review. Other Preventive Recommendations:    A preventive eye exam performed by an eye specialist is recommended every 1-2 years to screen for glaucoma; cataracts, macular degeneration, and other eye disorders. A preventive dental visit is recommended every 6 months. Try to get at least 150 minutes of exercise per week or 10,000 steps per day on a pedometer . Order or download the FREE \"Exercise & Physical Activity: Your Everyday Guide\" from [employer]. Call 2-220.552.5904 or search [employer] online. You need 6713-1457 mg of calcium and 0466-4116 IU of vitamin D per day. It is possible to meet your calcium requirement with diet alone, but a vitamin D supplement is usually necessary to meet this goal.  When exposed to the sun, use a sunscreen that protects against both UVA and UVB radiation with an SPF of 30 or greater. Reapply every 2 to 3 hours or after sweating, drying off with a towel, or swimming. Always wear a seat belt when traveling in a car. Always wear a helmet when riding a bicycle or motorcycle.

## 2022-10-12 NOTE — TELEPHONE ENCOUNTER
SW contacted patient to follow-up on Primary Care First referral from PCP. Sw spoke with pt and discovered through the conversation that pt has a visual impairment and cannot read printed material to complete Living Will Forms. Pt related she sees an pathobiologist in [facility] and gets a \"shot in her eye\" but was unable to relate her eye condition for why she gets this shot. SW suggested and recommended pt have her eye specialist send referral to [facility] [place] [address].., [address] [place] [address], [address] Phone: [phone] to see if can help pt with visual aides to increase her independence ; to  [place] at  [id number] to assist with Living Will; and [facility]  [phone] to assist with transportation and other services. SW will close out referral since pt is not with a Primary Care First primary care location.

## 2022-10-20 ENCOUNTER — TELEPHONE (OUTPATIENT)
Dept: FAMILY MEDICINE CLINIC | Age: 77
End: 2022-10-20

## 2022-10-25 NOTE — TELEPHONE ENCOUNTER
SW contacted patient to follow-up on Primary Care First referral from PCP. SW spoke with pt and provided pt with resources to assist her complete her living will. SW suggested pt contact [employer] and gave contact number so they can assist her in completing form. GLENN explained how form requires her signature be witnessed by 2 individuals who are not related or be notarized. GLENN explained that does not go to pt's office so could not meet her to assist her with her Living Will form.

## 2022-12-28 RX ORDER — SIMVASTATIN 20 MG
TABLET ORAL
Qty: 90 TABLET | Refills: 1 | Status: SHIPPED | OUTPATIENT
Start: 2022-12-28

## 2022-12-28 NOTE — TELEPHONE ENCOUNTER
Future appt scheduled 02/13/2023                      Last appt 10/10/2022      Last Written 10/28/2021    simvastatin (ZOCOR) 20 MG tablet  #90  1 RF

## 2023-01-10 DIAGNOSIS — R12 HEARTBURN: ICD-10-CM

## 2023-01-10 DIAGNOSIS — K44.9 HIATAL HERNIA: ICD-10-CM

## 2023-01-10 DIAGNOSIS — R13.19 ESOPHAGEAL DYSPHAGIA: ICD-10-CM

## 2023-01-10 LAB
LEFT VENTRICULAR EJECTION FRACTION MODE: NORMAL
LV EF: 60 %

## 2023-01-10 RX ORDER — OMEPRAZOLE 40 MG/1
CAPSULE, DELAYED RELEASE ORAL
Qty: 180 CAPSULE | Refills: 1 | Status: SHIPPED | OUTPATIENT
Start: 2023-01-10

## 2023-01-12 NOTE — RESULT ENCOUNTER NOTE
I believe she follows up with cardiology and it appears that is where this echocardiogram was done. It did show some evidence of left ventricular hypertrophy, some left atrial enlargement, and mild pulmonary hypertension.   Would recommend cardiology follow-up if not already done

## 2023-01-16 NOTE — PROGRESS NOTES
Called [phone] NA/not able to LM. Faxed result to ordering provider: Winter Fleming [phone].

## 2023-01-21 ENCOUNTER — APPOINTMENT (OUTPATIENT)
Dept: CT IMAGING | Age: 78
End: 2023-01-21
Payer: MEDICARE

## 2023-01-21 ENCOUNTER — APPOINTMENT (OUTPATIENT)
Dept: GENERAL RADIOLOGY | Age: 78
End: 2023-01-21
Payer: MEDICARE

## 2023-01-21 ENCOUNTER — HOSPITAL ENCOUNTER (INPATIENT)
Age: 78
LOS: 3 days | Discharge: HOME | End: 2023-01-24
Attending: EMERGENCY MEDICINE | Admitting: INTERNAL MEDICINE
Payer: MEDICARE

## 2023-01-21 DIAGNOSIS — I48.0 PAROXYSMAL ATRIAL FIBRILLATION (HCC): ICD-10-CM

## 2023-01-21 DIAGNOSIS — J18.9 PNEUMONIA DUE TO INFECTIOUS ORGANISM, UNSPECIFIED LATERALITY, UNSPECIFIED PART OF LUNG: Primary | ICD-10-CM

## 2023-01-21 DIAGNOSIS — E87.1 HYPONATREMIA: ICD-10-CM

## 2023-01-21 PROBLEM — J14 CAP (COMMUNITY ACQUIRED PNEUMONIA) DUE TO HAEMOPHILUS INFLUENZAE (HCC): Status: ACTIVE | Noted: 2023-01-21

## 2023-01-21 LAB
A/G RATIO: 1 (ref 1.1–2.2)
ALBUMIN SERPL-MCNC: 3.7 G/DL (ref 3.4–5)
ALP BLD-CCNC: 80 U/L (ref 40–129)
ALT SERPL-CCNC: 21 U/L (ref 10–40)
ANION GAP SERPL CALCULATED.3IONS-SCNC: 9 MMOL/L (ref 3–16)
AST SERPL-CCNC: 30 U/L (ref 15–37)
BASOPHILS ABSOLUTE: 0 K/UL (ref 0–0.2)
BASOPHILS RELATIVE PERCENT: 0.3 %
BILIRUB SERPL-MCNC: 0.5 MG/DL (ref 0–1)
BILIRUBIN URINE: NEGATIVE
BLOOD, URINE: NEGATIVE
BUN BLDV-MCNC: 9 MG/DL (ref 7–20)
CALCIUM SERPL-MCNC: 9.3 MG/DL (ref 8.3–10.6)
CHLORIDE BLD-SCNC: 87 MMOL/L (ref 99–110)
CLARITY: CLEAR
CO2: 31 MMOL/L (ref 21–32)
COLOR: YELLOW
CREAT SERPL-MCNC: <0.5 MG/DL (ref 0.6–1.2)
EOSINOPHILS ABSOLUTE: 0.1 K/UL (ref 0–0.6)
EOSINOPHILS RELATIVE PERCENT: 1 %
GFR SERPL CREATININE-BSD FRML MDRD: >60 ML/MIN/{1.73_M2}
GLUCOSE BLD-MCNC: 153 MG/DL (ref 70–99)
GLUCOSE URINE: NEGATIVE MG/DL
HCT VFR BLD CALC: 36.9 % (ref 36–48)
HEMOGLOBIN: 12.6 G/DL (ref 12–16)
KETONES, URINE: NEGATIVE MG/DL
LACTIC ACID: 1.1 MMOL/L (ref 0.4–2)
LEUKOCYTE ESTERASE, URINE: NEGATIVE
LYMPHOCYTES ABSOLUTE: 1 K/UL (ref 1–5.1)
LYMPHOCYTES RELATIVE PERCENT: 14.1 %
MCH RBC QN AUTO: 29.3 PG (ref 26–34)
MCHC RBC AUTO-ENTMCNC: 34.1 G/DL (ref 31–36)
MCV RBC AUTO: 86.1 FL (ref 80–100)
MICROSCOPIC EXAMINATION: NORMAL
MONOCYTES ABSOLUTE: 0.6 K/UL (ref 0–1.3)
MONOCYTES RELATIVE PERCENT: 9 %
NEUTROPHILS ABSOLUTE: 5.3 K/UL (ref 1.7–7.7)
NEUTROPHILS RELATIVE PERCENT: 75.6 %
NITRITE, URINE: NEGATIVE
PDW BLD-RTO: 12.8 % (ref 12.4–15.4)
PH UA: 5.5 (ref 5–8)
PLATELET # BLD: 190 K/UL (ref 135–450)
PMV BLD AUTO: 8.2 FL (ref 5–10.5)
POTASSIUM REFLEX MAGNESIUM: 4.4 MMOL/L (ref 3.5–5.1)
PROTEIN UA: NEGATIVE MG/DL
RAPID INFLUENZA  B AGN: NEGATIVE
RAPID INFLUENZA A AGN: NEGATIVE
RBC # BLD: 4.28 M/UL (ref 4–5.2)
SARS-COV-2, NAAT: NOT DETECTED
SODIUM BLD-SCNC: 127 MMOL/L (ref 136–145)
SPECIFIC GRAVITY UA: <=1.005 (ref 1–1.03)
TOTAL PROTEIN: 7.3 G/DL (ref 6.4–8.2)
TROPONIN: <0.01 NG/ML
URINE REFLEX TO CULTURE: NORMAL
URINE TYPE: NORMAL
UROBILINOGEN, URINE: 0.2 E.U./DL
WBC # BLD: 7.1 K/UL (ref 4–11)

## 2023-01-21 PROCEDURE — 2580000003 HC RX 258: Performed by: EMERGENCY MEDICINE

## 2023-01-21 PROCEDURE — 6360000002 HC RX W HCPCS: Performed by: INTERNAL MEDICINE

## 2023-01-21 PROCEDURE — 74176 CT ABD & PELVIS W/O CONTRAST: CPT

## 2023-01-21 PROCEDURE — 84484 ASSAY OF TROPONIN QUANT: CPT

## 2023-01-21 PROCEDURE — 3209999900 CT LUMBAR SPINE TRAUMA RECONSTRUCTION

## 2023-01-21 PROCEDURE — 87635 SARS-COV-2 COVID-19 AMP PRB: CPT

## 2023-01-21 PROCEDURE — 87040 BLOOD CULTURE FOR BACTERIA: CPT

## 2023-01-21 PROCEDURE — 36415 COLL VENOUS BLD VENIPUNCTURE: CPT

## 2023-01-21 PROCEDURE — 85025 COMPLETE CBC W/AUTO DIFF WBC: CPT

## 2023-01-21 PROCEDURE — 96365 THER/PROPH/DIAG IV INF INIT: CPT

## 2023-01-21 PROCEDURE — 6370000000 HC RX 637 (ALT 250 FOR IP): Performed by: INTERNAL MEDICINE

## 2023-01-21 PROCEDURE — 1200000000 HC SEMI PRIVATE

## 2023-01-21 PROCEDURE — 87205 SMEAR GRAM STAIN: CPT

## 2023-01-21 PROCEDURE — 81003 URINALYSIS AUTO W/O SCOPE: CPT

## 2023-01-21 PROCEDURE — 6360000002 HC RX W HCPCS: Performed by: EMERGENCY MEDICINE

## 2023-01-21 PROCEDURE — 73590 X-RAY EXAM OF LOWER LEG: CPT

## 2023-01-21 PROCEDURE — 99285 EMERGENCY DEPT VISIT HI MDM: CPT

## 2023-01-21 PROCEDURE — 80053 COMPREHEN METABOLIC PANEL: CPT

## 2023-01-21 PROCEDURE — 83605 ASSAY OF LACTIC ACID: CPT

## 2023-01-21 PROCEDURE — 87070 CULTURE OTHR SPECIMN AEROBIC: CPT

## 2023-01-21 PROCEDURE — 87804 INFLUENZA ASSAY W/OPTIC: CPT

## 2023-01-21 PROCEDURE — 6370000000 HC RX 637 (ALT 250 FOR IP): Performed by: EMERGENCY MEDICINE

## 2023-01-21 PROCEDURE — 2580000003 HC RX 258: Performed by: INTERNAL MEDICINE

## 2023-01-21 RX ORDER — NITROGLYCERIN 0.4 MG/1
0.4 TABLET SUBLINGUAL EVERY 5 MIN PRN
Status: DISCONTINUED | OUTPATIENT
Start: 2023-01-21 | End: 2023-01-24 | Stop reason: HOSPADM

## 2023-01-21 RX ORDER — DOXAZOSIN 2 MG/1
2 TABLET ORAL NIGHTLY
Status: DISCONTINUED | OUTPATIENT
Start: 2023-01-21 | End: 2023-01-24 | Stop reason: HOSPADM

## 2023-01-21 RX ORDER — ASPIRIN 81 MG/1
81 TABLET ORAL DAILY
Status: DISCONTINUED | OUTPATIENT
Start: 2023-01-21 | End: 2023-01-24

## 2023-01-21 RX ORDER — ENOXAPARIN SODIUM 100 MG/ML
30 INJECTION SUBCUTANEOUS 2 TIMES DAILY
Status: DISCONTINUED | OUTPATIENT
Start: 2023-01-21 | End: 2023-01-24

## 2023-01-21 RX ORDER — ATORVASTATIN CALCIUM 10 MG/1
10 TABLET, FILM COATED ORAL NIGHTLY
Status: DISCONTINUED | OUTPATIENT
Start: 2023-01-21 | End: 2023-01-24 | Stop reason: HOSPADM

## 2023-01-21 RX ORDER — ISOSORBIDE MONONITRATE 60 MG/1
60 TABLET, EXTENDED RELEASE ORAL DAILY
Status: DISCONTINUED | OUTPATIENT
Start: 2023-01-22 | End: 2023-01-24 | Stop reason: HOSPADM

## 2023-01-21 RX ORDER — CETIRIZINE HYDROCHLORIDE 10 MG/1
5 TABLET ORAL DAILY
Status: DISCONTINUED | OUTPATIENT
Start: 2023-01-21 | End: 2023-01-24 | Stop reason: HOSPADM

## 2023-01-21 RX ORDER — SODIUM CHLORIDE 9 MG/ML
INJECTION, SOLUTION INTRAVENOUS CONTINUOUS
Status: DISCONTINUED | OUTPATIENT
Start: 2023-01-21 | End: 2023-01-24 | Stop reason: HOSPADM

## 2023-01-21 RX ORDER — ACETAMINOPHEN 325 MG/1
650 TABLET ORAL EVERY 6 HOURS PRN
Status: DISCONTINUED | OUTPATIENT
Start: 2023-01-21 | End: 2023-01-24 | Stop reason: HOSPADM

## 2023-01-21 RX ORDER — SODIUM CHLORIDE 0.9 % (FLUSH) 0.9 %
5-40 SYRINGE (ML) INJECTION EVERY 12 HOURS SCHEDULED
Status: DISCONTINUED | OUTPATIENT
Start: 2023-01-21 | End: 2023-01-24 | Stop reason: HOSPADM

## 2023-01-21 RX ORDER — ONDANSETRON 4 MG/1
4 TABLET, ORALLY DISINTEGRATING ORAL EVERY 8 HOURS PRN
Status: DISCONTINUED | OUTPATIENT
Start: 2023-01-21 | End: 2023-01-24 | Stop reason: HOSPADM

## 2023-01-21 RX ORDER — POLYETHYLENE GLYCOL 3350 17 G/17G
17 POWDER, FOR SOLUTION ORAL DAILY PRN
Status: DISCONTINUED | OUTPATIENT
Start: 2023-01-21 | End: 2023-01-24 | Stop reason: HOSPADM

## 2023-01-21 RX ORDER — ACETAMINOPHEN 325 MG/1
650 TABLET ORAL ONCE
Status: COMPLETED | OUTPATIENT
Start: 2023-01-21 | End: 2023-01-21

## 2023-01-21 RX ORDER — SODIUM CHLORIDE 9 MG/ML
INJECTION, SOLUTION INTRAVENOUS PRN
Status: DISCONTINUED | OUTPATIENT
Start: 2023-01-21 | End: 2023-01-24 | Stop reason: HOSPADM

## 2023-01-21 RX ORDER — SODIUM CHLORIDE 0.9 % (FLUSH) 0.9 %
5-40 SYRINGE (ML) INJECTION PRN
Status: DISCONTINUED | OUTPATIENT
Start: 2023-01-21 | End: 2023-01-24 | Stop reason: HOSPADM

## 2023-01-21 RX ORDER — LOSARTAN POTASSIUM 100 MG/1
100 TABLET ORAL DAILY
Status: DISCONTINUED | OUTPATIENT
Start: 2023-01-21 | End: 2023-01-24 | Stop reason: HOSPADM

## 2023-01-21 RX ORDER — DULOXETIN HYDROCHLORIDE 30 MG/1
30 CAPSULE, DELAYED RELEASE ORAL 2 TIMES DAILY
Status: DISCONTINUED | OUTPATIENT
Start: 2023-01-21 | End: 2023-01-24 | Stop reason: HOSPADM

## 2023-01-21 RX ORDER — AZITHROMYCIN 250 MG/1
500 TABLET, FILM COATED ORAL DAILY
Status: COMPLETED | OUTPATIENT
Start: 2023-01-22 | End: 2023-01-24

## 2023-01-21 RX ORDER — ACETAMINOPHEN 650 MG/1
650 SUPPOSITORY RECTAL EVERY 6 HOURS PRN
Status: DISCONTINUED | OUTPATIENT
Start: 2023-01-21 | End: 2023-01-24 | Stop reason: HOSPADM

## 2023-01-21 RX ORDER — PANTOPRAZOLE SODIUM 40 MG/1
40 TABLET, DELAYED RELEASE ORAL
Status: DISCONTINUED | OUTPATIENT
Start: 2023-01-22 | End: 2023-01-24 | Stop reason: HOSPADM

## 2023-01-21 RX ORDER — OMEGA-3S/DHA/EPA/FISH OIL/D3 300MG-1000
400 CAPSULE ORAL DAILY
Status: DISCONTINUED | OUTPATIENT
Start: 2023-01-21 | End: 2023-01-24 | Stop reason: HOSPADM

## 2023-01-21 RX ORDER — METOPROLOL SUCCINATE 50 MG/1
100 TABLET, EXTENDED RELEASE ORAL DAILY
Status: DISCONTINUED | OUTPATIENT
Start: 2023-01-22 | End: 2023-01-24 | Stop reason: HOSPADM

## 2023-01-21 RX ORDER — BUSPIRONE HYDROCHLORIDE 10 MG/1
10 TABLET ORAL 2 TIMES DAILY
Status: DISCONTINUED | OUTPATIENT
Start: 2023-01-21 | End: 2023-01-24 | Stop reason: HOSPADM

## 2023-01-21 RX ORDER — IPRATROPIUM BROMIDE AND ALBUTEROL SULFATE 2.5; .5 MG/3ML; MG/3ML
1 SOLUTION RESPIRATORY (INHALATION)
Status: DISCONTINUED | OUTPATIENT
Start: 2023-01-22 | End: 2023-01-22

## 2023-01-21 RX ORDER — ALPRAZOLAM 0.5 MG/1
0.5 TABLET ORAL NIGHTLY PRN
Status: DISCONTINUED | OUTPATIENT
Start: 2023-01-21 | End: 2023-01-24 | Stop reason: HOSPADM

## 2023-01-21 RX ORDER — ASCORBIC ACID 500 MG
500 TABLET ORAL DAILY
Status: DISCONTINUED | OUTPATIENT
Start: 2023-01-22 | End: 2023-01-24 | Stop reason: HOSPADM

## 2023-01-21 RX ORDER — ALBUTEROL SULFATE 2.5 MG/3ML
2.5 SOLUTION RESPIRATORY (INHALATION) EVERY 6 HOURS PRN
Status: DISCONTINUED | OUTPATIENT
Start: 2023-01-21 | End: 2023-01-22

## 2023-01-21 RX ORDER — ONDANSETRON 2 MG/ML
4 INJECTION INTRAMUSCULAR; INTRAVENOUS EVERY 6 HOURS PRN
Status: DISCONTINUED | OUTPATIENT
Start: 2023-01-21 | End: 2023-01-24 | Stop reason: HOSPADM

## 2023-01-21 RX ADMIN — BUSPIRONE HYDROCHLORIDE 10 MG: 10 TABLET ORAL at 21:50

## 2023-01-21 RX ADMIN — DULOXETINE HYDROCHLORIDE 30 MG: 30 CAPSULE, DELAYED RELEASE ORAL at 21:50

## 2023-01-21 RX ADMIN — ALPRAZOLAM 0.5 MG: 0.5 TABLET ORAL at 21:50

## 2023-01-21 RX ADMIN — DOXAZOSIN 2 MG: 2 TABLET ORAL at 21:50

## 2023-01-21 RX ADMIN — ACETAMINOPHEN 650 MG: 325 TABLET ORAL at 15:46

## 2023-01-21 RX ADMIN — AZITHROMYCIN DIHYDRATE 500 MG: 500 INJECTION, POWDER, LYOPHILIZED, FOR SOLUTION INTRAVENOUS at 17:56

## 2023-01-21 RX ADMIN — ONDANSETRON 4 MG: 4 TABLET, ORALLY DISINTEGRATING ORAL at 21:50

## 2023-01-21 RX ADMIN — SODIUM CHLORIDE: 9 INJECTION, SOLUTION INTRAVENOUS at 18:53

## 2023-01-21 RX ADMIN — ENOXAPARIN SODIUM 30 MG: 100 INJECTION SUBCUTANEOUS at 21:50

## 2023-01-21 RX ADMIN — CEFTRIAXONE SODIUM 1000 MG: 1 INJECTION, POWDER, FOR SOLUTION INTRAMUSCULAR; INTRAVENOUS at 16:36

## 2023-01-21 RX ADMIN — ACETAMINOPHEN 650 MG: 325 TABLET ORAL at 21:50

## 2023-01-21 RX ADMIN — ATORVASTATIN CALCIUM 10 MG: 10 TABLET, FILM COATED ORAL at 21:50

## 2023-01-21 ASSESSMENT — PAIN DESCRIPTION - ONSET: ONSET: ON-GOING

## 2023-01-21 ASSESSMENT — ENCOUNTER SYMPTOMS
SORE THROAT: 0
SHORTNESS OF BREATH: 0
RHINORRHEA: 0
BACK PAIN: 1
ABDOMINAL PAIN: 0
EYE REDNESS: 0
COUGH: 1

## 2023-01-21 ASSESSMENT — PAIN DESCRIPTION - LOCATION
LOCATION: BACK;ABDOMEN;HIP
LOCATION: ABDOMEN

## 2023-01-21 ASSESSMENT — PAIN SCALES - GENERAL
PAINLEVEL_OUTOF10: 3
PAINLEVEL_OUTOF10: 10

## 2023-01-21 ASSESSMENT — LIFESTYLE VARIABLES
HOW MANY STANDARD DRINKS CONTAINING ALCOHOL DO YOU HAVE ON A TYPICAL DAY: PATIENT DOES NOT DRINK
HOW OFTEN DO YOU HAVE A DRINK CONTAINING ALCOHOL: NEVER

## 2023-01-21 ASSESSMENT — PAIN DESCRIPTION - DESCRIPTORS: DESCRIPTORS: ACHING

## 2023-01-21 ASSESSMENT — PAIN DESCRIPTION - PAIN TYPE: TYPE: ACUTE PAIN

## 2023-01-21 ASSESSMENT — PAIN - FUNCTIONAL ASSESSMENT
PAIN_FUNCTIONAL_ASSESSMENT: ACTIVITIES ARE NOT PREVENTED
PAIN_FUNCTIONAL_ASSESSMENT: 0-10

## 2023-01-21 ASSESSMENT — PAIN DESCRIPTION - FREQUENCY: FREQUENCY: INTERMITTENT

## 2023-01-21 ASSESSMENT — PAIN DESCRIPTION - ORIENTATION: ORIENTATION: LOWER

## 2023-01-21 NOTE — CONSULTS
Consult for Pharmacy to clarify patient's home medications.     Per most recent data in Rx Navigator:    Alprazolam 0.25 mg, one tablet per day (confirmed in [facility])  Duloxetine 30 mg, two capsules per day  Doxazosin 4 mg, one tablet per day  Furosemide 20 mg, one tablet per day  Isosorbide mononitrate 120 mg, one tablet per day  Losartan 100 mg, one tablet per day  Metoprolol succinate 100 mg, one tablet per day  Omeprazole 40 mg capsules, 2 capsules per day  Simvastatin 10 mg, one tablet per day  Buspirone 15 mg, two tablets per day    DOC NelsonPh.1/21/20236:50 PM
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
## 2023-01-22 LAB
BASOPHILS ABSOLUTE: 0 K/UL (ref 0–0.2)
BASOPHILS RELATIVE PERCENT: 0.3 %
EOSINOPHILS ABSOLUTE: 0.2 K/UL (ref 0–0.6)
EOSINOPHILS RELATIVE PERCENT: 2.9 %
HCT VFR BLD CALC: 37 % (ref 36–48)
HEMOGLOBIN: 12.3 G/DL (ref 12–16)
LYMPHOCYTES ABSOLUTE: 1.3 K/UL (ref 1–5.1)
LYMPHOCYTES RELATIVE PERCENT: 24.4 %
MCH RBC QN AUTO: 29.1 PG (ref 26–34)
MCHC RBC AUTO-ENTMCNC: 33.3 G/DL (ref 31–36)
MCV RBC AUTO: 87.4 FL (ref 80–100)
MONOCYTES ABSOLUTE: 0.7 K/UL (ref 0–1.3)
MONOCYTES RELATIVE PERCENT: 13 %
NEUTROPHILS ABSOLUTE: 3.1 K/UL (ref 1.7–7.7)
NEUTROPHILS RELATIVE PERCENT: 59.4 %
PDW BLD-RTO: 13.2 % (ref 12.4–15.4)
PLATELET # BLD: 188 K/UL (ref 135–450)
PMV BLD AUTO: 8.3 FL (ref 5–10.5)
RBC # BLD: 4.24 M/UL (ref 4–5.2)
TSH REFLEX: 0.89 UIU/ML (ref 0.27–4.2)
WBC # BLD: 5.2 K/UL (ref 4–11)

## 2023-01-22 PROCEDURE — 2580000003 HC RX 258: Performed by: INTERNAL MEDICINE

## 2023-01-22 PROCEDURE — 36415 COLL VENOUS BLD VENIPUNCTURE: CPT

## 2023-01-22 PROCEDURE — 6360000002 HC RX W HCPCS: Performed by: INTERNAL MEDICINE

## 2023-01-22 PROCEDURE — 2700000000 HC OXYGEN THERAPY PER DAY

## 2023-01-22 PROCEDURE — 85025 COMPLETE CBC W/AUTO DIFF WBC: CPT

## 2023-01-22 PROCEDURE — 6370000000 HC RX 637 (ALT 250 FOR IP): Performed by: INTERNAL MEDICINE

## 2023-01-22 PROCEDURE — 94761 N-INVAS EAR/PLS OXIMETRY MLT: CPT

## 2023-01-22 PROCEDURE — 94640 AIRWAY INHALATION TREATMENT: CPT

## 2023-01-22 PROCEDURE — 1200000000 HC SEMI PRIVATE

## 2023-01-22 PROCEDURE — 84443 ASSAY THYROID STIM HORMONE: CPT

## 2023-01-22 RX ORDER — ALBUTEROL SULFATE 2.5 MG/3ML
2.5 SOLUTION RESPIRATORY (INHALATION) EVERY 6 HOURS PRN
Status: DISCONTINUED | OUTPATIENT
Start: 2023-01-22 | End: 2023-01-23

## 2023-01-22 RX ORDER — ALBUTEROL SULFATE 2.5 MG/3ML
2.5 SOLUTION RESPIRATORY (INHALATION) 4 TIMES DAILY
Status: DISCONTINUED | OUTPATIENT
Start: 2023-01-22 | End: 2023-01-23

## 2023-01-22 RX ADMIN — METOPROLOL SUCCINATE 100 MG: 50 TABLET, EXTENDED RELEASE ORAL at 09:29

## 2023-01-22 RX ADMIN — CETIRIZINE HYDROCHLORIDE 5 MG: 10 TABLET ORAL at 09:29

## 2023-01-22 RX ADMIN — BUSPIRONE HYDROCHLORIDE 10 MG: 10 TABLET ORAL at 09:29

## 2023-01-22 RX ADMIN — ALBUTEROL SULFATE 2.5 MG: 2.5 SOLUTION RESPIRATORY (INHALATION) at 11:44

## 2023-01-22 RX ADMIN — DOXAZOSIN 2 MG: 2 TABLET ORAL at 20:30

## 2023-01-22 RX ADMIN — ONDANSETRON 4 MG: 4 TABLET, ORALLY DISINTEGRATING ORAL at 05:01

## 2023-01-22 RX ADMIN — ENOXAPARIN SODIUM 30 MG: 100 INJECTION SUBCUTANEOUS at 20:30

## 2023-01-22 RX ADMIN — PANTOPRAZOLE SODIUM 40 MG: 40 TABLET, DELAYED RELEASE ORAL at 05:00

## 2023-01-22 RX ADMIN — ALBUTEROL SULFATE 2.5 MG: 2.5 SOLUTION RESPIRATORY (INHALATION) at 19:29

## 2023-01-22 RX ADMIN — ALBUTEROL SULFATE 2.5 MG: 2.5 SOLUTION RESPIRATORY (INHALATION) at 04:07

## 2023-01-22 RX ADMIN — BUSPIRONE HYDROCHLORIDE 10 MG: 10 TABLET ORAL at 20:29

## 2023-01-22 RX ADMIN — CHOLECALCIFEROL TAB 10 MCG (400 UNIT) 400 UNITS: 10 TAB at 09:29

## 2023-01-22 RX ADMIN — ALBUTEROL SULFATE 2.5 MG: 2.5 SOLUTION RESPIRATORY (INHALATION) at 15:37

## 2023-01-22 RX ADMIN — ATORVASTATIN CALCIUM 10 MG: 10 TABLET, FILM COATED ORAL at 20:30

## 2023-01-22 RX ADMIN — OXYCODONE HYDROCHLORIDE AND ACETAMINOPHEN 500 MG: 500 TABLET ORAL at 09:29

## 2023-01-22 RX ADMIN — ALBUTEROL SULFATE 2.5 MG: 2.5 SOLUTION RESPIRATORY (INHALATION) at 07:47

## 2023-01-22 RX ADMIN — ACETAMINOPHEN 650 MG: 325 TABLET ORAL at 05:01

## 2023-01-22 RX ADMIN — ASPIRIN 81 MG: 81 TABLET, COATED ORAL at 09:29

## 2023-01-22 RX ADMIN — AZITHROMYCIN MONOHYDRATE 500 MG: 250 TABLET ORAL at 18:02

## 2023-01-22 RX ADMIN — DULOXETINE HYDROCHLORIDE 30 MG: 30 CAPSULE, DELAYED RELEASE ORAL at 09:29

## 2023-01-22 RX ADMIN — CEFTRIAXONE SODIUM 1000 MG: 1 INJECTION, POWDER, FOR SOLUTION INTRAMUSCULAR; INTRAVENOUS at 18:04

## 2023-01-22 RX ADMIN — ISOSORBIDE MONONITRATE 60 MG: 60 TABLET, EXTENDED RELEASE ORAL at 09:29

## 2023-01-22 RX ADMIN — DULOXETINE HYDROCHLORIDE 30 MG: 30 CAPSULE, DELAYED RELEASE ORAL at 20:30

## 2023-01-22 RX ADMIN — ENOXAPARIN SODIUM 30 MG: 100 INJECTION SUBCUTANEOUS at 09:30

## 2023-01-22 RX ADMIN — LOSARTAN POTASSIUM 100 MG: 100 TABLET, FILM COATED ORAL at 09:29

## 2023-01-22 ASSESSMENT — PAIN DESCRIPTION - ONSET: ONSET: ON-GOING

## 2023-01-22 ASSESSMENT — PAIN DESCRIPTION - PAIN TYPE
TYPE: ACUTE PAIN
TYPE: ACUTE PAIN

## 2023-01-22 ASSESSMENT — PAIN DESCRIPTION - LOCATION
LOCATION: BACK
LOCATION: BACK;ABDOMEN;HIP

## 2023-01-22 ASSESSMENT — PAIN - FUNCTIONAL ASSESSMENT
PAIN_FUNCTIONAL_ASSESSMENT: ACTIVITIES ARE NOT PREVENTED
PAIN_FUNCTIONAL_ASSESSMENT: ACTIVITIES ARE NOT PREVENTED

## 2023-01-22 ASSESSMENT — PAIN DESCRIPTION - DESCRIPTORS
DESCRIPTORS: ACHING
DESCRIPTORS: ACHING

## 2023-01-22 ASSESSMENT — PAIN DESCRIPTION - ORIENTATION
ORIENTATION: LOWER
ORIENTATION: LOWER

## 2023-01-22 ASSESSMENT — PAIN SCALES - GENERAL
PAINLEVEL_OUTOF10: 4
PAINLEVEL_OUTOF10: 4

## 2023-01-22 ASSESSMENT — PAIN DESCRIPTION - FREQUENCY: FREQUENCY: INTERMITTENT

## 2023-01-22 NOTE — PROGRESS NOTES
RT Inhaler-Nebulizer Bronchodilator Protocol Note    There is a bronchodilator order in the chart from a provider indicating to follow the RT Bronchodilator Protocol and there is an Initiate RT Inhaler-Nebulizer Bronchodilator Protocol order as well (see protocol at bottom of note). CXR Findings:  No results found. The findings from the last RT Protocol Assessment were as follows:   History Pulmonary Disease: Smoker 15 pack years or more  Respiratory Pattern: Regular pattern and RR 12-20 bpm  Breath Sounds: Slightly diminished and/or crackles  Cough: Strong, spontaneous, non-productive  Indication for Bronchodilator Therapy: Decreased or absent breath sounds  Bronchodilator Assessment Score: 3    Aerosolized bronchodilator medication orders have been revised according to the RT Inhaler-Nebulizer Bronchodilator Protocol below. Respiratory Therapist to perform RT Therapy Protocol Assessment initially then follow the protocol. Repeat RT Therapy Protocol Assessment PRN for score 0-3 or on second treatment, BID, and PRN for scores above 3. No Indications - adjust the frequency to every 6 hours PRN wheezing or bronchospasm, if no treatments needed after 48 hours then discontinue using Per Protocol order mode. If indication present, adjust the RT bronchodilator orders based on the Bronchodilator Assessment Score as indicated below. Use Inhaler orders unless patient has one or more of the following: on home nebulizer, not able to hold breath for 10 seconds, is not alert and oriented, cannot activate and use MDI correctly, or respiratory rate 25 breaths per minute or more, then use the equivalent nebulizer order(s) with same Frequency and PRN reasons based on the score. If a patient is on this medication at home then do not decrease Frequency below that used at home.     0-3 - enter or revise RT bronchodilator order(s) to equivalent RT Bronchodilator order with Frequency of every 4 hours PRN for wheezing or increased work of breathing using Per Protocol order mode. 4-6 - enter or revise RT Bronchodilator order(s) to two equivalent RT bronchodilator orders with one order with BID Frequency and one order with Frequency of every 4 hours PRN wheezing or increased work of breathing using Per Protocol order mode. 7-10 - enter or revise RT Bronchodilator order(s) to two equivalent RT bronchodilator orders with one order with TID Frequency and one order with Frequency of every 4 hours PRN wheezing or increased work of breathing using Per Protocol order mode. 11-13 - enter or revise RT Bronchodilator order(s) to one equivalent RT bronchodilator order with QID Frequency and an Albuterol order with Frequency of every 4 hours PRN wheezing or increased work of breathing using Per Protocol order mode. Greater than 13 - enter or revise RT Bronchodilator order(s) to one equivalent RT bronchodilator order with every 4 hours Frequency and an Albuterol order with Frequency of every 2 hours PRN wheezing or increased work of breathing using Per Protocol order mode.        Electronically signed by Yolanda Owen RCP on 1/21/2023 at 11:59 PM

## 2023-01-22 NOTE — PROGRESS NOTES
Occupational Therapy/Physical Therapy        OT/PT Attempt      Orders received and notes reviewed. Attempted OT/PT evaluation this afternoon. Pt lethargic, having difficulty keeping eyes open, and complaining of 10/10 pain. Will hold evaluation until pt is more alert and medicated for pain. No charge @0654. Cleveland Delgado Stands OTR/L [address] [facility], PT #[id number]

## 2023-01-22 NOTE — FLOWSHEET NOTE
01/21/23 2142   Vital Signs   Temp 97.6 °F (36.4 °C)   Temp Source Oral   Heart Rate 70   Heart Rate Source Monitor   Resp 20   BP (!) 151/63   MAP (Calculated) 92   BP Location Left upper arm   Patient Position Semi fowlers   Level of Consciousness 0   MEWS Score 1   Oxygen Therapy   SpO2 93 %   O2 Device None (Room air)   Admission questions complete per pt report at this time. Assessment complete, see flowsheets. Pt resting in bed, telemetry box 25 in place; PRN meds given per pt request within PRN order parameters with nightly meds- see eMAR and flowsheets for associated medication administration information. Pt has generalized trace edema, scattered bruising with large bruise to L back, abdominal folds are pink and moist but not red and skin intact pt declines interdry and 4eyes skin assessment at this time. Pt aware to call for any needs or changes and call light within reach, standard safety precautions in place. Patient is able to demonstrate the ability to move from a reclining position to an upright position within the recliner. Front wheel walker at bedside, pt personal walker is at home but she uses a walker at baseline for ambulation. Will continue to monitor.   Jackson Pineda RN
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
## 2023-01-22 NOTE — PROGRESS NOTES
RT Inhaler-Nebulizer Bronchodilator Protocol Note    There is a bronchodilator order in the chart from a provider indicating to follow the RT Bronchodilator Protocol and there is an Initiate RT Inhaler-Nebulizer Bronchodilator Protocol order as well (see protocol at bottom of note). CXR Findings:  No results found. The findings from the last RT Protocol Assessment were as follows:   History Pulmonary Disease: (P) Smoker 15 pack years or more  Respiratory Pattern: (P) Regular pattern and RR 12-20 bpm  Breath Sounds: (P) Inspiratory and expiratory or bilateral wheezing and/or rhonchi  Cough: (P) Strong, spontaneous, non-productive  Indication for Bronchodilator Therapy: (P) Wheezing associated with pulm disorder  Bronchodilator Assessment Score: (P) 7    Aerosolized bronchodilator medication orders have been revised according to the RT Inhaler-Nebulizer Bronchodilator Protocol below. Respiratory Therapist to perform RT Therapy Protocol Assessment initially then follow the protocol. Repeat RT Therapy Protocol Assessment PRN for score 0-3 or on second treatment, BID, and PRN for scores above 3. No Indications - adjust the frequency to every 6 hours PRN wheezing or bronchospasm, if no treatments needed after 48 hours then discontinue using Per Protocol order mode. If indication present, adjust the RT bronchodilator orders based on the Bronchodilator Assessment Score as indicated below. Use Inhaler orders unless patient has one or more of the following: on home nebulizer, not able to hold breath for 10 seconds, is not alert and oriented, cannot activate and use MDI correctly, or respiratory rate 25 breaths per minute or more, then use the equivalent nebulizer order(s) with same Frequency and PRN reasons based on the score. If a patient is on this medication at home then do not decrease Frequency below that used at home.     0-3 - enter or revise RT bronchodilator order(s) to equivalent RT Bronchodilator order with Frequency of every 4 hours PRN for wheezing or increased work of breathing using Per Protocol order mode. 4-6 - enter or revise RT Bronchodilator order(s) to two equivalent RT bronchodilator orders with one order with BID Frequency and one order with Frequency of every 4 hours PRN wheezing or increased work of breathing using Per Protocol order mode. 7-10 - enter or revise RT Bronchodilator order(s) to two equivalent RT bronchodilator orders with one order with TID Frequency and one order with Frequency of every 4 hours PRN wheezing or increased work of breathing using Per Protocol order mode. 11-13 - enter or revise RT Bronchodilator order(s) to one equivalent RT bronchodilator order with QID Frequency and an Albuterol order with Frequency of every 4 hours PRN wheezing or increased work of breathing using Per Protocol order mode. Greater than 13 - enter or revise RT Bronchodilator order(s) to one equivalent RT bronchodilator order with every 4 hours Frequency and an Albuterol order with Frequency of every 2 hours PRN wheezing or increased work of breathing using Per Protocol order mode. RT to enter RT Home Evaluation for COPD & MDI Assessment order using Per Protocol order mode.     Electronically signed by Stacie Glass RCP on 1/22/2023 at 8:09 AM

## 2023-01-22 NOTE — CARE COORDINATION
Case Management Assessment  Initial Evaluation      Patient Name: Marychuy Haro  YOB: 1945  Diagnosis: Hyponatremia [E87.1]  Pneumonia due to infectious organism, unspecified laterality, unspecified part of lung [J18.9]  CAP [facility]) [facility] Lynette Dorman  Date / Time: 1/21/2023 12:38 PM    Admission status/Date:INPT 1/21/23  Chart Reviewed: Yes      Patient Interviewed: Yes   Family Interviewed:  No      Hospitalization in the last 30 days:  No      Health Care Decision Maker :   Primary Decision Maker: Krishna Ling - Child - [phone]    Secondary Decision Maker: tyler contreras - Other - [phone]    (CM - must 1st enter selection under Navigator - emergency contact- Health Care Decision Maker Relationship and pick relationship)   Who do you trust or have selected to make healthcare decisions for you      Met with: pt at bedside  Interview conducted  (bedside/phone):    Current PCP: [address] required for SNF : Y, N          3 night stay required - [employer]  Support Systems/Care Needs: Family Members  Transportation: self    Meal Preparation: self    Housing  Living Arrangements: home alone  Steps: n/a  Intent for return to present living arrangements: Yes  Identified Issues: recent falls    [address] with [address] : No Agency:(Services)  Type of Home Care Services: None  Passport/Waiver : No  :                      Phone Number:    Passport/Waiver Services: -          Durable Medical Equiptment   DME Provider: n/a  Equipment:   Walker_X__Cane__x_RTS___ BSC___Shower Chair_X__Hospital Bed___W/C____Other________  02 at ____Liter(s)---wears(frequency)_______ HHN ___ CPAP___ BiPap___   N/A____      Home O2 Use :  No    If No for home O2---if presently on O2 during hospitalization:  No  if yes CM to follow for potential DC O2 need  Informed of need for care provider to bring portable home O2 tank on day of discharge for nursing to connect prior to leaving:   Not Indicated  Verbalized agreement/Understanding:   Not Indicated    Community Service Affiliation  Dialysis:  No    Agency:  Location:  Dialysis Schedule:  Phone:   Fax: Other Community Services: (ex:PT/OT,Mental Health,Wound Clinic, Cardio/Pul [address])    DISCHARGE PLAN: Explained Case Management role/services. CM reviewed chart and met with pt. Pt reports that she lives at home alone and is IPTA, however, has had recent falls at home. Pt states that she plans to return home at discharge. PT/Ot consult pending at this time. CM will follow for poss home with hhc vs STR.  Referral placed in [facility] for pastoral care for info on advance directives per pt request.

## 2023-01-22 NOTE — DISCHARGE INSTRUCTIONS
Follow-up CT scan of the chest recommended in 6 to 8 weeks to ensure resolution of infiltrates. Talk to your primary care provider    Heart Failure Resources:    Heart Failure Interactive Workbook:   Go to www.[employer].com/aha-heartfailure for a Free Heart Failure Interactive Workbook provided by The American Heart Association. This interactive workbook will provide information on Healthier Living with Heart Failure. Please copy and paste link into search bar. Use your mouse to scroll through the pages. HF [place] rosalinda:   Heart Failure Free smart phone rosalinda available for iPhone and Android download. Use your phone to track sodium intake, fluid intake, symptoms, and weight. Low Sodium Diet:  Go to www. [employer]. org website for [employer] which is Low Sodium! [employer] is a dialysis company, but this website offers free seasonal cookbooks. Each quarter, they will release 25-30 new recipes with a breakdown of calories, sodium, and glucose. Recipes:   Go to [email or web address]/recipes website for free recipes.

## 2023-01-22 NOTE — PLAN OF CARE
Problem: Discharge Planning  Goal: Discharge to home or other facility with appropriate resources  Outcome: Progressing     Problem: Pain  Goal: Verbalizes/displays adequate comfort level or baseline comfort level  1/22/2023 0756 by Gabriela Blunt RN  Outcome: Progressing  1/21/2023 2042 by Juliana Awad RN  Outcome: Progressing     Problem: Safety - Adult  Goal: Free from fall injury  Outcome: Progressing

## 2023-01-22 NOTE — PROGRESS NOTES
AM Shift assessment completed. Pt is alert and oriented. Vital signs are WNL. Respirations are even & easy. Patient remains on RA; expiratory wheezes noted. Patient complaint of left knee and hip pain from fall; tolerable at this time. No complaints voiced. Pt denies needs at this time. SR up x 2 and bed in low position. Call light is within reach. Will monitor. .Bedside Mobility Assessment Tool (BMAT):     Assessment Level 1- Sit and Shake    1. From a semi-reclined position, ask patient to sit up and rotate to a seated position at the side of the bed. Can use the bedrail. 2. Ask patient to reach out and grab your hand and shake making sure patient reaches across his/her midline. Pass- Patient is able to come to a seated position, maintain core strength. Maintains seated balance while reaching across midline. Move on to Assessment Level 2. Assessment Level 2- Stretch and Point   1. With patient in seated position at the side of the bed, have patient place both feet on the floor (or stool) with knees no higher than hips. 2. Ask patient to stretch one leg and straighten the knee, then bend the ankle/flex and point the toes. If appropriate, repeat with the other leg. Pass- Patient is able to demonstrate appropriate quad strength on intended weight bearing limb(s). Move onto Assessment Level 3. Assessment Level 3- Stand   1. Ask patient to elevate off the bed or chair (seated to standing) using an assistive device (cane, bedrail). 2. Patient should be able to raise buttocks off be and hold for a count of five. May repeat once. Pass- Patient maintains standing stability for at least 5 seconds, proceed to assessment level 4. Assessment Level 4- Walk   1. Ask patient to march in place at bedside. 2. Then ask patient to advance step and return each foot. Some medical conditions may render a patient from stepping backwards, use your best clinical judgement.    Fail- Patient not able to complete tasks OR requires use of assistive device. Patient is MOBILITY LEVEL 3.        Mobility Level- 3

## 2023-01-22 NOTE — PROGRESS NOTES
PM Shift report given to [employer]. Patient is stable. All end of shift needs have been met. No further assistance needed at this time.

## 2023-01-23 ENCOUNTER — APPOINTMENT (OUTPATIENT)
Dept: GENERAL RADIOLOGY | Age: 78
End: 2023-01-23
Payer: MEDICARE

## 2023-01-23 PROBLEM — M25.552 LEFT HIP PAIN: Status: ACTIVE | Noted: 2023-01-23

## 2023-01-23 PROBLEM — J18.9 PNEUMONIA DUE TO INFECTIOUS ORGANISM: Status: ACTIVE | Noted: 2023-01-23

## 2023-01-23 LAB
ANION GAP SERPL CALCULATED.3IONS-SCNC: 7 MMOL/L (ref 3–16)
BASOPHILS ABSOLUTE: 0 K/UL (ref 0–0.2)
BASOPHILS RELATIVE PERCENT: 0.4 %
BUN BLDV-MCNC: 7 MG/DL (ref 7–20)
CALCIUM SERPL-MCNC: 7.7 MG/DL (ref 8.3–10.6)
CHLORIDE BLD-SCNC: 95 MMOL/L (ref 99–110)
CO2: 30 MMOL/L (ref 21–32)
CREAT SERPL-MCNC: 0.6 MG/DL (ref 0.6–1.2)
CULTURE, RESPIRATORY: NORMAL
EOSINOPHILS ABSOLUTE: 0.2 K/UL (ref 0–0.6)
EOSINOPHILS RELATIVE PERCENT: 3.1 %
GFR SERPL CREATININE-BSD FRML MDRD: >60 ML/MIN/{1.73_M2}
GLUCOSE BLD-MCNC: 129 MG/DL (ref 70–99)
GRAM STAIN RESULT: NORMAL
HCT VFR BLD CALC: 33.3 % (ref 36–48)
HEMOGLOBIN: 11.1 G/DL (ref 12–16)
LYMPHOCYTES ABSOLUTE: 1.4 K/UL (ref 1–5.1)
LYMPHOCYTES RELATIVE PERCENT: 21.9 %
MCH RBC QN AUTO: 29.7 PG (ref 26–34)
MCHC RBC AUTO-ENTMCNC: 33.3 G/DL (ref 31–36)
MCV RBC AUTO: 89.1 FL (ref 80–100)
MONOCYTES ABSOLUTE: 0.8 K/UL (ref 0–1.3)
MONOCYTES RELATIVE PERCENT: 11.9 %
NEUTROPHILS ABSOLUTE: 4 K/UL (ref 1.7–7.7)
NEUTROPHILS RELATIVE PERCENT: 62.7 %
PDW BLD-RTO: 13.2 % (ref 12.4–15.4)
PLATELET # BLD: 217 K/UL (ref 135–450)
PMV BLD AUTO: 7.5 FL (ref 5–10.5)
POTASSIUM REFLEX MAGNESIUM: 3.6 MMOL/L (ref 3.5–5.1)
RBC # BLD: 3.73 M/UL (ref 4–5.2)
SODIUM BLD-SCNC: 132 MMOL/L (ref 136–145)
WBC # BLD: 6.4 K/UL (ref 4–11)

## 2023-01-23 PROCEDURE — 36415 COLL VENOUS BLD VENIPUNCTURE: CPT

## 2023-01-23 PROCEDURE — 2580000003 HC RX 258: Performed by: INTERNAL MEDICINE

## 2023-01-23 PROCEDURE — 2700000000 HC OXYGEN THERAPY PER DAY

## 2023-01-23 PROCEDURE — 93005 ELECTROCARDIOGRAM TRACING: CPT | Performed by: INTERNAL MEDICINE

## 2023-01-23 PROCEDURE — 85025 COMPLETE CBC W/AUTO DIFF WBC: CPT

## 2023-01-23 PROCEDURE — 97116 GAIT TRAINING THERAPY: CPT

## 2023-01-23 PROCEDURE — 6360000002 HC RX W HCPCS: Performed by: INTERNAL MEDICINE

## 2023-01-23 PROCEDURE — 97166 OT EVAL MOD COMPLEX 45 MIN: CPT

## 2023-01-23 PROCEDURE — 99232 SBSQ HOSP IP/OBS MODERATE 35: CPT | Performed by: INTERNAL MEDICINE

## 2023-01-23 PROCEDURE — 97162 PT EVAL MOD COMPLEX 30 MIN: CPT

## 2023-01-23 PROCEDURE — 97535 SELF CARE MNGMENT TRAINING: CPT

## 2023-01-23 PROCEDURE — 2060000000 HC ICU INTERMEDIATE R&B

## 2023-01-23 PROCEDURE — 97530 THERAPEUTIC ACTIVITIES: CPT

## 2023-01-23 PROCEDURE — 73502 X-RAY EXAM HIP UNI 2-3 VIEWS: CPT

## 2023-01-23 PROCEDURE — 94640 AIRWAY INHALATION TREATMENT: CPT

## 2023-01-23 PROCEDURE — 6370000000 HC RX 637 (ALT 250 FOR IP): Performed by: INTERNAL MEDICINE

## 2023-01-23 PROCEDURE — 6370000000 HC RX 637 (ALT 250 FOR IP)

## 2023-01-23 PROCEDURE — 94761 N-INVAS EAR/PLS OXIMETRY MLT: CPT

## 2023-01-23 PROCEDURE — 2500000003 HC RX 250 WO HCPCS: Performed by: INTERNAL MEDICINE

## 2023-01-23 PROCEDURE — 80048 BASIC METABOLIC PNL TOTAL CA: CPT

## 2023-01-23 RX ORDER — DILTIAZEM HYDROCHLORIDE 5 MG/ML
15 INJECTION INTRAVENOUS ONCE
Status: COMPLETED | OUTPATIENT
Start: 2023-01-23 | End: 2023-01-23

## 2023-01-23 RX ORDER — FUROSEMIDE 20 MG/1
20 TABLET ORAL DAILY
Status: DISCONTINUED | OUTPATIENT
Start: 2023-01-23 | End: 2023-01-24 | Stop reason: HOSPADM

## 2023-01-23 RX ORDER — IPRATROPIUM BROMIDE AND ALBUTEROL SULFATE 2.5; .5 MG/3ML; MG/3ML
1 SOLUTION RESPIRATORY (INHALATION)
Status: DISCONTINUED | OUTPATIENT
Start: 2023-01-23 | End: 2023-01-24

## 2023-01-23 RX ORDER — DILTIAZEM HYDROCHLORIDE 5 MG/ML
15 INJECTION INTRAVENOUS ONCE
Status: DISCONTINUED | OUTPATIENT
Start: 2023-01-23 | End: 2023-01-23

## 2023-01-23 RX ORDER — ALBUTEROL SULFATE 2.5 MG/3ML
2.5 SOLUTION RESPIRATORY (INHALATION) EVERY 4 HOURS PRN
Status: DISCONTINUED | OUTPATIENT
Start: 2023-01-23 | End: 2023-01-24

## 2023-01-23 RX ORDER — HYDRALAZINE HYDROCHLORIDE 20 MG/ML
10 INJECTION INTRAMUSCULAR; INTRAVENOUS EVERY 6 HOURS PRN
Status: DISCONTINUED | OUTPATIENT
Start: 2023-01-23 | End: 2023-01-24 | Stop reason: HOSPADM

## 2023-01-23 RX ADMIN — ASPIRIN 81 MG: 81 TABLET, COATED ORAL at 09:09

## 2023-01-23 RX ADMIN — ENOXAPARIN SODIUM 30 MG: 100 INJECTION SUBCUTANEOUS at 09:09

## 2023-01-23 RX ADMIN — AZITHROMYCIN MONOHYDRATE 500 MG: 250 TABLET ORAL at 09:09

## 2023-01-23 RX ADMIN — SODIUM CHLORIDE: 9 INJECTION, SOLUTION INTRAVENOUS at 03:53

## 2023-01-23 RX ADMIN — IPRATROPIUM BROMIDE AND ALBUTEROL SULFATE 1 AMPULE: 2.5; .5 SOLUTION RESPIRATORY (INHALATION) at 19:50

## 2023-01-23 RX ADMIN — DULOXETINE HYDROCHLORIDE 30 MG: 30 CAPSULE, DELAYED RELEASE ORAL at 20:46

## 2023-01-23 RX ADMIN — DILTIAZEM HYDROCHLORIDE 15 MG: 5 INJECTION, SOLUTION INTRAVENOUS at 23:21

## 2023-01-23 RX ADMIN — LOSARTAN POTASSIUM 100 MG: 100 TABLET, FILM COATED ORAL at 09:09

## 2023-01-23 RX ADMIN — PANTOPRAZOLE SODIUM 40 MG: 40 TABLET, DELAYED RELEASE ORAL at 05:12

## 2023-01-23 RX ADMIN — DULOXETINE HYDROCHLORIDE 30 MG: 30 CAPSULE, DELAYED RELEASE ORAL at 09:09

## 2023-01-23 RX ADMIN — CEFTRIAXONE SODIUM 1000 MG: 1 INJECTION, POWDER, FOR SOLUTION INTRAMUSCULAR; INTRAVENOUS at 17:34

## 2023-01-23 RX ADMIN — OXYCODONE HYDROCHLORIDE AND ACETAMINOPHEN 500 MG: 500 TABLET ORAL at 09:09

## 2023-01-23 RX ADMIN — CETIRIZINE HYDROCHLORIDE 5 MG: 10 TABLET ORAL at 09:09

## 2023-01-23 RX ADMIN — ISOSORBIDE MONONITRATE 60 MG: 60 TABLET, EXTENDED RELEASE ORAL at 09:09

## 2023-01-23 RX ADMIN — CHOLECALCIFEROL TAB 10 MCG (400 UNIT) 400 UNITS: 10 TAB at 09:09

## 2023-01-23 RX ADMIN — DOXAZOSIN 2 MG: 2 TABLET ORAL at 20:46

## 2023-01-23 RX ADMIN — HYDRALAZINE HYDROCHLORIDE 10 MG: 20 INJECTION INTRAMUSCULAR; INTRAVENOUS at 19:46

## 2023-01-23 RX ADMIN — ENOXAPARIN SODIUM 30 MG: 100 INJECTION SUBCUTANEOUS at 20:47

## 2023-01-23 RX ADMIN — ALBUTEROL SULFATE 2.5 MG: 2.5 SOLUTION RESPIRATORY (INHALATION) at 11:42

## 2023-01-23 RX ADMIN — DILTIAZEM HYDROCHLORIDE 10 MG/HR: 5 INJECTION, SOLUTION INTRAVENOUS at 23:37

## 2023-01-23 RX ADMIN — BUSPIRONE HYDROCHLORIDE 10 MG: 10 TABLET ORAL at 20:46

## 2023-01-23 RX ADMIN — ATORVASTATIN CALCIUM 10 MG: 10 TABLET, FILM COATED ORAL at 20:46

## 2023-01-23 RX ADMIN — FUROSEMIDE 20 MG: 20 TABLET ORAL at 14:29

## 2023-01-23 RX ADMIN — BUSPIRONE HYDROCHLORIDE 10 MG: 10 TABLET ORAL at 09:09

## 2023-01-23 RX ADMIN — METOPROLOL SUCCINATE 100 MG: 50 TABLET, EXTENDED RELEASE ORAL at 09:09
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
## 2023-01-23 NOTE — PROGRESS NOTES
RT Inhaler-Nebulizer Bronchodilator Protocol Note    There is a bronchodilator order in the chart from a provider indicating to follow the RT Bronchodilator Protocol and there is an Initiate RT Inhaler-Nebulizer Bronchodilator Protocol order as well (see protocol at bottom of note). CXR Findings:  No results found. The findings from the last RT Protocol Assessment were as follows:   History Pulmonary Disease: Smoker 15 pack years or more  Respiratory Pattern: Regular pattern and RR 12-20 bpm  Breath Sounds: Inspiratory and expiratory or bilateral wheezing and/or rhonchi  Cough: Strong, productive  Indication for Bronchodilator Therapy: Wheezing associated with pulm disorder  Bronchodilator Assessment Score: 8    Aerosolized bronchodilator medication orders have been revised according to the RT Inhaler-Nebulizer Bronchodilator Protocol below. Respiratory Therapist to perform RT Therapy Protocol Assessment initially then follow the protocol. Repeat RT Therapy Protocol Assessment PRN for score 0-3 or on second treatment, BID, and PRN for scores above 3. No Indications - adjust the frequency to every 6 hours PRN wheezing or bronchospasm, if no treatments needed after 48 hours then discontinue using Per Protocol order mode. If indication present, adjust the RT bronchodilator orders based on the Bronchodilator Assessment Score as indicated below. Use Inhaler orders unless patient has one or more of the following: on home nebulizer, not able to hold breath for 10 seconds, is not alert and oriented, cannot activate and use MDI correctly, or respiratory rate 25 breaths per minute or more, then use the equivalent nebulizer order(s) with same Frequency and PRN reasons based on the score. If a patient is on this medication at home then do not decrease Frequency below that used at home.     0-3 - enter or revise RT bronchodilator order(s) to equivalent RT Bronchodilator order with Frequency of every 4 hours PRN for wheezing or increased work of breathing using Per Protocol order mode. 4-6 - enter or revise RT Bronchodilator order(s) to two equivalent RT bronchodilator orders with one order with BID Frequency and one order with Frequency of every 4 hours PRN wheezing or increased work of breathing using Per Protocol order mode. 7-10 - enter or revise RT Bronchodilator order(s) to two equivalent RT bronchodilator orders with one order with TID Frequency and one order with Frequency of every 4 hours PRN wheezing or increased work of breathing using Per Protocol order mode. 11-13 - enter or revise RT Bronchodilator order(s) to one equivalent RT bronchodilator order with QID Frequency and an Albuterol order with Frequency of every 4 hours PRN wheezing or increased work of breathing using Per Protocol order mode. Greater than 13 - enter or revise RT Bronchodilator order(s) to one equivalent RT bronchodilator order with every 4 hours Frequency and an Albuterol order with Frequency of every 2 hours PRN wheezing or increased work of breathing using Per Protocol order mode.          Electronically signed by Giancarlo Cooley RCP on 1/22/2023 at 7:35 PM

## 2023-01-23 NOTE — PROGRESS NOTES
RT Inhaler-Nebulizer Bronchodilator Protocol Note    There is a bronchodilator order in the chart from a provider indicating to follow the RT Bronchodilator Protocol and there is an Initiate RT Inhaler-Nebulizer Bronchodilator Protocol order as well (see protocol at bottom of note). CXR Findings:  No results found. The findings from the last RT Protocol Assessment were as follows:   History Pulmonary Disease: (P) Smoker 15 pack years or more  Respiratory Pattern: (P) Mild dyspnea at rest, irregular pattern, or RR 21-25 bpm  Breath Sounds: (P) Inspiratory and expiratory or bilateral wheezing and/or rhonchi  Cough: (P) Strong, productive  Indication for Bronchodilator Therapy: (P) Wheezing associated with pulm disorder  Bronchodilator Assessment Score: (P) 12    Aerosolized bronchodilator medication orders have been revised according to the RT Inhaler-Nebulizer Bronchodilator Protocol below. Respiratory Therapist to perform RT Therapy Protocol Assessment initially then follow the protocol. Repeat RT Therapy Protocol Assessment PRN for score 0-3 or on second treatment, BID, and PRN for scores above 3. No Indications - adjust the frequency to every 6 hours PRN wheezing or bronchospasm, if no treatments needed after 48 hours then discontinue using Per Protocol order mode. If indication present, adjust the RT bronchodilator orders based on the Bronchodilator Assessment Score as indicated below. Use Inhaler orders unless patient has one or more of the following: on home nebulizer, not able to hold breath for 10 seconds, is not alert and oriented, cannot activate and use MDI correctly, or respiratory rate 25 breaths per minute or more, then use the equivalent nebulizer order(s) with same Frequency and PRN reasons based on the score. If a patient is on this medication at home then do not decrease Frequency below that used at home.     0-3 - enter or revise RT bronchodilator order(s) to equivalent RT Bronchodilator order with Frequency of every 4 hours PRN for wheezing or increased work of breathing using Per Protocol order mode.        4-6 - enter or revise RT Bronchodilator order(s) to two equivalent RT bronchodilator orders with one order with BID Frequency and one order with Frequency of every 4 hours PRN wheezing or increased work of breathing using Per Protocol order mode.        7-10 - enter or revise RT Bronchodilator order(s) to two equivalent RT bronchodilator orders with one order with TID Frequency and one order with Frequency of every 4 hours PRN wheezing or increased work of breathing using Per Protocol order mode.       11-13 - enter or revise RT Bronchodilator order(s) to one equivalent RT bronchodilator order with QID Frequency and an Albuterol order with Frequency of every 4 hours PRN wheezing or increased work of breathing using Per Protocol order mode.      Greater than 13 - enter or revise RT Bronchodilator order(s) to one equivalent RT bronchodilator order with every 4 hours Frequency and an Albuterol order with Frequency of every 2 hours PRN wheezing or increased work of breathing using Per Protocol order mode.     Duoneb Q4WA for wheezing  Electronically signed by Yariel Moon RCP on 1/23/2023 at 11:45 AM

## 2023-01-23 NOTE — PLAN OF CARE
Problem: Discharge Planning  Goal: Discharge to home or other facility with appropriate resources  1/23/2023 1010 by Tona Prieto RN  Outcome: Progressing     Problem: Pain  Goal: Verbalizes/displays adequate comfort level or baseline comfort level  1/23/2023 1010 by Tona Prieto RN  Outcome: Progressing     Problem: Safety - Adult  Goal: Free from fall injury  1/23/2023 1010 by Tona Prieto RN  Outcome: Progressing     Problem: Chronic Conditions and Co-morbidities  Goal: Patient's chronic conditions and co-morbidity symptoms are monitored and maintained or improved  Outcome: Progressing

## 2023-01-23 NOTE — ACP (ADVANCE CARE PLANNING)
Advance Care Planning     Advance Care Planning Inpatient Note  [facility] Department    Today's Date: 1/23/2023  Unit: [facility]    Received request from IDT Member. Upon review of chart and communication with care team, patient's decision making abilities are not in question. . Patient was/were present in the room during visit. Goals of ACP Conversation:  Discuss advance care planning documents    Health Care Decision Makers:       Primary Decision Maker: Milena Yeung - Child - [phone]    Secondary Decision Maker: Jah Georges - Other - [phone]  Summary:  Completed New Documents  Updated Healthcare Decision [facility]    Advance Care Planning Documents (Patient Wishes):  Healthcare Power of /Advance Directive Appointment of Health Care Agent  Living Will/Advance Directive     Assessment:  Pt has 3 daughters, said, Adalid Tellez comes to help me out, bring me to the hospital, go shopping for me. She is present with me. Raquel Arroyo is my friend for over 21 years. I also have a granddaughter I like to add on the list, Javier Settler. \". Pt named her daughter Pasha Dominique as primary decision maker, Raquel Arroyo as secondary decision maker while Lianetbart Eyad as supplementary decision maker. New document completed. Interventions:  Provided education on documents for clarity and greater understanding  Discussed and provided education on state decision maker hierarchy  Assisted in the completion of documents according to patient's wishes at this time    Care Preferences Communicated:     Ventilation:   If the patient, in their present state of health, suddenly became very ill and unable to breathe on their own,     the patient would desire the use of a ventilator (breathing machine). If their health worsens and it becomes clear that the change of recovery is unlikely,     the patient would desire the use of a ventilator (breathing machine).  \"If its taking a long time, then No. Or if there no chance of survival, then No\"    Resuscitation:  In the event the patient's heart stopped as a result of an underlying serious health condition, the patient communicates a preference for      resuscitative attempts (CPR). \"See if I will wake up, but if it didn't work, then let me go. \"    Outcomes/Plan:  New advance directive completed. Returned original document(s) to patient, as well as copies for distribution to appointed agents  Copy of advance directive given to staff to scan into medical record.   Teach Back Method used to verify the patient's and/or Healthcare Decision Maker's understanding of key information in the advance directive documents    Electronically signed by Chaplain Stacy on 1/23/2023 at 2:20 PM

## 2023-01-23 NOTE — PROGRESS NOTES
Inpatient Physical Therapy Evaluation and Treatment    Unit: [address] [facility]  Date:  1/23/2023  Patient Name:    Balbir Benjamin  Admitting diagnosis:  Hyponatremia [E87.1]  Pneumonia due to infectious organism, unspecified laterality, unspecified part of lung [J18.9]  CAP (community acquired pneumonia) due to Haemophilus influenzae ([facility] Scarlet Farley  Admit Date:  1/21/2023  Precautions/Restrictions/WB Status/ Lines/ Wounds/ Oxygen: Fall risk, Bed/chair alarm, and Lines -IV and Supplemental O2 (2L)    Treatment Time:  8-9  Treatment Number:  1   Timed Code Treatment Minutes: 45 minutes  Total Treatment Minutes:  60  minutes    Patient Goals for Therapy: \" to go back home \"          Discharge Recommendations: SNF  DME needs for discharge: Needs Met       Therapy recommendation for EMS Transport: can transport by wheelchair    Therapy recommendations for staff:   Assist of 1 with use of rolling walker (RW) for all transfers to/from [facility]  to/from chair    History of Present Illness:     Per H&P, \"68year-old female with history of hypertension, chronic diastolic CHF, hyperlipidemia, irritable bowel syndrome with diarrhea, anxiety, previous history of stroke and restless leg syndrome, presenting with complaints of weakness and fall with left hip pain. Patient states the pain is in her left hip/leg across her abdomen and to the back. She does have underlying chronic back pain also. Imaging in the ED negative for any acute fractures. She does have chronic back pain - CT scan of the lumbar spine showed multilevel degenerative disc disease. Patient also complained of a cough, initially she mentioned it is a chronic cough and later mentioned that this is worsening cough for 3 to 4 days. CT scan of the chest showed multifocal pneumonia. Sodium level low at 127. Patient admitted for further management. COVID and flu negative. .  Currently getting IV fluids and IV antibiotics Rocephin and Zithromax.    She still very weak, PT OT consulted. Case management consulted -will likely need SNF\"    Home Health S4 Level Recommendation:  NA  AM-PAC Mobility Score            Preadmission Environment    Pt. Lives Alone  Home environment:  one story home  Steps to enter first floor: ramp  Steps to second floor: N/A  Bathroom: tub/shower unit and grab bars  Equipment owned: [facility]    Preadmission Status:  Pt. Able to drive: Yes  Pt Fully independent with ADLs: Yes  Pt. Required assistance from family for: Independent PTA  Pt. independent for transfers and gait and walked with Lorena Lennox  History of falls Yes    Pain   Yes  Location: all over  Rating: 10 /10  Pain Medicine Status: No request made    Cognition    A&O x4   Able to follow 2 step commands    Subjective  Patient sitting EOB with no family present. Pt agreeable to this PT eval & tx. Upper Extremity ROM/Strength  Please see OT evaluation. Lower Extremity ROM / Strength   AROM WFL: Yes  ROM limitations:     Strength Assessment (measured on a 0-5 scale): and BLE strength impaired, but not formally assessed with MMT. R LE   Quad   Weak   Ant Tib  Weak   Hamstring Weak   Iliopsoas Weak  L LE  Quad   Weak   Ant Tib  Weak   Hamstring Weak   Iliopsoas Weak    Lower Extremity Sensation    WFL    Lower Extremity Proprioception:   NT    Coordination and Tone  WFL    Balance  Sitting:  Good ;  Independent  Comments: sitting EOB eating breakfast upon arrival    Standing: Fair ; Min A   Comments: at walker with gait belt    Bed Mobility   Supine to Sit:    Not Tested  Sit to Supine:   Not Tested  Rolling:   Not Tested  Scooting in sitting: SBA  Scooting in supine:  Not Tested    Transfer Training     Sit to stand:   Min A   Stand to sit:   Min A   Bed to Chair:   Min A  with use of gait belt and rolling walker (RW)    Gait gait completed as indicated below  Distance:      5 ft  Deviations (firm surface/linoleum):  decreased gil and increased NIRALI  Assistive Device Used:    rolling walker (RW)  Level of Assist:    Min A   Comment: distance limited by fatigue and limited endurance    Stair Training deferred, pt does not have stairs in home environment      Activity Tolerance   Pt completed therapy session with Pain noted with any activity  SOB noted with transfer to chair  Spo2 = 85%  HR = 87 BPM  BP = 165/71    Pt SpO2 95% on room air at EOB. Spo2 dropped to 85% with transfer to chair, required 2 minute rest break to return to 91%. Positioning Needs   Pt reclined in chair, alarm set, positioned in proper neutral alignment and pressure relief provided. Call light provided and all needs within reach    Exercises Initiated  Natali deferred secondary to treatment focus on functional mobility  NA    Other  None. Patient/Family Education   Pt educated on role of inpatient PT, POC, importance of continued activity, DC recommendations, safety awareness, transfer techniques, energy conservation, pacing activity, and calling for assist with mobility. Assessment  Pt seen for Physical Therapy evaluation in acute care setting. Pt demonstrated decreased Activity tolerance, Balance, Safety, and Strength as well as decreased independence with Ambulation, Bed Mobility , and Transfers. Recommending SNF upon discharge as patient functioning well below baseline, demonstrates good rehab potential and unable to return home due to limited or no family support, inability to negotiate stairs to enter home/bedroom/bathroom, and limited safety awareness. Goals : To be met in 3 visits:  1). Independent with LE Ex x 10 reps    To be met in 6 visits:  1). Supine to/from sit: SBA  2). Sit to/from stand: CGA  3). Bed to chair: CGA  4). Gait: Ambulate  40 ft.   with  CGA and use of rolling walker (RW)  5).   Tolerate B LE exercises 3 sets of 10-15 reps    Rehabilitation Potential: Fair  Strengths for achieving goals include:   Pt cooperative   Barriers to achieving goals include:    Complexity of condition, Pain, and Weakness    Plan    To be seen 3-5 x / week  while in acute care setting for therapeutic exercises, bed mobility, transfers, progressive gait training, balance training, and family/patient education. Signature: Sami aHn, [facility] [id number]    If patient discharges from this facility prior to next visit, this note will serve as the Discharge Summary.

## 2023-01-23 NOTE — PROGRESS NOTES
PM Shift report given to [facility] RN. Patient is stable. All end of shift needs have been met. No further assistance needed at this time.

## 2023-01-23 NOTE — PROGRESS NOTES
Inpatient Occupational Therapy  Evaluation and Treatment    Unit: 2 [place]  Date:  1/23/2023  Patient Name:    Solo Kirkland  Admitting diagnosis:  Hyponatremia [E87.1]  Pneumonia due to infectious organism, unspecified laterality, unspecified part of lung [J18.9]  CAP (community acquired pneumonia) due to [facility] influenzae ([facility] Kalyan Bonilla  Admit Date:  1/21/2023  Precautions/Restrictions/WB Status/ Lines/ Wounds/ Oxygen: Fall risk, Bed/chair alarm, and Lines -IV    Treatment Time:  800-900  Treatment Number: 1   Timed code treatment minutes 50 minutes   Total Treatment minutes:   60   minutes    Patient Goals for Therapy:  \" go home if I have to crawl  \"      Discharge Recommendations: SNF  DME needs for discharge: defer to facility       Therapy recommendations for staff:   Assist of 1 with use of rolling walker (RW) for all transfers to/from [facility]  to/from chair with RW     History of Present Illness: per H&P   68 y.o. female who presents after a fall. The patient states yesterday she was walking and she fell and injured her left lower extremity and left knee. She states she still walking with a walker but is very uncomfortable she is concerned that maybe she injured herself from the fall. She does not really recall hitting her pelvis or lower back but she has a bruise to her left flank. The patient was coughing during her exam and when I asked her if she has a frequent cough she reports that she has had a chronic cough since 2021. Unchanged. The patient does states since her fall she has had a little bit of lower abdominal discomfort with her cough. She denies hitting her head. No loss of consciousness. No neck pain. Home Health S4 Level Recommendation:  NA  AM-PAC Score: 17    Preadmission Environment    Pt.  Lives Alone  Home environment:    one story home  Steps to enter first floor: ramp  Steps to second floor: N/A  Bathroom: tub/shower unit and grab bars  Equipment owned: rollator and SPC    Preadmission Status:  Pt. Able to drive: Yes  Pt Fully independent with ADLs: Yes  Pt. Required assistance from family for: Independent PTA  Pt. independent for transfers and gait and walked with Matt Kanaris  History of falls Yes     Pain   Yes  Location: all over  Rating: 10 /10  Pain Medicine Status: No request made     Cognition    A&O x4   Able to follow 2 step commands    Subjective  Patient sitting EOB with no family present. Pt agreeable to this OT eval & tx. Upper Extremity ROM:    WFL    Upper Extremity Strength: Through observation [facility]        Upper Extremity Sensation    WFL    Upper Extremity Proprioception:  WFL    Coordination and Tone  WFL    Balance  Functional Sitting Balance:  WFL  Functional Standing Balance:Diminished    Bed mobility:    Supine to sit:   Not Tested- pt sitting edge of bed   Sit to supine:   Not Tested  Rolling:    Not Tested  Scooting in sitting:  Not Tested  Scooting to head of bed:   Supervision    Bridging:   Not Tested    Transfers:    Sit to stand:  Min A   Stand to sit:  Min A   Bed to chair:   Min A with RW  Standard toilet: Not Tested  Bed to [facility]:  Not Tested    Dressing:      UE:   Not Tested  LE:    Min A  to don pants     Bathing:    UE:  Not Tested  LE:  Not Tested    Eating:   Independent    Toileting:  Not Tested    Activity Tolerance   Pt completed therapy session with SOB , decreased endurance   Sitting Sp02 on 2 liters 98%  Sitting on room air  spo2 94-95%   /71 Sp02 93% HR 93 on room air   Positioning Needs:   Pt up in chair, alarm set, positioned in proper neutral alignment and pressure relief provided. Exercise / Activities Initiated:   N/A    Patient/Family Education:   Role of OT    Assessment of Deficits: Pt seen for Occupational therapy evaluation in acute care setting. Pt demonstrated decreased Activity tolerance, ADLs, Balance , Bed mobility, and Transfers.  Pt functioning below baseline and will likely benefit from skilled occupational therapy services to maximize safety and independence. Goal(s) : To be met in 3 Visits:  1). Bed to toilet/BSC: CGA with RW     To be met in 5 Visits:  1). Supine to/from Sit:  CGA  2). Upper Body Bathing:   SBA  3). Lower Body Bathing:   Min A   4). Upper Body Dressing:  SBA  5). Lower Body Dressing:  Min A   6). Pt to demonstrate UE exs x 15 reps with minimal cues    Rehabilitation Potential:  Fair for goals listed above. Strengths for achieving goals include: Pt cooperative  Barriers to achieving goals include:  Complexity of condition     Plan: To be seen 3-5 x/wk while in acute care setting for therapeutic exercises, bed mobility, transfers, dressing, bathing, family/patient education, ADL/IADL retraining, energy conservation training.      Janessa Porter OTR/L [id number]           If patient discharges from this facility prior to next visit, this note will serve as the Discharge Summary

## 2023-01-23 NOTE — CARE COORDINATION
INTERDISCIPLINARY PLAN OF CARE CONFERENCE    Date/Time: 1/23/2023 10:49 AM  Completed by: DIANA Cruz  Case Management      Patient Name:  Diana Ge  YOB: 1945  Admitting Diagnosis: Hyponatremia [E87.1]  Pneumonia due to infectious organism, unspecified laterality, unspecified part of lung [J18.9]  CAP (community acquired pneumonia) due to Haemophilus influenzae ([facility] Helena Liang     Admit Date/Time:  1/21/2023 12:38 PM    Chart reviewed. Interdisciplinary team contacted or reviewed plan related to patient progress and discharge plans. Disciplines included Case Management, Nursing, and Dietitian. Current Status:ongoing   PT/OT recommendation for discharge plan of care: SNF    Expected D/C Disposition:  Home  Confirmed plan with patient and/or family Yes confirmed with: (name) pt  Met with:pt    Discharge Plan Comments: Chart review completed. Met with pt at bedside. Discussed SNF recommendations and pt adamantly refused stating she won't need skilled home care. She states she needs assistance with house keeping which CM explained this is [facility] which she states she already knew. Discussed skilled home care for RN/PT/OT and pt was open to this after discussing this further. She stated referral to [facility]) and [address] [place] [address]); she declined [facility].     Called and spoke with Shane at [facility] who states they can accept for RN/PT/OT and facesheet was faxed via [employer].     Home O2 in place on admit: No

## 2023-01-23 NOTE — FLOWSHEET NOTE
01/22/23 2015   Vital Signs   Temp 97.1 °F (36.2 °C)   Temp Source Oral   Heart Rate 58   Heart Rate Source Monitor   Resp 20   BP (!) 145/71   MAP (Calculated) 96   BP Location Left upper arm   BP Method Automatic   Patient Position Semi fowlers   Level of Consciousness 0   MEWS Score 1   Oxygen Therapy   SpO2 99 %   O2 Device Nasal cannula     Shift assessment complete. See flow sheet. Scheduled meds given. See MAR. Patients head-toe complete, VS are logged, and active bowel sound noted in all four quadrants. Patient sitting up in bed respirations easy and unlabored. No s/s of distress. Patient alert and oriented. Denies any pain or SOB. No further needs  noted at this time. Call light and bedside table are within reach. The bed is locked and is in the lowest position.         Yesenia Schultz RN

## 2023-01-24 ENCOUNTER — TELEPHONE (OUTPATIENT)
Dept: CARDIOLOGY CLINIC | Age: 78
End: 2023-01-24

## 2023-01-24 VITALS
SYSTOLIC BLOOD PRESSURE: 145 MMHG | DIASTOLIC BLOOD PRESSURE: 62 MMHG | HEIGHT: 64 IN | RESPIRATION RATE: 18 BRPM | HEART RATE: 87 BPM | BODY MASS INDEX: 40.98 KG/M2 | WEIGHT: 240.01 LBS | TEMPERATURE: 97.8 F | OXYGEN SATURATION: 97 %

## 2023-01-24 PROBLEM — I48.0 PAROXYSMAL ATRIAL FIBRILLATION (HCC): Status: ACTIVE | Noted: 2023-01-24

## 2023-01-24 LAB
ANION GAP SERPL CALCULATED.3IONS-SCNC: 11 MMOL/L (ref 3–16)
BASOPHILS ABSOLUTE: 0 K/UL (ref 0–0.2)
BASOPHILS RELATIVE PERCENT: 0.7 %
BUN BLDV-MCNC: 5 MG/DL (ref 7–20)
CALCIUM SERPL-MCNC: 8.5 MG/DL (ref 8.3–10.6)
CHLORIDE BLD-SCNC: 94 MMOL/L (ref 99–110)
CO2: 31 MMOL/L (ref 21–32)
CREAT SERPL-MCNC: <0.5 MG/DL (ref 0.6–1.2)
EKG ATRIAL RATE: 113 BPM
EKG DIAGNOSIS: NORMAL
EKG Q-T INTERVAL: 304 MS
EKG QRS DURATION: 80 MS
EKG QTC CALCULATION (BAZETT): 462 MS
EKG R AXIS: 21 DEGREES
EKG T AXIS: -46 DEGREES
EKG VENTRICULAR RATE: 139 BPM
EOSINOPHILS ABSOLUTE: 0 K/UL (ref 0–0.6)
EOSINOPHILS RELATIVE PERCENT: 0.5 %
GFR SERPL CREATININE-BSD FRML MDRD: >60 ML/MIN/{1.73_M2}
GLUCOSE BLD-MCNC: 136 MG/DL (ref 70–99)
HCT VFR BLD CALC: 37 % (ref 36–48)
HEMOGLOBIN: 12.6 G/DL (ref 12–16)
LYMPHOCYTES ABSOLUTE: 1.1 K/UL (ref 1–5.1)
LYMPHOCYTES RELATIVE PERCENT: 15.8 %
MAGNESIUM: 1.5 MG/DL (ref 1.8–2.4)
MCH RBC QN AUTO: 29.9 PG (ref 26–34)
MCHC RBC AUTO-ENTMCNC: 33.9 G/DL (ref 31–36)
MCV RBC AUTO: 88.1 FL (ref 80–100)
MONOCYTES ABSOLUTE: 0.5 K/UL (ref 0–1.3)
MONOCYTES RELATIVE PERCENT: 7 %
NEUTROPHILS ABSOLUTE: 5.1 K/UL (ref 1.7–7.7)
NEUTROPHILS RELATIVE PERCENT: 76 %
PDW BLD-RTO: 13.3 % (ref 12.4–15.4)
PLATELET # BLD: 276 K/UL (ref 135–450)
PMV BLD AUTO: 7.4 FL (ref 5–10.5)
POTASSIUM REFLEX MAGNESIUM: 3.7 MMOL/L (ref 3.5–5.1)
RBC # BLD: 4.21 M/UL (ref 4–5.2)
SODIUM BLD-SCNC: 136 MMOL/L (ref 136–145)
WBC # BLD: 6.7 K/UL (ref 4–11)

## 2023-01-24 PROCEDURE — 6370000000 HC RX 637 (ALT 250 FOR IP): Performed by: INTERNAL MEDICINE

## 2023-01-24 PROCEDURE — 6360000002 HC RX W HCPCS: Performed by: INTERNAL MEDICINE

## 2023-01-24 PROCEDURE — 97535 SELF CARE MNGMENT TRAINING: CPT

## 2023-01-24 PROCEDURE — 99239 HOSP IP/OBS DSCHRG MGMT >30: CPT | Performed by: INTERNAL MEDICINE

## 2023-01-24 PROCEDURE — 99222 1ST HOSP IP/OBS MODERATE 55: CPT | Performed by: INTERNAL MEDICINE

## 2023-01-24 PROCEDURE — 85025 COMPLETE CBC W/AUTO DIFF WBC: CPT

## 2023-01-24 PROCEDURE — 93010 ELECTROCARDIOGRAM REPORT: CPT | Performed by: INTERNAL MEDICINE

## 2023-01-24 PROCEDURE — 94640 AIRWAY INHALATION TREATMENT: CPT

## 2023-01-24 PROCEDURE — 94761 N-INVAS EAR/PLS OXIMETRY MLT: CPT

## 2023-01-24 PROCEDURE — 83735 ASSAY OF MAGNESIUM: CPT

## 2023-01-24 PROCEDURE — 36415 COLL VENOUS BLD VENIPUNCTURE: CPT

## 2023-01-24 PROCEDURE — 80048 BASIC METABOLIC PNL TOTAL CA: CPT

## 2023-01-24 RX ORDER — LEVALBUTEROL 1.25 MG/.5ML
1.25 SOLUTION, CONCENTRATE RESPIRATORY (INHALATION) EVERY 4 HOURS PRN
Status: DISCONTINUED | OUTPATIENT
Start: 2023-01-24 | End: 2023-01-24 | Stop reason: HOSPADM

## 2023-01-24 RX ORDER — LEVALBUTEROL 1.25 MG/.5ML
1.25 SOLUTION, CONCENTRATE RESPIRATORY (INHALATION)
Status: DISCONTINUED | OUTPATIENT
Start: 2023-01-24 | End: 2023-01-24 | Stop reason: HOSPADM

## 2023-01-24 RX ORDER — POTASSIUM CHLORIDE 20 MEQ/1
40 TABLET, EXTENDED RELEASE ORAL ONCE
Status: COMPLETED | OUTPATIENT
Start: 2023-01-24 | End: 2023-01-24

## 2023-01-24 RX ORDER — DILTIAZEM HYDROCHLORIDE 120 MG/1
120 CAPSULE, COATED, EXTENDED RELEASE ORAL DAILY
Qty: 30 CAPSULE | Refills: 3 | Status: SHIPPED | OUTPATIENT
Start: 2023-01-24

## 2023-01-24 RX ORDER — DIGOXIN 0.25 MG/ML
500 INJECTION INTRAMUSCULAR; INTRAVENOUS ONCE
Status: COMPLETED | OUTPATIENT
Start: 2023-01-24 | End: 2023-01-24

## 2023-01-24 RX ORDER — DILTIAZEM HYDROCHLORIDE 60 MG/1
30 TABLET, FILM COATED ORAL EVERY 6 HOURS SCHEDULED
Status: DISCONTINUED | OUTPATIENT
Start: 2023-01-24 | End: 2023-01-24 | Stop reason: HOSPADM

## 2023-01-24 RX ORDER — MAGNESIUM SULFATE IN WATER 40 MG/ML
4000 INJECTION, SOLUTION INTRAVENOUS ONCE
Status: COMPLETED | OUTPATIENT
Start: 2023-01-24 | End: 2023-01-24

## 2023-01-24 RX ORDER — LEVOFLOXACIN 750 MG/1
750 TABLET ORAL DAILY
Qty: 5 TABLET | Refills: 0 | Status: SHIPPED | OUTPATIENT
Start: 2023-01-24 | End: 2023-01-29

## 2023-01-24 RX ORDER — ISOSORBIDE MONONITRATE 60 MG/1
60 TABLET, EXTENDED RELEASE ORAL DAILY
Qty: 30 TABLET | Refills: 3 | Status: SHIPPED | OUTPATIENT
Start: 2023-01-25

## 2023-01-24 RX ADMIN — POTASSIUM CHLORIDE 40 MEQ: 1500 TABLET, EXTENDED RELEASE ORAL at 09:23

## 2023-01-24 RX ADMIN — DILTIAZEM HYDROCHLORIDE 30 MG: 60 TABLET, FILM COATED ORAL at 09:29

## 2023-01-24 RX ADMIN — AZITHROMYCIN MONOHYDRATE 500 MG: 250 TABLET ORAL at 09:22

## 2023-01-24 RX ADMIN — CETIRIZINE HYDROCHLORIDE 5 MG: 10 TABLET ORAL at 09:22

## 2023-01-24 RX ADMIN — PANTOPRAZOLE SODIUM 40 MG: 40 TABLET, DELAYED RELEASE ORAL at 09:22

## 2023-01-24 RX ADMIN — METOPROLOL SUCCINATE 100 MG: 50 TABLET, EXTENDED RELEASE ORAL at 09:23

## 2023-01-24 RX ADMIN — MAGNESIUM SULFATE HEPTAHYDRATE 4000 MG: 40 INJECTION, SOLUTION INTRAVENOUS at 09:14

## 2023-01-24 RX ADMIN — LEVALBUTEROL 1.25 MG: 1.25 SOLUTION, CONCENTRATE RESPIRATORY (INHALATION) at 10:53

## 2023-01-24 RX ADMIN — CHOLECALCIFEROL TAB 10 MCG (400 UNIT) 400 UNITS: 10 TAB at 09:23

## 2023-01-24 RX ADMIN — ISOSORBIDE MONONITRATE 60 MG: 60 TABLET, EXTENDED RELEASE ORAL at 09:23

## 2023-01-24 RX ADMIN — BUSPIRONE HYDROCHLORIDE 10 MG: 10 TABLET ORAL at 09:23

## 2023-01-24 RX ADMIN — DULOXETINE HYDROCHLORIDE 30 MG: 30 CAPSULE, DELAYED RELEASE ORAL at 09:23

## 2023-01-24 RX ADMIN — FUROSEMIDE 20 MG: 20 TABLET ORAL at 09:22

## 2023-01-24 RX ADMIN — APIXABAN 5 MG: 5 TABLET, FILM COATED ORAL at 09:29

## 2023-01-24 RX ADMIN — LEVALBUTEROL 1.25 MG: 1.25 SOLUTION, CONCENTRATE RESPIRATORY (INHALATION) at 07:44

## 2023-01-24 RX ADMIN — DIGOXIN 500 MCG: 0.25 INJECTION INTRAMUSCULAR; INTRAVENOUS at 01:28

## 2023-01-24 RX ADMIN — LOSARTAN POTASSIUM 100 MG: 100 TABLET, FILM COATED ORAL at 09:22

## 2023-01-24 RX ADMIN — LEVALBUTEROL 1.25 MG: 1.25 SOLUTION, CONCENTRATE RESPIRATORY (INHALATION) at 15:10

## 2023-01-24 RX ADMIN — OXYCODONE HYDROCHLORIDE AND ACETAMINOPHEN 500 MG: 500 TABLET ORAL at 09:23

## 2023-01-24 NOTE — PROGRESS NOTES
Patient educated on discharge instructions as well as new medications use, dosage, administration and possible side effects. Patient verified knowledge. IV removed without difficulty and dry dressing in place. Telemetry monitor removed and returned to [facility]. Pt left facility in stable condition to Home with all of their personal belongings. Patient waiting for meds to beds.

## 2023-01-24 NOTE — CONSULTS
CARDIOLOGY CONSULTATION        Patient Name: Diana Ge  Date of admission: 1/21/2023 12:38 PM  Admission Dx: Hyponatremia [E87.1]  Pneumonia due to infectious organism, unspecified laterality, unspecified part of lung [J18.9]  CAP (community acquired pneumonia) due to Haemophilus influenzae [facility]) Helena Liang  Requesting Physician: Liz Motley MD  Primary Care physician: ROCKY Carpio - CNP    Reason for Consultation/Chief Complaint: Atrial fibrillation    History of Present Illness:     Diana Ge is a 68 y.o. patient hypertension, hyperlipidemia, HFpEF, CVA who presented to the hospital with complaints of weakness and left leg pain. Cardiology consulted for atrial fibrillation. ED course/Vital signs on presentation: Patient originally admitted for complaints above. She was noted to have cough and CT scan showing small multifocal infiltrates. Sodium level 127. Course complicated by atrial fibrillation. EKG yesterday showed atrial fibrillation with heart rate 139 bpm with inferior and anterolateral ST abnormality. Labs notable for magnesium level 1.5, potassium 3.7. Lasix held as she was felt to be compensated from a CHF standpoint. This morning, Matha Gilford states she suffered a fall. Tabitha Mixon and hurt her hip/abdomen. No LOC. Tabitha Mixon at bank previously and does state this has been a recurrent issue. She has weak legs. Understands she may benefit from PT. She notes off/on palpitations. Some dizziness. No severe pre-syncopal symptoms. Notes she was on a blood thinner in the past and did tolerate with no clinical bleeding. No chest pain/pressure/heaviness. No significant shortness of breath with episodic palpitations. Denies paroxysmal nocturnal dyspnea. Sleeps in recliner chronically. No increasing lower extremity edema though she's had in the past.       ROS notable for diarrhea, multiple episodes, last 2 days. Denies fevers/chills. Cough productive of sputum.      Patient follows with [facility] cardiology with most recent studies reviewed as below. Past Medical History:   has a past medical history of Arthritis, Bronchitis, CHF (congestive heart failure) ([facility] Chronic back pain, Chronic cough, Hyperlipidemia, Hypertension, Irritable bowel syndrome with diarrhea, Restless legs syndrome (RLS), Stroke (cerebrum) ([facility] and Ulcer, gastric, acute. Surgical History:   has a past surgical history that includes Upper gastrointestinal endoscopy (12/06/2019); Upper gastrointestinal endoscopy (N/A, 12/6/2019); and Lung surgery. Social History:   reports that she quit smoking about 28 years ago. Her smoking use included cigarettes. She has a 35.00 pack-year smoking history. She has been exposed to tobacco smoke. She has never used smokeless tobacco. She reports that she does not drink alcohol and does not use drugs. Family History:  +cardiac disease. Home Medications:  Were reviewed and are listed in nursing record and/or below  Prior to Admission medications    Medication Sig Start Date End Date Taking?  Authorizing Provider   simvastatin (ZOCOR) 20 MG tablet TAKE 1 TABLET NIGHTLY 12/28/22   Gunnar Cadet MD   omeprazole (PRILOSEC) 40 MG delayed release capsule TAKE 1 CAPSULE TWICE DAILY BEFORE MEALS 1/10/23   ROCKY Hopkins CNP   diclofenac sodium (VOLTAREN) 1 % GEL Apply 2 g topically 3 times daily 3/10/22   Alesia Wade MD   losartan (COZAAR) 100 MG tablet Take 1 tablet by mouth daily 2/18/22   Black Barton DO   vitamin C (ASCORBIC ACID) 500 MG tablet Take 500 mg by mouth daily    Historical Provider, MD   acetaminophen (TYLENOL) 500 MG tablet Take 1 tablet by mouth every 6 hours as needed for Pain 12/12/21   Jocelyn Lackey PA-C   furosemide (LASIX) 20 MG tablet TAKE 1 TABLET DAILY 10/28/21   ROCKY Finley CNP   Misc Natural Products (NEURIVA PO) Take by mouth  Patient not taking: Reported on 1/21/2023    Historical Provider, MD   DULoxetine (CYMBALTA) 30 MG extended release capsule TAKE 2 CAPSULES TWICE DAILY 6/7/21   ROCKY Pride CNP   loratadine (CLARITIN) 10 MG tablet Take 1 tablet by mouth daily 4/20/21   Horace Gee MD   busPIRone (BUSPAR) 10 MG tablet Take 15 mg by mouth 2 times daily  3/19/21   Historical Provider, MD   ALPRAZolam Valaria Paling) 0.5 MG tablet  3/20/21   Historical Provider, MD   isosorbide mononitrate (IMDUR) 120 MG extended release tablet Take 1 tablet by mouth daily  Patient not taking: Reported on 1/21/2023 2/2/21   ROCKY Pride CNP   doxazosin (CARDURA) 4 MG tablet Take 0.5 tablets by mouth nightly 2/2/21   ROCKY Pride CNP   metoprolol succinate (TOPROL XL) 100 MG extended release tablet Take 1 tablet by mouth daily 10/2/20   ROCKY Pride CNP   Cholecalciferol (VITAMIN D3 PO) Take 400 Units by mouth daily     Historical Provider, MD   nitroGLYCERIN (NITROSTAT) 0.4 MG SL tablet Place 1 tablet under the tongue every 5 minutes as needed for Chest pain up to max of 3 total doses. If no relief after 1 dose, call 911. 3/25/20   ROCKY Pride CNP   Probiotic Product (PROBIOTIC DAILY PO) Take by mouth    Historical Provider, MD   hydrocortisone 2.5 % cream Apply topically 2 times daily.  1/28/20   ROCKY Bustillo CNP   aspirin 81 MG EC tablet Take 81 mg by mouth daily    Historical Provider, MD   Multiple Vitamins-Minerals (HAIR SKIN AND NAILS FORMULA PO) Take by mouth    Historical Provider, MD   Multiple Vitamins-Minerals (OCUVITE PO) Take by mouth    Historical Provider, MD   Multiple Vitamin (DAILY VITAMIN PO) Take by mouth    Historical Provider, MD        CURRENT Medications:  potassium chloride (KLOR-CON M) extended release tablet 40 mEq, Once  levalbuterol (XOPENEX) nebulizer solution 1.25 mg, Q4H WA  levalbuterol (XOPENEX) nebulizer solution 1.25 mg, Q4H PRN  magnesium sulfate 4000 mg in 100 mL IVPB premix, Once  furosemide (LASIX) tablet 20 mg, Daily  hydrALAZINE (APRESOLINE) injection 10 mg, Q6H PRN  dilTIAZem 125 mg in dextrose 5 % 125 mL infusion, Continuous  aspirin EC tablet 81 mg, Daily  nitroGLYCERIN (NITROSTAT) SL tablet 0.4 mg, Q5 Min PRN  metoprolol succinate (TOPROL XL) extended release tablet 100 mg, Daily  isosorbide mononitrate (IMDUR) extended release tablet 60 mg, Daily  doxazosin (CARDURA) tablet 2 mg, Nightly  busPIRone (BUSPAR) tablet 10 mg, BID  ALPRAZolam (XANAX) tablet 0.5 mg, Nightly PRN  DULoxetine (CYMBALTA) extended release capsule 30 mg, BID  ascorbic acid (VITAMIN C) tablet 500 mg, Daily  losartan (COZAAR) tablet 100 mg, Daily  cetirizine (ZYRTEC) tablet 5 mg, Daily  atorvastatin (LIPITOR) tablet 10 mg, Nightly  pantoprazole (PROTONIX) tablet 40 mg, QAM AC  vitamin D3 (CHOLECALCIFEROL) tablet 400 Units, Daily  sodium chloride flush 0.9 % injection 5-40 mL, 2 times per day  sodium chloride flush 0.9 % injection 5-40 mL, PRN  0.9 % sodium chloride infusion, PRN  enoxaparin Sodium (LOVENOX) injection 30 mg, BID  ondansetron (ZOFRAN-ODT) disintegrating tablet 4 mg, Q8H PRN   Or  ondansetron (ZOFRAN) injection 4 mg, Q6H PRN  polyethylene glycol (GLYCOLAX) packet 17 g, Daily PRN  acetaminophen (TYLENOL) tablet 650 mg, Q6H PRN   Or  acetaminophen (TYLENOL) suppository 650 mg, Q6H PRN  cefTRIAXone (ROCEPHIN) 1,000 mg in dextrose 5 % 50 mL IVPB mini-bag, Q24H   And  azithromycin (ZITHROMAX) tablet 500 mg, Daily  [Held by provider] 0.9 % sodium chloride infusion, Continuous        Allergies:  Adhesive tape     Review of Systems:   A 14 point review of symptoms completed. Pertinent positives identified in the HPI, all other review of symptoms negative as below.       Objective:     Vitals:    01/24/23 0416 01/24/23 0500 01/24/23 0717 01/24/23 0747   BP:   (!) 159/84    Pulse: 84 86 98    Resp:   18    Temp:   98.7 °F (37.1 °C)    TempSrc:   Oral    SpO2:   93% 94%   Weight:       Height:          Weight: 240 lb 0.2 oz (108.9 kg)       PHYSICAL EXAM:    General:  Elderly woman, no acute distress    Head:  Normocephalic, atraumatic   Eyes:  Conjunctiva/corneas clear, anicteric sclerae    Nose: Nares normal, no drainage or sinus tenderness   Throat: No abnormalities of the lips, oral mucosa or tongue. Neck: Trachea midline. Neck supple with no lymphadenopathy, thyroid not enlarged, symmetric, no tenderness/mass/nodules    Lungs:   Wheezing anteriorly bilat lung fields, coarse crackles R > L base posteriorly, no distress. Chest Wall:  No deformity or tenderness to palpation   Heart:  Regular, tachycardic, normal S1, normal S2, no murmur, no rub, no S3/S4, PMI non-palpable. Abdomen:   Obese abd, soft, non-tender, with normoactive bowel sounds. No masses, no hepatosplenomegaly   Extremities: No cyanosis, clubbing or pitting edema. Vascular: 2+ radial, 2+dorsalis pedis bilaterally. Brisk carotid upstrokes without carotid bruit. Skin: Skin color, texture, turgor are normal with no rashes or ulceration. Pysch: Euthymic mood, appropriate affect   Neurologic: Oriented to person, place and time. No slurred speech or facial asymmetry. No motor or sensory deficits on gross examination.          Labs:   CBC:   Lab Results   Component Value Date/Time    WBC 6.7 01/24/2023 05:02 AM    RBC 4.21 01/24/2023 05:02 AM    HGB 12.6 01/24/2023 05:02 AM    HCT 37.0 01/24/2023 05:02 AM    MCV 88.1 01/24/2023 05:02 AM    RDW 13.3 01/24/2023 05:02 AM     01/24/2023 05:02 AM     CMP:  Lab Results   Component Value Date/Time     01/24/2023 05:02 AM    K 3.7 01/24/2023 05:02 AM    CL 94 01/24/2023 05:02 AM    CO2 31 01/24/2023 05:02 AM    BUN 5 01/24/2023 05:02 AM    CREATININE <0.5 01/24/2023 05:02 AM    GFRAA >60 10/10/2022 11:44 AM    AGRATIO 1.0 01/21/2023 01:00 PM    LABGLOM >60 01/24/2023 05:02 AM    GLUCOSE 136 01/24/2023 05:02 AM    PROT 7.3 01/21/2023 01:00 PM    CALCIUM 8.5 01/24/2023 05:02 AM    BILITOT 0.5 01/21/2023 01:00 PM    ALKPHOS 80 01/21/2023 01:00 PM    AST 30 01/21/2023 01:00 PM    ALT 21 01/21/2023 01:00 PM     PT/INR:  No results found for: PTINR  HgBA1c:  Lab Results   Component Value Date    LABA1C 6.1 10/10/2022     Lab Results   Component Value Date    CKTOTAL 216 (H) 12/12/2021    TROPONINI <0.01 01/21/2023       Lab Results   Component Value Date    CHOL 166 10/10/2022    CHOL 144 07/07/2022    CHOL 149 02/18/2022     Lab Results   Component Value Date    TRIG 87 10/10/2022    TRIG 59 07/07/2022    TRIG 81 02/18/2022     Lab Results   Component Value Date    HDL 57 10/10/2022    HDL 56 07/07/2022    HDL 56 02/18/2022     Lab Results   Component Value Date    LDLCALC 92 10/10/2022    LDLCALC 76 07/07/2022    LDLCALC 77 02/18/2022     Lab Results   Component Value Date    LABVLDL 17 10/10/2022    LABVLDL 12 07/07/2022    LABVLDL 16 02/18/2022     No results found for: CHOLHDLRATIO     Cardiac Data:     EKG: Personally reviewed with my interpretation as above    Telemetry personally reviewed: Sinus tachycardia low 100's presently. Intermittent/paroxysmal Atrial fibrillation and SVT with high rates 160-170's noted. Echo: 1/10/2023  EF 60%  LVH  Left atrial enlargement  Mild aortic regurgitation  Mild pulmonary hypertension, PASP 40 mmHg    Stress Test:    Cardiac catheterization: 2018 Dr. Putnam Chol:  1. HEMODYNAMICS: Aortic pressure 183/61, LV pressure 178/1 with an LVEDP of 6 mmHg. There were no pressure gradients across the aortic valve on pullback. 2. Coronary angiography: The left main artery is large and without any disease. It bifurcates into left anterior descending and left circumflex arteries. The LAD is a large vessel with multiple diagonal and septal branches. There is 20% to 30% stenosis in the proximal LAD. The left circumflex artery is a large vessel with multiple obtuse marginal branches. There is 30% stenosis, right after the takeoff of the first obtuse marginal branch.  The right coronary artery is dominant, it has mild luminal irregularities proximally. There is normal coronary flow. 3. Left ventriculography there are no wall motion abnormalities. Left ventricular systolic function is normal with an ejection fraction of approximately 60%. There is no significant valvular disease. IMPRESSION:  1. Mild coronary artery disease. 2. Normal left ventricular function. Impression and Plan:      Atrial fibrillation with RVR  -[id number] based on history  -start [address], stop aspirin  -continue metoprolol 100 mg Oral Daily home dose  -start short acting diltiazem 30 mg q6h and titrate, wean gtt for HR < 100  -recent echo from [facility] cardiology in January reviewed. NO need for repeat.   -follow telemetry  -plan cardiac monitor at [place] to be followed up with [facility] cardio    Non-obstructive Coronary artery disease  -BB, imdur  -stop aspirin as above    History of HFpEF  -low dose oral lasix continued; no indication for IV    Hyponatremia  Hypomagnesemia  -replete aggressively  -further workup for loose stools per IM    Hypertension  -imdur, BB, losartan, doxazosin, lasix     Hyperlipidemia  -low dose statin    History CVA  Peripheral arterial disease  -low dose statin     Follow up with [facility] cardiology. Patient Active Problem List   Diagnosis    Other headache syndrome    Irritable bowel syndrome with diarrhea    Chronic back pain    Restless leg syndrome    Essential hypertension    Peptic ulcer    Anxiety    Esophageal dysphagia    Nausea    PVD (peripheral vascular disease) (HCC)    NELIA positive    Rheumatoid arthritis involving multiple sites with positive rheumatoid factor (HCC)    CHF (congestive heart failure) ([address]    Stroke (cerebrum) ([facility])    CAP (community acquired pneumonia) due to Haemophilus influenzae ([address]    Hyponatremia    Pneumonia due to infectious organism    Left hip pain         I will address the patient's cardiac risk factors and adjusted pharmacologic treatment as needed.  In addition, I have reinforced the need for patient directed risk factor modification. All questions and concerns were addressed to the patient/family. Alternatives to my treatment were discussed. Thank you for allowing us to participate in the care of Deny Waller. Please call me with any questions [phone].     Wes Rose MD, [facility]  Cardiovascular Disease  [address]  [phone] [facility]  [phone] [address] [place] [address]  1/24/2023 8:11 AM

## 2023-01-24 NOTE — PROGRESS NOTES
BP (!) 155/98   Pulse (!) 116   Temp 97.8 °F (36.6 °C) (Oral)   Resp 20   Ht 5' 4\" (1.626 m)   Wt 242 lb (109.8 kg)   SpO2 90%   Breastfeeding No   BMI 41.54 kg/m²     Patient alert and oriented. Assessment complete. Due meds passed. Pt denies needs at this time        Bedside Mobility Assessment Tool (BMAT):     Assessment Level 1- Sit and Shake    1. From a semi-reclined position, ask patient to sit up and rotate to a seated position at the side of the bed. Can use the bedrail. 2. Ask patient to reach out and grab your hand and shake making sure patient reaches across his/her midline. Pass- Patient is able to come to a seated position, maintain core strength. Maintains seated balance while reaching across midline. Move on to Assessment Level 2. Assessment Level 2- Stretch and Point   1. With patient in seated position at the side of the bed, have patient place both feet on the floor (or stool) with knees no higher than hips. 2. Ask patient to stretch one leg and straighten the knee, then bend the ankle/flex and point the toes. If appropriate, repeat with the other leg. Pass- Patient is able to demonstrate appropriate quad strength on intended weight bearing limb(s). Move onto Assessment Level 3. Assessment Level 3- Stand   1. Ask patient to elevate off the bed or chair (seated to standing) using an assistive device (cane, bedrail). 2. Patient should be able to raise buttocks off be and hold for a count of five. May repeat once. Pass- Patient maintains standing stability for at least 5 seconds, proceed to assessment level 4. Assessment Level 4- Walk   1. Ask patient to march in place at bedside. 2. Then ask patient to advance step and return each foot. Some medical conditions may render a patient from stepping backwards, use your best clinical judgement.    Pass- Patient demonstrates balance while shifting weight and ability to step, takes independent steps, does not use assistive device patient is MOBILITY LEVEL 4.       Mobility Level- 4

## 2023-01-24 NOTE — PROGRESS NOTES
Called patient's daughter Solo Lobato to inform regarding patient's transfer to PCU, reached voicemail. Voiced mail left.

## 2023-01-24 NOTE — CARE COORDINATION
DISCHARGE ORDER  Date/Time 2023 4:29 PM  Completed by: Kimmy Cárdenas RN, Case Management    Patient Name: Galileo Mota      : 1945  Admitting Diagnosis: Hyponatremia [E87.1]  Pneumonia due to infectious organism, unspecified laterality, unspecified part of lung [J18.9]  CAP (community acquired pneumonia) due to Haemophilus influenzae ([facility] [address] [place]      Admit order Date and Status: Inpatient 2023  (verify MD's last order for status of admission)      Noted discharge order. If applicable PT/OT recommendation at Discharge: SNF  DME recommendation by PT/OT: Needs met  Confirmed discharge plan: Yes  with whom____patient at bedside___________  If pt confirmed DC plan does family need to be contacted by CM No if yes who______    Discharge Plan:   Discharge order noted. Reviewed chart and met with patient at bedside to discuss discharge needs and plan. Discussed therapy recommendations for SNF; patient adamantly refuses. States plans to return home and daughter will provide transportation home. Discussed [facility] services (PT/OT/SN) via [facility] and patient voiced agreement. Spoke to Janel Wheeler at [facility] who states SOC within 48 hours. [facility] will pull order and EZ from [facility]. Patient updated on [facility] and aware agency will call her directly to schedule SOC time. Instructed patient to contact [facility] if she does not hear from them within 48 hours. Patient declines further needs. Date of Last IMM Given: 2023    Reviewed chart. Role of discharge planner explained and patient verbalized understanding. Discharge order is noted. Has Home O2 in place on admit:  No  Informed of need to bring portable home O2 tank on day of discharge for nursing to connect prior to leaving:   Not Indicated  Verbalized agreement/Understanding:   Not Indicated  Pt is being d/c'd to home today. Pt's O2 sats are 97% on RA. Discharge timeout done with JULES Chanel.  All discharge needs and concerns addressed.

## 2023-01-24 NOTE — PROGRESS NOTES
RT Inhaler-Nebulizer Bronchodilator Protocol Note    There is a bronchodilator order in the chart from a provider indicating to follow the RT Bronchodilator Protocol and there is an Initiate RT Inhaler-Nebulizer Bronchodilator Protocol order as well (see protocol at bottom of note). CXR Findings:  No results found. The findings from the last RT Protocol Assessment were as follows:   History Pulmonary Disease: (P) Smoker 15 pack years or more  Respiratory Pattern: (P) Dyspnea on exertion or RR 21-25 bpm  Breath Sounds: (P) Inspiratory and expiratory or bilateral wheezing and/or rhonchi  Cough: (P) Strong, spontaneous, non-productive  Indication for Bronchodilator Therapy: (P) Wheezing associated with pulm disorder  Bronchodilator Assessment Score: (P) 9    Aerosolized bronchodilator medication orders have been revised according to the RT Inhaler-Nebulizer Bronchodilator Protocol below. Respiratory Therapist to perform RT Therapy Protocol Assessment initially then follow the protocol. Repeat RT Therapy Protocol Assessment PRN for score 0-3 or on second treatment, BID, and PRN for scores above 3. No Indications - adjust the frequency to every 6 hours PRN wheezing or bronchospasm, if no treatments needed after 48 hours then discontinue using Per Protocol order mode. If indication present, adjust the RT bronchodilator orders based on the Bronchodilator Assessment Score as indicated below. Use Inhaler orders unless patient has one or more of the following: on home nebulizer, not able to hold breath for 10 seconds, is not alert and oriented, cannot activate and use MDI correctly, or respiratory rate 25 breaths per minute or more, then use the equivalent nebulizer order(s) with same Frequency and PRN reasons based on the score. If a patient is on this medication at home then do not decrease Frequency below that used at home.     0-3 - enter or revise RT bronchodilator order(s) to equivalent RT Bronchodilator order with Frequency of every 4 hours PRN for wheezing or increased work of breathing using Per Protocol order mode. 4-6 - enter or revise RT Bronchodilator order(s) to two equivalent RT bronchodilator orders with one order with BID Frequency and one order with Frequency of every 4 hours PRN wheezing or increased work of breathing using Per Protocol order mode. 7-10 - enter or revise RT Bronchodilator order(s) to two equivalent RT bronchodilator orders with one order with TID Frequency and one order with Frequency of every 4 hours PRN wheezing or increased work of breathing using Per Protocol order mode. 11-13 - enter or revise RT Bronchodilator order(s) to one equivalent RT bronchodilator order with QID Frequency and an Albuterol order with Frequency of every 4 hours PRN wheezing or increased work of breathing using Per Protocol order mode. Greater than 13 - enter or revise RT Bronchodilator order(s) to one equivalent RT bronchodilator order with every 4 hours Frequency and an Albuterol order with Frequency of every 2 hours PRN wheezing or increased work of breathing using Per Protocol order mode.        Electronically signed by Celso Gage RCP on 1/23/2023 at 7:53 PM

## 2023-01-24 NOTE — PROGRESS NOTES
Patient arrived to PCU with heart rate ranging from 130 to 200 in afib RVR at 2310. Patient was given Cardizem bolus and was started on a drip at 5/hr. Patient HR maintained >130 afib. Rate was turned up to 15 per MD. HR was reassessed an hour later and was still >150 but was flipping in and out of NSR with a rate of 90. . A one time dose of Dig was given. HR was reassessed 30 minutes later and was still flipping in and out of NSR. Afib rate 130's to 160's and NSR rate of 80's. Patient also remains hypertensive with systolic pressure >661. Will continue to monitor.  Samm Alxeis RN

## 2023-01-24 NOTE — PROGRESS NOTES
Patient HR is in the 80's NSR. MD gave verbal order to turn rate of drip back down to 5/hr. Patient HR maintains in the 80's after rate change. Will continue to monitor.  Nory Barrientos RN

## 2023-01-24 NOTE — PROGRESS NOTES
Patient admitted to room 328 from 229. Patient oriented to room, call light, bed rails, phone, lights and bathroom. Patient instructed about the schedule of the day including: vital sign frequency, lab draws, possible tests, frequency of MD and staff rounds, daily weights, I &O's and prescribed diet. Telemetry box in place, patient aware of placement and reason. Bed locked, in lowest position, side rails up 2/4, call light within reach. Recliner Assessment  Patient is not able to demonstrated the ability to move from a reclining position to an upright position within the recliner. however patient is alert, oriented and able to provide informed consent       4 Eyes Skin Assessment     The patient is being assess for   Transfer to New Unit    I agree that 2 RN's have performed a thorough Head to Toe Skin Assessment on the patient. ALL assessment sites listed below have been assessed. Areas assessed for pressure by both nurses:   [x]   Head, Face, and Ears   [x]   Shoulders, Back, and Chest, Abdomen  [x]   Arms, Elbows, and Hands   [x]   Coccyx, Sacrum, and Ischium  [x]   Legs, Feet, and Heels        Scattered bruising to left side and under left breast.     **SHARE this note so that the co-signing nurse is able to place an eSignature**    Co-signer eSignature: Electronically signed by Ricardo De Leon RN on 1/24/23 at 4:05 AM EST    Does the Patient have Skin Breakdown related to pressure?   No            Ralph Prevention initiated:  NA   Wound Care Orders initiated:  NA      [facility] nurse consulted for Pressure Injury (Stage 3,4, Unstageable, DTI, NWPT, Complex wounds)and New or Established Ostomies:  NA      Primary Nurse eSignature: Electronically signed by Tamara Jha RN on 1/24/23 at 4:04 AM EST
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
## 2023-01-24 NOTE — PLAN OF CARE
HEART FAILURE CARE PLAN:    Comorbidities Reviewed: Yes   Patient has a past medical history of Arthritis, Bronchitis, CHF (congestive heart failure) ([facility] [address] Chronic back pain, Chronic cough, Hyperlipidemia, Hypertension, Irritable bowel syndrome with diarrhea, Restless legs syndrome (RLS), Stroke (cerebrum) ([facility] [address] and Ulcer, gastric, acute. ECHOCARDIOGRAM Reviewed: Yes   Patient's Ejection Fraction (EF) is greater than 40%    Weights Reviewed: Yes     Wt Readings from Last 5 Encounters:   01/23/23 240 lb 0.2 oz (108.9 kg)   10/10/22 231 lb (104.8 kg)   07/07/22 237 lb (107.5 kg)   05/31/22 236 lb (107 kg)   03/28/22 234 lb (106.1 kg)     Intake & Output Reviewed: Yes     Intake/Output Summary (Last 24 hours) at 1/24/2023 1016  Last data filed at 1/24/2023 0541  Gross per 24 hour   Intake --   Output 2200 ml   Net -2200 ml     Medications Reviewed: Yes     One of the following medications is REQUIRED for EF </= 20% SYSTOLIC FAILURE:   ACE[de-identified] None  ARB[de-identified] Losartan   ARNI[de-identified] None    One of the following Evidenced-Based Beta Blocker is REQUIRED for EF </= 05% SYSTOLIC FAILURE:   [de-identified]Metoprolol SUCCinate- Toprol XL    NON-Evidenced Based Beta Blocker for EF >94% DIASTOLIC FAILURE:   [de-identified]None    Diuretics:  [de-identified]Furosemide    Diet Reviewed: Yes   ADULT DIET; Regular    Goal of Care Reviewed: Yes   Patient and/or Family's stated Goal of Care this Admission: better understand heart failure and disease management, be more comfortable, and reduce lower extremity edema prior to discharge.

## 2023-01-24 NOTE — PROGRESS NOTES
Patient's monitor was placed at this time. We discussed how to return the monitor in 14 days. Patient was educated she can shower with this monitor and was educated on how to place new patch in approx. 7 days. Patient was shown how to report symptoms and the importance of keeping the phone charged. Patient was provided with package and educated on the importance of returning. Patient verbalized understanding and all questions were answered. Once daughter arrived, she was provided with the same education. All questions were answered. Patient and daughter were educated on the importance of eliquis. The precautions needed, urgency if falls happen. Both verbalized understanding.

## 2023-01-24 NOTE — FLOWSHEET NOTE
01/24/23 0718   Handoff   Communication Given Shift Handoff   Handoff Given To Yanique   Handoff Received From [facility] Communication Face to Face   Time Handoff Given 5893   End of Shift Check Performed Yes

## 2023-01-24 NOTE — FLOWSHEET NOTE
01/24/23 1306   Vital Signs   Temp 97.8 °F (36.6 °C)   Temp Source Oral   Heart Rate 87   Heart Rate Source Monitor   Resp 18   BP (!) 145/62   MAP (Calculated) 90   BP Location Right upper arm   BP Method Automatic   Patient Position Up in chair   Level of Consciousness 0   MEWS Score 1   Oxygen Therapy   SpO2 98 %   Pulse Oximetry Type Intermittent   O2 Device None (Room air)   Patient resting. Denies any needs.

## 2023-01-24 NOTE — PROGRESS NOTES
Occupational Therapy Daily Treatment Note    Unit: PCU  Date:  1/24/2023  Patient Name:    Nicki Shepard  Admitting diagnosis:  Hyponatremia [E87.1]  Pneumonia due to infectious organism, unspecified laterality, unspecified part of lung [J18.9]  CAP (community acquired pneumonia) due to Haemophilus influenzae ([email or web address] Destiny Garcia  Admit Date:  1/21/2023  Precautions/Restrictions:  Fall risk, Bed/chair alarm, and Lines -IV        Discharge Recommendations: SNF  DME needs for discharge: defer to facility       Therapy recommendations for staff:   Assist of 1 (contact guard assist) with use of rolling walker (RW) for all transfers and ambulation to/from BSC/chair    AM-PAC Score: AM-PAC Inpatient Daily Activity Raw Score: 17  Home Health S4 Level: NA       Treatment Time:  844-1102  Treatment number:  2    Total Treatment Time:   68 minutes    History of Present Illness: per H&P   68 y.o. female who presents after a fall. The patient states yesterday she was walking and she fell and injured her left lower extremity and left knee. She states she still walking with a walker but is very uncomfortable she is concerned that maybe she injured herself from the fall. She does not really recall hitting her pelvis or lower back but she has a bruise to her left flank. The patient was coughing during her exam and when I asked her if she has a frequent cough she reports that she has had a chronic cough since 2021. Unchanged. The patient does states since her fall she has had a little bit of lower abdominal discomfort with her cough. She denies hitting her head. No loss of consciousness. No neck pain. Subjective:  Pt was found seated in recliner today, was receptive to OT treatment.     Pain   No  Rating: NA  Location:NA  Pain Medicine Status: No request made      Bed Mobility:   Supine to Sit:  Not Tested  Sit to Supine:  Not Tested  Rolling:           Not Tested  Scooting:        SBA    Transfer Training:   Sit to stand: CGA  Stand to sit:  CGA  Bed to Chair:  Not Tested  Chair to [facility]:   CGA-Assist for IV  Standard toilet:   Not Tested    Activity Tolerance   Pt completed therapy session with No adverse symptoms noted w/activity  SpO2: 95%  HR: 87    Balance  Sitting Balance :     Independent  Standing Balance   SBA and CGA with RW    ADL Training:   Upper body dressing:  Min A for hospital gowns due to lines  Upper body bathing:  SBA  Lower body dressing:  Max A  Lower body bathing: Total A   Toileting: Mod A  Grooming/Hygiene:  Not Tested  Feeding :   Not Tested    Therapeutic Exercise:   N/A    Patient Education:   Role of OT  Recommendations for DC  Safe RW use/hand placement    Positioning Needs:   Up in chair, call light and needs in reach. Family Present:  No    Assessment: Pt tolerated session well today. Sponge bath was completed then pt had a diarrheal episode before she reached the [facility]. Washed LB again and had to change gown. Pt should continue to benefit from skilled OT tx to maximize independence so that she may eventually return home with the least amount of assistance. Recommending SNF upon discharge as patient functioning well below baseline, demonstrates good rehab potential and unable to return home due to limited or no family support, inability to negotiate stairs to enter home/bedroom/bathroom and home environment not conducive to patient recovery. GOALS  To be met in 3 Visits:  1). Bed to toilet/BSC: CGA with RW      To be met in 5 Visits:  1). Supine to/from Sit:             CGA  2). Upper Body Bathing:         SBA  3). Lower Body Bathing:         Min A   4). Upper Body Dressing:       SBA  5). Lower Body Dressing:       Min A   6).  Pt to demonstrate UE exs x 15 reps with minimal cues      Plan: cont with POC  3-5 x/wk       Tata Garcia MS, OTR/L  #[id number]        If patient discharges from this facility prior to next visit, this note will serve as the Discharge Summary

## 2023-01-24 NOTE — PROGRESS NOTES
16:10 Pt awake a&o x4, pt does seem to be confused or forgetful @ times, bed alarm on at all times for safety Assessment as charted   18:39 Pt BP elevated @  186/102 , Dr. Krystal Ly contacted MD gave orders for  PRN hydralazine, med to be given when available   19:00  Bedside Handoff report given to night nurse Shani Arevalo , pt awake a&o x4, pt stable @ handoff

## 2023-01-24 NOTE — PLAN OF CARE
Patient with atrial fibrillation with RVR, given one-time dose of diltiazem 15 mg x 1, will start on diltiazem drip at 10. Consult cardiology in the a.m. Will need full dose anticoagulation. Recent echo from 1/11/2023.

## 2023-01-24 NOTE — TELEPHONE ENCOUNTER
Monitor placed by Stacie Metz, RN; Angie RN  Monitor company [employer]  Length of monitor 14 day  Monitor ordered by Ray Doran  Serial number [id number]  Activation successful prior to pt leaving hospital? Yes
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
## 2023-01-24 NOTE — FLOWSHEET NOTE
01/24/23 0717   Vital Signs   Temp 98.7 °F (37.1 °C)   Temp Source Oral   Heart Rate 98   Heart Rate Source Monitor   Resp 18   BP (!) 159/84   MAP (Calculated) 109   BP Location Right upper arm   BP Method Automatic   Level of Consciousness 0   MEWS Score 1   Oxygen Therapy   SpO2 93 %   O2 Device None (Room air)   Patient is resting showing no s/s of distress. Patient is alert and oriented. Meds were given, see MAR. Patient is denying any needs. Bed is in lowest position and call light is within reach. Will continue to monitor. Shift assessment complete, see flowsheets.    RN provided water pitch per miles

## 2023-01-24 NOTE — PROGRESS NOTES
HR increased and became irregular per telemetry monitor. Highest HR seen on monitor 186. Clinical notified, EKG ordered, MD to be notified. Updated primary nurse.  Carolina Mari RN

## 2023-01-25 ENCOUNTER — CARE COORDINATION (OUTPATIENT)
Dept: CASE MANAGEMENT | Age: 78
End: 2023-01-25

## 2023-01-25 LAB — BLOOD CULTURE, ROUTINE: NORMAL

## 2023-01-25 NOTE — CARE COORDINATION
[facility] Care Transitions Initial Follow Up Call    Call within 2 business days of discharge: Yes    Patient: Norma Tillman Patient : 1945   MRN: [id number]  Reason for Admission: CAP, hyponatremia, hypomagnesemia, left hip pain s/p fall, paroxysmal A Fib, HTN, HLD, CHF, hx CVA -> home with [facility], new Eliquis (co-pay not documented), f/up cardiology  Discharge Date: 23 RARS: Readmission Risk Score: 10.9      Last Discharge [address]       Date Complaint Diagnosis Description Type Department Provider    23 Fall Pneumonia due to infectious organism, unspecified laterality, unspecified part of lung . .. ED to Hosp-Admission (Discharged) (ADMITTED) [facility] PCU Romana Just, MD; Cinthia Mari...     1st attempt - patient's only listed number rings to voice mailbox that is not set up. CTN left HIPAA compliant message on daughter's line (okay per HIPAA on file) requesting return call from patient. CTN confirmed with Lila Buitrago at [facility] they have referral and also trying to reach patient for [facility]. Addendum: Patient's daughter Andres hampton called back. Said patient's phone was lost during hospital stay. She canceled it and patient will have new phone today or tomorrow. She will have patient call CTN back when she has a phone. Daughter confirms [facility] has been in contact.     Follow Up  Future Appointments   Date Time Provider Kacey Freed   2023 11:00 AM ROCKY Andrade - CNP [place] [facility]       Maynor Srinivasan RN  Care Transition Nurse  [phone] mobile

## 2023-01-26 ENCOUNTER — TELEPHONE (OUTPATIENT)
Dept: FAMILY MEDICINE CLINIC | Age: 78
End: 2023-01-26

## 2023-01-26 ENCOUNTER — CARE COORDINATION (OUTPATIENT)
Dept: CASE MANAGEMENT | Age: 78
End: 2023-01-26

## 2023-01-26 DIAGNOSIS — R05.1 ACUTE COUGH: Primary | ICD-10-CM

## 2023-01-26 DIAGNOSIS — J18.9 PNEUMONIA OF RIGHT MIDDLE LOBE DUE TO INFECTIOUS ORGANISM: Primary | ICD-10-CM

## 2023-01-26 DIAGNOSIS — J18.9 PNEUMONIA OF RIGHT MIDDLE LOBE DUE TO INFECTIOUS ORGANISM: ICD-10-CM

## 2023-01-26 DIAGNOSIS — R05.1 ACUTE COUGH: ICD-10-CM

## 2023-01-26 DIAGNOSIS — I50.9 OTHER CONGESTIVE HEART FAILURE (HCC): Primary | ICD-10-CM

## 2023-01-26 RX ORDER — DEXTROMETHORPHAN HYDROBROMIDE AND PROMETHAZINE HYDROCHLORIDE 15; 6.25 MG/5ML; MG/5ML
5 SYRUP ORAL 4 TIMES DAILY PRN
Qty: 120 ML | Refills: 0 | Status: SHIPPED | OUTPATIENT
Start: 2023-01-26 | End: 2023-02-02

## 2023-01-26 RX ORDER — BENZONATATE 200 MG/1
200 CAPSULE ORAL 3 TIMES DAILY PRN
Qty: 21 CAPSULE | Refills: 0 | Status: SHIPPED | OUTPATIENT
Start: 2023-01-26 | End: 2023-01-26

## 2023-01-26 NOTE — TELEPHONE ENCOUNTER
[facility] called and said that the pt does not want capsules she want liquid cough medication.  Also the home care nurse wants an order for a  from the home care agency to go in an evaluate and talk with the pt

## 2023-01-26 NOTE — TELEPHONE ENCOUNTER
Noted. We cannot make the patient come in unwillingly. We will see her at her next scheduled appointment.

## 2023-01-26 NOTE — PROGRESS NOTES
Spoke with Rodo Mo who is a  for [facility] regarding the patient's and was treated admitted at [facility], for pneumonia recently. Patient was discharged with an accurate dosing of Cardizem. Patient was given 240 mg Cardizem that was filled by pharmacy by mistake. Patient is currently prescribed 120 mg Cardizem.  contacted me to question my recommendation on when the patient continue on the 240 or hold to 40 until can get refill of 120 mg Cardizem. I recommended the  the patient's cardiologist to discuss the matter with them to get their professional opinion on if the patient will be okay on 240 mg dose of Cardizem short-term until can get new prescription for 120 mg Cardizem.  verbalized understanding is going to contact patient cardiologist personally or have home health RN was with the patient to contact cardiologist.  Recommended both parties follow-up with me as needed for any clarification or further needs. I also sent Tessalon to patient pharmacy on file for acute cough related to the patient's diagnosis of pneumonia. Also placed x-ray of the chest that the patient will have done in 6 weeks from diagnosis of pneumonia to ensure resolution of pneumonia of the right middle lobe.

## 2023-01-26 NOTE — CARE COORDINATION
[facility] Care Transitions Initial Follow Up Call    Call within 2 business days of discharge: Yes    Care Transition Nurse contacted the patient by telephone to perform post hospital discharge assessment. Verified name and  with patient as identifiers. Provided introduction to self, and explanation of the Care Transition Nurse role. Patient: Cory Long Patient : 1945   MRN: [id number]  Reason for Admission: CAP, hyponatremia, hypomagnesemia, left hip pain s/p fall, paroxysmal A Fib, HTN, HLD, CHF, hx CVA -> home with [facility] (co-pay not documented), f/up cardiology  Discharge Date: 23 RARS: Readmission Risk Score: 10.9      Last Discharge [address]       Date Complaint Diagnosis Description Type Department Provider    23 Fall Pneumonia due to infectious organism, unspecified laterality, unspecified part of lung . .. ED to Hosp-Admission (Discharged) (ADMITTED) [address] Jostin Gomez MD; Janel Torrez... Challenges to be reviewed by the provider   Additional needs identified to be addressed with provider: Yes  -diltiazem 240mg sent home from hospital pharmacy but dose was 120mg  -patient would like cough medication sent to [facility]  -patient wants to update all rx to mail order pharmacy  -active with Lawrence Gibson ([phone])-[facility] are referring MSW          Method of communication with provider: phone. Patient goes by [facility]" as chart notes. She states she was up all night coughing unable to sleep. Does not have cough medication. Has been eating cough drops. She asks for cough syrup. Uses [facility] asks for cough med be sent there. She gets medications through mail order and needs new rx transferred to them - [facility]. She takes meds from a weekly mediset that she fills herself. Doesn't feel capable to manage on her own right now because she is so sick.  Says she keeps her bottles in a mens shaving kit. She fills her weekly mediset from the bottles. She does not have a list other than the AVS. She has not looked at her AVS. She did go through the medications in her shaving kit bag for review.     -She has not been cutting doxazosin in half (CTN noted on MAR). She has a pill cutter but doubts her ability to cut this. She will try. It would be more appropriate to have a 2mg tablet at home.  -She took diltiazem 240mg she has at home - dose was changed to 120mg daily but she said she does not have this new dose at home and already took the 240mg since home. Wearing holter monitor x 14 days. States she is able to get up and move around but unable to stand up long. She has easy things to eat at home and daughter going to grocery for her tomorrow. Offered referral to [address] [place] [address] for meals and HHA assist - she declined referral. Sees her cardiologist in [facility] 2/20/2023 with \"Dr BERNAL\". She also has f/up appt with [facility] on 2/3/2023 for injections. Daughter takes her to appointments when she doesn't feel she can drive herself. She has BP cuff but not a pulse oximeter. CTN can hear wheezing but she does not have inhaler or nebulizer at home. States she \"can't do the puffers\" because they don't work for her. She does not want SNF - \"I'm staying home. \"     CTN looked her address in [facility] - they do not service her area. CTN will contact [facility] and see if they will see her ASAP and help with meds and assess. CTN contacted [facility] at [facility] today. [facility] patient was admitted yesterday by [facility] herself and she was there for several hours. [facility] PT as there at the same time for their evaluation. RN also offered [employer] meals and she declined. States she can get up and go to bathroom, very slow, coughing non stop and SOB with exertion. She used to have HHA hours but the agency has since shut down. [facility] is referring their MSW for eval and resources. [facility] patient does not have Medicaid and said not eligible. Reviewed diltiazem dose that patient said she took 240mg. Verónica Evans RN confirms in her note from yesterday's in person med review - she confirms the bottle of diltiazem is 240mg and new at hospital discharge - this dose according to chart is 120mg. Confirms they destroyed the isosorbide 120mg because new dose is 60mg. She was wheezing during SN visit. Reviewed other meds. VS during SN intake visit on 1/25/2023:   130/64 P105  SaO2 94% RA  PT was also there evaluating and with exertion SaO2 was 91%     CTN contacting [place] [address] OP pharmacy to ask about diltiazem 120mg vs the 240mg dose she was discharged with. Afshan Cagle in the pharmacy will contact patient and home care nurse and send 120mg dose to [facility]. CTN contacted PCP to ask for cough medication and update to above. Discussed diltiazem with PCP. Instructed CTN to contact Dr Michael Howard in [facility] to notify about diltiazem dosing and ask for recommendation. PCP will send cough med to [address] [place] [address] (noted order for benzonatate). CTN contacted Dr Michael Howard office in [facility]. They know this patient well. Discussed admission dx, med changes and what patient has at home. [address] [place] [address] office staff requested AVS and cardiology note sent via fax for review (done at this time via RightFax to [phone]). She will review with Dr Michael Howard in between patients and call back with recommendation. Initial response from Dr Ioana Stevens prior to review of discharge information is that patient can stay on 240mg dose monitoring BP and P and calling if any issues. They will call the patient and schedule appt for next week (office only open Monday through Thursday). Said she does not need to decrease dose to 120mg at this time and they will discuss her medications at appointment yet to be scheduled. May call back with additional or different instruction once provider has opportunity to review AVS and cardio notes.     Received return call from [address] [place] [address] at Dr Michael Howard office confirming patient to continue diltiazem 240mg and to monitor BP daily. She also plans to contact patient to discuss changes in cardiac meds and to schedule f/up appt for next week in office. CTN updated [facility] notified [facility] [facility] [place] staff Sergio Morelos that new dose of diltiazem no longer needed to [facility] as cardiology is going to manage dose. States dose already called but she will notify [facility] pharmacist does not needed. CTN contacted patient to let her know that PCP sent cough med rx to [facility]. She voices understanding. Unsure who will be able to go to [facility] to get med for her. CTN offered to call her daughter to ask her and patient declined - said she will call her and ask daughter to get it. Care Transition Nurse reviewed discharge instructions, medical action plan, and red flags with patient who verbalized understanding. The patient was given an opportunity to ask questions and does not have any further questions or concerns at this time. Were discharge instructions available to patient? Yes. Reviewed appropriate site of care based on symptoms and resources available to patient including: PCP  Specialist  Home health  When to call 911. The patient agrees to contact the PCP office for questions related to their healthcare. Advance Care Planning:   Does patient have an Advance Directive: reviewed and current and decision maker updated. Medication reconciliation was performed with patient, who verbalizes understanding of administration of home medications.  Medications reviewed, 1111F entered: yes    Was patient discharged with a pulse oximeter? no    Non-face-to-face services provided:  Scheduled appointment with PCP-office calling patient  Scheduled appointment with Specialist-office calling patient  Obtained and reviewed discharge summary and/or continuity of care documents  Communication with home health agencies or other community services the patient is currently using-[facility] RN  Communication with specialists who will assume or re-assume care of the patient's system-specific problems-PCP and cardiologist  Education of patient/family/caregiver/guardian to support self-management-see note  Assessment and support for treatment adherence and medication management-1111F done    Offered patient enrollment in the Remote Patient Monitoring (RPM) program for in-home monitoring: Patient declined stating she does not like computers. Care Transitions 24 Hour Call    Care Transitions Interventions       Follow Up  Future Appointments   Date Time Provider Kacey Freed   2/13/2023 11:00 AM ROCKY Dangelo - CNP [place] [facility]     Care Transition Nurse provided contact information. Plan for follow-up call in 3-5 days based on severity of symptoms and risk factors.   Plan for next call: self management-BP, etc    Jennyfer Chaudhary, RN  Care Transition Nurse  [phone] mobile

## 2023-01-26 NOTE — TELEPHONE ENCOUNTER
Pt informed. She had a very hard time hearing me. She does not want to schedule a follow up appt  she states she has one already scheduled on 2/13/23.

## 2023-01-31 ENCOUNTER — OFFICE VISIT (OUTPATIENT)
Dept: FAMILY MEDICINE CLINIC | Age: 78
End: 2023-01-31

## 2023-01-31 VITALS
DIASTOLIC BLOOD PRESSURE: 88 MMHG | WEIGHT: 238 LBS | BODY MASS INDEX: 40.85 KG/M2 | OXYGEN SATURATION: 95 % | HEART RATE: 65 BPM | SYSTOLIC BLOOD PRESSURE: 138 MMHG

## 2023-01-31 DIAGNOSIS — M32.9 LUPUS (HCC): ICD-10-CM

## 2023-01-31 DIAGNOSIS — Z91.89 AT INCREASED RISK FOR EMERGENCY HOSPITAL ADMISSION: ICD-10-CM

## 2023-01-31 DIAGNOSIS — R06.2 WHEEZING: ICD-10-CM

## 2023-01-31 DIAGNOSIS — J18.9 PNEUMONIA OF RIGHT MIDDLE LOBE DUE TO INFECTIOUS ORGANISM: ICD-10-CM

## 2023-01-31 DIAGNOSIS — Z23 NEED FOR INFLUENZA VACCINATION: ICD-10-CM

## 2023-01-31 DIAGNOSIS — E66.01 OBESITY, CLASS III, BMI 40-49.9 (MORBID OBESITY) (HCC): ICD-10-CM

## 2023-01-31 DIAGNOSIS — Z09 HOSPITAL DISCHARGE FOLLOW-UP: Primary | ICD-10-CM

## 2023-01-31 DIAGNOSIS — I73.9 PVD (PERIPHERAL VASCULAR DISEASE) (HCC): ICD-10-CM

## 2023-01-31 RX ORDER — METHYLPREDNISOLONE 4 MG/1
TABLET ORAL
Qty: 1 KIT | Refills: 0 | Status: SHIPPED | OUTPATIENT
Start: 2023-01-31 | End: 2023-02-06

## 2023-01-31 RX ORDER — ALBUTEROL SULFATE 90 UG/1
2 AEROSOL, METERED RESPIRATORY (INHALATION) EVERY 6 HOURS PRN
Qty: 18 G | Refills: 0 | Status: SHIPPED | OUTPATIENT
Start: 2023-01-31

## 2023-01-31 ASSESSMENT — PATIENT HEALTH QUESTIONNAIRE - PHQ9
SUM OF ALL RESPONSES TO PHQ9 QUESTIONS 1 & 2: 0
SUM OF ALL RESPONSES TO PHQ QUESTIONS 1-9: 0
1. LITTLE INTEREST OR PLEASURE IN DOING THINGS: 0
2. FEELING DOWN, DEPRESSED OR HOPELESS: 0
SUM OF ALL RESPONSES TO PHQ QUESTIONS 1-9: 0

## 2023-01-31 NOTE — PROGRESS NOTES
Post-Discharge Transitional Care  Follow Up      Damien Beverly   YOB: 1945    Date of Office Visit:  1/31/2023  Date of Hospital Admission: 1/21/23  Date of Hospital Discharge: 1/24/23  Risk of hospital readmission (high >=14%. Medium >=10%) :Readmission Risk Score: 10.9      Care management risk score Rising risk (score 2-5) and Complex Care (Scores >=6): No Risk Score On File     Non face to face  following discharge, date last encounter closed (first attempt may have been earlier): 01/26/2023    Call initiated 2 business days of discharge: Yes      See [facility] [address] from March  ASSESSMENT/PLAN:   Hospital discharge follow-up  -     [place] DISCHARGE MEDS RECONCILED W/ CURRENT OUTPATIENT MED LIST  -     XR CHEST STANDARD (2 VW); Future  Pneumonia of right middle lobe due to infectious organism  Patient will have repeat chest x-ray to ensure resolution of pneumonia. Patient will have done between February 18 and February 21. Was provided with Medrol Dosepak and albuterol inhaler for noted wheezing in office today. Follow-up with office in the interim as needed. -     XR CHEST STANDARD (2 VW); Future  -     albuterol sulfate HFA (PROVENTIL HFA) 108 (90 Base) MCG/ACT inhaler; Inhale 2 puffs into the lungs every 6 hours as needed for Wheezing, Disp-18 g, R-0Normal  Obesity, Class III, BMI 40-49.9 (morbid obesity) ([facility] [address]  Should monitor dietary intake to help improve overall health. Should follow-up with pyramid guide to ensure is getting appropriate well-balanced diet. She limiting red meats and get more lean proteins for protein intake. Should get large portion of dietary intake from fruits and vegetable servings. Avoid high sugar content as well as high carbohydrate content which can increase blood sugar. Patient provided  with prescription for knee brace for left knee to help with stability and pain.   With increased for above-knee patient should be able to be up and ambulate more frequently which should help with weight loss from an activity standpoint. Lupus [facility])  Follow-up with rheumatology as recommended. Last was seen on 3/10/2022. Was recommended to continue on Cymbalta as well as the use of topical Voltaren, OTC Tylenol/NSAIDs. PVD (peripheral vascular disease) ([address]  Encouraged use of compression stockings or Ace bandages as needed to improve circulation in bilateral lower extremities. Following cardiology. Recommended following cardiology for routine visits as recommended. At increased risk for emergency hospital admission  Patient vital with referral for [facility] care management with device given her significant medical history and increased risk of hospital readmission. Explained the patient the benefits of the program and how would benefit her circumstance. -     Ambulatory Referral to Care Management with Device (Remote Patient Monitoring)  Wheezing  Patient placed on Medrol Dosepak and albuterol inhaler at this time for wheezing related to recent diagnosis of pneumonia. -     methylPREDNISolone (MEDROL DOSEPACK) 4 MG tablet; Take by mouth., Disp-1 kit, R-0Normal    Medical Decision Making: moderate complexity  No follow-ups on file. On this date 1/31/2023 I have spent 45 minutes reviewing previous notes, test results and face to face with the patient discussing the diagnosis and importance of compliance with the treatment plan as well as documenting on the day of the visit. Subjective:   HPI:  Follow up of Hospital problems/diagnosis(es): Patient was admitted to [facility] on 1/21/2023 and discharged on 1/24/2023. Patient was diagnosed with community-acquired pneumonia. Also had hyponatremia. Cardiology was consulted related to patient history of A. fib. Patient was started on Cardizem and Eliquis by cardiology. Lasix was resumed for chronic diastolic CHF. Follows cardiologist Justus Sargent on routine outpatient basis.   The CT chest showed multifocal pneumonia. Patient was given IV fluids in IV antibiotics Rocephin and Zithromax. Also provided DuoNeb treatments. Patient had PT and OT consulted due to weakness. No fractures noted on imaging related to patient fall causing left hip pain. On discharge was given Imdur. Inpatient course: Discharge summary reviewed- see chart. Interval history/Current status: Overall patient doing better at this time. Still with cough and chest congestion but appears to be improving. Afebrile today. BP stable at 130/88. Patient does still have some wheezing noted therefore I will place her on Medrol Dosepak and provide one-time albuterol inhaler for symptoms. Patient does not have any acute concerns at this time regarding her recent diagnosis of pneumonia. Lupus: Patient following endocrinology. Was last seen in March 2022. Was recommended to use OTC Voltaren as well as OTC Tylenol/NSAIDs for pain relief. Prevascular disease: Patient following cardiology in [place]. Recommend the patient follow-up with cardiology as advised. Patient Active Problem List   Diagnosis    Other headache syndrome    Irritable bowel syndrome with diarrhea    Chronic back pain    Restless leg syndrome    Essential hypertension    Peptic ulcer    Anxiety    Esophageal dysphagia    Nausea    PVD (peripheral vascular disease) (HCC)    NELIA positive    Rheumatoid arthritis involving multiple sites with positive rheumatoid factor (HCC)    CHF (congestive heart failure) ([address]    Stroke (cerebrum) ([address]    CAP (community acquired pneumonia) due to Haemophilus influenzae ([address]    Hyponatremia    Pneumonia due to infectious organism    Left hip pain    Paroxysmal atrial fibrillation ([address]       Medications listed as ordered at the time of discharge from hospital     Medication List            Accurate as of January 31, 2023 10:57 AM. If you have any questions, ask your nurse or doctor.                 START taking these medications albuterol sulfate  (90 Base) MCG/ACT inhaler  Commonly known as: Proventil HFA  Inhale 2 puffs into the lungs every 6 hours as needed for Wheezing  Started by: ROCKY Bailon CNP     methylPREDNISolone 4 MG tablet  Commonly known as: MEDROL DOSEPACK  Take by mouth. Started by: ROCKY Bailon CNP            CONTINUE taking these medications      acetaminophen 500 MG tablet  Commonly known as: TYLENOL  Take 1 tablet by mouth every 6 hours as needed for Pain     ALPRAZolam 0.5 MG tablet  Commonly known as: XANAX     apixaban 5 MG Tabs tablet  Commonly known as: ELIQUIS  Take 1 tablet by mouth 2 times daily     busPIRone 15 MG tablet  Commonly known as: BUSPAR     DAILY VITAMIN PO     diclofenac sodium 1 % Gel  Commonly known as: VOLTAREN  Apply 2 g topically 3 times daily     dilTIAZem 120 MG extended release capsule  Commonly known as: Cardizem CD  Take 1 capsule by mouth daily     doxazosin 4 MG tablet  Commonly known as: CARDURA     DULoxetine 30 MG extended release capsule  Commonly known as: CYMBALTA  TAKE 2 CAPSULES TWICE DAILY     furosemide 20 MG tablet  Commonly known as: LASIX  TAKE 1 TABLET DAILY     * HAIR SKIN AND NAILS FORMULA PO     * OCUVITE PO     hydrocortisone 2.5 % cream  Apply topically 2 times daily. isosorbide mononitrate 60 MG extended release tablet  Commonly known as: IMDUR  Take 1 tablet by mouth daily     loratadine 10 MG tablet  Commonly known as: Claritin  Take 1 tablet by mouth daily     losartan 100 MG tablet  Commonly known as: COZAAR  Take 1 tablet by mouth daily     metoprolol succinate 100 MG extended release tablet  Commonly known as: TOPROL XL  Take 1 tablet by mouth daily     NEURIVA PO     nitroGLYCERIN 0.4 MG SL tablet  Commonly known as: NITROSTAT  Place 1 tablet under the tongue every 5 minutes as needed for Chest pain up to max of 3 total doses. If no relief after 1 dose, call 911.      omeprazole 40 MG delayed release capsule  Commonly known as: PRILOSEC  TAKE 1 CAPSULE TWICE DAILY BEFORE MEALS     PROBIOTIC DAILY PO     promethazine-dextromethorphan 6.25-15 MG/5ML syrup  Commonly known as: PROMETHAZINE-DM  Take 5 mLs by mouth 4 times daily as needed for Cough     simvastatin 20 MG tablet  Commonly known as: ZOCOR  TAKE 1 TABLET NIGHTLY     vitamin C 500 MG tablet  Commonly known as: ASCORBIC ACID     VITAMIN D3 PO           * This list has 2 medication(s) that are the same as other medications prescribed for you. Read the directions carefully, and ask your doctor or other care provider to review them with you. Where to Get Your Medications        These medications were sent to [address] [facility]      Phone: [phone]   albuterol sulfate  (90 Base) MCG/ACT inhaler  methylPREDNISolone 4 MG tablet           Medications marked \"taking\" at this time  Outpatient Medications Marked as Taking for the 1/31/23 encounter (Office Visit) with ROCKY Walsh - CNP   Medication Sig Dispense Refill    methylPREDNISolone (MEDROL DOSEPACK) 4 MG tablet Take by mouth.  1 kit 0    albuterol sulfate HFA (PROVENTIL HFA) 108 (90 Base) MCG/ACT inhaler Inhale 2 puffs into the lungs every 6 hours as needed for Wheezing 18 g 0    promethazine-dextromethorphan (PROMETHAZINE-DM) 6.25-15 MG/5ML syrup Take 5 mLs by mouth 4 times daily as needed for Cough 120 mL 0    isosorbide mononitrate (IMDUR) 60 MG extended release tablet Take 1 tablet by mouth daily 30 tablet 3    dilTIAZem (CARDIZEM CD) 120 MG extended release capsule Take 1 capsule by mouth daily (Patient taking differently: Take 120 mg by mouth daily Indications: taking 240mg daily as that is what she has at home - [employer] RN 1/26/2023) 30 capsule 3    apixaban (ELIQUIS) 5 MG TABS tablet Take 1 tablet by mouth 2 times daily 60 tablet 2    omeprazole (PRILOSEC) 40 MG delayed release capsule TAKE 1 CAPSULE TWICE DAILY BEFORE MEALS 180 capsule 1    simvastatin (ZOCOR) 20 MG tablet TAKE 1 TABLET NIGHTLY 90 tablet 1    diclofenac sodium (VOLTAREN) 1 % GEL Apply 2 g topically 3 times daily 350 g 2    losartan (COZAAR) 100 MG tablet Take 1 tablet by mouth daily 30 tablet 0    vitamin C (ASCORBIC ACID) 500 MG tablet Take 500 mg by mouth daily      acetaminophen (TYLENOL) 500 MG tablet Take 1 tablet by mouth every 6 hours as needed for Pain 20 tablet 1    furosemide (LASIX) 20 MG tablet TAKE 1 TABLET DAILY 90 tablet 1    Misc Natural Products (NEURIVA PO) Take by mouth      DULoxetine (CYMBALTA) 30 MG extended release capsule TAKE 2 CAPSULES TWICE DAILY (Patient taking differently: Indications: taking one capsule twice daily. Karely Pan RN 1/26/2023) 360 capsule 1    loratadine (CLARITIN) 10 MG tablet Take 1 tablet by mouth daily 90 tablet 0    busPIRone (BUSPAR) 15 MG tablet Take 15 mg by mouth 2 times daily       ALPRAZolam (XANAX) 0.5 MG tablet 0.25 mg. Indications: taking daily in AM. OZZY James RN 1/26/2023      doxazosin (CARDURA) 4 MG tablet Take 2 mg by mouth nightly Indications: Patient has been taking whole tablet (4mg) at bedtime. She will start cutting in half. Karely Pan RN 1/26/2023      metoprolol succinate (TOPROL XL) 100 MG extended release tablet Take 1 tablet by mouth daily 90 tablet 1    Cholecalciferol (VITAMIN D3 PO) Take 400 Units by mouth daily       nitroGLYCERIN (NITROSTAT) 0.4 MG SL tablet Place 1 tablet under the tongue every 5 minutes as needed for Chest pain up to max of 3 total doses. If no relief after 1 dose, call 911. 25 tablet 0    Probiotic Product (PROBIOTIC DAILY PO) Take by mouth      hydrocortisone 2.5 % cream Apply topically 2 times daily.  28 g 1    Multiple Vitamins-Minerals (HAIR SKIN AND NAILS FORMULA PO) Take by mouth      Multiple Vitamins-Minerals (OCUVITE PO) Take by mouth      Multiple Vitamin (DAILY VITAMIN PO) Take by mouth Medications patient taking as of now reconciled against medications ordered at time of hospital discharge: Yes    A comprehensive review of systems was negative except for what was noted in the HPI. Objective:    /88 (Site: Left Upper Arm, Position: Sitting)   Pulse 65   Wt 238 lb (108 kg)   SpO2 95%   BMI 40.85 kg/m²   General Appearance: alert and oriented to person, place and time, well-developed and well-nourished, in no acute distress  Pulmonary/Chest: wheezing present- bilaterally in all lobes  Cardiovascular: normal rate, normal S1 and S2, no gallops, intact distal pulses, and no carotid bruits  Musculoskeletal: using walker, general weakness. Neurologic: gait and coordination abnormal and using walker,  and speech normal    Additional 30 minutes spent addressing [facility] [address] gaps as well as referral services provided to the patient today for care management. Most recent patient laboratory testing reviewed as well as most up-to-date imaging regarding pneumonia diagnosis. Hospitalist note reviewed upon discharge which indicated plan moving forward as well as medication changes. Greater than 60 minutes today spent on patient care. This document was prepared by a combination of typing and transcription through a voice recognition software. An electronic signature was used to authenticate this note.   --Kumar Ochoa, APRN - CNP

## 2023-02-01 ENCOUNTER — CARE COORDINATION (OUTPATIENT)
Dept: CASE MANAGEMENT | Age: 78
End: 2023-02-01

## 2023-02-01 NOTE — CARE COORDINATION
[facility] Care Transitions Follow Up Call      Patient: Sis Bustamante  Patient : 1945   MRN: [id number]  Reason for Admission: CAP, hyponatremia, hypomagnesemia, left hip pain s/p fall, paroxysmal A Fib, HTN, HLD, CHF, hx CVA -> home with [facility], new Eliquis (co-pay not documented), f/up cardiology, completed TCM   Discharge Date: 23 RARS: Readmission Risk Score: 10.9    Completed TCM visit yesterday with PCP and CTN received referral to enroll patient in RPM. CTN confirmed patient's address is within the [place] coverage map. Attempted outreach to patient but unable to reach or leave message because voice mailbox is not set up yet. CTN will attempt outreach at another time.      Follow Up  Future Appointments   Date Time Provider Kacey Freed   2023 11:00 AM ROCKY Pandya - CNP [place] [facility]     Coni Frankel, RN  Care Transition Nurse  [phone] mobile
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
## 2023-02-03 ENCOUNTER — HOSPITAL ENCOUNTER (OUTPATIENT)
Dept: GENERAL RADIOLOGY | Age: 78
Discharge: HOME | End: 2023-02-03
Payer: MEDICARE

## 2023-02-03 ENCOUNTER — HOSPITAL ENCOUNTER (OUTPATIENT)
Age: 78
Discharge: HOME | End: 2023-02-03
Payer: MEDICARE

## 2023-02-03 DIAGNOSIS — Z09 HOSPITAL DISCHARGE FOLLOW-UP: ICD-10-CM

## 2023-02-03 DIAGNOSIS — J18.9 PNEUMONIA OF RIGHT MIDDLE LOBE DUE TO INFECTIOUS ORGANISM: ICD-10-CM

## 2023-02-03 PROCEDURE — 71046 X-RAY EXAM CHEST 2 VIEWS: CPT

## 2023-02-08 ENCOUNTER — CARE COORDINATION (OUTPATIENT)
Dept: CASE MANAGEMENT | Age: 78
End: 2023-02-08

## 2023-02-08 NOTE — CARE COORDINATION
[address] Care Transitions Follow Up Call      Patient: César Lewis  Patient : 1945   MRN: [id number]  Reason for Admission: CAP, hyponatremia, hypomagnesemia, left hip pain s/p fall, paroxysmal A Fib, HTN, HLD, CHF, hx CVA -> home with [facility], new Eliquis (co-pay not documented), f/up cardiology, completed TCM   Discharge Date: 23 RARS: Readmission Risk Score: 10.9    Unable to reach and voice mailbox not set up.     Follow Up  Future Appointments   Date Time Provider Kacey Freed   2023 11:00 AM ROCKY Alex - CNP [place] [facility]     Leopoldo Hum, RN  Care Transition Nurse  [phone] mobile

## 2023-02-15 ENCOUNTER — CARE COORDINATION (OUTPATIENT)
Dept: CASE MANAGEMENT | Age: 78
End: 2023-02-15

## 2023-02-15 NOTE — CARE COORDINATION
[facility] [facility] Transitions Follow Up Call    2/15/2023    Patient: Matthew Amor  Patient : 1945   MRN: [id number]  Reason for Admission: CAP, hyponatremia, hypomagnesemia, left hip pain s/p fall, paroxysmal A Fib, HTN, HLD, CHF, hx CVA -> home with [facility], new Eliquis (co-pay not documented), f/up cardiology, completed TCM   Discharge Date: 23 RARS: Readmission Risk Score: 10.9         Spoke with: germania    [facility] 2nd attempted outreach. Unable to leave message no voice mailbox. CTN will try again to reach patient. Judge Celeste LOW  Care Coordinator     Care Transitions Subsequent and Final Call    Subsequent and Final Calls  Are you currently active with any services?: Home Health  Care Transitions Interventions  Other Interventions: Follow Up  No future appointments.     Judge Celeste LPN

## 2023-02-22 ENCOUNTER — CARE COORDINATION (OUTPATIENT)
Dept: CASE MANAGEMENT | Age: 78
End: 2023-02-22

## 2023-02-22 NOTE — CARE COORDINATION
[address] Care Transitions Follow Up Call      Patient: Derick Hardin  Patient : 1945   MRN: [id number]  Reason for Admission: CAP, hyponatremia, hypomagnesemia, left hip pain s/p fall, paroxysmal A Fib, HTN, HLD, CHF, hx CVA -> home with [facility], new Eliquis (co-pay not documented), f/up cardiology, completed TCM   Discharge Date: 23 RARS: Readmission Risk Score: 10.9      Per chart notes, was scheduled to see cardiology  with carotid doppler and hospital follow up. CTN attempted 3rd outreach to patient and unsuccessful. Will notify ACM. No further CTN outreach scheduled. Episode resolved. Follow Up  No future appointments.     Adali Pimentel RN  Care Transition Nurse  [phone] mobile

## 2023-02-23 ENCOUNTER — CARE COORDINATION (OUTPATIENT)
Dept: CARE COORDINATION | Age: 78
End: 2023-02-23

## 2023-02-23 NOTE — TELEPHONE ENCOUNTER
Could try biotin to help with hair loss. In regard to her CHF condition patient needs to ensure she is following up with all cardiology follow-ups as recommended. We will assess patient at next routine office visit.

## 2023-02-23 NOTE — CARE COORDINATION
Ambulatory Care Coordination Note  2023    Patient Current Location:  [address] [facility] [address]    ACM contacted the patient by telephone. Verified name and  with patient as identifiers. Provided introduction to self, and explanation of the ACM role. ACM: Ewa Garcia RN    Challenges to be reviewed by the provider   Additional needs identified to be addressed with provider: Yes  refused-RPM enrollment, [facility] Dietician and [facility] dietician  Patient c/o ongoing hair loss; she reports she has notified her PCP but wanted him to know it is coming out in clumps-Patient wants to know if there is anything she can do                Method of communication with provider: chart routing. PLAN:  Patient agrees to discontinue Gator-Aide and drink lemon flavored water  Increase vegetables/fruit and low fat protein in diet; less frozen meals  Encourage daily weights and BP; will readdress RPM  Patient would like to meet face to face with an RD; she declines [facility]  Patient wants to loose weight  Will check if [place] has RD      FYI:  Patient c/o BLE edema increasing; she tries to keep BLE elevated during the day-See CHF assessment below. She gave away her compression stockings because she had a hard time getting them on and they were too tight. Patient had been drinking large bottles of gator-aide -We reviewed food labels and discussed no sodium alternatives.  Patient could not believe how much sodium in her frozen meals; reviewed alternatives   Congestive Heart Failure Assessment    Are you currently restricting fluids?: No Restriction  Do you understand a low sodium diet?: No  Do you understand how to read food labels?: Yes  How many restaurant meals do you eat per week?: 0  Do you salt your food before tasting it?: No     Other symptoms causing concern      Symptoms:  CHF associated leg swelling: Pos      Symptom course: worsening (Comment: SOB at baseline)  Weight trend: stable (Comment: 239 lb)       Offered patient enrollment in the Remote Patient Monitoring (RPM) program for in-home monitoring: Patient declined. Ambulatory Care Coordination Assessment    Care Coordination Protocol  Referral from Primary Care Provider: Yes  Week 1 - Initial Assessment     Do you have all of your prescriptions and are they filled?: Yes (Comment: 2/23/23)  Barriers to medication adherence: None  Are you able to afford your medications?: Yes  How often do you have trouble taking your medications the way you have been told to take them?: I always take them as prescribed. Ability to seek help/take action for Emergent Urgent situations i.e. fire, crime, inclement weather or health crisis. : Independent  Ability to ambulate to restroom: Independent  Ability handle personal hygeine needs (bathing/dressing/grooming): Independent  Ability to manage Medications: Independent  Ability to prepare Food Preparation: Independent  Ability to maintain home (clean home, laundry): Needs Assistance  Ability to drive and/or has transportation: Independent  Ability to manage finances: Independent  Is patient able to live independently?: Yes     Current Housing: Apartment                 Thinking about your patient's physical health needs, are there any symptoms or problems (risk indicators) you are unsure about that require further investigation?: No identified areas of uncertainly or problems already being investigated   Are the patients physical health problems impacting on their mental well-being?: Mild impact on mental well-being e.g. \"\"feeling fed-up\"\", \"\"reduced enjoyment\"\"   Are there any problems with your patients lifestyle behaviors (alcohol, drugs, diet, exercise) that are impacting on physical or mental well-being?: No identified areas of concern   Do you have any other concerns about your patients mental well-being?  How would you rate their severity and impact on the patient?: No identified areas of concern   How would you rate their social network (family, work, friends)?: Adequate participation with social networks   How wells does the patient now understand their health and well-being (symptoms, signs or risk factors) and what they need to do to manage their health?: Reasonable to good understanding and already engages in managing health or is willing to undertake better management   How well do you think your patient can engage in healthcare discussions? (Barriers include language, deafness, aphasia, alcohol or drug problems, learning difficulties, concentration): Adequate communication, with or without minor barriers   Suggested Interventions and [employer]   Registered Dietician: In Process   Zone Management Tools: In Process                  No future appointments.

## 2023-02-24 NOTE — CARE COORDINATION
Attempted to contact dietician in [place]; left message for her to return call and provided ACM contact information  Notified patient regarding PCP's response-she could try OTC Biotin for hair loss   Also notified patient this ACM attempted to contact RD at [facility] and waiting to hear back from her.    Patient requesting to schedule appointment with PCP regarding itchy patches of skin under arms and posterior neck  Assisted patient with scheduling appointment this Monday on 2/37/23 at 11:20 am

## 2023-02-27 ENCOUNTER — OFFICE VISIT (OUTPATIENT)
Dept: FAMILY MEDICINE CLINIC | Age: 78
End: 2023-02-27

## 2023-02-27 VITALS
DIASTOLIC BLOOD PRESSURE: 86 MMHG | HEART RATE: 82 BPM | SYSTOLIC BLOOD PRESSURE: 136 MMHG | OXYGEN SATURATION: 94 % | WEIGHT: 240 LBS | BODY MASS INDEX: 41.2 KG/M2

## 2023-02-27 DIAGNOSIS — E53.8 B12 DEFICIENCY: ICD-10-CM

## 2023-02-27 DIAGNOSIS — H93.8X3 EAR FULLNESS, BILATERAL: ICD-10-CM

## 2023-02-27 DIAGNOSIS — L65.9 ALOPECIA: ICD-10-CM

## 2023-02-27 DIAGNOSIS — R21 RASH OF UNKNOWN ETIOLOGY: Primary | ICD-10-CM

## 2023-02-27 RX ORDER — TRIAMCINOLONE ACETONIDE 0.25 MG/G
CREAM TOPICAL
Qty: 15 G | Refills: 0 | Status: SHIPPED | OUTPATIENT
Start: 2023-02-27

## 2023-02-27 ASSESSMENT — ENCOUNTER SYMPTOMS
RESPIRATORY NEGATIVE: 1
GASTROINTESTINAL NEGATIVE: 1

## 2023-02-27 NOTE — PROGRESS NOTES
Chief Complaint   Patient presents with    Rash     Rash on neck and back     Alopecia     Hair loss        HPI:  Brendolyn Paget is a 68 y.o. (: 1945) here today   for evaluation of multiple issues. Rash: has rash to back of neck. Started about 6 months ago. Has not spread. Itches. Alopecia: has been having issues with hair loss. Has been an issues for several months. Denies family hx of mother having sig hair loss. Takes Biotin. TSH normal in 2023. States has bilateral ear infection. States both ears hurt and get drainage from left ear that is clear. Does not take antihistamine or Flonase. Patient's medications, allergies, past medical, surgical, social and family histories were reviewed and updated asappropriate. ROS:  Review of Systems   Constitutional: Negative. HENT:  Positive for congestion, ear discharge, ear pain and hearing loss. Hair loss     Respiratory: Negative. Cardiovascular: Negative. Gastrointestinal: Negative. Musculoskeletal:  Positive for arthralgias and gait problem. Skin:  Positive for rash. Neurological:  Negative for headaches. Prior to Visit Medications    Medication Sig Taking? Authorizing Provider   triamcinolone (KENALOG) 0.025 % cream Apply topically 2 times daily.  Yes ROCKY Flannery CNP   albuterol sulfate HFA (PROVENTIL HFA) 108 (90 Base) MCG/ACT inhaler Inhale 2 puffs into the lungs every 6 hours as needed for Wheezing Yes ROCKY Flannery CNP   isosorbide mononitrate (IMDUR) 60 MG extended release tablet Take 1 tablet by mouth daily Yes Adrian Conner MD   dilTIAZem (CARDIZEM CD) 120 MG extended release capsule Take 1 capsule by mouth daily  Patient taking differently: Take 120 mg by mouth daily Indications: taking 240mg daily as that is what she has at home - [address] Yes Adrian Conner MD   apixaban (ELIQUIS) 5 MG TABS tablet Take 1 tablet by mouth 2 times daily Yes Noah Thomas MD   omeprazole (PRILOSEC) 40 MG delayed release capsule TAKE 1 CAPSULE TWICE DAILY BEFORE MEALS Yes ROCKY Alvarado CNP   simvastatin (ZOCOR) 20 MG tablet TAKE 1 TABLET NIGHTLY Yes Davide Palomo MD   diclofenac sodium (VOLTAREN) 1 % GEL Apply 2 g topically 3 times daily Yes Michaela Umana MD   losartan (COZAAR) 100 MG tablet Take 1 tablet by mouth daily Yes [place] [facility] Schklar, DO   vitamin C (ASCORBIC ACID) 500 MG tablet Take 500 mg by mouth daily Yes Historical Provider, MD   acetaminophen (TYLENOL) 500 MG tablet Take 1 tablet by mouth every 6 hours as needed for Pain Yes Candi Araya PA-C   furosemide (LASIX) 20 MG tablet TAKE 1 TABLET DAILY Yes ROCKY Giordano CNP   Misc Natural Products (NEURIVA PO) Take by mouth Yes Historical Provider, MD   DULoxetine (CYMBALTA) 30 MG extended release capsule TAKE 2 CAPSULES TWICE DAILY  Patient taking differently: Indications: taking one capsule twice daily. Will Jones RN 1/26/2023 Yes ROCKY Giordano CNP   loratadine (CLARITIN) 10 MG tablet Take 1 tablet by mouth daily Yes Hernán Senior MD   busPIRone (BUSPAR) 15 MG tablet Take 15 mg by mouth 2 times daily  Yes Historical Provider, MD   ALPRAZolam (XANAX) 0.5 MG tablet 0.25 mg. Indications: taking daily in AM. Will Jones RN 1/26/2023 Yes Historical Provider, MD   doxazosin (CARDURA) 4 MG tablet Take 2 mg by mouth nightly Indications: Patient has been taking whole tablet (4mg) at bedtime. She will start cutting in half. Will Jones RN 1/26/2023 Yes ROCKY Giordano CNP   metoprolol succinate (TOPROL XL) 100 MG extended release tablet Take 1 tablet by mouth daily Yes ROCKY Giordano CNP   Cholecalciferol (VITAMIN D3 PO) Take 400 Units by mouth daily  Yes Historical Provider, MD   nitroGLYCERIN (NITROSTAT) 0.4 MG SL tablet Place 1 tablet under the tongue every 5 minutes as needed for Chest pain up to max of 3 total doses. If no relief after 1 dose, call 911.  Yes Baron Ryder APRN - CNP   Probiotic Product (PROBIOTIC DAILY PO) Take by mouth Yes Historical Provider, MD   hydrocortisone 2.5 % cream Apply topically 2 times daily. Yes ROCKY Davis CNP   Multiple Vitamins-Minerals (HAIR SKIN AND NAILS FORMULA PO) Take by mouth Yes Historical Provider, MD   Multiple Vitamins-Minerals (OCUVITE PO) Take by mouth Yes Historical Provider, MD   Multiple Vitamin (DAILY VITAMIN PO) Take by mouth Yes Historical Provider, MD       Allergies   Allergen Reactions    Adhesive Tape Itching       OBJECTIVE:    /86   Pulse 82   Wt 240 lb (108.9 kg)   SpO2 94%   BMI 41.20 kg/m²     BP Readings from Last 2 Encounters:   02/27/23 136/86   01/31/23 138/88       Wt Readings from Last 3 Encounters:   02/27/23 240 lb (108.9 kg)   01/31/23 238 lb (108 kg)   01/23/23 240 lb 0.2 oz (108.9 kg)       Physical Exam  Constitutional:       Appearance: Normal appearance. HENT:      Right Ear: Tympanic membrane, ear canal and external ear normal. There is no impacted cerumen. Left Ear: Tympanic membrane, ear canal and external ear normal. There is no impacted cerumen. Nose: Congestion present. No rhinorrhea. Cardiovascular:      Rate and Rhythm: Normal rate and regular rhythm. Pulses: Normal pulses. Heart sounds: Normal heart sounds. No murmur heard. Pulmonary:      Effort: Pulmonary effort is normal.      Breath sounds: Normal breath sounds. No wheezing. Abdominal:      General: Bowel sounds are normal.   Musculoskeletal:      Comments: Uses walker     Skin:     General: Skin is warm. Findings: Rash present. Neurological:      General: No focal deficit present. Mental Status: She is alert and oriented to person, place, and time. Psychiatric:         Mood and Affect: Mood normal.         Behavior: Behavior normal.           ASSESSMENT/PLAN:    1. Rash of unknown etiology  See above HPI for symptoms and onset. See picture inserted above.   Uncertain etiology at this time causing patient rash. We will trial Kenalog cream.  Does not appear to be fungal.  Patient advised to follow-up with office if symptoms fail to improve or worsen. - triamcinolone (KENALOG) 0.025 % cream; Apply topically 2 times daily. Dispense: 15 g; Refill: 0    2. Alopecia  See above HPI. Patient electing to have testing performed for abnormalities that can cause her symptoms of ongoing hair loss. Patient had normal TSH as listed above in January. We will follow-up with patient on testing results below. - Follicle Stimulating Hormone  - DHEA  - Luteinizing Hormone  - Prolactin  - T4, Free  - Vitamin B12 & Folate    3. Ear fullness, bilateral  See above HPI. No abnormalities noted for infection or fluid standpoint in office today in either ear. Patient still electing to have ENT referral for further evaluation. Patient states she was quoted $30,000 for hearing aids but wants a second opinion to determine if there is any other possible causes for her hearing loss from an ENT specialist.  - Keke Walter DO, Otolaryngology, [facility]    4. B12 deficiency  Ordered today. Patient has had history of getting B12 injections in the past.  - Vitamin B12 & Folate    35 min spent on pt care. This document was prepared by a combination of typing and transcription through a voice recognition software.     ROCKY Christiansen - CNP

## 2023-02-28 LAB
FOLATE: >20 NG/ML (ref 4.78–24.2)
FOLLICLE STIMULATING HORMONE: 49.6 MIU/ML
LUTEINIZING HORMONE: 26.2 MIU/ML
PROLACTIN: 10.1 NG/ML
T4 FREE: 1.4 NG/DL (ref 0.9–1.8)
VITAMIN B-12: 692 PG/ML (ref 211–911)

## 2023-03-02 LAB — DHEA: 0.64 NG/ML (ref 0.63–4.7)

## 2023-03-03 ENCOUNTER — TELEPHONE (OUTPATIENT)
Dept: CARDIOLOGY CLINIC | Age: 78
End: 2023-03-03

## 2023-03-03 DIAGNOSIS — I48.0 PAROXYSMAL ATRIAL FIBRILLATION (HCC): ICD-10-CM

## 2023-03-03 NOTE — TELEPHONE ENCOUNTER
Attempted to reach pt regarding results, was not able to leave a vm.  Will attempt to reach at a later time

## 2023-03-03 NOTE — TELEPHONE ENCOUNTER
----- Message from Bethany Jc MD sent at 3/3/2023  2:12 PM EST -----  Please tell the patient I reviewed cardiac monitor study which shows no occurrence of atrial fibrillation. No significant abnormal heart rhythms. Follow-up was planned with  cardiology, would ensure that they are following through.   Thank

## 2023-03-22 ENCOUNTER — TELEPHONE (OUTPATIENT)
Dept: FAMILY MEDICINE CLINIC | Age: 78
End: 2023-03-22

## 2023-03-22 NOTE — TELEPHONE ENCOUNTER
Pt bought belly fat pills OTC and started taking them, she said they gave her diarrhea. So a couple of days ago she finally stopped them but the diarrhea has not stopped. She wants to know if you can give her something to stop the diarrhea. I ask if she took imodium and she said yes it didn't work.

## 2023-03-22 NOTE — TELEPHONE ENCOUNTER
We will continue monitoring symptoms. Continue with Imodium. Most likely related to side effects from medication. Would also question of has developed a GI bug which is going around heavily.

## 2023-03-30 ENCOUNTER — CARE COORDINATION (OUTPATIENT)
Dept: CARE COORDINATION | Age: 78
End: 2023-03-30

## 2023-03-30 ENCOUNTER — OFFICE VISIT (OUTPATIENT)
Dept: ENT CLINIC | Age: 78
End: 2023-03-30
Payer: MEDICARE

## 2023-03-30 ENCOUNTER — PROCEDURE VISIT (OUTPATIENT)
Dept: AUDIOLOGY | Age: 78
End: 2023-03-30
Payer: MEDICARE

## 2023-03-30 VITALS
SYSTOLIC BLOOD PRESSURE: 130 MMHG | HEIGHT: 64 IN | DIASTOLIC BLOOD PRESSURE: 71 MMHG | BODY MASS INDEX: 40.97 KG/M2 | OXYGEN SATURATION: 96 % | TEMPERATURE: 97.6 F | WEIGHT: 240 LBS | HEART RATE: 71 BPM

## 2023-03-30 DIAGNOSIS — H90.3 ASYMMETRICAL SENSORINEURAL HEARING LOSS: Primary | ICD-10-CM

## 2023-03-30 DIAGNOSIS — H90.3 ASYMMETRIC SNHL (SENSORINEURAL HEARING LOSS): ICD-10-CM

## 2023-03-30 DIAGNOSIS — R26.89 IMBALANCE: ICD-10-CM

## 2023-03-30 DIAGNOSIS — H90.3 SENSORINEURAL HEARING LOSS (SNHL) OF BOTH EARS: Primary | ICD-10-CM

## 2023-03-30 PROCEDURE — 92567 TYMPANOMETRY: CPT | Performed by: AUDIOLOGIST

## 2023-03-30 PROCEDURE — 99204 OFFICE O/P NEW MOD 45 MIN: CPT | Performed by: OTOLARYNGOLOGY

## 2023-03-30 PROCEDURE — 3078F DIAST BP <80 MM HG: CPT | Performed by: OTOLARYNGOLOGY

## 2023-03-30 PROCEDURE — 1124F ACP DISCUSS-NO DSCNMKR DOCD: CPT | Performed by: OTOLARYNGOLOGY

## 2023-03-30 PROCEDURE — 3075F SYST BP GE 130 - 139MM HG: CPT | Performed by: OTOLARYNGOLOGY

## 2023-03-30 PROCEDURE — 92557 COMPREHENSIVE HEARING TEST: CPT | Performed by: AUDIOLOGIST

## 2023-03-30 ASSESSMENT — ENCOUNTER SYMPTOMS
SHORTNESS OF BREATH: 0
TROUBLE SWALLOWING: 0
APNEA: 0
FACIAL SWELLING: 0
COUGH: 0
EYE ITCHING: 0
SINUS PRESSURE: 0
SORE THROAT: 0
VOICE CHANGE: 0

## 2023-03-30 NOTE — PROGRESS NOTES
Diana Ge   1945, 66 y.o. female   [id number]       Referring Provider: Sophie Anderson DO  Referral Type: In an order in [address] [place] [address]    Reason for Visit: Evaluation of the cause of disorders of hearing, tinnitus, or balance. ADULT AUDIOLOGIC EVALUATION      Diana Ge is a 66 y.o. female seen today, 3/30/2023 , for an initial audiologic evaluation. Patient was seen by Sophie Anderson DO following today's evaluation. AUDIOLOGIC AND OTHER PERTINENT MEDICAL HISTORY:      Diana Ge reports a gradual decline in hearing sensitivity over the last several years. She states she struggles to understand speech, especially when people aren't facing her and sometimes with the TV as well. She states she gets intermittent drainage from both ears. She has some noise exposure history. No other significant otologic history reported. She denied otalgia, aural fullness, tinnitus, dizziness, imbalance, history of falls, history of head trauma, history of ear surgery, and family history of hearing loss. Date: 3/30/2023     IMPRESSIONS:      Today's results revealed abnormal hearing sensitivity, bilaterally, that is significant enough to cause difficulty in most listening situations. Excellent speech understanding when in quiet. Tympanometry indicates normal middle ear function. Discussed test results and implications with patient. Discussed possible benefits of amplification. Recommended she call her insurance company for a referral to an in-network provider for potential hearing aid benefit. .  Follow medical recommendations of Sophie Anderson DO.     ASSESSMENT AND FINDINGS:     Otoscopy unremarkable. RIGHT EAR:  Hearing Sensitivity: A mild to moderate sensorineural hearing loss. Speech Recognition Threshold: 50 dB HL  Word Recognition: Excellent 100%, based on NU-6 25-word list at 90 dBHL using recorded speech stimuli.     Tympanometry: Normal peak pressure and compliance, Type A tympanogram, consistent with normal

## 2023-03-30 NOTE — CARE COORDINATION
Spoke with RD from [facility]. She reports insurance would cover RD if history of DM otherwise no guarantee of coverage  Attempted to contact patient for CC f/u call; left HIPPA compliant message and ACM contact information.     Will readdress potential referral for CC RD next f/u
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
## 2023-04-04 ENCOUNTER — TELEPHONE (OUTPATIENT)
Dept: FAMILY MEDICINE CLINIC | Age: 78
End: 2023-04-04

## 2023-04-04 DIAGNOSIS — I63.9 CEREBROVASCULAR ACCIDENT (CVA), UNSPECIFIED MECHANISM (HCC): ICD-10-CM

## 2023-04-04 DIAGNOSIS — I50.9 OTHER CONGESTIVE HEART FAILURE (HCC): Primary | ICD-10-CM

## 2023-04-04 DIAGNOSIS — I48.0 PAROXYSMAL ATRIAL FIBRILLATION (HCC): ICD-10-CM

## 2023-04-04 DIAGNOSIS — G89.29 CHRONIC MIDLINE LOW BACK PAIN WITHOUT SCIATICA: ICD-10-CM

## 2023-04-04 DIAGNOSIS — M54.50 CHRONIC MIDLINE LOW BACK PAIN WITHOUT SCIATICA: ICD-10-CM

## 2023-04-04 NOTE — TELEPHONE ENCOUNTER
Can place order for electronic scooter. Patient with a history of stroke, CHF which could all be used for CPT code for scooter. In regard to Xanax I am not going to refill this medication. This is not a chronic prescription being filled by me.

## 2023-04-04 NOTE — TELEPHONE ENCOUNTER
Tried calling patient back to address concerns she had. Joyce Koroma will not be calling her in a prescription of Xanax. She will need to call her cardiologist, he was the prescribing provider for that specific medication. Order for an electronic scooter is added to her chart under \"letters\" tab, and can be printed or faxed when needed. The patient did not know where they were going for this yet. Just knew she had an appointment on 4/5/23.

## 2023-04-04 NOTE — TELEPHONE ENCOUNTER
PATIENT CALLED IN REQUESTING AN ELECTRIC SCOOTER ORDER AND A REFILL ON XANAX PRESCRIPTION     THE PATIENT'S CARDIOLOGIST IS THE ORIGINAL PROVIDER THAT ORDERED THE Yg Herrera. PATIENT STATES THERE IS STILL ONE REFILL LEFT ON THE MEDICATION BOTTLE. I PERSONALLY CALLED [facility] AND CONFIRMED THAT SHE STILL HAS ONE REFILL LEFT ON THIS MEDICATION. PHARMACIST ALSO STATED THAT THE 0.25 MG THAT WAS ORIGINALLY ORDERED IS ON BACK ORDER UNTIL THE MIDDLE OF April. PHARMACIST STATED THE ORIGINAL ORDERING PROVIDER WOULD NEED TO BE CONTACTED TO CHANGE THE PRESCRIPTION TO A DIFFERENT MG TO BE FILLED. PATIENT ALSO STATED THAT SHE NEEDED AN ORDER FOR AN ELECTRIC SCOOTER, BECAUSE SHE HAD AN APPT SCHEDULED FOR TOMORROW TO GO PICK ONE OUT. I DID ASK WHERE SHE WAS GOING TO CALL AND VERIFY WHAT TYPE OF SPECIFIC ORDER THEY WOULD NEED OR IF THEY WOULD SUPPLY THE NEEDED PAPERWORK TO BE FILLED OUT TO OBTAIN AN ELECTRIC SCOOTER. THE PATIENT DID NOT KNOW WHERE SHE WAS GOING, BUT NEEDED AN ORDER FOR TOMORROW. WILL PROVIDE THE PATIENT WITH A GENERIC SCOOTER ORDER, BUT MAY NEED UPDATED DEPENDING ON INSURANCE AND THE SCOOTER COMPANY THAT IS BEING USED.

## 2023-04-20 DIAGNOSIS — K44.9 HIATAL HERNIA: ICD-10-CM

## 2023-04-20 DIAGNOSIS — R13.19 ESOPHAGEAL DYSPHAGIA: ICD-10-CM

## 2023-04-20 DIAGNOSIS — R12 HEARTBURN: ICD-10-CM

## 2023-04-20 RX ORDER — OMEPRAZOLE 40 MG/1
CAPSULE, DELAYED RELEASE ORAL
Qty: 180 CAPSULE | Refills: 1 | Status: SHIPPED | OUTPATIENT
Start: 2023-04-20

## 2023-05-05 ENCOUNTER — CARE COORDINATION (OUTPATIENT)
Dept: CARE COORDINATION | Age: 78
End: 2023-05-05

## 2023-05-23 ENCOUNTER — CARE COORDINATION (OUTPATIENT)
Dept: CARE COORDINATION | Age: 78
End: 2023-05-23

## 2023-05-23 NOTE — CARE COORDINATION
Attempted to contact patient for CC f/u; phone rings and rings with no answer  Unable to leave message

## 2023-06-08 ENCOUNTER — TELEPHONE (OUTPATIENT)
Dept: FAMILY MEDICINE CLINIC | Age: 78
End: 2023-06-08

## 2023-06-26 RX ORDER — SIMVASTATIN 20 MG
TABLET ORAL
Qty: 90 TABLET | Refills: 3 | Status: SHIPPED | OUTPATIENT
Start: 2023-06-26

## 2023-07-17 ENCOUNTER — OFFICE VISIT (OUTPATIENT)
Dept: FAMILY MEDICINE CLINIC | Age: 78
End: 2023-07-17
Payer: MEDICARE

## 2023-07-17 VITALS
WEIGHT: 234.4 LBS | HEART RATE: 79 BPM | BODY MASS INDEX: 40.23 KG/M2 | DIASTOLIC BLOOD PRESSURE: 84 MMHG | OXYGEN SATURATION: 94 % | SYSTOLIC BLOOD PRESSURE: 156 MMHG

## 2023-07-17 DIAGNOSIS — I73.9 PVD (PERIPHERAL VASCULAR DISEASE) (HCC): ICD-10-CM

## 2023-07-17 DIAGNOSIS — I48.0 PAROXYSMAL ATRIAL FIBRILLATION (HCC): ICD-10-CM

## 2023-07-17 DIAGNOSIS — I50.9 OTHER CONGESTIVE HEART FAILURE (HCC): Primary | ICD-10-CM

## 2023-07-17 DIAGNOSIS — E78.2 MIXED HYPERLIPIDEMIA: ICD-10-CM

## 2023-07-17 DIAGNOSIS — R60.0 BILATERAL LOWER EXTREMITY EDEMA: ICD-10-CM

## 2023-07-17 DIAGNOSIS — I10 ESSENTIAL HYPERTENSION: ICD-10-CM

## 2023-07-17 PROCEDURE — 1124F ACP DISCUSS-NO DSCNMKR DOCD: CPT

## 2023-07-17 PROCEDURE — 3079F DIAST BP 80-89 MM HG: CPT

## 2023-07-17 PROCEDURE — 99215 OFFICE O/P EST HI 40 MIN: CPT

## 2023-07-17 PROCEDURE — 36415 COLL VENOUS BLD VENIPUNCTURE: CPT

## 2023-07-17 PROCEDURE — 3077F SYST BP >= 140 MM HG: CPT

## 2023-07-17 RX ORDER — POTASSIUM CHLORIDE 750 MG/1
10 TABLET, EXTENDED RELEASE ORAL DAILY
Qty: 180 TABLET | Refills: 1 | Status: SHIPPED | OUTPATIENT
Start: 2023-07-17 | End: 2023-07-17 | Stop reason: CLARIF

## 2023-07-17 RX ORDER — FUROSEMIDE 40 MG/1
40 TABLET ORAL DAILY
Qty: 30 TABLET | Refills: 3 | Status: SHIPPED | OUTPATIENT
Start: 2023-07-17

## 2023-07-17 ASSESSMENT — ENCOUNTER SYMPTOMS
SHORTNESS OF BREATH: 1
GASTROINTESTINAL NEGATIVE: 1
BACK PAIN: 1

## 2023-07-17 NOTE — PROGRESS NOTES
Chief Complaint   Patient presents with    Foot Swelling     Bilateral foot and ankle swelling        HPI:  Jaylin Ibarra is a 66 y.o. (: 1945) here today   for evaluation of bilateral foot and ankle swelling x2 weeks. History of CHF and is on Lasix 20 mg daily. Takes Potassium supplement with Lasix given by cardio but unsure of dosage amount and is not active in med list. Follow cardio [facility] in [place]. Weight down 6 pounds from March visit. Patient's medications, allergies, past medical, surgical, social and family histories were reviewed and updated asappropriate. ROS:  Review of Systems   Constitutional: Negative. HENT: Negative. Respiratory:  Positive for shortness of breath. Cardiovascular:  Positive for leg swelling. Gastrointestinal: Negative. Genitourinary: Negative. Musculoskeletal:  Positive for arthralgias, back pain and gait problem. Psychiatric/Behavioral: Negative. Prior to Visit Medications    Medication Sig Taking? Authorizing Provider   furosemide (LASIX) 40 MG tablet Take 1 tablet by mouth daily Yes ROCKY Delarosa CNP   simvastatin (ZOCOR) 20 MG tablet TAKE 1 TABLET NIGHTLY  ROCKY Delarosa CNP   omeprazole (PRILOSEC) 40 MG delayed release capsule TAKE 1 CAPSULE TWICE DAILY BEFORE MEALS  ROCKY Delarosa CNP   Scooter MISC by Does not apply route  ROCKY Delarosa CNP   triamcinolone (KENALOG) 0.025 % cream Apply topically 2 times daily.   ROCKY Delarosa CNP   albuterol sulfate HFA (PROVENTIL HFA) 108 (90 Base) MCG/ACT inhaler Inhale 2 puffs into the lungs every 6 hours as needed for Wheezing  ROCKY Delarosa CNP   isosorbide mononitrate (IMDUR) 60 MG extended release tablet Take 1 tablet by mouth daily  Camelia Montero MD   dilTIAZem (CARDIZEM CD) 120 MG extended release capsule Take 1 capsule by mouth daily  Patient taking differently: Take 120 mg by mouth daily Indications:

## 2023-07-18 ENCOUNTER — TELEPHONE (OUTPATIENT)
Dept: FAMILY MEDICINE CLINIC | Age: 78
End: 2023-07-18

## 2023-07-18 LAB
ALBUMIN SERPL-MCNC: 3.9 G/DL (ref 3.4–5)
ALBUMIN/GLOB SERPL: 1.3 {RATIO} (ref 1.1–2.2)
ALP SERPL-CCNC: 81 U/L (ref 40–129)
ALT SERPL-CCNC: 14 U/L (ref 10–40)
ANION GAP SERPL CALCULATED.3IONS-SCNC: 8 MMOL/L (ref 3–16)
AST SERPL-CCNC: 21 U/L (ref 15–37)
BILIRUB SERPL-MCNC: 0.4 MG/DL (ref 0–1)
BUN SERPL-MCNC: 8 MG/DL (ref 7–20)
CALCIUM SERPL-MCNC: 9.5 MG/DL (ref 8.3–10.6)
CHLORIDE SERPL-SCNC: 98 MMOL/L (ref 99–110)
CHOLEST SERPL-MCNC: 136 MG/DL (ref 0–199)
CO2 SERPL-SCNC: 31 MMOL/L (ref 21–32)
CREAT SERPL-MCNC: 0.7 MG/DL (ref 0.6–1.2)
GFR SERPLBLD CREATININE-BSD FMLA CKD-EPI: >60 ML/MIN/{1.73_M2}
GLUCOSE SERPL-MCNC: 104 MG/DL (ref 70–99)
HDLC SERPL-MCNC: 50 MG/DL (ref 40–60)
LDLC SERPL CALC-MCNC: 71 MG/DL
NT-PROBNP SERPL-MCNC: 756 PG/ML (ref 0–449)
POTASSIUM SERPL-SCNC: 4.7 MMOL/L (ref 3.5–5.1)
PROT SERPL-MCNC: 7 G/DL (ref 6.4–8.2)
SODIUM SERPL-SCNC: 137 MMOL/L (ref 136–145)
TRIGL SERPL-MCNC: 76 MG/DL (ref 0–150)
VLDLC SERPL CALC-MCNC: 15 MG/DL

## 2023-07-18 NOTE — TELEPHONE ENCOUNTER
If she is on a potassium 100 mg supplement I will be fine allergist somewhat confused because typically potassium is given with meq dosing. We can monitor for the time being if I need to make any changes when the patient comes back for blood work I will do so.

## 2023-08-18 ENCOUNTER — OFFICE VISIT (OUTPATIENT)
Dept: FAMILY MEDICINE CLINIC | Age: 78
End: 2023-08-18
Payer: MEDICARE

## 2023-08-18 VITALS
WEIGHT: 229 LBS | HEART RATE: 84 BPM | DIASTOLIC BLOOD PRESSURE: 82 MMHG | BODY MASS INDEX: 39.31 KG/M2 | OXYGEN SATURATION: 96 % | SYSTOLIC BLOOD PRESSURE: 158 MMHG

## 2023-08-18 DIAGNOSIS — E53.8 B12 DEFICIENCY: ICD-10-CM

## 2023-08-18 DIAGNOSIS — I10 ESSENTIAL HYPERTENSION: ICD-10-CM

## 2023-08-18 DIAGNOSIS — I73.9 PVD (PERIPHERAL VASCULAR DISEASE) (HCC): ICD-10-CM

## 2023-08-18 DIAGNOSIS — M50.30 DDD (DEGENERATIVE DISC DISEASE), CERVICAL: ICD-10-CM

## 2023-08-18 DIAGNOSIS — E78.5 HYPERLIPIDEMIA, UNSPECIFIED HYPERLIPIDEMIA TYPE: ICD-10-CM

## 2023-08-18 DIAGNOSIS — R73.03 PRE-DIABETES: ICD-10-CM

## 2023-08-18 DIAGNOSIS — I50.9 OTHER CONGESTIVE HEART FAILURE (HCC): Primary | ICD-10-CM

## 2023-08-18 DIAGNOSIS — I63.9 CEREBROVASCULAR ACCIDENT (CVA), UNSPECIFIED MECHANISM (HCC): ICD-10-CM

## 2023-08-18 DIAGNOSIS — E55.9 VITAMIN D DEFICIENCY: ICD-10-CM

## 2023-08-18 DIAGNOSIS — H92.03 OTALGIA, BILATERAL: ICD-10-CM

## 2023-08-18 DIAGNOSIS — I48.0 PAROXYSMAL ATRIAL FIBRILLATION (HCC): ICD-10-CM

## 2023-08-18 DIAGNOSIS — E78.2 MIXED HYPERLIPIDEMIA: ICD-10-CM

## 2023-08-18 LAB
25(OH)D3 SERPL-MCNC: 59.6 NG/ML
BASOPHILS # BLD: 0.1 K/UL (ref 0–0.2)
BASOPHILS NFR BLD: 1.2 %
DEPRECATED RDW RBC AUTO: 14.2 % (ref 12.4–15.4)
EOSINOPHIL # BLD: 0.4 K/UL (ref 0–0.6)
EOSINOPHIL NFR BLD: 7.3 %
FOLATE SERPL-MCNC: >20 NG/ML (ref 4.78–24.2)
HCT VFR BLD AUTO: 40.2 % (ref 36–48)
HGB BLD-MCNC: 13.3 G/DL (ref 12–16)
LYMPHOCYTES # BLD: 2.1 K/UL (ref 1–5.1)
LYMPHOCYTES NFR BLD: 35.2 %
MCH RBC QN AUTO: 29.5 PG (ref 26–34)
MCHC RBC AUTO-ENTMCNC: 33.1 G/DL (ref 31–36)
MCV RBC AUTO: 89.1 FL (ref 80–100)
MONOCYTES # BLD: 0.5 K/UL (ref 0–1.3)
MONOCYTES NFR BLD: 8.9 %
NEUTROPHILS # BLD: 2.8 K/UL (ref 1.7–7.7)
NEUTROPHILS NFR BLD: 47.4 %
PLATELET # BLD AUTO: 232 K/UL (ref 135–450)
PMV BLD AUTO: 8.6 FL (ref 5–10.5)
RBC # BLD AUTO: 4.51 M/UL (ref 4–5.2)
VIT B12 SERPL-MCNC: 941 PG/ML (ref 211–911)
WBC # BLD AUTO: 5.8 K/UL (ref 4–11)

## 2023-08-18 PROCEDURE — 99215 OFFICE O/P EST HI 40 MIN: CPT

## 2023-08-18 PROCEDURE — 3079F DIAST BP 80-89 MM HG: CPT

## 2023-08-18 PROCEDURE — 1124F ACP DISCUSS-NO DSCNMKR DOCD: CPT

## 2023-08-18 PROCEDURE — 3077F SYST BP >= 140 MM HG: CPT

## 2023-08-18 PROCEDURE — 36415 COLL VENOUS BLD VENIPUNCTURE: CPT

## 2023-08-18 RX ORDER — CIPROFLOXACIN AND DEXAMETHASONE 3; 1 MG/ML; MG/ML
4 SUSPENSION/ DROPS AURICULAR (OTIC) 2 TIMES DAILY
Qty: 3 ML | Refills: 0 | Status: SHIPPED | OUTPATIENT
Start: 2023-08-18 | End: 2023-08-25

## 2023-08-18 RX ORDER — METHYLPREDNISOLONE 4 MG/1
TABLET ORAL
Qty: 1 KIT | Refills: 0 | Status: SHIPPED | OUTPATIENT
Start: 2023-08-18 | End: 2023-08-24

## 2023-08-18 ASSESSMENT — ENCOUNTER SYMPTOMS
BACK PAIN: 1
GASTROINTESTINAL NEGATIVE: 1
RESPIRATORY NEGATIVE: 1

## 2023-08-18 NOTE — PROGRESS NOTES
Never true    Ran Out of Food in the Last Year: Never true   Transportation Needs: Not on file   Physical Activity: Inactive    Days of Exercise per Week: 0 days    Minutes of Exercise per Session: 0 min   Stress: Not on file   Social Connections: Not on file   Intimate Partner Violence: Not on file   Housing Stability: Not on file       Prior to Visit Medications    Medication Sig Taking? Authorizing Provider   methylPREDNISolone (MEDROL DOSEPACK) 4 MG tablet Take by mouth. Yes ROCKY Wyatt CNP   ciprofloxacin-dexamethasone (CIPRODEX) 0.3-0.1 % otic suspension Place 4 drops into both ears 2 times daily for 7 days Yes ROCKY Wyatt CNP   POTASSIUM CHLORIDE PO Take 100 mg by mouth daily Yes Historical Provider, MD   furosemide (LASIX) 40 MG tablet Take 1 tablet by mouth daily Yes ROCKY Wyatt CNP   simvastatin (ZOCOR) 20 MG tablet TAKE 1 TABLET NIGHTLY Yes ROCKY Wyatt CNP   omeprazole (PRILOSEC) 40 MG delayed release capsule TAKE 1 CAPSULE TWICE DAILY BEFORE MEALS Yes ROCKY Wyatt CNP   Scooter MISC by Does not apply route Yes ROCKY Wyatt CNP   triamcinolone (KENALOG) 0.025 % cream Apply topically 2 times daily.  Yes ROCKY Wyatt CNP   albuterol sulfate HFA (PROVENTIL HFA) 108 (90 Base) MCG/ACT inhaler Inhale 2 puffs into the lungs every 6 hours as needed for Wheezing Yes ROCKY Wyatt CNP   isosorbide mononitrate (IMDUR) 60 MG extended release tablet Take 1 tablet by mouth daily Yes Julia Pal MD   dilTIAZem (CARDIZEM CD) 120 MG extended release capsule Take 1 capsule by mouth daily  Patient taking differently: Take 1 capsule by mouth daily Indications: taking 240mg daily as that is what she has at home - [address] 1/26/2023 Yes Julia Pal MD   apixaban (ELIQUIS) 5 MG TABS tablet Take 1 tablet by mouth 2 times daily Yes uJlia Pal MD   diclofenac sodium (VOLTAREN) 1 % GEL Apply 2 g

## 2023-08-19 LAB
EST. AVERAGE GLUCOSE BLD GHB EST-MCNC: 128.4 MG/DL
HBA1C MFR BLD: 6.1 %

## 2023-09-01 ENCOUNTER — NURSE ONLY (OUTPATIENT)
Dept: FAMILY MEDICINE CLINIC | Age: 78
End: 2023-09-01

## 2023-09-01 VITALS — OXYGEN SATURATION: 95 % | HEART RATE: 75 BPM | SYSTOLIC BLOOD PRESSURE: 136 MMHG | DIASTOLIC BLOOD PRESSURE: 90 MMHG

## 2023-09-01 DIAGNOSIS — I10 ESSENTIAL HYPERTENSION: Primary | ICD-10-CM

## 2023-09-06 ENCOUNTER — OFFICE VISIT (OUTPATIENT)
Dept: FAMILY MEDICINE CLINIC | Age: 78
End: 2023-09-06
Payer: MEDICARE

## 2023-09-06 VITALS
OXYGEN SATURATION: 96 % | SYSTOLIC BLOOD PRESSURE: 158 MMHG | DIASTOLIC BLOOD PRESSURE: 84 MMHG | HEART RATE: 73 BPM | WEIGHT: 231 LBS | BODY MASS INDEX: 39.65 KG/M2

## 2023-09-06 DIAGNOSIS — M50.30 DDD (DEGENERATIVE DISC DISEASE), CERVICAL: ICD-10-CM

## 2023-09-06 DIAGNOSIS — I63.9 CEREBROVASCULAR ACCIDENT (CVA), UNSPECIFIED MECHANISM (HCC): ICD-10-CM

## 2023-09-06 DIAGNOSIS — L98.9 SKIN SORE: ICD-10-CM

## 2023-09-06 DIAGNOSIS — G89.29 CHRONIC BILATERAL LOW BACK PAIN WITH BILATERAL SCIATICA: ICD-10-CM

## 2023-09-06 DIAGNOSIS — M41.86 OTHER FORM OF SCOLIOSIS OF LUMBAR SPINE: ICD-10-CM

## 2023-09-06 DIAGNOSIS — I10 ESSENTIAL HYPERTENSION: ICD-10-CM

## 2023-09-06 DIAGNOSIS — M54.42 CHRONIC BILATERAL LOW BACK PAIN WITH BILATERAL SCIATICA: ICD-10-CM

## 2023-09-06 DIAGNOSIS — M54.41 CHRONIC BILATERAL LOW BACK PAIN WITH BILATERAL SCIATICA: ICD-10-CM

## 2023-09-06 DIAGNOSIS — I50.9 OTHER CONGESTIVE HEART FAILURE (HCC): ICD-10-CM

## 2023-09-06 DIAGNOSIS — M51.36 DDD (DEGENERATIVE DISC DISEASE), LUMBAR: Primary | ICD-10-CM

## 2023-09-06 PROCEDURE — 1124F ACP DISCUSS-NO DSCNMKR DOCD: CPT

## 2023-09-06 PROCEDURE — 99215 OFFICE O/P EST HI 40 MIN: CPT

## 2023-09-06 PROCEDURE — 3079F DIAST BP 80-89 MM HG: CPT

## 2023-09-06 PROCEDURE — 3077F SYST BP >= 140 MM HG: CPT

## 2023-09-06 RX ORDER — DOXAZOSIN MESYLATE 4 MG/1
6 TABLET ORAL NIGHTLY
Qty: 45 TABLET | Refills: 3 | Status: SHIPPED | OUTPATIENT
Start: 2023-09-06

## 2023-09-06 ASSESSMENT — ENCOUNTER SYMPTOMS
RESPIRATORY NEGATIVE: 1
GASTROINTESTINAL NEGATIVE: 1
BACK PAIN: 1
ROS SKIN COMMENTS: SKIN SORES

## 2023-09-06 NOTE — PROGRESS NOTES
Chief Complaint   Patient presents with    Back Pain     Pt eval for scooter       HPI:  Hasmukh Corrales is a 66 y.o. (: 1945) here today   for evaluation to discuss to have a electronic scooter ordered for her. Patient deals with chronic low back pain on a routine basis. Most recent CT scan of the lumbar spine on 2023 showed severe multilevel disc disease. Also noted dextroconvex lumbar scoliosis. Currently uses a cane and walker. Uses a regular wheelchar at the grocery store but is not longer able to even use that. Using cane, walker, wheelchair anymore causes additive neck pain. Can't dress independently all the time. Has to have help with carrying in groceries, dressing, from family often. Feels a scooter would greatly benefit her quality of life. Also has CHF which cause issues with SOB with significant physical demand walking often. Has to stop and take breaks to catch breath. Also hx of stroke, restless leg. Hypertension: [address]  cardiology in [facility] Dr. Fransico Hilario. Be elevated today at 164/80. Patient reports she has been taking doxazosin 4 mg at bedtime. Is seeing cardio again end of October. Skin sores left lower extremity. Occur randomly. Denies picking skin. Come and go. Patient's medications, allergies, past medical, surgical, social and family histories were reviewed and updated asappropriate. ROS:  Review of Systems   Constitutional: Negative. HENT: Negative. Respiratory: Negative. Cardiovascular: Negative. Gastrointestinal: Negative. Musculoskeletal:  Positive for arthralgias, back pain, gait problem, neck pain and neck stiffness. Skin:         Skin sores     Psychiatric/Behavioral: Negative. Prior to Visit Medications    Medication Sig Taking? Authorizing Provider   doxazosin (CARDURA) 4 MG tablet Take 1.5 tablets by mouth nightly Indications: Patient has been taking whole tablet (4mg) at bedtime. She will start cutting in half.  K

## 2023-09-13 DIAGNOSIS — I63.9 CEREBROVASCULAR ACCIDENT (CVA), UNSPECIFIED MECHANISM (HCC): ICD-10-CM

## 2023-09-13 DIAGNOSIS — I48.0 PAROXYSMAL ATRIAL FIBRILLATION (HCC): Primary | ICD-10-CM

## 2023-09-13 RX ORDER — NITROGLYCERIN 0.4 MG/1
0.4 TABLET SUBLINGUAL EVERY 5 MIN PRN
Qty: 25 TABLET | Refills: 0 | Status: SHIPPED | OUTPATIENT
Start: 2023-09-13

## 2023-09-27 ENCOUNTER — OFFICE VISIT (OUTPATIENT)
Dept: FAMILY MEDICINE CLINIC | Age: 78
End: 2023-09-27

## 2023-09-27 VITALS
WEIGHT: 234 LBS | BODY MASS INDEX: 39.95 KG/M2 | SYSTOLIC BLOOD PRESSURE: 100 MMHG | HEART RATE: 72 BPM | OXYGEN SATURATION: 95 % | HEIGHT: 64 IN | DIASTOLIC BLOOD PRESSURE: 54 MMHG

## 2023-09-27 DIAGNOSIS — G25.81 RESTLESS LEG SYNDROME: ICD-10-CM

## 2023-09-27 DIAGNOSIS — I10 ESSENTIAL HYPERTENSION: Primary | ICD-10-CM

## 2023-09-27 RX ORDER — DOXAZOSIN MESYLATE 4 MG/1
TABLET ORAL
Qty: 30 TABLET | Refills: 3 | Status: SHIPPED | OUTPATIENT
Start: 2023-09-27

## 2023-09-27 RX ORDER — ASPIRIN 81 MG/1
81 TABLET ORAL DAILY
COMMUNITY

## 2023-09-27 RX ORDER — MOMETASONE FUROATE 1 MG/G
CREAM TOPICAL
Qty: 15 G | Refills: 3 | Status: SHIPPED | OUTPATIENT
Start: 2023-09-27 | End: 2023-09-28

## 2023-09-27 RX ORDER — DOXAZOSIN MESYLATE 4 MG/1
4 TABLET ORAL NIGHTLY
Qty: 30 TABLET | Refills: 3 | Status: SHIPPED | OUTPATIENT
Start: 2023-09-27 | End: 2023-09-27

## 2023-09-27 SDOH — ECONOMIC STABILITY: FOOD INSECURITY: WITHIN THE PAST 12 MONTHS, THE FOOD YOU BOUGHT JUST DIDN'T LAST AND YOU DIDN'T HAVE MONEY TO GET MORE.: NEVER TRUE

## 2023-09-27 SDOH — ECONOMIC STABILITY: INCOME INSECURITY: HOW HARD IS IT FOR YOU TO PAY FOR THE VERY BASICS LIKE FOOD, HOUSING, MEDICAL CARE, AND HEATING?: NOT HARD AT ALL

## 2023-09-27 SDOH — ECONOMIC STABILITY: HOUSING INSECURITY
IN THE LAST 12 MONTHS, WAS THERE A TIME WHEN YOU DID NOT HAVE A STEADY PLACE TO SLEEP OR SLEPT IN A SHELTER (INCLUDING NOW)?: NO

## 2023-09-27 SDOH — ECONOMIC STABILITY: FOOD INSECURITY: WITHIN THE PAST 12 MONTHS, YOU WORRIED THAT YOUR FOOD WOULD RUN OUT BEFORE YOU GOT MONEY TO BUY MORE.: NEVER TRUE

## 2023-09-27 ASSESSMENT — ENCOUNTER SYMPTOMS
BACK PAIN: 1
RESPIRATORY NEGATIVE: 1

## 2023-09-28 ENCOUNTER — PROCEDURE VISIT (OUTPATIENT)
Dept: AUDIOLOGY | Age: 78
End: 2023-09-28
Payer: MEDICARE

## 2023-09-28 ENCOUNTER — OFFICE VISIT (OUTPATIENT)
Dept: ENT CLINIC | Age: 78
End: 2023-09-28
Payer: MEDICARE

## 2023-09-28 VITALS — RESPIRATION RATE: 16 BRPM | BODY MASS INDEX: 39.95 KG/M2 | HEIGHT: 64 IN | WEIGHT: 234 LBS

## 2023-09-28 DIAGNOSIS — H90.3 SENSORINEURAL HEARING LOSS (SNHL) OF BOTH EARS: ICD-10-CM

## 2023-09-28 DIAGNOSIS — H60.8X3 CHRONIC ECZEMATOUS OTITIS EXTERNA OF BOTH EARS: Primary | ICD-10-CM

## 2023-09-28 DIAGNOSIS — H90.3 ASYMMETRICAL SENSORINEURAL HEARING LOSS: Primary | ICD-10-CM

## 2023-09-28 PROCEDURE — 99214 OFFICE O/P EST MOD 30 MIN: CPT | Performed by: OTOLARYNGOLOGY

## 2023-09-28 PROCEDURE — 92557 COMPREHENSIVE HEARING TEST: CPT | Performed by: AUDIOLOGIST

## 2023-09-28 PROCEDURE — 92567 TYMPANOMETRY: CPT | Performed by: AUDIOLOGIST

## 2023-09-28 PROCEDURE — 1124F ACP DISCUSS-NO DSCNMKR DOCD: CPT | Performed by: OTOLARYNGOLOGY

## 2023-09-28 RX ORDER — MOMETASONE FUROATE 1 MG/G
CREAM TOPICAL
Qty: 1 EACH | Refills: 0 | Status: SHIPPED | OUTPATIENT
Start: 2023-09-28
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
## 2023-09-28 NOTE — PROGRESS NOTES
an in-network provider for potential hearing aid benefit. Follow medical recommendations of Apolonia Benton DO.    ASSESSMENT AND FINDINGS:     Otoscopy unremarkable. RIGHT EAR:  Hearing Sensitivity: A mild to moderate sensorineural hearing loss. Speech Recognition Threshold: 40 dB HL  Word Recognition: Excellent 100%, based on NU-6 25-word list at 80 dBHL using recorded speech stimuli. Tympanometry: Normal peak pressure and compliance, Type A tympanogram, consistent with normal middle ear function. Volume 1.4 cm3, Peak 2 daPa, 0.59 mmho. LEFT EAR:  Hearing Sensitivity: A mild to moderate sensorineural hearing loss. Speech Recognition Threshold: 40 dB HL  Word Recognition: Excellent 100%, based on NU-6 25-word list at 80 dBHL using recorded speech stimuli. Tympanometry: Normal peak pressure and compliance, Type A tympanogram, consistent with normal middle ear function. Volume 1.4 cm3, Peak -2 daPa, 0.41 mmho. Reliability: Good   Transducer: Inserts    See scanned audiogram dated 9/28/2023 for results. PATIENT EDUCATION:       The following items were discussed with the patient:   - Good Communication Strategies  - Hearing Loss and Hearing Aids    Educational information was shared in the After Visit Summary. RECOMMENDATIONS:                                                                                                                                                                                                                                                            The following items are recommended based on patient report and results from today's appointment:   - Continue medical follow-up with Apolonia Benton DO.   - Retest hearing as medically indicated and/or sooner if a change in hearing is noted.   - Utilize \"Good Communication Strategies\" as discussed to assist in speech understanding with communication partners.  - Recommended

## 2023-09-30 ASSESSMENT — ENCOUNTER SYMPTOMS
VOICE CHANGE: 0
COUGH: 0
EYE ITCHING: 0
SINUS PRESSURE: 0
FACIAL SWELLING: 0
SHORTNESS OF BREATH: 0
TROUBLE SWALLOWING: 0
APNEA: 0
SORE THROAT: 0

## 2023-10-04 DIAGNOSIS — J18.9 PNEUMONIA OF RIGHT MIDDLE LOBE DUE TO INFECTIOUS ORGANISM: ICD-10-CM

## 2023-10-04 RX ORDER — ALBUTEROL SULFATE 90 UG/1
2 AEROSOL, METERED RESPIRATORY (INHALATION) EVERY 6 HOURS PRN
Qty: 18 G | Refills: 5 | Status: SHIPPED | OUTPATIENT
Start: 2023-10-04

## 2023-10-10 ENCOUNTER — PROCEDURE VISIT (OUTPATIENT)
Dept: AUDIOLOGY | Age: 78
End: 2023-10-10

## 2023-10-10 ENCOUNTER — TELEPHONE (OUTPATIENT)
Dept: FAMILY MEDICINE CLINIC | Age: 78
End: 2023-10-10

## 2023-10-10 DIAGNOSIS — H90.3 ASYMMETRICAL SENSORINEURAL HEARING LOSS: Primary | ICD-10-CM

## 2023-10-10 DIAGNOSIS — R21 RASH OF UNKNOWN ETIOLOGY: ICD-10-CM

## 2023-10-10 PROCEDURE — NBSRV NON-BILLABLE SERVICE: Performed by: AUDIOLOGIST

## 2023-10-10 NOTE — TELEPHONE ENCOUNTER
Pt would like to know if you could send in a lotion type med for her ears, she said the cream and ointments are too hard for her to apply herself. She said you prescribed it once before a long time ago.

## 2023-10-10 NOTE — PROGRESS NOTES
Kieran Rothman  [birth year]83 y.o. female   [id number]      HEARING AID EVALUATION    Kieran Rothman was seen today, 10/10/2023 , for a Hearing Aid Evaluation following audiologic evaluation on 9/28/2023. Patient has no prior history of hearing aid use. Patient was found to not have an insurance benefit for hearing aids. Patient is responsible for any cost over the previously listed benefit (if any). Hearing aid benefit worksheet scanned into media tab. DATE: 10/10/2023     PROCEDURES:     REVIEWED AUDIOGRAM AND HEARING EVALUATION RESULTS:         LIFESTYLE EVALUATION:    After a discussion of evaluation results, discussion of levels of technology and prices, and lifestyle assessment, Kieran Rothman has decided to consider the options and contact Audiology when a decision has been made. .     Patient does not have an insurance benefit and states she is unable to purchase devices with an out of pocket expense. PATIENT EDUCATION:      - Verbally reviewed benefits and limitations of devices and available features in hearing aids. - Verbally discussed realistic expectations of hearing aid use     Information was shared verbally with patient during appointment. RECOMMENDATIONS:      - Return as needed      No charge for today's appointment.         Pérez Silver  Audiologist

## 2023-10-11 NOTE — TELEPHONE ENCOUNTER
Pt feels the cream the ENT gave is not helping.  She is wanting you to send in the one you sent before

## 2023-10-11 NOTE — TELEPHONE ENCOUNTER
It appears the ENT specialist provided patient with Elocon cream for her ears. I am not certain on the lotion the patient is referring to that I sent for her. I would think the cream provided by the ENT specialist would be fairly easy to apply in regard to its thickness.   Anything I would have prescribed in the past would have been an ointment or a cream.

## 2023-10-12 NOTE — TELEPHONE ENCOUNTER
Nothing on provided for the patient's ears were eardrops. I did provide her a Kenalog cream for a rash on her neck during one of our visits. Ok to order kenalog if this is the med pt is referring to.

## 2023-10-13 RX ORDER — TRIAMCINOLONE ACETONIDE 0.25 MG/G
CREAM TOPICAL
Qty: 15 G | Refills: 0 | Status: SHIPPED | OUTPATIENT
Start: 2023-10-13

## 2023-11-14 ENCOUNTER — TELEPHONE (OUTPATIENT)
Dept: FAMILY MEDICINE CLINIC | Age: 78
End: 2023-11-14

## 2023-11-14 NOTE — TELEPHONE ENCOUNTER
Pts insurance called and said they needed a PA from us for DME (scooter). I was not sure where we sent the order for the scooter and I was not sure how to go about starting a PA.  The fax # for insurance is [phone]  Hotline for any questions is [phone]

## 2023-11-15 NOTE — TELEPHONE ENCOUNTER
Spoke to pt and she said she went to [facility] for an Eval and they told her they didn't have a scooter big enough for her. She would like to go somewhere else. I faxed order to [facility].

## 2023-12-11 ENCOUNTER — TELEPHONE (OUTPATIENT)
Dept: FAMILY MEDICINE CLINIC | Age: 78
End: 2023-12-11

## 2023-12-11 NOTE — TELEPHONE ENCOUNTER
Pt called and said she is coughing and has a runny nose and her \"water\" is dark yellow. The cardiologist gave her a Mary Gomez and ordered an XR and told her to follow up with you. She feels like the zpak has helped but is still having symptoms. She is concerned that something is wrong and that he hasn't told her, I will try to get a copy of the XR report tomorrow because they are closed already today.

## 2023-12-12 ENCOUNTER — TELEPHONE (OUTPATIENT)
Dept: FAMILY MEDICINE CLINIC | Age: 78
End: 2023-12-12

## 2023-12-12 NOTE — TELEPHONE ENCOUNTER
Called patient to inform her of Dominic's recommendations. Patient states she was told to follow up with her PCP by her cardio. I informed patient that from the X-Ray results Jazmyn Meek wanted her to follow up with cardio. I called and spoke with Mendy with Dr. Andi Stephens office and he is going to send patient for a repeat chest xray and blood work. They have contacted the patient.

## 2023-12-27 ENCOUNTER — NURSE ONLY (OUTPATIENT)
Dept: FAMILY MEDICINE CLINIC | Age: 78
End: 2023-12-27
Payer: MEDICARE

## 2023-12-27 DIAGNOSIS — R35.0 URINARY FREQUENCY: Primary | ICD-10-CM

## 2023-12-27 LAB
BILIRUBIN, POC: NEGATIVE
BLOOD URINE, POC: NEGATIVE
CLARITY, POC: CLEAR
COLOR, POC: YELLOW
GLUCOSE URINE, POC: NEGATIVE
KETONES, POC: NEGATIVE
LEUKOCYTE EST, POC: NORMAL
NITRITE, POC: NEGATIVE
PH, POC: 8.5
PROTEIN, POC: NEGATIVE
SPECIFIC GRAVITY, POC: 1.01
UROBILINOGEN, POC: 0.2

## 2023-12-27 PROCEDURE — 81002 URINALYSIS NONAUTO W/O SCOPE: CPT

## 2023-12-27 NOTE — RESULT ENCOUNTER NOTE
UA pos for small leukocytes. Send for culture. What symptoms? No pertinent family history in first degree relatives

## 2023-12-29 ENCOUNTER — TELEPHONE (OUTPATIENT)
Dept: FAMILY MEDICINE CLINIC | Age: 78
End: 2023-12-29
Payer: MEDICARE

## 2023-12-29 ENCOUNTER — TELEPHONE (OUTPATIENT)
Dept: FAMILY MEDICINE CLINIC | Age: 78
End: 2023-12-29

## 2023-12-29 DIAGNOSIS — J06.9 UPPER RESPIRATORY TRACT INFECTION, UNSPECIFIED TYPE: Primary | ICD-10-CM

## 2023-12-29 LAB — BACTERIA UR CULT: NORMAL

## 2023-12-29 PROCEDURE — 99422 OL DIG E/M SVC 11-20 MIN: CPT

## 2023-12-29 RX ORDER — AMOXICILLIN 875 MG/1
875 TABLET, COATED ORAL 2 TIMES DAILY
Qty: 14 TABLET | Refills: 0 | Status: SHIPPED | OUTPATIENT
Start: 2023-12-29 | End: 2024-01-05

## 2023-12-29 NOTE — TELEPHONE ENCOUNTER
Sore throat, runny nose, ear pain, coughing stuff up, headache x2 days. Wants something called in to [address]

## 2023-12-29 NOTE — TELEPHONE ENCOUNTER
Luis E Rose routed conversation to You 58 minutes ago (8:56 AM)     Luis E Scottcolo 58 minutes ago (8:56 AM)     HF  Pt called back. Refusing to covid/flu test. Just got her flu shot at her appt here the other day. Note          Luis E Scottcolo 1 hour ago (8:52 AM)     HF  Left vm for pt to call back. Note          You  [facility]  Clinical Staff 1 hour ago (8:42 AM)       Would recommend flu/covid testing. Can place order. If positive would determine appropriate treatment. Note          Luis E Scottcolo routed conversation to You 1 hour ago (8:26 AM)     Luis E Scottcolo 1 hour ago (8:26 AM)     HF  Sore throat, runny nose, ear pain, coughing stuff up, headache x2 days. Wants something called in to [facility]. Note          Allergies   Allergen Reactions    Adhesive Tape Itching     Lab Results   Component Value Date     12/22/2023    K 4.6 12/22/2023     12/22/2023    CO2 33 (H) 12/22/2023    BUN 14 12/22/2023    CREATININE 0.8 12/22/2023    GLUCOSE 150 (H) 12/22/2023    CALCIUM 8.6 12/22/2023    PROT 6.7 12/22/2023    LABALBU 3.8 12/22/2023    BILITOT 0.4 12/22/2023    ALKPHOS 87 12/22/2023    AST 20 12/22/2023    ALT 16 12/22/2023    LABGLOM >60 12/22/2023    GFRAA >60 10/10/2022    AGRATIO 1.3 12/22/2023    GLOB 3.3 04/20/2021       Current Outpatient Medications   Medication Sig Dispense Refill    fluticasone furoate-vilanterol (BREO ELLIPTA) 200-25 MCG/ACT AEPB inhaler Inhale 1 puff into the lungs daily 28 each 3    triamcinolone (KENALOG) 0.025 % cream Apply topically 2 times daily.  15 g 0    albuterol sulfate HFA (PROVENTIL;VENTOLIN;PROAIR) 108 (90 Base) MCG/ACT inhaler INHALE 2 PUFFS BY MOUTH EVERY 6 HOURS AS NEEDED FOR WHEEZING 18 g 5    mometasone (ELOCON) 0.1 % cream Apply topically daily to ears once a day x 1 week then as needed 1-3 times per week 1 each 0    aspirin 81 MG EC tablet Take 1 tablet by mouth daily      doxazosin (CARDURA) 4 MG

## 2023-12-29 NOTE — TELEPHONE ENCOUNTER
Patient could still have influenza and/or COVID given her symptoms. Early onset of 2 days we typically would not prescribe antibiotic treatment. Would recommend monitoring for the next 72 hours for worsening symptoms. If fails to improve or worsens could go ahead and take amoxicillin which I sent to the pharmacy requested. Please ensure staying well-hydrated. Recommend OTC antihistamine for sinus congestion. Recommend warm salt water gargles, throat lozenges, cough drops to help soothe sore throat. Follow-up with office as needed.

## 2023-12-29 NOTE — TELEPHONE ENCOUNTER
Would recommend flu/covid testing. Can place order. If positive would determine appropriate treatment.

## 2024-01-16 ENCOUNTER — TELEPHONE (OUTPATIENT)
Dept: FAMILY MEDICINE CLINIC | Age: 79
End: 2024-01-16

## 2024-01-16 DIAGNOSIS — R19.7 DIARRHEA, UNSPECIFIED TYPE: Primary | ICD-10-CM

## 2024-01-16 RX ORDER — ALOSETRON HYDROCHLORIDE 0.5 MG/1
0.5 TABLET ORAL 2 TIMES DAILY
Qty: 10 TABLET | Refills: 0 | Status: SHIPPED | OUTPATIENT
Start: 2024-01-16

## 2024-01-16 NOTE — TELEPHONE ENCOUNTER
Caren called and has had diarrhea for four days. She said that she can't make to the bathroom most of the time. Caren has no other symptoms. She has taken imodium and she says it does not help. Caren was asking if you could call something in to [facility] for it.

## 2024-01-16 NOTE — TELEPHONE ENCOUNTER
I sent a prescription for Alosetron to your pharmacy for a 5-day supply.  Will need to follow-up as needed.

## 2024-02-14 ENCOUNTER — TELEPHONE (OUTPATIENT)
Dept: FAMILY MEDICINE CLINIC | Age: 79
End: 2024-02-14

## 2024-02-14 NOTE — TELEPHONE ENCOUNTER
Bjorn said she keeps having diarrhea and wants to know if there is some kind of test to she if she has a bug in her stool that is causing it.

## 2024-02-15 DIAGNOSIS — R19.7 DIARRHEA, UNSPECIFIED TYPE: Primary | ICD-10-CM

## 2024-02-15 NOTE — TELEPHONE ENCOUNTER
Form was scanned and mom was notified it is ready  Pt has had issues with diarrhea for a while. She called a few weeks ago and you gave some medication which helped a little, but she still has it. She would like to proceed with stool studies. Please place orders and I will put bottles up front for pt to

## 2024-02-15 NOTE — TELEPHONE ENCOUNTER
Would recommend trying Imodium.  If the patient has eaten anything different that could eventually have caused an infectious bacteria in regard to her diarrhea we could test stool.  Otherwise would continue to monitor and treat symptomatically and assess for improvement.  If has a viral bug symptoms could be persistent/intermittent for several days.

## 2024-02-29 RX ORDER — ALPRAZOLAM 0.5 MG/1
0.25 TABLET ORAL NIGHTLY PRN
Qty: 20 TABLET | Refills: 0 | OUTPATIENT
Start: 2024-02-29 | End: 2024-03-30

## 2024-02-29 NOTE — TELEPHONE ENCOUNTER
This medication has been being filled by another provider.  I am not refilling this medication.  Please have her contact the prescribing physician for refills.

## 2024-03-02 DIAGNOSIS — K44.9 HIATAL HERNIA: ICD-10-CM

## 2024-03-02 DIAGNOSIS — R12 HEARTBURN: ICD-10-CM

## 2024-03-02 DIAGNOSIS — R13.19 ESOPHAGEAL DYSPHAGIA: ICD-10-CM

## 2024-03-04 ENCOUNTER — NURSE ONLY (OUTPATIENT)
Dept: FAMILY MEDICINE CLINIC | Age: 79
End: 2024-03-04

## 2024-03-04 DIAGNOSIS — R19.7 DIARRHEA, UNSPECIFIED TYPE: ICD-10-CM

## 2024-03-04 RX ORDER — OMEPRAZOLE 40 MG/1
CAPSULE, DELAYED RELEASE ORAL
Qty: 180 CAPSULE | Refills: 0 | Status: SHIPPED | OUTPATIENT
Start: 2024-03-04

## 2024-03-05 LAB — GI PATHOGENS PNL STL NAA+PROBE: NORMAL

## 2024-03-06 NOTE — RESULT ENCOUNTER NOTE
No evidence of significant abnormality on testing for diarrhea.  If ongoing symptoms, may need further testing for possible pancreatic insufficiency versus GI referral for further evaluation

## 2024-03-12 ENCOUNTER — TELEPHONE (OUTPATIENT)
Dept: FAMILY MEDICINE CLINIC | Age: 79
End: 2024-03-12

## 2024-03-12 NOTE — TELEPHONE ENCOUNTER
Pt called and requested that you send in the paper work again for the scooter. She could not tell me what or where the paper work went. So I was hoping you knew what she is talking about.

## 2024-03-12 NOTE — TELEPHONE ENCOUNTER
Luda from PT dept called back. She states since order was from last year it will need re faxed. I re faxed the order and informed pt they should contact her in a day or 2

## 2024-03-12 NOTE — TELEPHONE ENCOUNTER
There was a handwritten prescription scanned in the patient's media from November 2023 for a scooter.  I would reprint the prior scanned image and send to wherever is needed for the patient's issue.

## 2024-03-13 NOTE — TELEPHONE ENCOUNTER
Luda called back from PT and states she spoke to pt and she seemed very confused. They scheduled pt for 3/20 at 10am for scooter eval. Called pt back and reminded her of the appt and she thought it was for power chair. She does not want a power chair, she prefers a scooter. I reminded pt of appt she voiced understanding

## 2024-03-15 ENCOUNTER — OFFICE VISIT (OUTPATIENT)
Dept: FAMILY MEDICINE CLINIC | Age: 79
End: 2024-03-15

## 2024-03-15 ENCOUNTER — TELEPHONE (OUTPATIENT)
Dept: FAMILY MEDICINE CLINIC | Age: 79
End: 2024-03-15

## 2024-03-15 VITALS
HEART RATE: 80 BPM | OXYGEN SATURATION: 91 % | WEIGHT: 224 LBS | DIASTOLIC BLOOD PRESSURE: 80 MMHG | SYSTOLIC BLOOD PRESSURE: 164 MMHG | BODY MASS INDEX: 38.45 KG/M2

## 2024-03-15 DIAGNOSIS — I73.9 PVD (PERIPHERAL VASCULAR DISEASE) (HCC): ICD-10-CM

## 2024-03-15 DIAGNOSIS — M32.9 LUPUS (HCC): ICD-10-CM

## 2024-03-15 DIAGNOSIS — I50.9 OTHER CONGESTIVE HEART FAILURE (HCC): ICD-10-CM

## 2024-03-15 DIAGNOSIS — I48.0 PAROXYSMAL ATRIAL FIBRILLATION (HCC): ICD-10-CM

## 2024-03-15 DIAGNOSIS — M05.79 RHEUMATOID ARTHRITIS INVOLVING MULTIPLE SITES WITH POSITIVE RHEUMATOID FACTOR (HCC): ICD-10-CM

## 2024-03-15 DIAGNOSIS — I10 ESSENTIAL HYPERTENSION: ICD-10-CM

## 2024-03-15 DIAGNOSIS — M25.562 LEFT KNEE PAIN, UNSPECIFIED CHRONICITY: Primary | ICD-10-CM

## 2024-03-15 ASSESSMENT — PATIENT HEALTH QUESTIONNAIRE - PHQ9
1. LITTLE INTEREST OR PLEASURE IN DOING THINGS: 0
2. FEELING DOWN, DEPRESSED OR HOPELESS: 0
SUM OF ALL RESPONSES TO PHQ9 QUESTIONS 1 & 2: 0
SUM OF ALL RESPONSES TO PHQ QUESTIONS 1-9: 0

## 2024-03-15 ASSESSMENT — ENCOUNTER SYMPTOMS: RESPIRATORY NEGATIVE: 1

## 2024-03-15 NOTE — TELEPHONE ENCOUNTER
----- Message from Rebecca Galicia sent at 3/15/2024 11:31 AM EDT -----  Subject: Message to Provider    QUESTIONS  Information for Provider? Patient wanted to call and let the office know   that she uses [facility] not the [facility] for her   medical needs. Patient does need to go to the [facility] to get   her scooter but wanted to make sure the office knows she does not usually   use that hospital.  ---------------------------------------------------------------------------  --------------  CALL BACK INFO  [phone]; Do not leave any message, patient will call back for answer  ---------------------------------------------------------------------------  --------------  SCRIPT ANSWERS  Relationship to Patient? Self

## 2024-03-20 ENCOUNTER — APPOINTMENT (OUTPATIENT)
Dept: CT IMAGING | Age: 79
End: 2024-03-20
Attending: EMERGENCY MEDICINE
Payer: MEDICARE

## 2024-03-20 ENCOUNTER — HOSPITAL ENCOUNTER (INPATIENT)
Age: 79
LOS: 1 days | Discharge: HOME | End: 2024-03-21
Attending: EMERGENCY MEDICINE | Admitting: INTERNAL MEDICINE
Payer: MEDICARE

## 2024-03-20 ENCOUNTER — APPOINTMENT (OUTPATIENT)
Dept: GENERAL RADIOLOGY | Age: 79
End: 2024-03-20
Payer: MEDICARE

## 2024-03-20 DIAGNOSIS — N39.0 URINARY TRACT INFECTION IN FEMALE: ICD-10-CM

## 2024-03-20 DIAGNOSIS — R11.0 NAUSEA: Primary | ICD-10-CM

## 2024-03-20 DIAGNOSIS — R10.30 LOWER ABDOMINAL PAIN: ICD-10-CM

## 2024-03-20 PROBLEM — R79.89 ELEVATED TROPONIN: Status: ACTIVE | Noted: 2024-03-20

## 2024-03-20 PROBLEM — N30.01 ACUTE CYSTITIS WITH HEMATURIA: Status: ACTIVE | Noted: 2024-03-20

## 2024-03-20 PROBLEM — R11.14 BILIOUS VOMITING WITH NAUSEA: Status: ACTIVE | Noted: 2024-03-20

## 2024-03-20 PROBLEM — J44.9 COPD (CHRONIC OBSTRUCTIVE PULMONARY DISEASE) (HCC): Status: ACTIVE | Noted: 2024-03-20

## 2024-03-20 PROBLEM — K52.9 CHRONIC DIARRHEA: Status: ACTIVE | Noted: 2024-03-20

## 2024-03-20 PROBLEM — I25.10 CORONARY ATHEROSCLEROSIS: Status: ACTIVE | Noted: 2018-02-27

## 2024-03-20 LAB
ALBUMIN SERPL-MCNC: 3.9 G/DL (ref 3.4–5)
ALBUMIN/GLOB SERPL: 1.1 {RATIO} (ref 1.1–2.2)
ALP SERPL-CCNC: 83 U/L (ref 40–129)
ALT SERPL-CCNC: 16 U/L (ref 10–40)
ANION GAP SERPL CALCULATED.3IONS-SCNC: 11 MMOL/L (ref 3–16)
AST SERPL-CCNC: 24 U/L (ref 15–37)
BACTERIA URNS QL MICRO: ABNORMAL /HPF
BASOPHILS # BLD: 0.1 K/UL (ref 0–0.2)
BASOPHILS NFR BLD: 0.6 %
BILIRUB SERPL-MCNC: 0.5 MG/DL (ref 0–1)
BILIRUB UR QL STRIP.AUTO: NEGATIVE
BUN SERPL-MCNC: 16 MG/DL (ref 7–20)
CALCIUM SERPL-MCNC: 9.4 MG/DL (ref 8.3–10.6)
CHLORIDE SERPL-SCNC: 101 MMOL/L (ref 99–110)
CLARITY UR: CLEAR
CO2 SERPL-SCNC: 30 MMOL/L (ref 21–32)
COLOR UR: YELLOW
CREAT SERPL-MCNC: 0.7 MG/DL (ref 0.6–1.2)
DEPRECATED RDW RBC AUTO: 14.2 % (ref 12.4–15.4)
EKG ATRIAL RATE: 75 BPM
EKG DIAGNOSIS: NORMAL
EKG P AXIS: 53 DEGREES
EKG P-R INTERVAL: 166 MS
EKG Q-T INTERVAL: 388 MS
EKG QRS DURATION: 82 MS
EKG QTC CALCULATION (BAZETT): 433 MS
EKG R AXIS: 78 DEGREES
EKG T AXIS: 51 DEGREES
EKG VENTRICULAR RATE: 75 BPM
EOSINOPHIL # BLD: 0.2 K/UL (ref 0–0.6)
EOSINOPHIL NFR BLD: 2.2 %
EPI CELLS #/AREA URNS HPF: ABNORMAL /HPF (ref 0–5)
GFR SERPLBLD CREATININE-BSD FMLA CKD-EPI: >60 ML/MIN/{1.73_M2}
GLUCOSE SERPL-MCNC: 155 MG/DL (ref 70–99)
GLUCOSE UR STRIP.AUTO-MCNC: NEGATIVE MG/DL
HCT VFR BLD AUTO: 41.8 % (ref 36–48)
HGB BLD-MCNC: 13.8 G/DL (ref 12–16)
HGB UR QL STRIP.AUTO: ABNORMAL
KETONES UR STRIP.AUTO-MCNC: NEGATIVE MG/DL
LACTATE BLDV-SCNC: 1.1 MMOL/L (ref 0.4–1.9)
LACTATE BLDV-SCNC: 1.4 MMOL/L (ref 0.4–1.9)
LEUKOCYTE ESTERASE UR QL STRIP.AUTO: ABNORMAL
LIPASE SERPL-CCNC: 13 U/L (ref 13–60)
LYMPHOCYTES # BLD: 1.6 K/UL (ref 1–5.1)
LYMPHOCYTES NFR BLD: 15.3 %
MCH RBC QN AUTO: 29 PG (ref 26–34)
MCHC RBC AUTO-ENTMCNC: 33.1 G/DL (ref 31–36)
MCV RBC AUTO: 87.5 FL (ref 80–100)
MONOCYTES # BLD: 0.6 K/UL (ref 0–1.3)
MONOCYTES NFR BLD: 5.7 %
NEUTROPHILS # BLD: 8 K/UL (ref 1.7–7.7)
NEUTROPHILS NFR BLD: 76.2 %
NITRITE UR QL STRIP.AUTO: POSITIVE
NT-PROBNP SERPL-MCNC: 292 PG/ML (ref 0–449)
PH UR STRIP.AUTO: 6 [PH] (ref 5–8)
PLATELET # BLD AUTO: 186 K/UL (ref 135–450)
PMV BLD AUTO: 8.6 FL (ref 5–10.5)
POTASSIUM SERPL-SCNC: 4 MMOL/L (ref 3.5–5.1)
PROT SERPL-MCNC: 7.5 G/DL (ref 6.4–8.2)
PROT UR STRIP.AUTO-MCNC: 30 MG/DL
RBC # BLD AUTO: 4.78 M/UL (ref 4–5.2)
RBC #/AREA URNS HPF: ABNORMAL /HPF (ref 0–4)
SODIUM SERPL-SCNC: 142 MMOL/L (ref 136–145)
SP GR UR STRIP.AUTO: 1.02 (ref 1–1.03)
TROPONIN, HIGH SENSITIVITY: 27 NG/L (ref 0–14)
TROPONIN, HIGH SENSITIVITY: 30 NG/L (ref 0–14)
UA COMPLETE W REFLEX CULTURE PNL UR: YES
UA DIPSTICK W REFLEX MICRO PNL UR: YES
URN SPEC COLLECT METH UR: ABNORMAL
UROBILINOGEN UR STRIP-ACNC: 0.2 E.U./DL
WBC # BLD AUTO: 10.5 K/UL (ref 4–11)
WBC #/AREA URNS HPF: ABNORMAL /HPF (ref 0–5)

## 2024-03-20 PROCEDURE — 87186 SC STD MICRODIL/AGAR DIL: CPT

## 2024-03-20 PROCEDURE — 2580000003 HC RX 258: Performed by: EMERGENCY MEDICINE

## 2024-03-20 PROCEDURE — 87086 URINE CULTURE/COLONY COUNT: CPT

## 2024-03-20 PROCEDURE — 71045 X-RAY EXAM CHEST 1 VIEW: CPT

## 2024-03-20 PROCEDURE — 83690 ASSAY OF LIPASE: CPT

## 2024-03-20 PROCEDURE — 94640 AIRWAY INHALATION TREATMENT: CPT

## 2024-03-20 PROCEDURE — 87088 URINE BACTERIA CULTURE: CPT

## 2024-03-20 PROCEDURE — 93010 ELECTROCARDIOGRAM REPORT: CPT | Performed by: STUDENT IN AN ORGANIZED HEALTH CARE EDUCATION/TRAINING PROGRAM

## 2024-03-20 PROCEDURE — 96365 THER/PROPH/DIAG IV INF INIT: CPT

## 2024-03-20 PROCEDURE — 74177 CT ABD & PELVIS W/CONTRAST: CPT

## 2024-03-20 PROCEDURE — 6360000004 HC RX CONTRAST MEDICATION: Performed by: EMERGENCY MEDICINE

## 2024-03-20 PROCEDURE — 6360000002 HC RX W HCPCS: Performed by: EMERGENCY MEDICINE

## 2024-03-20 PROCEDURE — 2580000003 HC RX 258: Performed by: INTERNAL MEDICINE

## 2024-03-20 PROCEDURE — 1200000000 HC SEMI PRIVATE

## 2024-03-20 PROCEDURE — 99223 1ST HOSP IP/OBS HIGH 75: CPT | Performed by: INTERNAL MEDICINE

## 2024-03-20 PROCEDURE — 83605 ASSAY OF LACTIC ACID: CPT

## 2024-03-20 PROCEDURE — 83880 ASSAY OF NATRIURETIC PEPTIDE: CPT

## 2024-03-20 PROCEDURE — 80053 COMPREHEN METABOLIC PANEL: CPT

## 2024-03-20 PROCEDURE — 94761 N-INVAS EAR/PLS OXIMETRY MLT: CPT

## 2024-03-20 PROCEDURE — 93005 ELECTROCARDIOGRAM TRACING: CPT | Performed by: EMERGENCY MEDICINE

## 2024-03-20 PROCEDURE — 6370000000 HC RX 637 (ALT 250 FOR IP): Performed by: EMERGENCY MEDICINE

## 2024-03-20 PROCEDURE — 85025 COMPLETE CBC W/AUTO DIFF WBC: CPT

## 2024-03-20 PROCEDURE — 36415 COLL VENOUS BLD VENIPUNCTURE: CPT

## 2024-03-20 PROCEDURE — 81001 URINALYSIS AUTO W/SCOPE: CPT

## 2024-03-20 PROCEDURE — 84484 ASSAY OF TROPONIN QUANT: CPT

## 2024-03-20 PROCEDURE — 6370000000 HC RX 637 (ALT 250 FOR IP)

## 2024-03-20 PROCEDURE — 99285 EMERGENCY DEPT VISIT HI MDM: CPT

## 2024-03-20 RX ORDER — POTASSIUM CHLORIDE 7.45 MG/ML
10 INJECTION INTRAVENOUS PRN
Status: DISCONTINUED | OUTPATIENT
Start: 2024-03-20 | End: 2024-03-21 | Stop reason: HOSPADM

## 2024-03-20 RX ORDER — ACETAMINOPHEN 500 MG
1000 TABLET ORAL ONCE
Status: COMPLETED | OUTPATIENT
Start: 2024-03-20 | End: 2024-03-20

## 2024-03-20 RX ORDER — DOXAZOSIN 2 MG/1
4 TABLET ORAL NIGHTLY
Status: DISCONTINUED | OUTPATIENT
Start: 2024-03-20 | End: 2024-03-21 | Stop reason: HOSPADM

## 2024-03-20 RX ORDER — DILTIAZEM HYDROCHLORIDE 120 MG/1
120 CAPSULE, COATED, EXTENDED RELEASE ORAL DAILY
Status: DISCONTINUED | OUTPATIENT
Start: 2024-03-20 | End: 2024-03-21 | Stop reason: HOSPADM

## 2024-03-20 RX ORDER — ISOSORBIDE MONONITRATE 60 MG/1
60 TABLET, EXTENDED RELEASE ORAL DAILY
Status: DISCONTINUED | OUTPATIENT
Start: 2024-03-20 | End: 2024-03-21 | Stop reason: HOSPADM

## 2024-03-20 RX ORDER — DULOXETIN HYDROCHLORIDE 30 MG/1
30 CAPSULE, DELAYED RELEASE ORAL 2 TIMES DAILY
Status: DISCONTINUED | OUTPATIENT
Start: 2024-03-20 | End: 2024-03-21 | Stop reason: HOSPADM

## 2024-03-20 RX ORDER — ONDANSETRON 2 MG/ML
4 INJECTION INTRAMUSCULAR; INTRAVENOUS EVERY 6 HOURS PRN
Status: DISCONTINUED | OUTPATIENT
Start: 2024-03-20 | End: 2024-03-21 | Stop reason: HOSPADM

## 2024-03-20 RX ORDER — LOSARTAN POTASSIUM 100 MG/1
100 TABLET ORAL DAILY
Status: DISCONTINUED | OUTPATIENT
Start: 2024-03-20 | End: 2024-03-21 | Stop reason: HOSPADM

## 2024-03-20 RX ORDER — MAGNESIUM SULFATE IN WATER 40 MG/ML
2000 INJECTION, SOLUTION INTRAVENOUS PRN
Status: DISCONTINUED | OUTPATIENT
Start: 2024-03-20 | End: 2024-03-21 | Stop reason: HOSPADM

## 2024-03-20 RX ORDER — ATORVASTATIN CALCIUM 10 MG/1
10 TABLET, FILM COATED ORAL NIGHTLY
Status: DISCONTINUED | OUTPATIENT
Start: 2024-03-20 | End: 2024-03-21 | Stop reason: HOSPADM

## 2024-03-20 RX ORDER — SODIUM CHLORIDE 9 MG/ML
INJECTION, SOLUTION INTRAVENOUS CONTINUOUS
Status: DISCONTINUED | OUTPATIENT
Start: 2024-03-20 | End: 2024-03-21

## 2024-03-20 RX ORDER — SODIUM CHLORIDE 9 MG/ML
INJECTION, SOLUTION INTRAVENOUS PRN
Status: DISCONTINUED | OUTPATIENT
Start: 2024-03-20 | End: 2024-03-21 | Stop reason: HOSPADM

## 2024-03-20 RX ORDER — SODIUM CHLORIDE 0.9 % (FLUSH) 0.9 %
5-40 SYRINGE (ML) INJECTION PRN
Status: DISCONTINUED | OUTPATIENT
Start: 2024-03-20 | End: 2024-03-21 | Stop reason: HOSPADM

## 2024-03-20 RX ORDER — BUDESONIDE AND FORMOTEROL FUMARATE DIHYDRATE 160; 4.5 UG/1; UG/1
2 AEROSOL RESPIRATORY (INHALATION)
Status: DISCONTINUED | OUTPATIENT
Start: 2024-03-20 | End: 2024-03-21 | Stop reason: HOSPADM

## 2024-03-20 RX ORDER — POTASSIUM CHLORIDE 20 MEQ/1
40 TABLET, EXTENDED RELEASE ORAL PRN
Status: DISCONTINUED | OUTPATIENT
Start: 2024-03-20 | End: 2024-03-21 | Stop reason: HOSPADM

## 2024-03-20 RX ORDER — SODIUM CHLORIDE 0.9 % (FLUSH) 0.9 %
5-40 SYRINGE (ML) INJECTION EVERY 12 HOURS SCHEDULED
Status: DISCONTINUED | OUTPATIENT
Start: 2024-03-20 | End: 2024-03-21 | Stop reason: HOSPADM

## 2024-03-20 RX ORDER — ALBUTEROL SULFATE 90 UG/1
2 AEROSOL, METERED RESPIRATORY (INHALATION) EVERY 6 HOURS PRN
Status: DISCONTINUED | OUTPATIENT
Start: 2024-03-20 | End: 2024-03-21 | Stop reason: HOSPADM

## 2024-03-20 RX ORDER — FUROSEMIDE 40 MG/1
40 TABLET ORAL DAILY
Status: DISCONTINUED | OUTPATIENT
Start: 2024-03-20 | End: 2024-03-20

## 2024-03-20 RX ORDER — ACETAMINOPHEN 325 MG/1
650 TABLET ORAL EVERY 6 HOURS PRN
Status: DISCONTINUED | OUTPATIENT
Start: 2024-03-20 | End: 2024-03-21 | Stop reason: HOSPADM

## 2024-03-20 RX ORDER — ACETAMINOPHEN 650 MG/1
650 SUPPOSITORY RECTAL EVERY 6 HOURS PRN
Status: DISCONTINUED | OUTPATIENT
Start: 2024-03-20 | End: 2024-03-21 | Stop reason: HOSPADM

## 2024-03-20 RX ORDER — ONDANSETRON 4 MG/1
4 TABLET, ORALLY DISINTEGRATING ORAL ONCE
Status: COMPLETED | OUTPATIENT
Start: 2024-03-20 | End: 2024-03-20

## 2024-03-20 RX ORDER — POLYETHYLENE GLYCOL 3350 17 G/17G
17 POWDER, FOR SOLUTION ORAL DAILY PRN
Status: DISCONTINUED | OUTPATIENT
Start: 2024-03-20 | End: 2024-03-21 | Stop reason: HOSPADM

## 2024-03-20 RX ORDER — BUSPIRONE HYDROCHLORIDE 7.5 MG/1
15 TABLET ORAL 2 TIMES DAILY
Status: DISCONTINUED | OUTPATIENT
Start: 2024-03-20 | End: 2024-03-21 | Stop reason: HOSPADM

## 2024-03-20 RX ORDER — ASPIRIN 81 MG/1
81 TABLET ORAL DAILY
Status: DISCONTINUED | OUTPATIENT
Start: 2024-03-20 | End: 2024-03-21 | Stop reason: HOSPADM

## 2024-03-20 RX ORDER — METOPROLOL SUCCINATE 50 MG/1
100 TABLET, EXTENDED RELEASE ORAL DAILY
Status: DISCONTINUED | OUTPATIENT
Start: 2024-03-20 | End: 2024-03-21 | Stop reason: HOSPADM

## 2024-03-20 RX ORDER — ONDANSETRON 4 MG/1
4 TABLET, ORALLY DISINTEGRATING ORAL EVERY 8 HOURS PRN
Status: DISCONTINUED | OUTPATIENT
Start: 2024-03-20 | End: 2024-03-21 | Stop reason: HOSPADM

## 2024-03-20 RX ORDER — PANTOPRAZOLE SODIUM 40 MG/1
40 TABLET, DELAYED RELEASE ORAL
Status: DISCONTINUED | OUTPATIENT
Start: 2024-03-21 | End: 2024-03-21 | Stop reason: HOSPADM

## 2024-03-20 RX ORDER — FUROSEMIDE 40 MG/1
40 TABLET ORAL DAILY
Status: DISCONTINUED | OUTPATIENT
Start: 2024-03-21 | End: 2024-03-21 | Stop reason: HOSPADM

## 2024-03-20 RX ADMIN — BUSPIRONE HYDROCHLORIDE 15 MG: 7.5 TABLET ORAL at 14:51

## 2024-03-20 RX ADMIN — ACETAMINOPHEN 1000 MG: 500 TABLET ORAL at 07:15

## 2024-03-20 RX ADMIN — SODIUM CHLORIDE: 9 INJECTION, SOLUTION INTRAVENOUS at 14:55

## 2024-03-20 RX ADMIN — ONDANSETRON 4 MG: 4 TABLET, ORALLY DISINTEGRATING ORAL at 07:15

## 2024-03-20 RX ADMIN — CEFTRIAXONE SODIUM 2000 MG: 2 INJECTION, POWDER, FOR SOLUTION INTRAMUSCULAR; INTRAVENOUS at 07:16

## 2024-03-20 RX ADMIN — ATORVASTATIN CALCIUM 10 MG: 10 TABLET, FILM COATED ORAL at 22:10

## 2024-03-20 RX ADMIN — IOMEPROL INJECTION 75 ML: 714 INJECTION, SOLUTION INTRAVASCULAR at 07:01

## 2024-03-20 RX ADMIN — ASPIRIN 81 MG: 81 TABLET, COATED ORAL at 14:51

## 2024-03-20 RX ADMIN — DOXAZOSIN 4 MG: 2 TABLET ORAL at 22:10

## 2024-03-20 RX ADMIN — METOPROLOL SUCCINATE 100 MG: 50 TABLET, EXTENDED RELEASE ORAL at 14:51

## 2024-03-20 RX ADMIN — ACETAMINOPHEN 650 MG: 325 TABLET ORAL at 15:01

## 2024-03-20 RX ADMIN — BUSPIRONE HYDROCHLORIDE 15 MG: 7.5 TABLET ORAL at 22:10

## 2024-03-20 RX ADMIN — DULOXETINE HYDROCHLORIDE 30 MG: 30 CAPSULE, DELAYED RELEASE ORAL at 22:10

## 2024-03-20 RX ADMIN — LOSARTAN POTASSIUM 100 MG: 50 TABLET, FILM COATED ORAL at 07:47

## 2024-03-20 RX ADMIN — DILTIAZEM HYDROCHLORIDE 120 MG: 120 CAPSULE, EXTENDED RELEASE ORAL at 14:51

## 2024-03-20 RX ADMIN — DULOXETINE HYDROCHLORIDE 30 MG: 30 CAPSULE, DELAYED RELEASE ORAL at 14:51

## 2024-03-20 RX ADMIN — ACETAMINOPHEN 650 MG: 325 TABLET ORAL at 22:09

## 2024-03-20 RX ADMIN — APIXABAN 5 MG: 5 TABLET, FILM COATED ORAL at 14:51

## 2024-03-20 RX ADMIN — APIXABAN 5 MG: 5 TABLET, FILM COATED ORAL at 22:09

## 2024-03-20 RX ADMIN — ISOSORBIDE MONONITRATE 60 MG: 60 TABLET ORAL at 14:51

## 2024-03-20 RX ADMIN — Medication 2 PUFF: at 19:34

## 2024-03-20 ASSESSMENT — PAIN - FUNCTIONAL ASSESSMENT
PAIN_FUNCTIONAL_ASSESSMENT: NONE - DENIES PAIN
PAIN_FUNCTIONAL_ASSESSMENT: ACTIVITIES ARE NOT PREVENTED
PAIN_FUNCTIONAL_ASSESSMENT: NONE - DENIES PAIN
PAIN_FUNCTIONAL_ASSESSMENT: NONE - DENIES PAIN

## 2024-03-20 ASSESSMENT — PAIN DESCRIPTION - PAIN TYPE: TYPE: ACUTE PAIN

## 2024-03-20 ASSESSMENT — PAIN DESCRIPTION - DESCRIPTORS: DESCRIPTORS: ACHING;DISCOMFORT

## 2024-03-20 ASSESSMENT — PAIN SCALES - GENERAL
PAINLEVEL_OUTOF10: 5
PAINLEVEL_OUTOF10: 8
PAINLEVEL_OUTOF10: 6

## 2024-03-20 ASSESSMENT — PAIN DESCRIPTION - LOCATION: LOCATION: HEAD

## 2024-03-20 NOTE — PROGRESS NOTES
Pt arrived to room 219 at this time. VSS. AM assessment completed. No complaints of pain or discomfort voiced. No signs or symptoms of distress noted. Patient tolerated morning medications well. Respirations easy and even. Bed in lowest position, bed alarm in place and functioning properly, SR up x 2 and bed in low position. Call light within reach. Telemetry applied, CMU notified.    Bedside Mobility Assessment Tool (BMAT):     Assessment Level 1- Sit and Shake    1. From a semi-reclined position, ask patient to sit up and rotate to a seated position at the side of the bed. Can use the bedrail.    2. Ask patient to reach out and grab your hand and shake making sure patient reaches across his/her midline.   Pass- Patient is able to come to a seated position, maintain core strength. Maintains seated balance while reaching across midline. Move on to Assessment Level 2.     Assessment Level 2- Stretch and Point   1. With patient in seated position at the side of the bed, have patient place both feet on the floor (or stool) with knees no higher than hips.    2. Ask patient to stretch one leg and straighten the knee, then bend the ankle/flex and point the toes. If appropriate, repeat with the other leg.   Pass- Patient is able to demonstrate appropriate quad strength on intended weight bearing limb(s). Move onto Assessment Level 3.     Assessment Level 3- Stand   1. Ask patient to elevate off the bed or chair (seated to standing) using an assistive device (cane, bedrail).    2. Patient should be able to raise buttocks off be and hold for a count of five. May repeat once.   Pass- Patient maintains standing stability for at least 5 seconds, proceed to assessment level 4.    Assessment Level 4- Walk   1. Ask patient to march in place at bedside.    2. Then ask patient to advance step and return each foot. Some medical conditions may render a patient from stepping backwards, use your best clinical judgement.   Fail- Patient

## 2024-03-20 NOTE — PROGRESS NOTES
RT Inhaler-Nebulizer Bronchodilator Protocol Note    There is a bronchodilator order in the chart from a provider indicating to follow the RT Bronchodilator Protocol and there is an “Initiate RT Inhaler-Nebulizer Bronchodilator Protocol” order as well (see protocol at bottom of note).    CXR Findings:  XR CHEST PORTABLE    Result Date: 3/20/2024  Mild vascular indistinctness either due to low lung volumes or mild edema       The findings from the last RT Protocol Assessment were as follows:   History Pulmonary Disease: Chronic pulmonary disease  Respiratory Pattern: Regular pattern and RR 12-20 bpm  Breath Sounds: Clear breath sounds  Cough: Strong, spontaneous, non-productive  Indication for Bronchodilator Therapy: None  Bronchodilator Assessment Score: 2    Aerosolized bronchodilator medication orders have been revised according to the RT Inhaler-Nebulizer Bronchodilator Protocol below.    Respiratory Therapist to perform RT Therapy Protocol Assessment initially then follow the protocol.  Repeat RT Therapy Protocol Assessment PRN for score 0-3 or on second treatment, BID, and PRN for scores above 3.    No Indications - adjust the frequency to every 6 hours PRN wheezing or bronchospasm, if no treatments needed after 48 hours then discontinue using Per Protocol order mode.     If indication present, adjust the RT bronchodilator orders based on the Bronchodilator Assessment Score as indicated below.  Use Inhaler orders unless patient has one or more of the following: on home nebulizer, not able to hold breath for 10 seconds, is not alert and oriented, cannot activate and use MDI correctly, or respiratory rate 25 breaths per minute or more, then use the equivalent nebulizer order(s) with same Frequency and PRN reasons based on the score.  If a patient is on this medication at home then do not decrease Frequency below that used at home.    0-3 - enter or revise RT bronchodilator order(s) to equivalent RT

## 2024-03-20 NOTE — PLAN OF CARE
Admit PCU    Acute cystitis   Rocephin, cx pending     Elevated trop, no EKG changes no chest pain  Trend, monitor on tele    ROCKY Vines - CNP  03/20/24  8:20 AM
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
## 2024-03-20 NOTE — PROGRESS NOTES
Pt delivers a description of the \"friend\" that brought her to [facility] ED:    States he is a big, tall,  man. 89 y/o. Bald, wears readers, and was wearing a black jacket. His name is Rigo Mccloud.     Pt states she met him about three months ago at [employer] and he offered to let her hunt on his property so they became friends. She states that he \"always has a gun on him\" and described it as a handgun. She states he has been helping drive her places, getting her groceries, and going to her house to watch TV at night. She says he is very pushy and very protective of her, which she feels is very odd since they have only known each other for a few months. She said the \"friend\" told the [facility] staff that she is his client, which the pt states is odd and untrue. Inpatient consult to  placed.

## 2024-03-20 NOTE — ED NOTES
Pt resting with OU closed, resps even and unlab, awaiting [place] [facility]. Pt aware of plan of care and verbalizes understanding

## 2024-03-20 NOTE — PROGRESS NOTES
Explained to pt that she could be a HIPAA pt and explained the parameters of it to her. Pt states that if \"friend\" Rigo Mccloud calls and asks if she's here, we can let him know that she is at the hospital, but she does not want him to visit her in her room. Pt does not want to be a HIPAA pt.     Called registration desk at this time to update them that they should call the pt's RN if any visitors show up.

## 2024-03-20 NOTE — ED NOTES
Pt refusing to allow flu & COVID tests to be preformed. This nurse & Dr Jung spoke with pt @ great length regarding these tests, pt continues to refuse to allow them to be preformed. [facility] [place] [facility] [place] made aware

## 2024-03-20 NOTE — ED PROVIDER NOTES
Received signout from night shift doctor.  Patient with urinary tract infection, significant bladder wall thickening with nausea and unable to tolerate p.o.  Will admit for IV hydration, treatment of UTI.    Patient admitted to hospitalist at [facility].  Please see previous chart for complete workup     Edu Jung MD  03/20/24 7867    
ear normal.      Nose: Nose normal.   Eyes:      General: No scleral icterus.        Right eye: No discharge.         Left eye: No discharge.      Conjunctiva/sclera: Conjunctivae normal.   Cardiovascular:      Rate and Rhythm: Normal rate and regular rhythm.      Heart sounds: Normal heart sounds.   Pulmonary:      Effort: Pulmonary effort is normal. No respiratory distress.      Breath sounds: Normal breath sounds. No wheezing or rales.   Abdominal:      General: Bowel sounds are normal. There is no distension.      Palpations: Abdomen is soft.      Tenderness: There is no abdominal tenderness. There is no guarding or rebound.   Musculoskeletal:      Cervical back: Neck supple.      Right lower leg: Edema present.      Left lower leg: Edema present.   Skin:     Coloration: Skin is not pale.   Neurological:      Mental Status: She is alert.   Psychiatric:         Mood and Affect: Mood normal.         Behavior: Behavior normal.             DIAGNOSTIC RESULTS   LABS:   Labs Reviewed   CBC WITH AUTO DIFFERENTIAL - Abnormal; Notable for the following components:       Result Value    Neutrophils Absolute 8.0 (*)     All other components within normal limits   COMPREHENSIVE METABOLIC PANEL W/ REFLEX TO MG FOR LOW K - Abnormal; Notable for the following components:    Glucose 155 (*)     All other components within normal limits   TROPONIN - Abnormal; Notable for the following components:    Troponin, High Sensitivity 27 (*)     All other components within normal limits   URINALYSIS WITH REFLEX TO CULTURE - Abnormal; Notable for the following components:    Blood, Urine LARGE (*)     Protein, UA 30 (*)     Nitrite, Urine POSITIVE (*)     Leukocyte Esterase, Urine LARGE (*)     All other components within normal limits   MICROSCOPIC URINALYSIS - Abnormal; Notable for the following components:    WBC, UA 21-50 (*)     Bacteria, UA 3+ (*)     All other components within normal limits   RAPID INFLUENZA A/B ANTIGENS
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
## 2024-03-21 ENCOUNTER — APPOINTMENT (OUTPATIENT)
Dept: CT IMAGING | Age: 79
End: 2024-03-21
Payer: MEDICARE

## 2024-03-21 VITALS
SYSTOLIC BLOOD PRESSURE: 116 MMHG | HEART RATE: 60 BPM | BODY MASS INDEX: 38.65 KG/M2 | RESPIRATION RATE: 18 BRPM | TEMPERATURE: 98.3 F | DIASTOLIC BLOOD PRESSURE: 56 MMHG | OXYGEN SATURATION: 92 % | WEIGHT: 226.4 LBS | HEIGHT: 64 IN

## 2024-03-21 PROCEDURE — 6370000000 HC RX 637 (ALT 250 FOR IP): Performed by: STUDENT IN AN ORGANIZED HEALTH CARE EDUCATION/TRAINING PROGRAM

## 2024-03-21 PROCEDURE — 70450 CT HEAD/BRAIN W/O DYE: CPT

## 2024-03-21 PROCEDURE — 6360000002 HC RX W HCPCS

## 2024-03-21 PROCEDURE — 94640 AIRWAY INHALATION TREATMENT: CPT

## 2024-03-21 PROCEDURE — 99238 HOSP IP/OBS DSCHRG MGMT 30/<: CPT | Performed by: INTERNAL MEDICINE

## 2024-03-21 PROCEDURE — 2580000003 HC RX 258

## 2024-03-21 PROCEDURE — 94761 N-INVAS EAR/PLS OXIMETRY MLT: CPT

## 2024-03-21 PROCEDURE — 6370000000 HC RX 637 (ALT 250 FOR IP)

## 2024-03-21 RX ORDER — BUTALBITAL, ACETAMINOPHEN AND CAFFEINE 50; 325; 40 MG/1; MG/1; MG/1
1 TABLET ORAL EVERY 4 HOURS PRN
Status: DISCONTINUED | OUTPATIENT
Start: 2024-03-21 | End: 2024-03-21 | Stop reason: HOSPADM

## 2024-03-21 RX ORDER — AMOXICILLIN 500 MG/1
500 CAPSULE ORAL 3 TIMES DAILY
Qty: 15 CAPSULE | Refills: 0 | Status: SHIPPED | OUTPATIENT
Start: 2024-03-21 | End: 2024-03-26

## 2024-03-21 RX ORDER — LANOLIN ALCOHOL/MO/W.PET/CERES
1000 CREAM (GRAM) TOPICAL DAILY
COMMUNITY

## 2024-03-21 RX ORDER — ONDANSETRON 4 MG/1
4 TABLET, FILM COATED ORAL EVERY 8 HOURS PRN
Qty: 20 TABLET | Refills: 0 | Status: SHIPPED | OUTPATIENT
Start: 2024-03-21

## 2024-03-21 RX ADMIN — BUTALBITAL, ACETAMINOPHEN AND CAFFEINE 1 TABLET: 325; 50; 40 TABLET ORAL at 04:25

## 2024-03-21 RX ADMIN — PANTOPRAZOLE SODIUM 40 MG: 40 TABLET, DELAYED RELEASE ORAL at 06:24

## 2024-03-21 RX ADMIN — CEFTRIAXONE SODIUM 1000 MG: 1 INJECTION, POWDER, FOR SOLUTION INTRAMUSCULAR; INTRAVENOUS at 06:23

## 2024-03-21 RX ADMIN — Medication 2 PUFF: at 08:07

## 2024-03-21 ASSESSMENT — PAIN SCALES - GENERAL: PAINLEVEL_OUTOF10: 10

## 2024-03-21 ASSESSMENT — PAIN DESCRIPTION - LOCATION: LOCATION: HEAD

## 2024-03-21 NOTE — PLAN OF CARE
Problem: Discharge Planning  Goal: Discharge to home or other facility with appropriate resources  3/20/2024 2220 by Bryce Giraldo, RN  Outcome: Progressing  3/20/2024 1615 by Kayla Ewing RN  Outcome: Progressing     Problem: Safety - Adult  Goal: Free from fall injury  3/20/2024 2220 by Bryce Giraldo RN  Outcome: Progressing  3/20/2024 1615 by Kayla Ewing RN  Outcome: Progressing     Problem: Skin/Tissue Integrity  Goal: Absence of new skin breakdown  Description: 1.  Monitor for areas of redness and/or skin breakdown  2.  Assess vascular access sites hourly  3.  Every 4-6 hours minimum:  Change oxygen saturation probe site  4.  Every 4-6 hours:  If on nasal continuous positive airway pressure, respiratory therapy assess nares and determine need for appliance change or resting period.  Outcome: Progressing     Problem: Pain  Goal: Verbalizes/displays adequate comfort level or baseline comfort level  Outcome: Progressing     Problem: Chronic Conditions and Co-morbidities  Goal: Patient's chronic conditions and co-morbidity symptoms are monitored and maintained or improved  Outcome: Progressing

## 2024-03-21 NOTE — DISCHARGE INSTRUCTIONS
F/w PCP in 2 weeks          Heart Failure Resources:  Heart Failure Interactive Workbook:  Go to [email or web address]/publication/?m=372133 for a Free Heart Failure Interactive Workbook provided by The American Heart Association. This interactive workbook will provide information on Healthier Living with Heart Failure. Please copy and paste link into search bar. Use your mouse to scroll through the pages.    HF [place] rosalinda:   Heart Failure Free smart phone rosalinda available for iPhone and Android download. Use your phone to track sodium intake, fluid intake, symptoms, and weight.     Low Sodium Diet / Recipes:  Go to www.[employer] website for “renal” diet which is Low Sodium! [employer] is a dialysis company, but this website offers free seasonal cookbooks. Each quarter, they will release 25-30 new recipes with a breakdown of calories, sodium, and glucose. You can also go to www.[employer]/recipes website for free recipes.     Discharge Instruction Video:  Scan the QR code below with your camera and click the canva.com link to open the video and watch educational information on Heart Failure and Medications from one of our nurses.   https://www.[employer]/design/DAFZnsH_JRk/5EteerlGNBOleJSdgG5rkf/edit    Home Exercise Program:   Identification of Green/Yellow/Red zones:  You should be able to identify when you feel good (green zone), if you have 1-2 symptoms of HF (yellow zone), or if you are in need of medical attention (red zone).  In your CHF education folder you were provided a “stop light tool” to outline this information.     We want to you to rate your exertion levels:    Our therapy team has discussed means of identification with you such as the \"Juma scale.\"  The Juma rating scale ranges from 6 to 20, where 6 means \"no exertion at all\" and 20 means \"maximal exertion.\" The goal is to use this to gauge how much effort it is taking for you to do your normal daily tasks.   You should be able to  recognize when too much exertion is being expended.    Elements of Energy Conservation:   Prioritize/Plan: Decide what needs to be done today, and what can wait for a later date, write to do lists, plan ahead to avoid extra trips, and gather supplies and equipment needed before starting an activity.   Position: Avoid tiring and awkward posture that may impair breathing.  Pace: Slow and steady pace, never rushing!  Pursed lip breathing.  Pursed Lip Breathing: \"Smell the roses, blow out the candles\".

## 2024-03-21 NOTE — PROGRESS NOTES
AM assessment completed. No complaints of pain or discomfort voiced, denies headache at this time. No signs of symptoms of distress noted. Patient tolerated medications well. Respirations easy and even. Bed in lowest position, bed alarm in place and functioning properly, SR up x 2 and bed in low position. Call light within reach.     Bedside Mobility Assessment Tool (BMAT):     Assessment Level 1- Sit and Shake    1. From a semi-reclined position, ask patient to sit up and rotate to a seated position at the side of the bed. Can use the bedrail.    2. Ask patient to reach out and grab your hand and shake making sure patient reaches across his/her midline.   Pass- Patient is able to come to a seated position, maintain core strength. Maintains seated balance while reaching across midline. Move on to Assessment Level 2.     Assessment Level 2- Stretch and Point   1. With patient in seated position at the side of the bed, have patient place both feet on the floor (or stool) with knees no higher than hips.    2. Ask patient to stretch one leg and straighten the knee, then bend the ankle/flex and point the toes. If appropriate, repeat with the other leg.   Pass- Patient is able to demonstrate appropriate quad strength on intended weight bearing limb(s). Move onto Assessment Level 3.     Assessment Level 3- Stand   1. Ask patient to elevate off the bed or chair (seated to standing) using an assistive device (cane, bedrail).    2. Patient should be able to raise buttocks off be and hold for a count of five. May repeat once.   Pass- Patient maintains standing stability for at least 5 seconds, proceed to assessment level 4.    Assessment Level 4- Walk   1. Ask patient to march in place at bedside.    2. Then ask patient to advance step and return each foot. Some medical conditions may render a patient from stepping backwards, use your best clinical judgement.   Fail- Patient not able to complete tasks OR requires use of

## 2024-03-21 NOTE — PLAN OF CARE
Problem: Discharge Planning  Goal: Discharge to home or other facility with appropriate resources  3/21/2024 1001 by Kayla Ewing RN  Outcome: Progressing  3/20/2024 2220 by Bryce Giraldo RN  Outcome: Progressing     Problem: Safety - Adult  Goal: Free from fall injury  3/21/2024 1001 by Kayla Ewing RN  Outcome: Progressing  3/20/2024 2220 by Bryce Giraldo RN  Outcome: Progressing     Problem: Skin/Tissue Integrity  Goal: Absence of new skin breakdown  Description: 1.  Monitor for areas of redness and/or skin breakdown  2.  Assess vascular access sites hourly  3.  Every 4-6 hours minimum:  Change oxygen saturation probe site  4.  Every 4-6 hours:  If on nasal continuous positive airway pressure, respiratory therapy assess nares and determine need for appliance change or resting period.  3/21/2024 1001 by Kayla Ewing RN  Outcome: Progressing  3/20/2024 2220 by Bryce Giraldo RN  Outcome: Progressing     Problem: Pain  Goal: Verbalizes/displays adequate comfort level or baseline comfort level  3/21/2024 1001 by Kayla Ewing RN  Outcome: Progressing  3/20/2024 2220 by Bryce Giraldo RN  Outcome: Progressing     Problem: Chronic Conditions and Co-morbidities  Goal: Patient's chronic conditions and co-morbidity symptoms are monitored and maintained or improved  3/21/2024 1001 by Kayla Ewing RN  Outcome: Progressing  3/20/2024 2220 by Bryce Giraldo RN  Outcome: Progressing

## 2024-03-21 NOTE — PROGRESS NOTES
IM Progress Note    Admit Date:  3/20/2024  1    Interval history:  ***    Subjective:  Ms. Georges ***    Objective:   /63   Pulse 63   Temp 98.3 °F (36.8 °C) (Oral)   Resp 18   Ht 1.626 m (5' 4\")   Wt 102.7 kg (226 lb 6.4 oz)   SpO2 95%   BMI 38.86 kg/m²     Intake/Output Summary (Last 24 hours) at 3/21/2024 0716  Last data filed at 3/21/2024 0312  Gross per 24 hour   Intake 353.33 ml   Output 700 ml   Net -346.67 ml       Physical Exam:    General:  elderly female healthy appearing  Awake, alert and oriented. Appears to be not in any distress  Mucous Membranes:  Pink , anicteric  Neck: No JVD, no carotid bruit, no thyromegaly  Chest:  Clear to auscultation bilaterally, no added sounds  Cardiovascular:  RRR S1S2 heard, no murmurs or gallops  Abdomen:  Soft, obese, undistended,  mild diffuse lower abd + tender, no organomegaly, BS present  Extremities: No edema or cyanosis. Distal pulses well felt  Neurological : grossly normal       Medications:   Scheduled Medications:    losartan  100 mg Oral Daily    apixaban  5 mg Oral BID    aspirin  81 mg Oral Daily    busPIRone  15 mg Oral BID    dilTIAZem  120 mg Oral Daily    doxazosin  4 mg Oral Nightly    DULoxetine  30 mg Oral BID    budesonide-formoterol  2 puff Inhalation BID RT    isosorbide mononitrate  60 mg Oral Daily    metoprolol succinate  100 mg Oral Daily    pantoprazole  40 mg Oral QAM AC    atorvastatin  10 mg Oral Nightly    sodium chloride flush  5-40 mL IntraVENous 2 times per day    cefTRIAXone (ROCEPHIN) IV  1,000 mg IntraVENous Q24H    furosemide  40 mg Oral Daily     I   sodium chloride      sodium chloride 100 mL/hr at 03/20/24 1455     butalbital-acetaminophen-caffeine, albuterol sulfate HFA, sodium chloride flush, sodium chloride, potassium chloride **OR** potassium alternative oral replacement **OR** potassium chloride, magnesium sulfate, ondansetron **OR** ondansetron, polyethylene glycol, acetaminophen **OR** acetaminophen    Lab  Data:  Recent Labs     03/20/24  0605   WBC 10.5   HGB 13.8   HCT 41.8   MCV 87.5        Recent Labs     03/20/24  0605      K 4.0      CO2 30   BUN 16   CREATININE 0.7     No results for input(s): \"CKTOTAL\", \"CKMB\", \"CKMBINDEX\", \"TROPONINI\" in the last 72 hours.    Coagulation: No results found for: \"INR\", \"APTT\"  Cardiac markers:   Lab Results   Component Value Date/Time    CKTOTAL 216 12/12/2021 02:30 PM    TROPONINI <0.01 01/21/2023 01:00 PM         Lab Results   Component Value Date    ALT 16 03/20/2024    AST 24 03/20/2024    ALKPHOS 83 03/20/2024    BILITOT 0.5 03/20/2024       No results found for: \"INR\", \"PROTIME\"    Radiology    CT HEAD WO CONTRAST   Final Result   Atrophy and small-vessel ischemic change, similar to prior.  No acute   intracranial abnormality.      Mild mucosal thickening paranasal sinuses         XR CHEST PORTABLE   Final Result   Mild vascular indistinctness either due to low lung volumes or mild edema         CT ABDOMEN PELVIS W IV CONTRAST Additional Contrast? None   Final Result   Acute cystitis.  Recommend correlation with urinalysis.               RADIOLOGY     XR CHEST PORTABLE   Final Result   Mild vascular indistinctness either due to low lung volumes or mild edema           CT ABDOMEN PELVIS W IV CONTRAST Additional Contrast? None   Final Result   Acute cystitis.  Recommend correlation with urinalysis.                 Principal Problem:    Acute cystitis with hematuria  Active Problems:    CHF (congestive heart failure) (HCC)    Paroxysmal atrial fibrillation (HCC)    Essential hypertension    Anxiety    Bilious vomiting with nausea    Elevated troponin  Resolved Problems:    * No resolved hospital problems. *           ASSESSMENT/PLAN:     Acute Cystitis with hematuria  - CT of abdomen showed diffuse thickening of the urinary bladder with hyperenhancement of the mucosa consistent with acute cystitis   - urinalysis as above  - urine culture pending  - started

## 2024-03-21 NOTE — PLAN OF CARE
Problem: Discharge Planning  Goal: Discharge to home or other facility with appropriate resources  3/21/2024 1101 by Kayla Ewing RN  Outcome: Adequate for Discharge  3/21/2024 1001 by Kayla Ewing RN  Outcome: Progressing  3/20/2024 2220 by Bryce Giraldo RN  Outcome: Progressing     Problem: Safety - Adult  Goal: Free from fall injury  3/21/2024 1101 by Kayla Ewing RN  Outcome: Adequate for Discharge  3/21/2024 1001 by Kayla Ewing RN  Outcome: Progressing  3/20/2024 2220 by Bryce Giraldo RN  Outcome: Progressing     Problem: Skin/Tissue Integrity  Goal: Absence of new skin breakdown  Description: 1.  Monitor for areas of redness and/or skin breakdown  2.  Assess vascular access sites hourly  3.  Every 4-6 hours minimum:  Change oxygen saturation probe site  4.  Every 4-6 hours:  If on nasal continuous positive airway pressure, respiratory therapy assess nares and determine need for appliance change or resting period.  3/21/2024 1101 by Kayla Ewing RN  Outcome: Adequate for Discharge  3/21/2024 1001 by Kayla Ewing RN  Outcome: Progressing  3/20/2024 2220 by Bryce Giraldo RN  Outcome: Progressing     Problem: Pain  Goal: Verbalizes/displays adequate comfort level or baseline comfort level  3/21/2024 1101 by Kayla Ewing RN  Outcome: Adequate for Discharge  3/21/2024 1001 by Kayla Ewing RN  Outcome: Progressing  3/20/2024 2220 by Bryce Giraldo RN  Outcome: Progressing     Problem: Chronic Conditions and Co-morbidities  Goal: Patient's chronic conditions and co-morbidity symptoms are monitored and maintained or improved  3/21/2024 1101 by Kayla Ewing RN  Outcome: Adequate for Discharge  3/21/2024 1001 by Kayla Ewing RN  Outcome: Progressing  3/20/2024 2220 by Bryce Giraldo RN  Outcome: Progressing

## 2024-03-21 NOTE — PROGRESS NOTES
Physician Progress Note      PATIENT:               MATT DARBY  CSN #:                  [id number]  :                       1945  ADMIT DATE:       3/20/2024 5:43 AM  DISCH DATE:  RESPONDING  PROVIDER #:        Hesham Munroe MD          QUERY TEXT:    Patient admitted with Acute Cystitis, noted to have Paroxysmal atrial   fibrillation and is maintained on Eliquis. If possible, please document in   progress notes and discharge summary if you are evaluating and/or treating any   of the following:    The medical record reflects the following:  Risk Factors: CHF, HTN, Age, Sex, Hx of CVA  Clinical Indicators: Per H&P-Paroxysmal atrial fibrillation  Treatment: Eliquis      Thank You,  Tata Porter [facility] CDS  Options provided:  -- Secondary hypercoagulable state in a patient with atrial fibrillation  -- Other - I will add my own diagnosis  -- Disagree - Not applicable / Not valid  -- Disagree - Clinically unable to determine / Unknown  -- Refer to Clinical Documentation Reviewer    PROVIDER RESPONSE TEXT:    This patient has secondary hypercoagulable state in a patient with atrial   fibrillation.    Query created by: Tata Porter on 3/21/2024 3:26 AM      Electronically signed by:  Hesham Munroe MD 3/21/2024 7:16 AM

## 2024-03-21 NOTE — FLOWSHEET NOTE
Shift assessment complete. See doc flow. Nightly medications given see MAR. IVF infusing. A/O x4. Patient c/o headache PRN Tylenol given. Patient denies any pain or nausea at this time. No BMs at this time, still need a stool sample, hx of IBS. Pt continues to refuse Covid/ Flu swab. Pt ambulates with a stand-by assist x1. Bed alarm in place. Call light and bedside table within easy reach.    03/20/24 2153   Vital Signs   Temp 98.6 °F (37 °C)   Temp Source Oral   Pulse 64   Heart Rate Source Monitor   Respirations 16   BP (!) 169/75   MAP (Calculated) 106   BP Location Right upper arm   BP Method Automatic   Patient Position Semi fowlers

## 2024-03-21 NOTE — PROGRESS NOTES
Pt given written and verbal discharge instructions. Pt indicated understanding of home medication and care instructions. Prescriptions provided to pt via preferred pharmacy. Pt's belongings packed. IV removed, pt tolerated. Pt states her daughter has arrived to pick her up downstairs. Transport placed at this time. No needs voiced.

## 2024-03-21 NOTE — DISCHARGE SUMMARY
Name:  Caren Georges  Room:  0219/0219-01  MRN:    [id number]    IM Discharge Summary    Discharging Physician:  Hesham hopkins MD    Admit: 3/20/2024    Discharge:      PCP      [place] Loaiza, ROCKY - CNP    Diagnoses and hospital course  this Admission           Acute Cystitis with hematuria  - CT of abdomen showed diffuse thickening of the urinary bladder with hyperenhancement of the mucosa consistent with acute cystitis   - urinalysis as above  - urine culture pending  - started on Rocephin        IBS with diarrhea  Abdominal Pain  - follows with GI   - was started on regimen and did not take  - started on Metamucil which improved diarrhea  - pt stated abdominal pain and cramping is normal for her  - stool studies ordered      Nausea and Vomiting  - x1 episode after eating at [employer]  - asking for food at this time  - IVF  - monitor      Elevated Troponin  Hx of NOCAD  - troponin 27--30- likely demand given elevated blood pressure   - no acute EKG changes  - denies chest pain  - echo 1/23 showed LV hypertrophy, some atrial enlargement and mild pulmonary HTN  - monitor on telemetry         HTN- uncontrolled  - blood pressure as high as 204/82 in ED  - given Losartan in ED  - continue home regimen of Losartan, Imdur, Toprol and cardura  - blood pressure improved  - monitor blood pressure         IBS with diarrhea  - follows with GI   - was started on regimen and did not take  - started on Metamucil which improved diarrhea        Diastolic CHF  - appears compensated   - pt is on Lasix, losartan   - echo as below  - daily weights, low sodium diet, fluid restriction, strict I/O        PAF  - pt is on eliquis, Cardizem and Toprol  - HR controlled   - monitor on telemetry         Hx of CVA  - continue statin and eliquis        Peptic Ulcer Disease   - continue PPI         COPD with no ae  Lung Fibrosis   - continue PRN albuterol      Depression  Anxiety  - continue Buspar and Cymbalta       HPI   79 y.o. female     NEUTOPHILPCT 76.2 03/20/2024 06:05 AM    LYMPHOPCT 15.3 03/20/2024 06:05 AM    MONOPCT 5.7 03/20/2024 06:05 AM    BASOPCT 0.6 03/20/2024 06:05 AM    NEUTROABS 8.0 03/20/2024 06:05 AM    LYMPHSABS 1.6 03/20/2024 06:05 AM    MONOSABS 0.6 03/20/2024 06:05 AM    EOSABS 0.2 03/20/2024 06:05 AM    BASOSABS 0.1 03/20/2024 06:05 AM     BNP:   Lab Results   Component Value Date/Time     03/20/2024 06:05 AM    K 4.0 03/20/2024 06:05 AM    CO2 30 03/20/2024 06:05 AM    BUN 16 03/20/2024 06:05 AM    CREATININE 0.7 03/20/2024 06:05 AM    CALCIUM 9.4 03/20/2024 06:05 AM                Medication List        START taking these medications      amoxicillin 500 MG capsule  Commonly known as: AMOXIL  Take 1 capsule by mouth 3 times daily for 5 days     ondansetron 4 MG tablet  Commonly known as: Zofran  Take 1 tablet by mouth every 8 hours as needed for Nausea or Vomiting            CHANGE how you take these medications      DULoxetine 30 MG extended release capsule  Commonly known as: CYMBALTA  TAKE 2 CAPSULES TWICE DAILY  What changed: See the new instructions.     omeprazole 40 MG delayed release capsule  Commonly known as: PRILOSEC  To be filled by Provider  What changed:   how much to take  how to take this  when to take this            CONTINUE taking these medications      acetaminophen 500 MG tablet  Commonly known as: TYLENOL  Take 1 tablet by mouth every 6 hours as needed for Pain     albuterol sulfate  (90 Base) MCG/ACT inhaler  Commonly known as: PROVENTIL;VENTOLIN;PROAIR  INHALE 2 PUFFS BY MOUTH EVERY 6 HOURS AS NEEDED FOR WHEEZING     alosetron 0.5 MG tablet  Commonly known as: LOTRONEX  Take 1 tablet by mouth 2 times daily     ALPRAZolam 0.5 MG tablet  Commonly known as: XANAX     apixaban 5 MG Tabs tablet  Commonly known as: ELIQUIS  Take 1 tablet by mouth 2 times daily     aspirin 81 MG EC tablet     busPIRone 15 MG tablet  Commonly known as: BUSPAR     DAILY VITAMIN PO     diclofenac sodium 1 %
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
## 2024-03-21 NOTE — PROGRESS NOTES
Order placed for PRN Fioricet. Dose given. IVF infusing. No other needs noted at this time. Bed alarm in place. Call light and bedside table within reach.
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
## 2024-03-21 NOTE — PROGRESS NOTES
Telemetry removed per discharge order. Pt's family expected to arrive around 1400 for pickup. CMU notified.

## 2024-03-21 NOTE — FLOWSHEET NOTE
Patient awake in bed. C/o migraine level headache. Tylenol has been given twice with no relief either time. Pt with a Hx of HTN and CHF. BPs inconsistent. Perfect Serve sent to MD for a PRN for migraine relief. Awaiting orders. No other needs noted at this time. Bed alarm in place. Call light and bedside table within reach.    03/21/24 0312 03/21/24 0321 03/21/24 0327   Vital Signs   Temp 98.3 °F (36.8 °C)  --   --    Temp Source Oral  --   --    Pulse 65 68 63   Heart Rate Source Monitor Monitor Monitor   Respirations 18 18 18   BP (!) 236/84 (!) 108/56 127/63   MAP (Calculated) 135 73 84   BP Location Right upper arm Left upper arm Left lower arm   BP Method Automatic Automatic Automatic   Patient Position Semi fowlers Semi fowlers Semi fowlers   Oxygen Therapy   SpO2 95 %  --   --    O2 Device None (Room air)  --   --    Height and Weight   Weight - Scale 102.7 kg (226 lb 6.4 oz)  --   --    Weight Method Bed scale;Actual  --   --    BMI (Calculated) 38.9  --   --

## 2024-03-21 NOTE — ACP (ADVANCE CARE PLANNING)
Advance Care Planning     General Advance Care Planning (ACP) Conversation    Date of Conversation: 3/21/2024  Conducted with: Patient with Decision Making Capacity    Healthcare Decision Maker:    Primary Decision Maker: Angela Norwood - Child - [phone]    Secondary Decision Maker: tyler contreras - Other - [phone]    Supplemental (Other) Decision Maker: Huyen Christiansen - Grandchild - [phone]  Click here to complete Healthcare Decision Makers including selection of the Healthcare Decision Maker Relationship (ie \"Primary\").   Today we documented Decision Maker(s) consistent with Legal Next of Kin hierarchy.    Content/Action Overview:  DECLINED ACP Conversation - will revisit periodically  Reviewed DNR/DNI and patient     Length of Voluntary ACP Conversation in minutes:  <16 minutes (Non-Billable)    Susi Cintron RN

## 2024-03-21 NOTE — PROGRESS NOTES
Med recc updated, MD notified. Awaiting medication order adjustments in order to administer AM meds.

## 2024-03-22 ENCOUNTER — CARE COORDINATION (OUTPATIENT)
Dept: CASE MANAGEMENT | Age: 79
End: 2024-03-22

## 2024-03-22 LAB
BACTERIA UR CULT: ABNORMAL
ORGANISM: ABNORMAL

## 2024-03-22 NOTE — CARE COORDINATION
Care Transitions Initial Follow Up Call    Call within 2 business days of discharge: Yes    Patient: Caren Georges Patient : 1945   MRN: [id number]  Reason for Admission: 1 day -> acute cystitis w/ hematuria, IBS w/ diarrhea, abd pain, n/v, elevated troponin, hx NOCAD, HTN-uncontrolled, dCHF, PAF on Eliquis, hx CVA, PUD, COPD no AE, lung fibrosis, depression, anxiety -> home w/ referral to [facility]  Discharge Date: 3/21/24 RARS: Readmission Risk Score: 12.3    Last Discharge Facility       Date Complaint Diagnosis Description Type Department Provider    3/20/24 Nausea Nausea ... ED to [place]-Ad[place] (Discharged) [place] [facility] [place] Renée Gandhi DO; Krystin Cruz...     1st attempt - CTN attempted follow-up outreach to patient. Message left including CTN contact information.     Follow Up  Future Appointments   Date Time Provider Department Center   2024  8:20 AM Cleveland Loaiza APRN - CNP [place] [facility]   2024 10:20 AM Cleveland Loaiza APRN - CNP [place] [facility]     Hazel James, RN  Care Transition Nurse  [phone] mobile

## 2024-03-25 ENCOUNTER — TELEPHONE (OUTPATIENT)
Dept: FAMILY MEDICINE CLINIC | Age: 79
End: 2024-03-25

## 2024-03-25 ENCOUNTER — CARE COORDINATION (OUTPATIENT)
Dept: CASE MANAGEMENT | Age: 79
End: 2024-03-25

## 2024-03-25 DIAGNOSIS — I50.9 OTHER CONGESTIVE HEART FAILURE (HCC): Primary | ICD-10-CM

## 2024-03-25 PROCEDURE — 1111F DSCHRG MED/CURRENT MED MERGE: CPT

## 2024-03-25 NOTE — TELEPHONE ENCOUNTER
On call communication:  Received a call from Mrs. Georges stating that she was returning a call to the office.  Reviewed her EMR and found no indication of contact to her at that time.  Explained that given this was Sunday it would not be likely for someone to be in the office.  It was noted that she has an appointment in the office 3/26/2024.  I suggested it may have been an automatic reminder of that appointment.

## 2024-03-25 NOTE — CARE COORDINATION
Care Transitions Initial Follow Up Call    Call within 2 business days of discharge: Yes    Patient Current Location: Home: [address] [place] [id number]    Care Transition Nurse contacted the patient by telephone to perform post hospital discharge assessment. Verified name and  with patient as identifiers. Provided introduction to self, and explanation of the Care Transition Nurse role.    Patient: Caren Georges Patient : 1945   MRN: [id number]  Reason for Admission: 1 day -> acute cystitis w/ hematuria, IBS w/ diarrhea, abd pain, n/v, elevated troponin, hx NOCAD, HTN-uncontrolled, dCHF, PAF on Eliquis, hx CVA, PUD, COPD no AE, lung fibrosis, depression, anxiety [place] [facility] [place]   Discharge Date: 3/21/24 RARS: Readmission Risk Score: 12.3    Last Discharge Facility       Date Complaint Diagnosis Description Type Department Provider    3/20/24 Nausea Nausea ... ED to Hosp-Admission (Discharged) (ADMITTED) [facility] [place] Renée Gandhi, ; Krystin Cruz...     Challenges to be reviewed by the provider   Additional needs identified to be addressed with provider: Yes    -Needs refill on nitroglycerin (she lost hers)  -Consider home care referral if she is agreeable (had [facility] in recent past)  -I told her to bring all medication bottles for review because she was unclear on dosing of many on phone   -She cannot afford Breo Ellipta ($400)-please consider alternative-I can make referral to my  for assistance with affording medications also. Pt states she has been using same inhaler since prescribed         Method of communication with provider: chart routing.    She has been using Zofran continuously since she had n/v once 2 days ago and wants to avoid it. States she feels weak and deconditioned. States someone from her insurance visits but hasn't returned since she got home. She insists this is through her [employer] insurance program. States they provide housecleaning

## 2024-03-26 ENCOUNTER — OFFICE VISIT (OUTPATIENT)
Dept: FAMILY MEDICINE CLINIC | Age: 79
End: 2024-03-26

## 2024-03-26 VITALS
WEIGHT: 227 LBS | HEART RATE: 76 BPM | DIASTOLIC BLOOD PRESSURE: 86 MMHG | SYSTOLIC BLOOD PRESSURE: 124 MMHG | BODY MASS INDEX: 38.96 KG/M2 | OXYGEN SATURATION: 90 %

## 2024-03-26 DIAGNOSIS — J44.9 CHRONIC OBSTRUCTIVE PULMONARY DISEASE, UNSPECIFIED COPD TYPE (HCC): ICD-10-CM

## 2024-03-26 DIAGNOSIS — N39.0 E-COLI UTI: ICD-10-CM

## 2024-03-26 DIAGNOSIS — I48.0 PAROXYSMAL ATRIAL FIBRILLATION (HCC): ICD-10-CM

## 2024-03-26 DIAGNOSIS — I63.9 CEREBROVASCULAR ACCIDENT (CVA), UNSPECIFIED MECHANISM (HCC): ICD-10-CM

## 2024-03-26 DIAGNOSIS — Z09 HOSPITAL DISCHARGE FOLLOW-UP: Primary | ICD-10-CM

## 2024-03-26 DIAGNOSIS — B96.20 E-COLI UTI: ICD-10-CM

## 2024-03-26 LAB
BILIRUBIN, POC: NEGATIVE
BLOOD URINE, POC: NEGATIVE
CLARITY, POC: CLEAR
COLOR, POC: YELLOW
GLUCOSE URINE, POC: NEGATIVE
KETONES, POC: NEGATIVE
LEUKOCYTE EST, POC: NORMAL
NITRITE, POC: NEGATIVE
PH, POC: 6
PROTEIN, POC: NEGATIVE
SPECIFIC GRAVITY, POC: <=1.005
UROBILINOGEN, POC: 0.2

## 2024-03-26 RX ORDER — NITROGLYCERIN 0.4 MG/1
0.4 TABLET SUBLINGUAL EVERY 5 MIN PRN
Qty: 25 TABLET | Refills: 0 | Status: SHIPPED | OUTPATIENT
Start: 2024-03-26

## 2024-03-26 NOTE — PROGRESS NOTES
daily     ALPRAZolam 0.5 MG tablet  Commonly known as: XANAX     amoxicillin 500 MG capsule  Commonly known as: AMOXIL  Take 1 capsule by mouth 3 times daily for 5 days     apixaban 5 MG Tabs tablet  Commonly known as: ELIQUIS  Take 1 tablet by mouth 2 times daily     aspirin 81 MG EC tablet     busPIRone 15 MG tablet  Commonly known as: BUSPAR     DAILY VITAMIN PO     diclofenac sodium 1 % Gel  Commonly known as: VOLTAREN  Apply 2 g topically 3 times daily     dilTIAZem 120 MG extended release capsule  Commonly known as: Cardizem CD  Take 1 capsule by mouth daily     doxazosin 4 MG tablet  Commonly known as: CARDURA  Indications: Patient has been taking whole tablet (4mg) at bedtime. She will start cutting in half. OZZY James RN 1/26/2023 Take on 4mg tab nightly.     fluticasone furoate-vilanterol 200-25 MCG/ACT Aepb inhaler  Commonly known as: Breo Ellipta  Inhale 1 puff into the lungs daily     * HAIR SKIN AND NAILS FORMULA PO     * OCUVITE PO     hydrocortisone 2.5 % cream  Apply topically 2 times daily.     losartan 100 MG tablet  Commonly known as: COZAAR  Take 1 tablet by mouth daily     metoprolol succinate 100 MG extended release tablet  Commonly known as: TOPROL XL  Take 1 tablet by mouth daily     mometasone 0.1 % cream  Commonly known as: Elocon  Apply topically daily to ears once a day x 1 week then as needed 1-3 times per week     NEURIVA PO     nitroGLYCERIN 0.4 MG SL tablet  Commonly known as: NITROSTAT  Place 1 tablet under the tongue every 5 minutes as needed for Chest pain up to max of 3 total doses. If no relief after 1 dose, call 911.     ondansetron 4 MG tablet  Commonly known as: Zofran  Take 1 tablet by mouth every 8 hours as needed for Nausea or Vomiting     POTASSIUM CHLORIDE PO     simvastatin 20 MG tablet  Commonly known as: ZOCOR  TAKE 1 TABLET NIGHTLY     triamcinolone 0.025 % cream  Commonly known as: KENALOG  Apply topically 2 times daily.     vitamin B-12 1000 MCG tablet  Commonly

## 2024-03-27 LAB — BACTERIA UR CULT: NORMAL

## 2024-03-28 ENCOUNTER — TELEPHONE (OUTPATIENT)
Dept: FAMILY MEDICINE CLINIC | Age: 79
End: 2024-03-28

## 2024-03-28 NOTE — TELEPHONE ENCOUNTER
On Call Communication:   Call from Caren. She is not sure what time her appointment is for Monday. Reviewed EMR and advised appointment is 4/1/2024 at 8:20 AM, Further advised that the appointment for 4/19/2024 has been canceled per EMR review.

## 2024-03-29 ENCOUNTER — CARE COORDINATION (OUTPATIENT)
Dept: CASE MANAGEMENT | Age: 79
End: 2024-03-29

## 2024-03-29 NOTE — CARE COORDINATION
Care Transitions Follow Up Call    Patient Current Location: Home: [address] [place] [phone]    Care Transition Nurse contacted the patient by telephone to follow up after recent hospital admission.      Patient: Caren Georges  Patient : 1945   MRN: [id number]  Reason for Admission: 1 day -> acute cystitis w/ hematuria, IBS w/ diarrhea, abd pain, n/v, elevated troponin, hx NOCAD, HTN-uncontrolled, dCHF, PAF on Eliquis, hx CVA, PUD, COPD no AE, lung fibrosis, depression, anxiety -> home w/ referral to [facility], [place] weight 226#    Discharge Date: 3/21/24 RARS: Readmission Risk Score: 12.3      Needs to be reviewed by the provider   Additional needs identified to be addressed with provider: No         Method of communication with provider: none.    Completed office visit and urine culture negative. Still needs to contact insurance company for Breo Ellipta replacement and notify PCP. States she is getting around her home well and does not need therapy. Denies needs going into weekend.     Addressed changes since last contact:  completed HFU-nitro ordered, to call ins for Breo replacement option that is covered better  Discussed follow-up appointments. If no appointment was previously scheduled, appointment scheduling offered: Yes.   Is follow up appointment scheduled within 7 days of discharge? completed.    Follow Up  Future Appointments   Date Time Provider Department Center   2024  8:20 AM Cleveland Loaiza, ROCKY - CNP [place] [facility]     External follow up appointment(s): [place]    Care Transition Nurse reviewed medical action plan and red flags with patient and discussed any barriers to care and/or understanding of plan of care after discharge. Discussed appropriate site of care based on symptoms and resources available to patient including: PCP  Specialist. The patient agrees to contact the PCP office for questions related to their healthcare.      Patients top risk factors for readmission:

## 2024-04-01 ENCOUNTER — TELEMEDICINE (OUTPATIENT)
Dept: FAMILY MEDICINE CLINIC | Age: 79
End: 2024-04-01
Payer: MEDICARE

## 2024-04-01 DIAGNOSIS — Z00.00 MEDICARE ANNUAL WELLNESS VISIT, SUBSEQUENT: Primary | ICD-10-CM

## 2024-04-01 PROCEDURE — G0439 PPPS, SUBSEQ VISIT: HCPCS

## 2024-04-01 PROCEDURE — 1123F ACP DISCUSS/DSCN MKR DOCD: CPT

## 2024-04-01 PROCEDURE — 99497 ADVNCD CARE PLAN 30 MIN: CPT

## 2024-04-01 ASSESSMENT — PATIENT HEALTH QUESTIONNAIRE - PHQ9
SUM OF ALL RESPONSES TO PHQ9 QUESTIONS 1 & 2: 0
SUM OF ALL RESPONSES TO PHQ QUESTIONS 1-9: 0
SUM OF ALL RESPONSES TO PHQ QUESTIONS 1-9: 0
2. FEELING DOWN, DEPRESSED OR HOPELESS: NOT AT ALL
1. LITTLE INTEREST OR PLEASURE IN DOING THINGS: NOT AT ALL
SUM OF ALL RESPONSES TO PHQ QUESTIONS 1-9: 0
SUM OF ALL RESPONSES TO PHQ QUESTIONS 1-9: 0

## 2024-04-01 NOTE — PATIENT INSTRUCTIONS
Learning About Being Active as an Older Adult  Why is being active important as you get older?     Being active is one of the best things you can do for your health. And it's never too late to start. Being active--or getting active, if you aren't already--has definite benefits. It can:  Give you more energy,  Keep your mind sharp.  Improve balance to reduce your risk of falls.  Help you manage chronic illness with fewer medicines.  No matter how old you are, how fit you are, or what health problems you have, there is a form of activity that will work for you. And the more physical activity you can do, the better your overall health will be.  What kinds of activity can help you stay healthy?  Being more active will make your daily activities easier. Physical activity includes planned exercise and things you do in daily life. There are four types of activity:  Aerobic.  Doing aerobic activity makes your heart and lungs strong.  Includes walking, dancing, and gardening.  Aim for at least 2½ hours spread throughout the week.  It improves your energy and can help you sleep better.  Muscle-strengthening.  This type of activity can help maintain muscle and strengthen bones.  Includes climbing stairs, using resistance bands, and lifting or carrying heavy loads.  Aim for at least twice a week.  It can help protect the knees and other joints.  Stretching.  Stretching gives you better range of motion in joints and muscles.  Includes upper arm stretches, calf stretches, and gentle yoga.  Aim for at least twice a week, preferably after your muscles are warmed up from other activities.  It can help you function better in daily life.  Balancing.  This helps you stay coordinated and have good posture.  Includes heel-to-toe walking, sharath chi, and certain types of yoga.  Aim for at least 3 days a week.  It can reduce your risk of falling.  Even if you have a hard time meeting the recommendations, it's better to be more active

## 2024-04-01 NOTE — PROGRESS NOTES
Medicare Annual Wellness Visit    Caren Georges is here for Medicare AWV    Assessment & Plan   Medicare annual wellness visit, subsequent  Recommendations for Preventive Services Due: see orders and patient instructions/AVS.  Recommended screening schedule for the next 5-10 years is provided to the patient in written form: see Patient Instructions/AVS.     No follow-ups on file.     Subjective   The following acute and/or chronic problems were also addressed today:  Patient via weight today through telephone call visit for Medicare annual wellness visit.  Patient overslept today and did not come in office.  I want to be evaluate the patient's blood pressure given her recent hospitalization.  Patient reports is staying stable at home.  Has upcoming cardiology appointment this month per patient during our conversation today.    Care gaps addressed today.    Patient's complete Health Risk Assessment and screening values have been reviewed and are found in Flowsheets. The following problems were reviewed today and where indicated follow up appointments were made and/or referrals ordered.    Positive Risk Factor Screenings with Interventions:       Cognitive:      Words recalled: 2 Words Recalled     Total Score Interpretation: Abnormal Mini-Cog  Interventions:  Patient declines any further evaluation or treatment            Activity, Diet, and Weight:  On average, how many days per week do you engage in moderate to strenuous exercise (like a brisk walk)?: 0 days  On average, how many minutes do you engage in exercise at this level?: 0 min    Do you eat balanced/healthy meals regularly?: (!) No    There is no height or weight on file to calculate BMI. (!) Abnormal      Inactivity Interventions:  Patient declined any further interventions or treatment  Do you eat balanced/healthy meals regularly Interventions:  Patient declines any further evaluation or treatment  Obesity Interventions:  Patient declines any further

## 2024-04-05 ENCOUNTER — CARE COORDINATION (OUTPATIENT)
Dept: CASE MANAGEMENT | Age: 79
End: 2024-04-05

## 2024-04-05 NOTE — CARE COORDINATION
Care Transitions Follow Up Call      Patient: Caren Georges  Patient : 1945   MRN: [id number]  Reason for Admission: 1 day -> acute cystitis w/ hematuria, IBS w/ diarrhea, abd pain, n/v, elevated troponin, hx NOCAD, HTN-uncontrolled, dCHF, PAF on Eliquis, hx CVA, PUD, COPD no AE, lung fibrosis, depression, anxiety -> home w/ referral to [facility], [place] weight 226#    Discharge Date: 3/21/24 RARS: Readmission Risk Score: 12.3    Unable to reach or leave message at this time.     Follow Up  No future appointments.    Hazel James RN  Care Transition Nurse  [phone] mobile

## 2024-04-09 DIAGNOSIS — R12 HEARTBURN: ICD-10-CM

## 2024-04-09 DIAGNOSIS — R13.19 ESOPHAGEAL DYSPHAGIA: ICD-10-CM

## 2024-04-09 DIAGNOSIS — K44.9 HIATAL HERNIA: ICD-10-CM

## 2024-04-09 RX ORDER — OMEPRAZOLE 40 MG/1
40 CAPSULE, DELAYED RELEASE ORAL 2 TIMES DAILY
Qty: 60 CAPSULE | Refills: 3 | Status: SHIPPED | OUTPATIENT
Start: 2024-04-09

## 2024-04-10 ENCOUNTER — CARE COORDINATION (OUTPATIENT)
Dept: CASE MANAGEMENT | Age: 79
End: 2024-04-10

## 2024-04-10 NOTE — CARE COORDINATION
Care Transitions Follow Up Call    Patient Current Location: Home: [address] [place] [phone]    Care Transition Nurse contacted the patient by telephone to follow up after recent hospital admission.      Patient: Caren Georges  Patient : 1945   MRN: [id number]  Reason for Admission: 1 day -> acute cystitis w/ hematuria, IBS w/ diarrhea, abd pain, n/v, elevated troponin, hx NOCAD, HTN-uncontrolled, dCHF, PAF on Eliquis, hx CVA, PUD, COPD no AE, lung fibrosis, depression, anxiety -> home w/ referral to [facility], [facility] weight 226#    Discharge Date: 3/21/24 RARS: Readmission Risk Score: 12.3    Needs to be reviewed by the provider   Additional needs identified to be addressed with provider: No         Method of communication with provider: none.    Patient reports she is feeling good. Reports one episode of diarrhea and took a pill that resolved it. Denies s/s UTI-reviewed. Denies n/v. She has home scale but forgets to weigh daily. States last weight was at PCP office. States she does not have any swelling and her breathing is at her baseline. State she just called in refill requests to her pharmacy.     CHF education:  -if you take a water pill - do not skip doses  -weigh daily in the morning at the same time and in the same clothing  -report weight gain of >3#/day or >5#/week  -report increased SOB, edema, abd fullness, difficulty lying flat  -report palpitations, cough, difficulty urinating  -reviewed salt and fluid restrictions      Patient resides in [facility]. She is interested in support services like housekeeping. She does not have Medicaid. She is interested in pursuing application-said she tried once but could not get through after a long hold. She is currently private paying a \"girl\" out of pocket because [facility] no longer provides personal home helper. She said [facility] sent her a letter about available services but when she called was told there are none. Per chart review, a referral to [facility]

## 2024-04-11 ENCOUNTER — CARE COORDINATION (OUTPATIENT)
Dept: CASE MANAGEMENT | Age: 79
End: 2024-04-11

## 2024-04-11 NOTE — CARE COORDINATION
CTN Follow Up:    CTN received call back from  Susi with [facility]. She said that she received the referral but did not follow up on it because the issue had been resolved. The issue was with a friend per Susi and resolved. She recommended referral to [facility] which CTN left message on the referral line yesterday.     CTN contacted [facility] again. Spoke with Kendra. She took referral information and will contact patient. She was notified patient has been having on/off trouble with her phone and she will keep trying to call if unable to reach at first.     Hazel James, RN  Care Transition Nurse  [phone] mobile    No future appointments.

## 2024-04-12 ENCOUNTER — CARE COORDINATION (OUTPATIENT)
Dept: CASE MANAGEMENT | Age: 79
End: 2024-04-12

## 2024-04-12 NOTE — CARE COORDINATION
Care Transitions Follow Up Call    Patient: Caren Georges  Patient : 1945   MRN: [id number]  Reason for Admission: 1 day -> acute cystitis w/ hematuria, IBS w/ diarrhea, abd pain, n/v, elevated troponin, hx NOCAD, HTN-uncontrolled, dCHF, PAF on Eliquis, hx CVA, PUD, COPD no AE, lung fibrosis, depression, anxiety -> home w/ referral to [facility], [place] weight 226#    Discharge Date: 3/21/24 RARS: Readmission Risk Score: 12.3  CTN attempted follow-up outreach to patient. Message left including CTN contact information.     Follow Up  No future appointments.    External follow up appointment(s):   Cardiology Dr [place] 2024  [facility] Dr [place] 2024    Hazel James, RN  Care Transition Nurse  [phone] mobile

## 2024-04-16 ENCOUNTER — CARE COORDINATION (OUTPATIENT)
Dept: CASE MANAGEMENT | Age: 79
End: 2024-04-16

## 2024-04-16 NOTE — CARE COORDINATION
Care Transitions Follow Up Call    Patient Current Location: Home: [address] [place] [id number]    Care Transition Nurse contacted the patient by telephone to follow up after recent hospital admission.      Patient: Caren Georges  Patient : 1945   MRN: [id number]  Reason for Admission: 1 day -> acute cystitis w/ hematuria, IBS w/ diarrhea, abd pain, n/v, elevated troponin, hx NOCAD, HTN-uncontrolled, dCHF, PAF on Eliquis, hx CVA, PUD, COPD no AE, lung fibrosis, depression, anxiety -> home w/ referral to APS, DC weight 226#, declined HC at discharge and during care transition  Discharge Date: 3/21/24 RARS: Readmission Risk Score: 12.3    Needs to be reviewed by the provider   Additional needs identified to be addressed with provider: No         Method of communication with provider: none.    Reports her arthritis in her hands is bothering her. States she was evaluated for a scooter at [facility], but was denied. States her co-pay would be 20% but insurance won't allow it. They offered to set her up for therapy and she declined because of chronic back pain/issues.     She spoke with Susi at [employer] since [facility] made referral last week. Said she was trying to call them when she accidentally called [facility]. CTN confirmed she has [employer] number ([phone]). States she thinks she is eligible for Medicaid and wonders if they could help her get a scooter.     She feels well today other than hand arthritis. Today's weight 217#. Using walker to ambulate. Uses scooters in stores for distances. She denies needs. CTN making referral to [facility].     Addressed changes since last contact:  none  Discussed follow-up appointments. If no appointment was previously scheduled, appointment scheduling offered: Yes.   Is follow up appointment scheduled within 7 days of discharge? completed.    External follow up appointment(s):   Cardiology Dr Ty 2024  [facility] Dr Villeda 2024    Care Transition Nurse

## 2024-04-19 PROBLEM — N39.0 UTI (URINARY TRACT INFECTION): Status: RESOLVED | Noted: 2024-03-20 | Resolved: 2024-04-19

## 2024-04-19 PROBLEM — R79.89 ELEVATED TROPONIN: Status: RESOLVED | Noted: 2024-03-20 | Resolved: 2024-04-19

## 2024-04-23 ENCOUNTER — CARE COORDINATION (OUTPATIENT)
Dept: CARE COORDINATION | Age: 79
End: 2024-04-23

## 2024-04-23 NOTE — CARE COORDINATION
Received referral from CTN  Attempted to contact patient for CC Intro call; left HIPPA compliant message and ACM contact information.

## 2024-05-01 ENCOUNTER — TELEPHONE (OUTPATIENT)
Dept: FAMILY MEDICINE CLINIC | Age: 79
End: 2024-05-01

## 2024-05-01 NOTE — TELEPHONE ENCOUNTER
Pt called and said she has thrush again, mouth is white for about 4 days. She thinks she needs something different than the last time because it is worse this time and she thinks it wasn't \"good enough\" medication. [facility] [place] [facility].

## 2024-05-02 ENCOUNTER — TELEPHONE (OUTPATIENT)
Dept: FAMILY MEDICINE CLINIC | Age: 79
End: 2024-05-02

## 2024-05-02 DIAGNOSIS — B37.0 THRUSH: Primary | ICD-10-CM

## 2024-05-02 RX ORDER — FLUCONAZOLE 150 MG/1
150 TABLET ORAL
Qty: 2 TABLET | Refills: 0 | Status: SHIPPED | OUTPATIENT
Start: 2024-05-02 | End: 2024-05-08

## 2024-05-02 NOTE — TELEPHONE ENCOUNTER
I sent Diflucan to the patient's pharmacy.  I would have ordered nystatin but we have been having trouble getting nystatin filled with any local pharmacy.  Trial meds as ordered and follow-up as needed.

## 2024-05-02 NOTE — TELEPHONE ENCOUNTER
(Patient taking differently: Take 1 capsule by mouth 2 times daily Indications: taking one capsule twice daily. OZZY James RN 1/26/2023) 360 capsule 1    busPIRone (BUSPAR) 15 MG tablet Take 15 mg by mouth 2 times daily      ALPRAZolam (XANAX) 0.5 MG tablet Take 0.5 tablets by mouth 2 times daily as needed for Sleep or Anxiety. Indications: taking daily in AM. OZZY James RN 1/26/2023      metoprolol succinate (TOPROL XL) 100 MG extended release tablet Take 1 tablet by mouth daily 90 tablet 1    Cholecalciferol (VITAMIN D3 PO) Take 250 mcg by mouth daily      hydrocortisone 2.5 % cream Apply topically 2 times daily. 28 g 1    Multiple Vitamins-Minerals (HAIR SKIN AND NAILS FORMULA PO) Take by mouth      Multiple Vitamins-Minerals (OCUVITE PO) Take by mouth      Multiple Vitamin (DAILY VITAMIN PO) Take by mouth       No current facility-administered medications for this visit.    1. Thrush  See above pt message. Trial on diflucan. All local pharmacies have been out of nystatin and I have been ordering Diflucan.  Take as directed and follow-up as needed.  - fluconazole (DIFLUCAN) 150 MG tablet; Take 1 tablet by mouth every 72 hours for 6 days  Dispense: 2 tablet; Refill: 0    This document was prepared by a combination of typing and transcription through a voice recognition software.  11-20 min spent on pt care.   Cleveland Loaiza, ROCKY - CNP

## 2024-05-02 NOTE — TELEPHONE ENCOUNTER
Pt is not doing well, states she is having issues walking right now and can not make it to urgent care to be seen.

## 2024-05-07 ENCOUNTER — CARE COORDINATION (OUTPATIENT)
Dept: CARE COORDINATION | Age: 79
End: 2024-05-07

## 2024-05-07 NOTE — CARE COORDINATION
Attempted to contact patient for CC call; left HIPPA compliant message and ACM contact information.

## 2024-05-09 ENCOUNTER — CARE COORDINATION (OUTPATIENT)
Dept: CARE COORDINATION | Age: 79
End: 2024-05-09

## 2024-05-09 NOTE — CARE COORDINATION
Multiple attempts to contact patient unsuccessful  No further outreach scheduled with this ACM; episode of care resolved

## 2024-06-12 ENCOUNTER — TELEPHONE (OUTPATIENT)
Dept: FAMILY MEDICINE CLINIC | Age: 79
End: 2024-06-12

## 2024-06-12 RX ORDER — CLOTRIMAZOLE 10 MG/1
10 LOZENGE ORAL; TOPICAL
Qty: 35 TABLET | Refills: 0 | Status: SHIPPED | OUTPATIENT
Start: 2024-06-12 | End: 2024-06-19

## 2024-06-12 NOTE — TELEPHONE ENCOUNTER
Pt has thrush and asking for something to be called in. Dominic called in diflucan about a month ago and pt states she don't think it helped. Please send something different

## 2024-07-02 ENCOUNTER — OFFICE VISIT (OUTPATIENT)
Dept: FAMILY MEDICINE CLINIC | Age: 79
End: 2024-07-02
Payer: MEDICARE

## 2024-07-02 VITALS — BODY MASS INDEX: 37.22 KG/M2 | WEIGHT: 218 LBS | HEIGHT: 64 IN

## 2024-07-02 DIAGNOSIS — B37.0 ORAL THRUSH: Primary | ICD-10-CM

## 2024-07-02 DIAGNOSIS — K21.9 GASTROESOPHAGEAL REFLUX DISEASE, UNSPECIFIED WHETHER ESOPHAGITIS PRESENT: ICD-10-CM

## 2024-07-02 DIAGNOSIS — M05.79 RHEUMATOID ARTHRITIS INVOLVING MULTIPLE SITES WITH POSITIVE RHEUMATOID FACTOR (HCC): ICD-10-CM

## 2024-07-02 DIAGNOSIS — K58.0 IRRITABLE BOWEL SYNDROME WITH DIARRHEA: ICD-10-CM

## 2024-07-02 PROCEDURE — 99215 OFFICE O/P EST HI 40 MIN: CPT

## 2024-07-02 PROCEDURE — 1123F ACP DISCUSS/DSCN MKR DOCD: CPT

## 2024-07-02 PROCEDURE — G2211 COMPLEX E/M VISIT ADD ON: HCPCS

## 2024-07-02 RX ORDER — METHYLPREDNISOLONE 4 MG/1
TABLET ORAL
Qty: 1 KIT | Refills: 0 | Status: SHIPPED | OUTPATIENT
Start: 2024-07-02 | End: 2024-07-08

## 2024-07-02 RX ORDER — FLUCONAZOLE 150 MG/1
150 TABLET ORAL
Qty: 2 TABLET | Refills: 0 | Status: SHIPPED | OUTPATIENT
Start: 2024-07-02 | End: 2024-07-08

## 2024-07-02 RX ORDER — PANTOPRAZOLE SODIUM 40 MG/1
40 TABLET, DELAYED RELEASE ORAL
Qty: 60 TABLET | Refills: 5 | Status: SHIPPED | OUTPATIENT
Start: 2024-07-02

## 2024-07-02 ASSESSMENT — ENCOUNTER SYMPTOMS
DIARRHEA: 1
COUGH: 1

## 2024-07-02 NOTE — PROGRESS NOTES
Chief Complaint   Patient presents with    Still having issues with Thresh on her lips.      Still having issues with Thresh on her bottom lip. Currently having issues with her bowels & loose stool.        HPI:  Caren Georges is a 79 y.o. (: 1945) here today   for discussion of multiple concerns.    Patient still complaining of oral thrush that comes and goes.  Does not the first time the patient is managing her symptoms.  Has been treated in the past with antifungals seemingly always has an issue.  Patient reports that his infected her entire body entirely.    History of IBS.  Has been having issue over the last couple weeks with diarrhea episodes that are uncontrolled has tried OTC Imodium with no improvement.    Having a great deal of hand pain currently with her history of arthritis.  Cannot provide NSAIDs due to history of CHF.    Chronic cough: He is on Prilosec 40 mg twice daily.  Patient refuses to have EGD.  Not on ACE inhibitor's.      Patient's medications, allergies, past medical, surgical, social and family histories were reviewed and updated as appropriate.    ROS:  Review of Systems   Constitutional: Negative.    HENT: Negative.     Respiratory:  Positive for cough.    Cardiovascular: Negative.    Gastrointestinal:  Positive for diarrhea.   Musculoskeletal:  Positive for arthralgias.   Neurological: Negative.    Psychiatric/Behavioral: Negative.             Hemoglobin A1C (%)   Date Value   2023 6.0       Past Medical History:   Diagnosis Date    Arthritis     Bronchitis     CHF (congestive heart failure) ([facility])     Chronic back pain     Chronic cough     Hyperlipidemia     Hypertension     Irritable bowel syndrome with diarrhea     PAF (paroxysmal atrial fibrillation) ([facility])     Restless legs syndrome (RLS)     Stroke (cerebrum) ([facility])     Ulcer, gastric, acute        Family History   Problem Relation Age of Onset    Asthma Neg Hx     Cancer Neg Hx     Diabetes Neg Hx     Emphysema Neg Hx

## 2024-07-16 ENCOUNTER — TELEPHONE (OUTPATIENT)
Dept: FAMILY MEDICINE CLINIC | Age: 79
End: 2024-07-16

## 2024-07-16 DIAGNOSIS — I99.9 VASCULAR DISEASE: ICD-10-CM

## 2024-07-16 DIAGNOSIS — M79.604 BILATERAL LOWER EXTREMITY PAIN: ICD-10-CM

## 2024-07-16 DIAGNOSIS — M79.605 BILATERAL LOWER EXTREMITY PAIN: ICD-10-CM

## 2024-07-16 DIAGNOSIS — I63.9 CEREBROVASCULAR ACCIDENT (CVA), UNSPECIFIED MECHANISM (HCC): ICD-10-CM

## 2024-07-16 DIAGNOSIS — I99.9 VASCULAR DISORDER OF LOWER EXTREMITY: Primary | ICD-10-CM

## 2024-07-16 DIAGNOSIS — M79.621 PAIN IN RIGHT UPPER ARM: ICD-10-CM

## 2024-07-16 DIAGNOSIS — R09.89 OTHER SPECIFIED SYMPTOMS AND SIGNS INVOLVING THE CIRCULATORY AND RESPIRATORY SYSTEMS: ICD-10-CM

## 2024-07-16 DIAGNOSIS — I50.9 OTHER CONGESTIVE HEART FAILURE (HCC): ICD-10-CM

## 2024-07-19 NOTE — TELEPHONE ENCOUNTER
Can use her hx of stroke, CHF for diagnosis.   
I fixed it. I thought you mean a code for her medical device. My mistake.   
Left message for patient to return my call  
Left message for patient to return my call  
Left message informing patient and will fax order tomorrow.  
Ok to order Duplex arterial US of BLE.   
Order pended unsure what dx to put and want to make sure this is the correct test you want ordered  
Please send.  Suspect will need hand prescription signed  
Pt called in and wanted us to give you these three names and numbers to send her scooter prescription to.   Shira  [phone]  [facility] [phone]  [facility] [phone]  
Pt informed VU would like to have the US done   
Pt informed, pt also states she needs something for the circulation in her legs, she has leg syndrome. She said her feet and legs fall asleep and she gets cramps and has to stand up. Pt uses [facility]  
This test is not covered by insurance for those two diagnoses.  
Would recommend trying compression stockings.  Otherwise we will need to do ultrasound of the patient's lower extremities to assess for vascular disease.  
show

## 2024-07-24 ENCOUNTER — TELEPHONE (OUTPATIENT)
Dept: FAMILY MEDICINE CLINIC | Age: 79
End: 2024-07-24

## 2024-07-24 NOTE — TELEPHONE ENCOUNTER
I have seen the patient on multiple occasions for what she feels is thrush in her mouth.  I have more than once explained the patient I do not feel like her symptoms are related to thrush and more so related to dry lips because she frequently licks her lips.  I do not have a treatment option for her at this time.  If the patient cannot see GI that is up to her but I have also try to treat her for her diarrhea symptoms in the past also.  The most appropriate thing at this point is to see GI because of her ongoing symptoms that do not improve with prior treatment.

## 2024-07-24 NOTE — TELEPHONE ENCOUNTER
I believe this is an ongoing problem for the pt with her Diarrhea symptoms. Please confirm. If so would recommend seeing GI. Would recommend Immodium OTC if not already doing so.     I do not have a solution for her lips. They appear to be dry when I see her. Recommend vasoline multiple times a day to moisturize.

## 2024-07-24 NOTE — TELEPHONE ENCOUNTER
Pt informed, said she can not eat with Vaseline and that her lips are messed up from the thrush cause that medicine you gave her didn't work. The thrush has gone through her whole system and is causing her GI issues. She said Imodium doesn't help her one bit. She can't go anywhere. She said she doesn't want a referral to GI, she's scheduled to see a specialist for legs already.

## 2024-07-24 NOTE — TELEPHONE ENCOUNTER
Pt called and said she needs something for diarrhea, as soon as she eats she has to go to the bathroom. She also said her lip is still peeling off and the medicine you gave her is not working. Pt uses [facility]

## 2024-07-29 RX ORDER — SIMVASTATIN 20 MG
TABLET ORAL
Qty: 90 TABLET | Refills: 0 | Status: SHIPPED | OUTPATIENT
Start: 2024-07-29

## 2024-07-29 NOTE — TELEPHONE ENCOUNTER
Future appt scheduled 0 appt schedule                   Last appt 07/02/2024      Last Written 06/26/2023      simvastatin (ZOCOR) 20 MG tablet   #90  3 RF

## 2024-08-19 DIAGNOSIS — K21.9 GASTROESOPHAGEAL REFLUX DISEASE, UNSPECIFIED WHETHER ESOPHAGITIS PRESENT: ICD-10-CM

## 2024-08-19 RX ORDER — PANTOPRAZOLE SODIUM 40 MG/1
40 TABLET, DELAYED RELEASE ORAL
Qty: 60 TABLET | Refills: 5 | Status: SHIPPED | OUTPATIENT
Start: 2024-08-19

## 2024-08-19 NOTE — TELEPHONE ENCOUNTER
Patient called requesting \"stomach pill\" be sent to [facility], she did not remember the name of the med

## 2024-10-11 ENCOUNTER — OFFICE VISIT (OUTPATIENT)
Dept: FAMILY MEDICINE CLINIC | Age: 79
End: 2024-10-11
Payer: MEDICARE

## 2024-10-11 VITALS
OXYGEN SATURATION: 95 % | HEART RATE: 73 BPM | WEIGHT: 221.2 LBS | BODY MASS INDEX: 37.76 KG/M2 | DIASTOLIC BLOOD PRESSURE: 70 MMHG | SYSTOLIC BLOOD PRESSURE: 140 MMHG | HEIGHT: 64 IN

## 2024-10-11 DIAGNOSIS — K90.9 DIARRHEA DUE TO MALABSORPTION: ICD-10-CM

## 2024-10-11 DIAGNOSIS — J44.9 CHRONIC OBSTRUCTIVE PULMONARY DISEASE, UNSPECIFIED COPD TYPE (HCC): ICD-10-CM

## 2024-10-11 DIAGNOSIS — R19.7 DIARRHEA DUE TO MALABSORPTION: ICD-10-CM

## 2024-10-11 DIAGNOSIS — Z23 INFLUENZA VACCINATION ADMINISTERED AT CURRENT VISIT: ICD-10-CM

## 2024-10-11 DIAGNOSIS — H65.193 ACUTE MEE (MIDDLE EAR EFFUSION), BILATERAL: Primary | ICD-10-CM

## 2024-10-11 DIAGNOSIS — Z86.73 HISTORY OF CEREBELLAR STROKE: ICD-10-CM

## 2024-10-11 DIAGNOSIS — I50.9 OTHER CONGESTIVE HEART FAILURE (HCC): ICD-10-CM

## 2024-10-11 DIAGNOSIS — L65.8 FEMALE PATTERN HAIR LOSS: ICD-10-CM

## 2024-10-11 PROCEDURE — G0008 ADMIN INFLUENZA VIRUS VAC: HCPCS | Performed by: NURSE PRACTITIONER

## 2024-10-11 PROCEDURE — 3078F DIAST BP <80 MM HG: CPT | Performed by: NURSE PRACTITIONER

## 2024-10-11 PROCEDURE — 1123F ACP DISCUSS/DSCN MKR DOCD: CPT | Performed by: NURSE PRACTITIONER

## 2024-10-11 PROCEDURE — 3077F SYST BP >= 140 MM HG: CPT | Performed by: NURSE PRACTITIONER

## 2024-10-11 PROCEDURE — 99214 OFFICE O/P EST MOD 30 MIN: CPT | Performed by: NURSE PRACTITIONER

## 2024-10-11 PROCEDURE — 90653 IIV ADJUVANT VACCINE IM: CPT | Performed by: NURSE PRACTITIONER

## 2024-10-11 RX ORDER — DICYCLOMINE HCL 20 MG
20 TABLET ORAL 2 TIMES DAILY
Qty: 30 TABLET | Refills: 0 | Status: SHIPPED | OUTPATIENT
Start: 2024-10-11

## 2024-10-11 RX ORDER — METHYLPREDNISOLONE 4 MG
TABLET, DOSE PACK ORAL
Qty: 1 KIT | Refills: 0 | Status: SHIPPED | OUTPATIENT
Start: 2024-10-11 | End: 2024-10-17

## 2024-10-11 SDOH — ECONOMIC STABILITY: FOOD INSECURITY: WITHIN THE PAST 12 MONTHS, YOU WORRIED THAT YOUR FOOD WOULD RUN OUT BEFORE YOU GOT MONEY TO BUY MORE.: NEVER TRUE

## 2024-10-11 SDOH — ECONOMIC STABILITY: FOOD INSECURITY: WITHIN THE PAST 12 MONTHS, THE FOOD YOU BOUGHT JUST DIDN'T LAST AND YOU DIDN'T HAVE MONEY TO GET MORE.: NEVER TRUE

## 2024-10-11 SDOH — ECONOMIC STABILITY: INCOME INSECURITY: HOW HARD IS IT FOR YOU TO PAY FOR THE VERY BASICS LIKE FOOD, HOUSING, MEDICAL CARE, AND HEATING?: NOT HARD AT ALL

## 2024-10-11 NOTE — PATIENT INSTRUCTIONS
[facility] [place] [facility]  [facility]  [address] [place], [place], [address]  Ph: [phone]  Office Hours: Tuesdays & Wednesdays 8:00-5:00  Ivan Lambert DO and Mary Castro PA-C    [facility]-- Dr. Balbuena  [address] [place] [address]. [address]  [place] [address]  Main: [phone]

## 2024-10-11 NOTE — PROGRESS NOTES
10/11/2024    Chief Complaint   Patient presents with    Otitis Media     Complains of bilateral ear pain for 2wks       Caren Georges is a 79 y.o. female, presents today for:      ASSESSMENT/PLAN:    1. Acute SURESH (middle ear effusion), bilateral  Start Medrol Dose Jenaro for ear effusion  No need for abx today due to exam  Call if no improvement  - methylPREDNISolone (MEDROL, JENARO,) 4 MG tablet; Take by mouth.  Dispense: 1 kit; Refill: 0    2. Diarrhea due to malabsorption  Trial Bentyl for ? Spasms.  Low concern for infectious diarrhea, bowel obstruction.    Updated labs ordered to r/o organic caues  - dicyclomine (BENTYL) 20 MG tablet; Take 1 tablet by mouth in the morning and at bedtime Abdominal spasms/ diarrhea  Dispense: 30 tablet; Refill: 0  - TSH with Reflex to FT4 ([place] Only); Future  - CBC with Auto Differential; Future  - Vitamin B12; Future  - Vitamin D 25 Hydroxy; Future    3. Female pattern hair loss  Reassurance provided.    - TSH with Reflex to FT4 ([place] Only); Future  - CBC with Auto Differential; Future  - Vitamin B12; Future  - Vitamin D 25 Hydroxy; Future    4. Other congestive heart failure (HCC)  Discussed with patient different between Electric Scooter Medicare will provide vs. Moped patient is requesting.  Additionally, pt previously denied scooter per OT evaluation.    Continue care with Cardiology  No change in medications today  - TSH with Reflex to FT4 ([place] Only); Future  - CBC with Auto Differential; Future  - Vitamin B12; Future  - Vitamin D 25 Hydroxy; Future    5. Chronic obstructive pulmonary disease, unspecified COPD type (HCC)  Controlled today  Cntinue current medications    6. History of cerebellar stroke  Monitor for now.  No new deficits noted today    7. Influenza vaccination administered at current visit  Influenza vaccination given today  - Influenza, FLUAD Trivalent, (age 65 y+), IM, Preservative Free, 0.5mL      Return for 4-6 weeks bowel movements.

## 2024-10-14 ENCOUNTER — LAB (OUTPATIENT)
Dept: FAMILY MEDICINE CLINIC | Age: 79
End: 2024-10-14
Payer: MEDICARE

## 2024-10-14 DIAGNOSIS — R19.7 DIARRHEA DUE TO MALABSORPTION: ICD-10-CM

## 2024-10-14 DIAGNOSIS — I50.9 OTHER CONGESTIVE HEART FAILURE (HCC): ICD-10-CM

## 2024-10-14 DIAGNOSIS — L65.8 FEMALE PATTERN HAIR LOSS: ICD-10-CM

## 2024-10-14 DIAGNOSIS — K90.9 DIARRHEA DUE TO MALABSORPTION: ICD-10-CM

## 2024-10-14 PROCEDURE — 36415 COLL VENOUS BLD VENIPUNCTURE: CPT | Performed by: NURSE PRACTITIONER

## 2024-10-14 NOTE — PROGRESS NOTES
Blood drawn per order.  Needle size: 23 g  Site: L Antecubital.  First attempt successful Yes    Second attempt no    Pressure applied until bleeding stopped.  Pressure applied.    Patient informed to call office or return if bleeding reoccurs and unable to stop.    Tubes drawn: 1 purple     2 red

## 2024-10-15 LAB
25(OH)D3 SERPL-MCNC: 34.3 NG/ML
BASOPHILS # BLD: 0 K/UL (ref 0–0.2)
BASOPHILS NFR BLD: 0.4 %
DEPRECATED RDW RBC AUTO: 13.7 % (ref 12.4–15.4)
EOSINOPHIL # BLD: 0 K/UL (ref 0–0.6)
EOSINOPHIL NFR BLD: 0.1 %
HCT VFR BLD AUTO: 40 % (ref 36–48)
HGB BLD-MCNC: 13.2 G/DL (ref 12–16)
LYMPHOCYTES # BLD: 1 K/UL (ref 1–5.1)
LYMPHOCYTES NFR BLD: 13.6 %
MCH RBC QN AUTO: 29.5 PG (ref 26–34)
MCHC RBC AUTO-ENTMCNC: 33 G/DL (ref 31–36)
MCV RBC AUTO: 89.3 FL (ref 80–100)
MONOCYTES # BLD: 0.4 K/UL (ref 0–1.3)
MONOCYTES NFR BLD: 6.2 %
NEUTROPHILS # BLD: 5.6 K/UL (ref 1.7–7.7)
NEUTROPHILS NFR BLD: 79.7 %
PLATELET # BLD AUTO: 221 K/UL (ref 135–450)
PMV BLD AUTO: 8.7 FL (ref 5–10.5)
RBC # BLD AUTO: 4.48 M/UL (ref 4–5.2)
TSH SERPL DL<=0.005 MIU/L-ACNC: 1.2 UIU/ML (ref 0.27–4.2)
VIT B12 SERPL-MCNC: 406 PG/ML (ref 211–911)
WBC # BLD AUTO: 7.1 K/UL (ref 4–11)

## 2024-10-17 NOTE — RESULT ENCOUNTER NOTE
No anemia/ infection.  Normal thyroid function.  Normal Vitamin D/ B12.    Additionally--- Medicare will not covered the electronic bike you were requesting

## 2024-11-08 ENCOUNTER — OFFICE VISIT (OUTPATIENT)
Dept: FAMILY MEDICINE CLINIC | Age: 79
End: 2024-11-08

## 2024-11-08 VITALS
HEART RATE: 63 BPM | BODY MASS INDEX: 37.59 KG/M2 | SYSTOLIC BLOOD PRESSURE: 128 MMHG | TEMPERATURE: 98.7 F | DIASTOLIC BLOOD PRESSURE: 82 MMHG | WEIGHT: 219 LBS | OXYGEN SATURATION: 99 %

## 2024-11-08 DIAGNOSIS — B37.0 ORAL THRUSH: ICD-10-CM

## 2024-11-08 DIAGNOSIS — L30.9 DERMATITIS OF LIP: ICD-10-CM

## 2024-11-08 DIAGNOSIS — R19.7 DIARRHEA, UNSPECIFIED TYPE: Primary | ICD-10-CM

## 2024-11-08 DIAGNOSIS — H65.193 ACUTE MEE (MIDDLE EAR EFFUSION), BILATERAL: ICD-10-CM

## 2024-11-08 RX ORDER — AZITHROMYCIN 250 MG/1
TABLET, FILM COATED ORAL
Qty: 6 TABLET | Refills: 0 | Status: SHIPPED | OUTPATIENT
Start: 2024-11-08 | End: 2024-11-18

## 2024-11-08 RX ORDER — CLOTRIMAZOLE 10 MG/1
10 LOZENGE ORAL
Qty: 50 TABLET | Refills: 0 | Status: SHIPPED | OUTPATIENT
Start: 2024-11-08 | End: 2024-11-18

## 2024-11-08 RX ORDER — TRIAMCINOLONE ACETONIDE 0.25 MG/G
OINTMENT TOPICAL
Qty: 15 G | Refills: 1 | Status: SHIPPED | OUTPATIENT
Start: 2024-11-08 | End: 2024-11-15

## 2024-11-08 ASSESSMENT — PATIENT HEALTH QUESTIONNAIRE - PHQ9
7. TROUBLE CONCENTRATING ON THINGS, SUCH AS READING THE NEWSPAPER OR WATCHING TELEVISION: NOT AT ALL
9. THOUGHTS THAT YOU WOULD BE BETTER OFF DEAD, OR OF HURTING YOURSELF: NOT AT ALL
1. LITTLE INTEREST OR PLEASURE IN DOING THINGS: NOT AT ALL
4. FEELING TIRED OR HAVING LITTLE ENERGY: NOT AT ALL
8. MOVING OR SPEAKING SO SLOWLY THAT OTHER PEOPLE COULD HAVE NOTICED. OR THE OPPOSITE, BEING SO FIGETY OR RESTLESS THAT YOU HAVE BEEN MOVING AROUND A LOT MORE THAN USUAL: NOT AT ALL
2. FEELING DOWN, DEPRESSED OR HOPELESS: NOT AT ALL
SUM OF ALL RESPONSES TO PHQ QUESTIONS 1-9: 0
SUM OF ALL RESPONSES TO PHQ9 QUESTIONS 1 & 2: 0
SUM OF ALL RESPONSES TO PHQ QUESTIONS 1-9: 0
5. POOR APPETITE OR OVEREATING: NOT AT ALL
SUM OF ALL RESPONSES TO PHQ QUESTIONS 1-9: 0
6. FEELING BAD ABOUT YOURSELF - OR THAT YOU ARE A FAILURE OR HAVE LET YOURSELF OR YOUR FAMILY DOWN: NOT AT ALL
SUM OF ALL RESPONSES TO PHQ QUESTIONS 1-9: 0
3. TROUBLE FALLING OR STAYING ASLEEP: NOT AT ALL
10. IF YOU CHECKED OFF ANY PROBLEMS, HOW DIFFICULT HAVE THESE PROBLEMS MADE IT FOR YOU TO DO YOUR WORK, TAKE CARE OF THINGS AT HOME, OR GET ALONG WITH OTHER PEOPLE: NOT DIFFICULT AT ALL
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
## 2024-11-19 ENCOUNTER — HOSPITAL ENCOUNTER (OUTPATIENT)
Age: 79
Discharge: HOME | End: 2024-11-19
Payer: MEDICARE

## 2024-11-19 DIAGNOSIS — K52.9 CHRONIC DIARRHEA: ICD-10-CM

## 2024-11-19 DIAGNOSIS — K52.9 CHRONIC DIARRHEA: Primary | ICD-10-CM

## 2024-11-19 LAB
ANION GAP SERPL CALCULATED.3IONS-SCNC: 11 MMOL/L (ref 3–16)
BASOPHILS # BLD: 0 K/UL (ref 0–0.2)
BASOPHILS NFR BLD: 0.9 %
BUN SERPL-MCNC: 10 MG/DL (ref 7–20)
CALCIUM SERPL-MCNC: 9.2 MG/DL (ref 8.3–10.6)
CHLORIDE SERPL-SCNC: 97 MMOL/L (ref 99–110)
CO2 SERPL-SCNC: 29 MMOL/L (ref 21–32)
CREAT SERPL-MCNC: 0.7 MG/DL (ref 0.6–1.2)
DEPRECATED RDW RBC AUTO: 13.4 % (ref 12.4–15.4)
EOSINOPHIL # BLD: 0.3 K/UL (ref 0–0.6)
EOSINOPHIL NFR BLD: 5.7 %
GFR SERPLBLD CREATININE-BSD FMLA CKD-EPI: 88 ML/MIN/{1.73_M2}
GLUCOSE SERPL-MCNC: 101 MG/DL (ref 70–99)
HCT VFR BLD AUTO: 38 % (ref 36–48)
HGB BLD-MCNC: 12.5 G/DL (ref 12–16)
IGA SERPL-MCNC: 346 MG/DL (ref 70–400)
LYMPHOCYTES # BLD: 1.4 K/UL (ref 1–5.1)
LYMPHOCYTES NFR BLD: 25.1 %
MCH RBC QN AUTO: 29.2 PG (ref 26–34)
MCHC RBC AUTO-ENTMCNC: 33 G/DL (ref 31–36)
MCV RBC AUTO: 88.5 FL (ref 80–100)
MONOCYTES # BLD: 0.5 K/UL (ref 0–1.3)
MONOCYTES NFR BLD: 9.2 %
NEUTROPHILS # BLD: 3.3 K/UL (ref 1.7–7.7)
NEUTROPHILS NFR BLD: 59.1 %
PLATELET # BLD AUTO: 205 K/UL (ref 135–450)
PMV BLD AUTO: 8.4 FL (ref 5–10.5)
POTASSIUM SERPL-SCNC: 4.4 MMOL/L (ref 3.5–5.1)
RBC # BLD AUTO: 4.29 M/UL (ref 4–5.2)
SODIUM SERPL-SCNC: 137 MMOL/L (ref 136–145)
WBC # BLD AUTO: 5.5 K/UL (ref 4–11)

## 2024-11-19 PROCEDURE — 83516 IMMUNOASSAY NONANTIBODY: CPT

## 2024-11-19 PROCEDURE — 80048 BASIC METABOLIC PNL TOTAL CA: CPT

## 2024-11-19 PROCEDURE — 85025 COMPLETE CBC W/AUTO DIFF WBC: CPT

## 2024-11-19 PROCEDURE — 36415 COLL VENOUS BLD VENIPUNCTURE: CPT

## 2024-11-19 PROCEDURE — 82784 ASSAY IGA/IGD/IGG/IGM EACH: CPT

## 2024-11-20 ENCOUNTER — HOSPITAL ENCOUNTER (OUTPATIENT)
Age: 79
Setting detail: SPECIMEN
Discharge: HOME | End: 2024-11-20
Payer: MEDICARE

## 2024-11-20 DIAGNOSIS — K52.9 CHRONIC DIARRHEA: ICD-10-CM

## 2024-11-20 LAB
C DIFF TOX A+B STL QL IA: NORMAL
TISSUE TRANSGLUTAMINASE IGA: <0.5 U/ML (ref 0–14)

## 2024-11-20 PROCEDURE — 87506 IADNA-DNA/RNA PROBE TQ 6-11: CPT

## 2024-11-20 PROCEDURE — 87449 NOS EACH ORGANISM AG IA: CPT

## 2024-11-20 PROCEDURE — 82653 EL-1 FECAL QUANTITATIVE: CPT

## 2024-11-20 PROCEDURE — 87328 CRYPTOSPORIDIUM AG IA: CPT

## 2024-11-20 PROCEDURE — 87336 ENTAMOEB HIST DISPR AG IA: CPT

## 2024-11-20 PROCEDURE — 83993 ASSAY FOR CALPROTECTIN FECAL: CPT

## 2024-11-20 PROCEDURE — 87324 CLOSTRIDIUM AG IA: CPT

## 2024-11-21 LAB
CRYPTOSP AG STL QL IA: NORMAL
E HISTOLYT AG STL QL IA: NORMAL
G LAMBLIA AG STL QL IA: NORMAL
GI PATHOGENS PNL STL NAA+PROBE: NORMAL

## 2024-11-22 LAB
CALPROTECTIN STL-MCNT: 26 UG/G
ELASTASE PANC STL-MCNT: 548 UG/G

## 2024-12-04 ENCOUNTER — CARE COORDINATION (OUTPATIENT)
Dept: CARE COORDINATION | Age: 79
End: 2024-12-04

## 2024-12-04 NOTE — CARE COORDINATION
Ambulatory Care Coordination Note     2024           Patient contacted the ACM by telephone. Verified name and  with patient as identifiers. Provided introduction to self, and explanation of the ACM role.   Patient declined care management services at this time.          ACM: Ina Robbins RN     Challenges to be reviewed by the provider   Additional needs identified to be addressed with provider No  none       Summary:  Patient contacted this ACM  Patient reports this ACM's contact number was listed in her phone and she was returning the call  Informed patient this ACM has not attempted outreach since 2024.  Informed patient this ACM is not longer working for [facility]  Offered to provide contact number for the care manager of this office; patient declined  Offered to schedule new patient appointment with Dr. Walton; patient declined  Patient reports she has f/u appointments with Dr. Dwyer and Dr. Villeda later this month  Patient further reports she will schedule appointment with Dr. Walton when she gets her test results back from Dr. Dwyer.  No further outreach scheduled with this ACM; episode of care resolved    PCP/Specialist follow up:   Future Appointments         Provider Specialty Dept Phone    2024 11:15 AM Christiane Villeda DO Otolaryngology [phone]

## 2024-12-04 NOTE — CARE COORDINATION
Spoke with patient's daughter DANIEL Norwood; she is listed as emergency contact and on patient's HIPAA form  Introduced role of ACM and informed Ms. Norwood this ACM is no longer with patient's PCP practice  Informed Ms. Norwood this ACM received call from her Mom  Ms. Norwood reports the patient keeps all of her phone calls and contacts listed in her phone and may have contacted this ACM by mistake.  Ms. Norwood further reports she talks with her Mom several times per day and denies any concerns.  No further outreach scheduled with this ACM; episode of care resolved

## 2024-12-18 ENCOUNTER — OFFICE VISIT (OUTPATIENT)
Dept: ENT CLINIC | Age: 79
End: 2024-12-18
Payer: MEDICARE

## 2024-12-18 VITALS
SYSTOLIC BLOOD PRESSURE: 117 MMHG | WEIGHT: 216 LBS | BODY MASS INDEX: 36.88 KG/M2 | HEIGHT: 64 IN | HEART RATE: 69 BPM | DIASTOLIC BLOOD PRESSURE: 72 MMHG

## 2024-12-18 DIAGNOSIS — H60.8X3 CHRONIC ECZEMATOUS OTITIS EXTERNA OF BOTH EARS: Primary | ICD-10-CM

## 2024-12-18 DIAGNOSIS — R23.8 SKIN IRRITATION: ICD-10-CM

## 2024-12-18 PROCEDURE — 1160F RVW MEDS BY RX/DR IN RCRD: CPT | Performed by: OTOLARYNGOLOGY

## 2024-12-18 PROCEDURE — 99213 OFFICE O/P EST LOW 20 MIN: CPT | Performed by: OTOLARYNGOLOGY

## 2024-12-18 PROCEDURE — 1159F MED LIST DOCD IN RCRD: CPT | Performed by: OTOLARYNGOLOGY

## 2024-12-18 PROCEDURE — 3074F SYST BP LT 130 MM HG: CPT | Performed by: OTOLARYNGOLOGY

## 2024-12-18 PROCEDURE — 3078F DIAST BP <80 MM HG: CPT | Performed by: OTOLARYNGOLOGY

## 2024-12-18 PROCEDURE — 1123F ACP DISCUSS/DSCN MKR DOCD: CPT | Performed by: OTOLARYNGOLOGY

## 2024-12-18 RX ORDER — CLOTRIMAZOLE AND BETAMETHASONE DIPROPIONATE 10; .64 MG/G; MG/G
CREAM TOPICAL
Qty: 1 EACH | Refills: 1 | Status: SHIPPED | OUTPATIENT
Start: 2024-12-18

## 2024-12-18 ASSESSMENT — ENCOUNTER SYMPTOMS
SORE THROAT: 0
EYE ITCHING: 0
VOICE CHANGE: 0
APNEA: 0
TROUBLE SWALLOWING: 0
FACIAL SWELLING: 0
SINUS PRESSURE: 0
SHORTNESS OF BREATH: 0
COUGH: 0

## 2024-12-18 NOTE — PROGRESS NOTES
daily) 30 tablet 3    isosorbide mononitrate (IMDUR) 60 MG extended release tablet Take 1 tablet by mouth daily (Patient taking differently: Take 2 tablets by mouth daily) 30 tablet 3    dilTIAZem (CARDIZEM CD) 120 MG extended release capsule Take 1 capsule by mouth daily (Patient taking differently: Take 1 capsule by mouth daily Indications: taking 240mg daily as that is what she has at home - OZZY James RN 1/26/2023) 30 capsule 3    diclofenac sodium (VOLTAREN) 1 % GEL Apply 2 g topically 3 times daily 350 g 2    losartan (COZAAR) 100 MG tablet Take 1 tablet by mouth daily 30 tablet 0    acetaminophen (TYLENOL) 500 MG tablet Take 1 tablet by mouth every 6 hours as needed for Pain 20 tablet 1    Misc Natural Products (NEURIVA PO) Take by mouth      DULoxetine (CYMBALTA) 30 MG extended release capsule TAKE 2 CAPSULES TWICE DAILY (Patient taking differently: Take 1 capsule by mouth 2 times daily Indications: taking one capsule twice daily. OZZY James RN 1/26/2023) 360 capsule 1    busPIRone (BUSPAR) 15 MG tablet Take 15 mg by mouth 2 times daily      ALPRAZolam (XANAX) 0.5 MG tablet Take 0.5 tablets by mouth 2 times daily as needed for Sleep or Anxiety. Indications: taking daily in AM. OZZY James RN 1/26/2023      metoprolol succinate (TOPROL XL) 100 MG extended release tablet Take 1 tablet by mouth daily 90 tablet 1    Cholecalciferol (VITAMIN D3 PO) Take 250 mcg by mouth daily      Multiple Vitamins-Minerals (HAIR SKIN AND NAILS FORMULA PO) Take by mouth      Multiple Vitamins-Minerals (OCUVITE PO) Take by mouth      Multiple Vitamin (DAILY VITAMIN PO) Take by mouth       No current facility-administered medications for this visit.       Review of Systems     Review of Systems   Constitutional:  Negative for appetite change, chills, fatigue, fever and unexpected weight change.   HENT:  Negative for congestion, ear discharge, ear pain, facial swelling, hearing loss, nosebleeds, postnasal drip, sinus pressure, sneezing,

## 2025-01-13 RX ORDER — SIMVASTATIN 20 MG
20 TABLET ORAL NIGHTLY
Qty: 90 TABLET | Refills: 3 | Status: SHIPPED | OUTPATIENT
Start: 2025-01-13

## 2025-01-19 ENCOUNTER — HOSPITAL ENCOUNTER (INPATIENT)
Age: 80
LOS: 2 days | Discharge: HOME HEALTH | DRG: 368 | End: 2025-01-22
Attending: STUDENT IN AN ORGANIZED HEALTH CARE EDUCATION/TRAINING PROGRAM | Admitting: STUDENT IN AN ORGANIZED HEALTH CARE EDUCATION/TRAINING PROGRAM
Payer: MEDICARE

## 2025-01-19 ENCOUNTER — APPOINTMENT (OUTPATIENT)
Dept: CT IMAGING | Age: 80
DRG: 368 | End: 2025-01-19
Payer: MEDICARE

## 2025-01-19 DIAGNOSIS — K92.1 MELENA: ICD-10-CM

## 2025-01-19 DIAGNOSIS — D64.9 ANEMIA, UNSPECIFIED TYPE: ICD-10-CM

## 2025-01-19 DIAGNOSIS — K92.2 GASTROINTESTINAL HEMORRHAGE, UNSPECIFIED GASTROINTESTINAL HEMORRHAGE TYPE: Primary | ICD-10-CM

## 2025-01-19 LAB
ABO + RH BLD: NORMAL
ALBUMIN SERPL-MCNC: 3.8 G/DL (ref 3.4–5)
ALBUMIN/GLOB SERPL: 1.2 {RATIO} (ref 1.1–2.2)
ALP SERPL-CCNC: 82 U/L (ref 40–129)
ALT SERPL-CCNC: 10 U/L (ref 10–40)
ANION GAP SERPL CALCULATED.3IONS-SCNC: 12 MMOL/L (ref 3–16)
ANTI-XA UNFRAC HEPARIN: <0.1 IU/ML (ref 0.3–0.7)
AST SERPL-CCNC: 21 U/L (ref 15–37)
BASOPHILS # BLD: 0.1 K/UL (ref 0–0.2)
BASOPHILS NFR BLD: 0.7 %
BILIRUB SERPL-MCNC: 0.4 MG/DL (ref 0–1)
BLD GP AB SCN SERPL QL: NORMAL
BUN SERPL-MCNC: 15 MG/DL (ref 7–20)
CALCIUM SERPL-MCNC: 8.9 MG/DL (ref 8.3–10.6)
CHLORIDE SERPL-SCNC: 98 MMOL/L (ref 99–110)
CO2 SERPL-SCNC: 30 MMOL/L (ref 21–32)
CREAT SERPL-MCNC: 0.8 MG/DL (ref 0.6–1.2)
DEPRECATED RDW RBC AUTO: 14.8 % (ref 12.4–15.4)
EOSINOPHIL # BLD: 0.3 K/UL (ref 0–0.6)
EOSINOPHIL NFR BLD: 4.2 %
FLUAV RNA RESP QL NAA+PROBE: NOT DETECTED
FLUBV RNA RESP QL NAA+PROBE: NOT DETECTED
GFR SERPLBLD CREATININE-BSD FMLA CKD-EPI: 75 ML/MIN/{1.73_M2}
GLUCOSE SERPL-MCNC: 115 MG/DL (ref 70–99)
HCT VFR BLD AUTO: 34.3 % (ref 36–48)
HEMOCCULT STL QL: ABNORMAL
HGB BLD-MCNC: 11.2 G/DL (ref 12–16)
INR PPP: 0.95 (ref 0.85–1.15)
LACTATE BLDV-SCNC: 1.7 MMOL/L (ref 0.4–1.9)
LYMPHOCYTES # BLD: 2.4 K/UL (ref 1–5.1)
LYMPHOCYTES NFR BLD: 31.6 %
MCH RBC QN AUTO: 27.9 PG (ref 26–34)
MCHC RBC AUTO-ENTMCNC: 32.8 G/DL (ref 31–36)
MCV RBC AUTO: 85.1 FL (ref 80–100)
MONOCYTES # BLD: 0.6 K/UL (ref 0–1.3)
MONOCYTES NFR BLD: 7.8 %
NEUTROPHILS # BLD: 4.2 K/UL (ref 1.7–7.7)
NEUTROPHILS NFR BLD: 55.7 %
PLATELET # BLD AUTO: 238 K/UL (ref 135–450)
PMV BLD AUTO: 8.4 FL (ref 5–10.5)
POTASSIUM SERPL-SCNC: 4.5 MMOL/L (ref 3.5–5.1)
PROT SERPL-MCNC: 6.9 G/DL (ref 6.4–8.2)
PROTHROMBIN TIME: 12.9 SEC (ref 11.9–14.9)
RBC # BLD AUTO: 4.03 M/UL (ref 4–5.2)
SARS-COV-2 RNA RESP QL NAA+PROBE: NOT DETECTED
SODIUM SERPL-SCNC: 140 MMOL/L (ref 136–145)
WBC # BLD AUTO: 7.5 K/UL (ref 4–11)

## 2025-01-19 PROCEDURE — 85025 COMPLETE CBC W/AUTO DIFF WBC: CPT

## 2025-01-19 PROCEDURE — 86850 RBC ANTIBODY SCREEN: CPT

## 2025-01-19 PROCEDURE — 85610 PROTHROMBIN TIME: CPT

## 2025-01-19 PROCEDURE — 86901 BLOOD TYPING SEROLOGIC RH(D): CPT

## 2025-01-19 PROCEDURE — 6360000002 HC RX W HCPCS: Performed by: NURSE PRACTITIONER

## 2025-01-19 PROCEDURE — 82270 OCCULT BLOOD FECES: CPT

## 2025-01-19 PROCEDURE — 74174 CTA ABD&PLVS W/CONTRAST: CPT

## 2025-01-19 PROCEDURE — 93005 ELECTROCARDIOGRAM TRACING: CPT | Performed by: NURSE PRACTITIONER

## 2025-01-19 PROCEDURE — 6360000004 HC RX CONTRAST MEDICATION: Performed by: NURSE PRACTITIONER

## 2025-01-19 PROCEDURE — 96374 THER/PROPH/DIAG INJ IV PUSH: CPT

## 2025-01-19 PROCEDURE — 80053 COMPREHEN METABOLIC PANEL: CPT

## 2025-01-19 PROCEDURE — 99285 EMERGENCY DEPT VISIT HI MDM: CPT

## 2025-01-19 PROCEDURE — 85520 HEPARIN ASSAY: CPT

## 2025-01-19 PROCEDURE — 83605 ASSAY OF LACTIC ACID: CPT

## 2025-01-19 PROCEDURE — 86900 BLOOD TYPING SEROLOGIC ABO: CPT

## 2025-01-19 PROCEDURE — 87636 SARSCOV2 & INF A&B AMP PRB: CPT

## 2025-01-19 RX ORDER — IOPAMIDOL 755 MG/ML
75 INJECTION, SOLUTION INTRAVASCULAR
Status: COMPLETED | OUTPATIENT
Start: 2025-01-19 | End: 2025-01-19

## 2025-01-19 RX ORDER — PANTOPRAZOLE SODIUM 40 MG/10ML
80 INJECTION, POWDER, LYOPHILIZED, FOR SOLUTION INTRAVENOUS ONCE
Status: COMPLETED | OUTPATIENT
Start: 2025-01-19 | End: 2025-01-19

## 2025-01-19 RX ADMIN — IOPAMIDOL 75 ML: 755 INJECTION, SOLUTION INTRAVENOUS at 22:45

## 2025-01-19 RX ADMIN — PANTOPRAZOLE SODIUM 80 MG: 40 INJECTION, POWDER, FOR SOLUTION INTRAVENOUS at 22:08

## 2025-01-19 ASSESSMENT — PAIN SCALES - GENERAL: PAINLEVEL_OUTOF10: 8

## 2025-01-20 ENCOUNTER — ANESTHESIA (OUTPATIENT)
Dept: ENDOSCOPY | Age: 80
DRG: 368 | End: 2025-01-20
Payer: MEDICARE

## 2025-01-20 ENCOUNTER — ANESTHESIA EVENT (OUTPATIENT)
Dept: ENDOSCOPY | Age: 80
DRG: 368 | End: 2025-01-20
Payer: MEDICARE

## 2025-01-20 ENCOUNTER — APPOINTMENT (OUTPATIENT)
Dept: GENERAL RADIOLOGY | Age: 80
DRG: 368 | End: 2025-01-20
Payer: MEDICARE

## 2025-01-20 PROBLEM — K92.2 UPPER GI BLEED: Status: ACTIVE | Noted: 2025-01-20

## 2025-01-20 LAB
ANION GAP SERPL CALCULATED.3IONS-SCNC: 8 MMOL/L (ref 3–16)
BASOPHILS # BLD: 0.1 K/UL (ref 0–0.2)
BASOPHILS NFR BLD: 0.9 %
BUN SERPL-MCNC: 13 MG/DL (ref 7–20)
CALCIUM SERPL-MCNC: 8.2 MG/DL (ref 8.3–10.6)
CHLORIDE SERPL-SCNC: 98 MMOL/L (ref 99–110)
CO2 SERPL-SCNC: 30 MMOL/L (ref 21–32)
CREAT SERPL-MCNC: 0.8 MG/DL (ref 0.6–1.2)
DEPRECATED RDW RBC AUTO: 14.4 % (ref 12.4–15.4)
EKG ATRIAL RATE: 56 BPM
EKG DIAGNOSIS: NORMAL
EKG P AXIS: 66 DEGREES
EKG P-R INTERVAL: 166 MS
EKG Q-T INTERVAL: 462 MS
EKG QRS DURATION: 82 MS
EKG QTC CALCULATION (BAZETT): 445 MS
EKG R AXIS: 4 DEGREES
EKG T AXIS: 23 DEGREES
EKG VENTRICULAR RATE: 56 BPM
EOSINOPHIL # BLD: 0.4 K/UL (ref 0–0.6)
EOSINOPHIL NFR BLD: 6.1 %
FERRITIN SERPL IA-MCNC: 22 NG/ML (ref 15–150)
FOLATE SERPL-MCNC: 18.1 NG/ML (ref 4.78–24.2)
GFR SERPLBLD CREATININE-BSD FMLA CKD-EPI: 75 ML/MIN/{1.73_M2}
GLUCOSE BLD-MCNC: 96 MG/DL (ref 70–99)
GLUCOSE BLD-MCNC: 99 MG/DL (ref 70–99)
GLUCOSE SERPL-MCNC: 101 MG/DL (ref 70–99)
HCT VFR BLD AUTO: 27.6 % (ref 36–48)
HCT VFR BLD AUTO: 29 % (ref 36–48)
HGB BLD-MCNC: 9.2 G/DL (ref 12–16)
HGB BLD-MCNC: 9.6 G/DL (ref 12–16)
IMMATURE RETIC FRACT: 0.26 (ref 0.21–0.37)
IRON SATN MFR SERPL: 6 % (ref 15–50)
IRON SERPL-MCNC: 23 UG/DL (ref 37–145)
LYMPHOCYTES # BLD: 1.7 K/UL (ref 1–5.1)
LYMPHOCYTES NFR BLD: 26.7 %
MCH RBC QN AUTO: 28 PG (ref 26–34)
MCHC RBC AUTO-ENTMCNC: 33.1 G/DL (ref 31–36)
MCV RBC AUTO: 84.5 FL (ref 80–100)
MONOCYTES # BLD: 0.6 K/UL (ref 0–1.3)
MONOCYTES NFR BLD: 9.9 %
NEUTROPHILS # BLD: 3.5 K/UL (ref 1.7–7.7)
NEUTROPHILS NFR BLD: 56.4 %
PERFORMED ON: NORMAL
PERFORMED ON: NORMAL
PLATELET # BLD AUTO: 196 K/UL (ref 135–450)
PMV BLD AUTO: 8.2 FL (ref 5–10.5)
POTASSIUM SERPL-SCNC: 4.1 MMOL/L (ref 3.5–5.1)
RBC # BLD AUTO: 3.44 M/UL (ref 4–5.2)
RETICS # AUTO: 0.05 M/UL (ref 0.02–0.1)
RETICS/RBC NFR AUTO: 1.43 % (ref 0.5–2.18)
SODIUM SERPL-SCNC: 136 MMOL/L (ref 136–145)
TIBC SERPL-MCNC: 360 UG/DL (ref 260–445)
TSH SERPL DL<=0.005 MIU/L-ACNC: 0.82 UIU/ML (ref 0.27–4.2)
VIT B12 SERPL-MCNC: 330 PG/ML (ref 211–911)
WBC # BLD AUTO: 6.3 K/UL (ref 4–11)

## 2025-01-20 PROCEDURE — 85045 AUTOMATED RETICULOCYTE COUNT: CPT

## 2025-01-20 PROCEDURE — 6370000000 HC RX 637 (ALT 250 FOR IP): Performed by: NURSE PRACTITIONER

## 2025-01-20 PROCEDURE — 84443 ASSAY THYROID STIM HORMONE: CPT

## 2025-01-20 PROCEDURE — 36415 COLL VENOUS BLD VENIPUNCTURE: CPT

## 2025-01-20 PROCEDURE — 73030 X-RAY EXAM OF SHOULDER: CPT

## 2025-01-20 PROCEDURE — 3700000000 HC ANESTHESIA ATTENDED CARE: Performed by: INTERNAL MEDICINE

## 2025-01-20 PROCEDURE — 83540 ASSAY OF IRON: CPT

## 2025-01-20 PROCEDURE — 2580000003 HC RX 258: Performed by: NURSE ANESTHETIST, CERTIFIED REGISTERED

## 2025-01-20 PROCEDURE — G0378 HOSPITAL OBSERVATION PER HR: HCPCS

## 2025-01-20 PROCEDURE — 88305 TISSUE EXAM BY PATHOLOGIST: CPT

## 2025-01-20 PROCEDURE — 7100000011 HC PHASE II RECOVERY - ADDTL 15 MIN: Performed by: INTERNAL MEDICINE

## 2025-01-20 PROCEDURE — 6360000002 HC RX W HCPCS: Performed by: STUDENT IN AN ORGANIZED HEALTH CARE EDUCATION/TRAINING PROGRAM

## 2025-01-20 PROCEDURE — 1200000000 HC SEMI PRIVATE

## 2025-01-20 PROCEDURE — 80048 BASIC METABOLIC PNL TOTAL CA: CPT

## 2025-01-20 PROCEDURE — 2500000003 HC RX 250 WO HCPCS: Performed by: STUDENT IN AN ORGANIZED HEALTH CARE EDUCATION/TRAINING PROGRAM

## 2025-01-20 PROCEDURE — 93010 ELECTROCARDIOGRAM REPORT: CPT | Performed by: INTERNAL MEDICINE

## 2025-01-20 PROCEDURE — 3609012400 HC EGD TRANSORAL BIOPSY SINGLE/MULTIPLE: Performed by: INTERNAL MEDICINE

## 2025-01-20 PROCEDURE — 96376 TX/PRO/DX INJ SAME DRUG ADON: CPT

## 2025-01-20 PROCEDURE — 6370000000 HC RX 637 (ALT 250 FOR IP): Performed by: STUDENT IN AN ORGANIZED HEALTH CARE EDUCATION/TRAINING PROGRAM

## 2025-01-20 PROCEDURE — 85014 HEMATOCRIT: CPT

## 2025-01-20 PROCEDURE — 0DB68ZX EXCISION OF STOMACH, VIA NATURAL OR ARTIFICIAL OPENING ENDOSCOPIC, DIAGNOSTIC: ICD-10-PCS | Performed by: INTERNAL MEDICINE

## 2025-01-20 PROCEDURE — 82746 ASSAY OF FOLIC ACID SERUM: CPT

## 2025-01-20 PROCEDURE — 83550 IRON BINDING TEST: CPT

## 2025-01-20 PROCEDURE — 82607 VITAMIN B-12: CPT

## 2025-01-20 PROCEDURE — 85018 HEMOGLOBIN: CPT

## 2025-01-20 PROCEDURE — 82728 ASSAY OF FERRITIN: CPT

## 2025-01-20 PROCEDURE — 85025 COMPLETE CBC W/AUTO DIFF WBC: CPT

## 2025-01-20 PROCEDURE — 7100000010 HC PHASE II RECOVERY - FIRST 15 MIN: Performed by: INTERNAL MEDICINE

## 2025-01-20 PROCEDURE — 6360000002 HC RX W HCPCS: Performed by: NURSE ANESTHETIST, CERTIFIED REGISTERED

## 2025-01-20 PROCEDURE — 3700000001 HC ADD 15 MINUTES (ANESTHESIA): Performed by: INTERNAL MEDICINE

## 2025-01-20 PROCEDURE — 2709999900 HC NON-CHARGEABLE SUPPLY: Performed by: INTERNAL MEDICINE

## 2025-01-20 RX ORDER — LIDOCAINE HYDROCHLORIDE 20 MG/ML
INJECTION, SOLUTION INFILTRATION; PERINEURAL
Status: DISCONTINUED | OUTPATIENT
Start: 2025-01-20 | End: 2025-01-20 | Stop reason: SDUPTHER

## 2025-01-20 RX ORDER — SODIUM CHLORIDE 9 MG/ML
INJECTION, SOLUTION INTRAVENOUS
Status: DISCONTINUED | OUTPATIENT
Start: 2025-01-20 | End: 2025-01-20 | Stop reason: SDUPTHER

## 2025-01-20 RX ORDER — NALOXONE HYDROCHLORIDE 0.4 MG/ML
INJECTION, SOLUTION INTRAMUSCULAR; INTRAVENOUS; SUBCUTANEOUS PRN
Status: DISCONTINUED | OUTPATIENT
Start: 2025-01-20 | End: 2025-01-20 | Stop reason: HOSPADM

## 2025-01-20 RX ORDER — PANTOPRAZOLE SODIUM 20 MG/1
20 TABLET, DELAYED RELEASE ORAL
Status: ON HOLD | COMMUNITY
End: 2025-01-22

## 2025-01-20 RX ORDER — MEPERIDINE HYDROCHLORIDE 25 MG/ML
12.5 INJECTION INTRAMUSCULAR; INTRAVENOUS; SUBCUTANEOUS EVERY 5 MIN PRN
Status: DISCONTINUED | OUTPATIENT
Start: 2025-01-20 | End: 2025-01-20 | Stop reason: HOSPADM

## 2025-01-20 RX ORDER — ACETAMINOPHEN 325 MG/1
650 TABLET ORAL EVERY 6 HOURS PRN
Status: DISCONTINUED | OUTPATIENT
Start: 2025-01-20 | End: 2025-01-22 | Stop reason: HOSPADM

## 2025-01-20 RX ORDER — ASPIRIN 81 MG/1
81 TABLET ORAL DAILY
Status: DISCONTINUED | OUTPATIENT
Start: 2025-01-20 | End: 2025-01-22 | Stop reason: HOSPADM

## 2025-01-20 RX ORDER — METOPROLOL SUCCINATE 50 MG/1
100 TABLET, EXTENDED RELEASE ORAL DAILY
Status: DISCONTINUED | OUTPATIENT
Start: 2025-01-20 | End: 2025-01-20

## 2025-01-20 RX ORDER — SODIUM CHLORIDE 9 MG/ML
INJECTION, SOLUTION INTRAVENOUS PRN
Status: DISCONTINUED | OUTPATIENT
Start: 2025-01-20 | End: 2025-01-22 | Stop reason: HOSPADM

## 2025-01-20 RX ORDER — BUSPIRONE HYDROCHLORIDE 7.5 MG/1
15 TABLET ORAL 2 TIMES DAILY
Status: DISCONTINUED | OUTPATIENT
Start: 2025-01-20 | End: 2025-01-22 | Stop reason: HOSPADM

## 2025-01-20 RX ORDER — DULOXETIN HYDROCHLORIDE 30 MG/1
30 CAPSULE, DELAYED RELEASE ORAL 2 TIMES DAILY
Status: DISCONTINUED | OUTPATIENT
Start: 2025-01-20 | End: 2025-01-22 | Stop reason: HOSPADM

## 2025-01-20 RX ORDER — SODIUM CHLORIDE 0.9 % (FLUSH) 0.9 %
5-40 SYRINGE (ML) INJECTION PRN
Status: DISCONTINUED | OUTPATIENT
Start: 2025-01-20 | End: 2025-01-20 | Stop reason: HOSPADM

## 2025-01-20 RX ORDER — DOXAZOSIN 4 MG/1
4 TABLET ORAL NIGHTLY
Status: ON HOLD | COMMUNITY
End: 2025-01-22 | Stop reason: HOSPADM

## 2025-01-20 RX ORDER — MAGNESIUM SULFATE IN WATER 40 MG/ML
2000 INJECTION, SOLUTION INTRAVENOUS PRN
Status: DISCONTINUED | OUTPATIENT
Start: 2025-01-20 | End: 2025-01-22 | Stop reason: HOSPADM

## 2025-01-20 RX ORDER — FUROSEMIDE 40 MG/1
40 TABLET ORAL DAILY
COMMUNITY

## 2025-01-20 RX ORDER — POTASSIUM CHLORIDE 7.45 MG/ML
10 INJECTION INTRAVENOUS PRN
Status: DISCONTINUED | OUTPATIENT
Start: 2025-01-20 | End: 2025-01-22 | Stop reason: HOSPADM

## 2025-01-20 RX ORDER — LABETALOL HYDROCHLORIDE 5 MG/ML
5 INJECTION, SOLUTION INTRAVENOUS EVERY 10 MIN PRN
Status: DISCONTINUED | OUTPATIENT
Start: 2025-01-20 | End: 2025-01-20 | Stop reason: HOSPADM

## 2025-01-20 RX ORDER — ISOSORBIDE MONONITRATE 60 MG/1
120 TABLET, EXTENDED RELEASE ORAL DAILY
Status: DISCONTINUED | OUTPATIENT
Start: 2025-01-20 | End: 2025-01-22 | Stop reason: HOSPADM

## 2025-01-20 RX ORDER — DIPHENHYDRAMINE HYDROCHLORIDE 50 MG/ML
12.5 INJECTION INTRAMUSCULAR; INTRAVENOUS
Status: DISCONTINUED | OUTPATIENT
Start: 2025-01-20 | End: 2025-01-20 | Stop reason: HOSPADM

## 2025-01-20 RX ORDER — SODIUM CHLORIDE 0.9 % (FLUSH) 0.9 %
5-40 SYRINGE (ML) INJECTION EVERY 12 HOURS SCHEDULED
Status: DISCONTINUED | OUTPATIENT
Start: 2025-01-20 | End: 2025-01-22 | Stop reason: HOSPADM

## 2025-01-20 RX ORDER — OXYCODONE HYDROCHLORIDE 5 MG/1
10 TABLET ORAL PRN
Status: DISCONTINUED | OUTPATIENT
Start: 2025-01-20 | End: 2025-01-20 | Stop reason: HOSPADM

## 2025-01-20 RX ORDER — LIDOCAINE 4 G/G
1 PATCH TOPICAL DAILY
Status: DISCONTINUED | OUTPATIENT
Start: 2025-01-20 | End: 2025-01-22 | Stop reason: HOSPADM

## 2025-01-20 RX ORDER — OXYCODONE HYDROCHLORIDE 5 MG/1
5 TABLET ORAL PRN
Status: DISCONTINUED | OUTPATIENT
Start: 2025-01-20 | End: 2025-01-20 | Stop reason: HOSPADM

## 2025-01-20 RX ORDER — ONDANSETRON 2 MG/ML
4 INJECTION INTRAMUSCULAR; INTRAVENOUS
Status: DISCONTINUED | OUTPATIENT
Start: 2025-01-20 | End: 2025-01-20 | Stop reason: HOSPADM

## 2025-01-20 RX ORDER — PANTOPRAZOLE SODIUM 40 MG/10ML
40 INJECTION, POWDER, LYOPHILIZED, FOR SOLUTION INTRAVENOUS 2 TIMES DAILY
Status: DISCONTINUED | OUTPATIENT
Start: 2025-01-20 | End: 2025-01-22 | Stop reason: HOSPADM

## 2025-01-20 RX ORDER — ATORVASTATIN CALCIUM 10 MG/1
20 TABLET, FILM COATED ORAL DAILY
Status: DISCONTINUED | OUTPATIENT
Start: 2025-01-20 | End: 2025-01-22 | Stop reason: HOSPADM

## 2025-01-20 RX ORDER — POTASSIUM CHLORIDE 750 MG/1
10 TABLET, EXTENDED RELEASE ORAL DAILY
COMMUNITY

## 2025-01-20 RX ORDER — ONDANSETRON 4 MG/1
4 TABLET, ORALLY DISINTEGRATING ORAL EVERY 8 HOURS PRN
Status: DISCONTINUED | OUTPATIENT
Start: 2025-01-20 | End: 2025-01-22 | Stop reason: HOSPADM

## 2025-01-20 RX ORDER — ONDANSETRON 2 MG/ML
4 INJECTION INTRAMUSCULAR; INTRAVENOUS EVERY 6 HOURS PRN
Status: DISCONTINUED | OUTPATIENT
Start: 2025-01-20 | End: 2025-01-22 | Stop reason: HOSPADM

## 2025-01-20 RX ORDER — SODIUM CHLORIDE 0.9 % (FLUSH) 0.9 %
5-40 SYRINGE (ML) INJECTION EVERY 12 HOURS SCHEDULED
Status: DISCONTINUED | OUTPATIENT
Start: 2025-01-20 | End: 2025-01-20 | Stop reason: HOSPADM

## 2025-01-20 RX ORDER — LOSARTAN POTASSIUM 100 MG/1
100 TABLET ORAL DAILY
Status: DISCONTINUED | OUTPATIENT
Start: 2025-01-20 | End: 2025-01-22 | Stop reason: HOSPADM

## 2025-01-20 RX ORDER — ISOSORBIDE MONONITRATE 120 MG/1
120 TABLET, EXTENDED RELEASE ORAL DAILY
COMMUNITY

## 2025-01-20 RX ORDER — DULOXETIN HYDROCHLORIDE 30 MG/1
30 CAPSULE, DELAYED RELEASE ORAL 2 TIMES DAILY
COMMUNITY

## 2025-01-20 RX ORDER — SODIUM CHLORIDE 9 MG/ML
INJECTION, SOLUTION INTRAVENOUS PRN
Status: DISCONTINUED | OUTPATIENT
Start: 2025-01-20 | End: 2025-01-20 | Stop reason: HOSPADM

## 2025-01-20 RX ORDER — ACETAMINOPHEN 650 MG/1
650 SUPPOSITORY RECTAL EVERY 6 HOURS PRN
Status: DISCONTINUED | OUTPATIENT
Start: 2025-01-20 | End: 2025-01-22 | Stop reason: HOSPADM

## 2025-01-20 RX ORDER — SODIUM CHLORIDE 0.9 % (FLUSH) 0.9 %
5-40 SYRINGE (ML) INJECTION PRN
Status: DISCONTINUED | OUTPATIENT
Start: 2025-01-20 | End: 2025-01-22 | Stop reason: HOSPADM

## 2025-01-20 RX ORDER — PROPOFOL 10 MG/ML
INJECTION, EMULSION INTRAVENOUS
Status: DISCONTINUED | OUTPATIENT
Start: 2025-01-20 | End: 2025-01-20 | Stop reason: SDUPTHER

## 2025-01-20 RX ORDER — FUROSEMIDE 40 MG/1
40 TABLET ORAL DAILY
Status: DISCONTINUED | OUTPATIENT
Start: 2025-01-20 | End: 2025-01-22 | Stop reason: HOSPADM

## 2025-01-20 RX ADMIN — PROPOFOL 40 MG: 10 INJECTION, EMULSION INTRAVENOUS at 12:40

## 2025-01-20 RX ADMIN — ACETAMINOPHEN 650 MG: 325 TABLET ORAL at 04:34

## 2025-01-20 RX ADMIN — SODIUM CHLORIDE, PRESERVATIVE FREE 10 ML: 5 INJECTION INTRAVENOUS at 09:00

## 2025-01-20 RX ADMIN — PROPOFOL 40 MG: 10 INJECTION, EMULSION INTRAVENOUS at 12:42

## 2025-01-20 RX ADMIN — SODIUM CHLORIDE: 0.9 INJECTION, SOLUTION INTRAVENOUS at 12:33

## 2025-01-20 RX ADMIN — BUSPIRONE HYDROCHLORIDE 15 MG: 7.5 TABLET ORAL at 20:37

## 2025-01-20 RX ADMIN — ACETAMINOPHEN 650 MG: 325 TABLET ORAL at 18:45

## 2025-01-20 RX ADMIN — PROPOFOL 20 MG: 10 INJECTION, EMULSION INTRAVENOUS at 12:44

## 2025-01-20 RX ADMIN — LIDOCAINE HYDROCHLORIDE 80 MG: 20 INJECTION, SOLUTION INFILTRATION; PERINEURAL at 12:40

## 2025-01-20 RX ADMIN — SODIUM CHLORIDE, PRESERVATIVE FREE 10 ML: 5 INJECTION INTRAVENOUS at 20:38

## 2025-01-20 RX ADMIN — DULOXETINE HYDROCHLORIDE 30 MG: 30 CAPSULE, DELAYED RELEASE ORAL at 20:37

## 2025-01-20 RX ADMIN — Medication 3 MG: at 20:45

## 2025-01-20 RX ADMIN — PANTOPRAZOLE SODIUM 40 MG: 40 INJECTION, POWDER, LYOPHILIZED, FOR SOLUTION INTRAVENOUS at 20:37

## 2025-01-20 ASSESSMENT — PAIN DESCRIPTION - LOCATION
LOCATION: GENERALIZED;HEAD
LOCATION: BACK;SHOULDER
LOCATION: GENERALIZED

## 2025-01-20 ASSESSMENT — PAIN SCALES - GENERAL
PAINLEVEL_OUTOF10: 0
PAINLEVEL_OUTOF10: 7
PAINLEVEL_OUTOF10: 3
PAINLEVEL_OUTOF10: 10
PAINLEVEL_OUTOF10: 4

## 2025-01-20 ASSESSMENT — PAIN DESCRIPTION - DESCRIPTORS
DESCRIPTORS: ACHING;DISCOMFORT
DESCRIPTORS: ACHING
DESCRIPTORS: ACHING;DISCOMFORT
DESCRIPTORS: ACHING

## 2025-01-20 ASSESSMENT — PAIN - FUNCTIONAL ASSESSMENT: PAIN_FUNCTIONAL_ASSESSMENT: PREVENTS OR INTERFERES SOME ACTIVE ACTIVITIES AND ADLS

## 2025-01-20 NOTE — ANESTHESIA PRE PROCEDURE
Department of Anesthesiology  Preprocedure Note       Name:  Caren Georges   Age:  79 y.o.  :  1945                                          MRN:  [id number]         Date:  2025      Surgeon: Surgeon(s):  Kaley Gonzáles MD    Procedure: Procedure(s):  EGD W/ANES.    Medications prior to admission:   Prior to Admission medications    Medication Sig Start Date End Date Taking? Authorizing Provider   DULoxetine (CYMBALTA) 30 MG extended release capsule Take 1 capsule by mouth 2 times daily   Yes Millie Mandujano MD   furosemide (LASIX) 40 MG tablet Take 1 tablet by mouth daily   Yes Millie Mandujano MD   isosorbide mononitrate (IMDUR) 120 MG extended release tablet Take 1 tablet by mouth daily   Yes Millie Mandujano MD   pantoprazole (PROTONIX) 20 MG tablet Take 1 tablet by mouth every morning (before breakfast)   Yes Millie Mandujano MD   potassium chloride (KLOR-CON) 10 MEQ extended release tablet Take 1 tablet by mouth daily   Yes Millie Mandujano MD   simvastatin (ZOCOR) 20 MG tablet Take 1 tablet by mouth nightly 25  Yes Migel Ocampo MD   clotrimazole-betamethasone (LOTRISONE) 1-0.05 % cream Apply topically 2 times daily x 2 weeks to bilateral ears and lower lip 24  Yes Christiane Villeda DO   nitroGLYCERIN (NITROSTAT) 0.4 MG SL tablet Place 1 tablet under the tongue every 5 minutes as needed for Chest pain up to max of 3 total doses. If no relief after 1 dose, call 911. 3/26/24  Yes Cleveland Loaiza, APRN - CNP   aspirin 81 MG EC tablet Take 1 tablet by mouth daily   Yes Millie Mandujano MD   diclofenac sodium (VOLTAREN) 1 % GEL Apply 2 g topically 3 times daily 3/10/22  Yes Desiree Reyes MD   losartan (COZAAR) 100 MG tablet Take 1 tablet by mouth daily 22  Yes Boris Colbert DO   busPIRone (BUSPAR) 15 MG tablet Take 15 mg by mouth 2 times daily 3/19/21  Yes Millie Mandujano MD   metoprolol succinate (TOPROL XL) 100 MG extended release tablet
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
## 2025-01-20 NOTE — ACP (ADVANCE CARE PLANNING)
Advance Care Planning     Advance Care Planning Inpatient Note  [facility] Department    Today's Date: 1/20/2025  Unit: [facility] [place] [facility]ICAL    Received request from patient and family.  Upon review of chart and communication with care team, patient's decision making abilities are not in question.. Patient, Healthcare Decision Maker, and Child/Children was/were present in the room during visit.    Goals of ACP Conversation:  Discuss advance care planning documents  Facilitate a discussion related to patient's goals of care as they align with the patient's values and beliefs.    Health Care Decision Makers:         Primary Decision Maker: Angela Norwood - Child - [phone]    Secondary Decision Maker: Huyen Christiansen - Grandchild - [phone] \"Little Huyen\"  Summary:  Verified & Updated Decision makers.     Bjorn has a document completed on 1/25/23 that is not current, she hopes to complete new documents with the above Decision Makers listed.    Advance Care Planning Documents (Patient Wishes):  Healthcare Power of /Advance Directive Appointment of Health Care Agent         Interventions:  Provided education on documents for clarity and greater understanding  Discussed and provided education on state decision maker hierarchy  Assisted in the completion of documents according to patient's wishes at this time  Encouraged ongoing ACP conversation with future decision makers and loved ones    Care Preferences Communicated:     Hospitalization:  If the patient's health worsens and it becomes clear that the chance of recovery is unlikely,     the patient wants hospitalization.    Ventilation:   If the patient, in their present state of health, suddenly became very ill and unable to breathe on their own,     the patient would desire the use of a ventilator (breathing machine).    If their health worsens and it becomes clear that the change of recovery is unlikely,     the patient would desire
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
## 2025-01-20 NOTE — ED PROVIDER NOTES
[facility] EMERGENCY DEPARTMENT  EMERGENCY DEPARTMENT ENCOUNTER        Patient Name: Caren Georges  MRN: [id number]  Birthdate 1945  Date of evaluation: 1/19/2025  Provider: Marcy Loaiza MD  PCP: No primary care provider on file.  Note Started: 10:42 PM EST 1/19/25    I independently examined and evaluated Caren Georges. I personally saw the patient and performed a substantive portion of the visit including all aspects of the medical decision making.  I made/approved the management plan and take responsibility for the patient management.  I am the primary physician of record.    CHIEF COMPLAINT  Melena       HISTORY OF PRESENT ILLNESS  History from : Patient    Limitations to history : None    In brief, Caren Georges is a 79 y.o. female  has a past medical history of Arthritis, Bronchitis, CHF (congestive heart failure) ([facility]), Chronic back pain, Chronic cough, Hyperlipidemia, Hypertension, Irritable bowel syndrome with diarrhea, PAF (paroxysmal atrial fibrillation) ([facility]), Restless legs syndrome (RLS), Stroke (cerebrum) ([facility]), and Ulcer, gastric, acute., who presents to the ED complaining of melena.  Patient has had black stool intermittently for the past month.  She does report some lower abdominal pain.  She states it was worse today, 2 black bowel movements.  Denies any vomiting, diarrhea, dysuria, vaginal bleeding or discharge.  Patient has Eliquis listed on her home meds but states she does not take it.     REVIEW OF SYSTEMS  All systems reviewed, pertinent positives per HPI otherwise noted to be negative.    Focused exam revealed   PHYSICAL EXAM  ED Triage Vitals   BP Systolic BP Percentile Diastolic BP Percentile Temp Temp Source Pulse Respirations SpO2   01/19/25 2150 -- -- 01/19/25 2150 01/19/25 2150 01/19/25 2150 01/19/25 2150 01/19/25 2150   (!) 141/76   98.3 °F (36.8 °C) Oral (!) 111 18 97 %      Height Weight - Scale         01/19/25 2149 01/19/25 2149         1.626 m (5' 4\") 102.2 kg (225 lb

## 2025-01-20 NOTE — PROGRESS NOTES
Per glenda cmu pt visible on monitor while in recovery.      Report called to Sara mendez regarding egd.

## 2025-01-20 NOTE — CARE COORDINATION
Case Management Assessment  Initial Evaluation    Date/Time of Evaluation: 1/20/2025 9:30 AM  Assessment Completed by: Avis Ramirez    If patient is discharged prior to next notation, then this note serves as note for discharge by case management.    Patient Name: Caren Georges                   YOB: 1945  Diagnosis: Upper GI bleed [K92.2]  Gastrointestinal hemorrhage, unspecified gastrointestinal hemorrhage type [K92.2]                   Date / Time: 1/19/2025  9:30 PM    Patient Admission Status: Inpatient   Readmission Risk (Low < 19, Mod (19-27), High > 27): Readmission Risk Score: 13.5    Current PCP: No primary care provider on file.  PCP verified by CM? Yes (Eduar)    Chart Reviewed: Yes      History Provided by: Patient  Patient Orientation: Alert and Oriented    Patient Cognition: Alert    Hospitalization in the last 30 days (Readmission):  No    If yes, Readmission Assessment in CM Navigator will be completed.    Advance Directives:      Code Status: Full Code   Patient's Primary Decision Maker is: Named in Scanned ACP Document    Primary Decision Maker: Angela Norwood - Child - [phone]    Discharge Planning:    Patient lives with: Alone Type of Home: House  Primary Care Giver: Self  Patient Support Systems include: Children   Current Financial resources: Medicare  Current community resources: None  Current services prior to admission: Durable Medical Equipment            Current DME: Walker, [employer]            Type of Home Care services:  None    ADLS  Prior functional level: Assistance with the following:, Bathing, Dressing, Cooking, Housework, Shopping, Mobility  Current functional level: Assistance with the following:, Bathing, Dressing, Mobility, Housework, Cooking, Shopping    PT AM-PAC:   /24  OT AM-PAC:   /24    Family can provide assistance at DC: Yes  Would you like Case Management to discuss the discharge plan with any other family members/significant others, and if so,

## 2025-01-20 NOTE — H&P
History and Physical      Name:  Caren Georges /Age/Sex: 1945  (79 y.o. female)   MRN & CSN:  [id number] & [id number] Encounter Date/Time: 2025 12:04 AM   Location:   PCP: No primary care provider on file.       Hospital Day: 2    Assessment and Plan:     Patient is a 79 y.o. female who presented with melena.     # Upper GI bleed  # Acute blood loss anemia  # Hx of peptic ulcer disease  - Reported fatigue with melanotic stools over past months, worse over past few days. No known other source of bleeding. No anticoagulants or NSAID use. Last EGD in 2019 showed large hiatal hernia with normal esophagus, stomach and duodenum.  - Hemodynamically stable, Hg 11.2, baseline ~12-13. BUN:Cr not elevated. FOBT positive. CTA negative for acute bleed.   - Follow-up repeat labs and anemia panel, transfuse if Hg <7, continue PPI BID. GI consulted, appreciate assistance.     # Paroxysmal atrial fibrillation  - Continue Toprol-XL, was previously on Eliquis and diltiazem.      # HFpEF, compensated  - Last TTE in 2023 with LVEF of 60%.   - Clinically euvolemic.      # Non-obstructive CAD   - [facility] in 2018 with mild CAD.   - Continue ASA, Lipitor, Toprol-XL and Imdur.    # PVD  - Continue ASA and statin.    # Hx of CVA  - Continue ASA and statin.    # Essential hypertension  - Continue losartan and Toprol-XL, currently not on Cardura.    # Mixed hyperlipidemia  - Continue statin.    # Suspected COPD without exacerbation  - Not on oxygen at baseline. No PFTs on file. Quit smoking 30 years ago.  - Continue home inhalers.     # IBS-D  - Currently not on Bentyl.    # Anxiety  - Continue Cymbalta and BuSpar.    # Class II obesity  - BMI 38.7.     Checklist:  Advanced care planning: full  Diet: NPO past midnight pending GI evaluation  VTE ppx: SCD    Disposition: admit to inpatient.  Estimated discharge: 2-3 day(s).  Current living situation: home.  Expected disposition: home.    Spoke with ED provider who recommended  \"BLOODCULT2\"  Organism:   Lab Results   Component Value Date/Time    ORG Escherichia coli 03/20/2024 06:30 AM       Radiology results:  CTA ABDOMEN PELVIS W WO CONTRAST   Preliminary Result   1. No evidence of active hemorrhage in stomach, small bowel, or large bowel   on the early and delayed postcontrast images.   2. No evidence of colitis or diverticulosis coli.   3. 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   4. Evidence of multiple fluid levels in small bowel, and large bowel,   suggestive of acute enteritis.   No typical sign of colitis.  No obvious abnormal thickening of colonic wall.   No pericolic inflammatory change.  No diverticulosis coli.   5. Moderately prominent retrocardiac hiatal hernia.   6. Moderate to marked multilevel degenerative disc disease and facet   osteoarthritis in the lumbar spine.   7. Normal appendix.   8. Atrophied pancreas.  No diagnostic finding in liver, gallbladder, spleen   and kidneys.             Jorge Alberto Steven MD  01/20/25 12:04 AM

## 2025-01-20 NOTE — FLOWSHEET NOTE
01/20/25 0141   Vital Signs   Temp 98.2 °F (36.8 °C)   Temp Source Oral   Respirations 18   BP (!) 146/63   MAP (Calculated) 91   BP Location Left upper arm   BP Method Automatic   Patient Position Semi fowlers   Opioid-Induced Sedation   POSS Score 1   RASS   Mendoza Agitation Sedation Scale (RASS) 0   Oxygen Therapy   Pulse Oximetry Type Intermittent   Pulse Oximeter Device Mode Intermittent   Pulse Oximeter Device Location Left;Finger   O2 Device None (Room air)   Height and Weight   Height 1.626 m (5' 4\")   Weight - Scale 94.6 kg (208 lb 8 oz)   Weight Method Actual;Bed scale   BSA (Calculated - sq m) 2.07 sq meters   BMI (Calculated) 35.9     Patient arrived to floor from ED. Patient A+Ox4, vitals and assessments done at this time. Bed in lowest position, call light within reach.
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
## 2025-01-20 NOTE — ANESTHESIA POSTPROCEDURE EVALUATION
Department of Anesthesiology  Postprocedure Note    Patient: Caren Georges  MRN: [id number]  YOB: 1945  Date of evaluation: 1/20/2025    Procedure Summary       Date: 01/20/25 Room / Location: [address]    Anesthesia Start: 1233 Anesthesia Stop: 1249    Procedure: ESOPHAGOGASTRODUODENOSCOPY BIOPSY Diagnosis:       Melena      Anemia, unspecified type      (Melena [K92.1])      (Anemia, unspecified type [D64.9])    Surgeons: Kaley Gonzáles MD Responsible Provider: Yarelis Chaudhari MD    Anesthesia Type: TIVA ASA Status: 3 - Emergent            Anesthesia Type: No value filed.    Babs Phase I: Babs Score: 10    Babs Phase II: Babs Score: 10    Anesthesia Post Evaluation    Comments: Postoperative Anesthesia Note    Name:    Caren Georges  MRN:      [id number]    Patient Vitals in the past 12 hrs:  01/20/25 1324, BP:(!) 130/51, Temp:96.9 °F (36.1 °C), Temp src:Infrared, Pulse:77, Resp:18, SpO2:96 %  01/20/25 1257, BP:(!) 96/48, Temp:96.9 °F (36.1 °C), Pulse:86, Resp:16, SpO2:94 %  01/20/25 1032, Temp:97.1 °F (36.2 °C), Pulse:62, Resp:16, SpO2:100 %  01/20/25 0141, BP:(!) 146/63, Temp:98.2 °F (36.8 °C), Temp src:Oral, Resp:18, Height:1.626 m (5' 4\"), Weight:94.6 kg (208 lb 8 oz)     LABS:    CBC  Lab Results       Component                Value               Date/Time                  WBC                      6.3                 01/20/2025 06:23 AM        HGB                      9.6 (L)             01/20/2025 06:23 AM        HCT                      29.0 (L)            01/20/2025 06:23 AM        PLT                      196                 01/20/2025 06:23 AM   RENAL  Lab Results       Component                Value               Date/Time                  NA                       136                 01/20/2025 06:23 AM        K                        4.1                 01/20/2025 06:23 AM        CL                       98 (L)              01/20/2025 06:23 AM

## 2025-01-20 NOTE — PROGRESS NOTES
Brief Progress Note    Date: 01/20/25    Subjective: Patient was evaluated by nocturnist early this AM. Current plan of care below. I have reviewed vitals, labs and orders and have briefly seen the patient.     Back from EGD, awake alert sitting up in chair.  Appears pale.  Denies any complaints other than her right shoulder pain which has been ongoing.  Pt stated pain is getting worse, she has limited movement of that shoulder. Injury in 2019.    Objective:  Vitals:    01/19/25 2313 01/20/25 0029 01/20/25 0100 01/20/25 0141   BP: 136/63 (!) 148/58 136/64 (!) 146/63   Pulse: 56 71 55    Resp: 15 26 18 18   Temp:    98.2 °F (36.8 °C)   TempSrc:    Oral   SpO2: 92% 97% 100%    Weight:    94.6 kg (208 lb 8 oz)   Height:    1.626 m (5' 4\")         Labs:  CBC:   Recent Labs     01/19/25 2159 01/20/25  0623   WBC 7.5 6.3   HGB 11.2* 9.6*   HCT 34.3* 29.0*   MCV 85.1 84.5    196     BMP:   Recent Labs     01/19/25 2159 01/20/25  0623    136   K 4.5 4.1   CL 98* 98*   CO2 30 30   BUN 15 13   CREATININE 0.8 0.8     LIVER PROFILE:   Recent Labs     01/19/25 2159   AST 21   ALT 10   BILITOT 0.4   ALKPHOS 82     PT/INR:   Recent Labs     01/19/25 2159   PROTIME 12.9   INR 0.95       Results       Procedure Component Value Units Date/Time    COVID-19 & Influenza Combo [[id number]] Collected: 01/19/25 2154    Order Status: Completed Specimen: Nasopharyngeal Swab Updated: 01/19/25 2245     SARS-CoV-2 RNA, RT PCR NOT DETECTED     Comment: Not Detected results do not preclude SARS-CoV-2 infection and  should not be used as the sole basis for patient management  decisions.  Results must be combined with clinical observations,  patient history, and epidemiological information.  Testing was performed using TRISTAN MARTHA SARS-CoV-2 and Influenza A/B  nucleic acid assay. This test is a multiplex Real-Time Reverse  Transcriptase Polymerase Chain Reaction (RT-PCR)-based in vitro  diagnostic test intended for the qualitative        Assessment & Plan:    Acute blood loss anemia  Upper GI bleed  Hx of Peptic Ulcer disease  Retrocardiac Hiatal Hernia  - Reports fatigue with melanotic stools over the past months  - baseline hemoglobin appears to be 12-13  - hemoglobin on admission 11.2---down to 9.6 today   - occult +  - CTA negative for acute bleed  - NPO --plan for EGD today   EGD  Impression:         -  LA Grade A esophagitis with no bleeding.          -  Gastritis, characterized by erythema.  Biopsied.          -  10 cm hiatal hernia.          -  Erosive gastropathy with no bleeding and no stigmata of recent             bleeding.          -  Normal second portion of the duodenum.    - cont to monitor h/h, transfuse for Hb<7   - GI consulted   - IV PPI      PAF  Currently SB  - HR down to the 50s at times  - on Toprol 100 mg---hold today, decrease to 75 mg tomorrow and monitor  - was previously on ELiquis--- pt follows with Dr. Ty, she stated he told her to stop taking.    - check TSH  - Secondary hypercoagulable state in a patient with atrial fibrillation  - monitor on telemetry        Right shoulder pain  - acute on chronic, pt stated injury in 2019 and pain is ongoing  - she stated she has limited movement to arm     HTN  - continue Imdur and Losartan  - monitor blood pressure     Diastolic CHF  - appears compensated  - hold Lasix today, likely will resume tomorrow   - ef as above- 60%  - daily weights, low sodium diet, strict I/O    NOCAD  - holding aspirin  - reduce BB given bradycardia  - continue statin and Imdur    Hx of CVA  - holding aspirin 2/2 above  - resume statin     COPD   Lung Fibrosis   - no AE  - continue scheduled and PRN inhalers     Depression  Anxiety   - resume Buspar and Cymbalta     IBS with diarrhea       Obesity  - Body mass index is 35.79 kg/m².  - Recommended weight loss      Note above makes patient higher risk for morbidity and mortality requiring testing and treatment.        DVT Prophylaxis:

## 2025-01-21 LAB
ANION GAP SERPL CALCULATED.3IONS-SCNC: 8 MMOL/L (ref 3–16)
BASOPHILS # BLD: 0 K/UL (ref 0–0.2)
BASOPHILS NFR BLD: 0.9 %
BUN SERPL-MCNC: 10 MG/DL (ref 7–20)
CALCIUM SERPL-MCNC: 8 MG/DL (ref 8.3–10.6)
CHLORIDE SERPL-SCNC: 96 MMOL/L (ref 99–110)
CO2 SERPL-SCNC: 26 MMOL/L (ref 21–32)
CREAT SERPL-MCNC: 0.8 MG/DL (ref 0.6–1.2)
DEPRECATED RDW RBC AUTO: 14.5 % (ref 12.4–15.4)
EOSINOPHIL # BLD: 0.4 K/UL (ref 0–0.6)
EOSINOPHIL NFR BLD: 7.3 %
GFR SERPLBLD CREATININE-BSD FMLA CKD-EPI: 75 ML/MIN/{1.73_M2}
GLUCOSE BLD-MCNC: 104 MG/DL (ref 70–99)
GLUCOSE SERPL-MCNC: 90 MG/DL (ref 70–99)
HCT VFR BLD AUTO: 27.2 % (ref 36–48)
HGB BLD-MCNC: 9 G/DL (ref 12–16)
LYMPHOCYTES # BLD: 1.5 K/UL (ref 1–5.1)
LYMPHOCYTES NFR BLD: 28.5 %
MCH RBC QN AUTO: 28.1 PG (ref 26–34)
MCHC RBC AUTO-ENTMCNC: 33 G/DL (ref 31–36)
MCV RBC AUTO: 85.3 FL (ref 80–100)
MONOCYTES # BLD: 0.6 K/UL (ref 0–1.3)
MONOCYTES NFR BLD: 11.4 %
NEUTROPHILS # BLD: 2.7 K/UL (ref 1.7–7.7)
NEUTROPHILS NFR BLD: 51.9 %
PERFORMED ON: ABNORMAL
PLATELET # BLD AUTO: 175 K/UL (ref 135–450)
PMV BLD AUTO: 8.4 FL (ref 5–10.5)
POTASSIUM SERPL-SCNC: 3.7 MMOL/L (ref 3.5–5.1)
RBC # BLD AUTO: 3.19 M/UL (ref 4–5.2)
SODIUM SERPL-SCNC: 130 MMOL/L (ref 136–145)
WBC # BLD AUTO: 5.1 K/UL (ref 4–11)

## 2025-01-21 PROCEDURE — 2500000003 HC RX 250 WO HCPCS: Performed by: STUDENT IN AN ORGANIZED HEALTH CARE EDUCATION/TRAINING PROGRAM

## 2025-01-21 PROCEDURE — 36415 COLL VENOUS BLD VENIPUNCTURE: CPT

## 2025-01-21 PROCEDURE — 6360000002 HC RX W HCPCS: Performed by: STUDENT IN AN ORGANIZED HEALTH CARE EDUCATION/TRAINING PROGRAM

## 2025-01-21 PROCEDURE — 96376 TX/PRO/DX INJ SAME DRUG ADON: CPT

## 2025-01-21 PROCEDURE — 6370000000 HC RX 637 (ALT 250 FOR IP): Performed by: STUDENT IN AN ORGANIZED HEALTH CARE EDUCATION/TRAINING PROGRAM

## 2025-01-21 PROCEDURE — 6370000000 HC RX 637 (ALT 250 FOR IP): Performed by: INTERNAL MEDICINE

## 2025-01-21 PROCEDURE — 97535 SELF CARE MNGMENT TRAINING: CPT

## 2025-01-21 PROCEDURE — 97530 THERAPEUTIC ACTIVITIES: CPT

## 2025-01-21 PROCEDURE — 80048 BASIC METABOLIC PNL TOTAL CA: CPT

## 2025-01-21 PROCEDURE — 97116 GAIT TRAINING THERAPY: CPT

## 2025-01-21 PROCEDURE — 6370000000 HC RX 637 (ALT 250 FOR IP): Performed by: NURSE PRACTITIONER

## 2025-01-21 PROCEDURE — 99232 SBSQ HOSP IP/OBS MODERATE 35: CPT | Performed by: INTERNAL MEDICINE

## 2025-01-21 PROCEDURE — 97162 PT EVAL MOD COMPLEX 30 MIN: CPT

## 2025-01-21 PROCEDURE — 97166 OT EVAL MOD COMPLEX 45 MIN: CPT

## 2025-01-21 PROCEDURE — 1200000000 HC SEMI PRIVATE

## 2025-01-21 PROCEDURE — 85025 COMPLETE CBC W/AUTO DIFF WBC: CPT

## 2025-01-21 PROCEDURE — G0378 HOSPITAL OBSERVATION PER HR: HCPCS

## 2025-01-21 RX ORDER — ROPINIROLE 0.25 MG/1
0.5 TABLET, FILM COATED ORAL ONCE
Status: COMPLETED | OUTPATIENT
Start: 2025-01-21 | End: 2025-01-21

## 2025-01-21 RX ADMIN — BUSPIRONE HYDROCHLORIDE 15 MG: 7.5 TABLET ORAL at 20:41

## 2025-01-21 RX ADMIN — SODIUM CHLORIDE, PRESERVATIVE FREE 10 ML: 5 INJECTION INTRAVENOUS at 08:45

## 2025-01-21 RX ADMIN — SODIUM CHLORIDE, PRESERVATIVE FREE 10 ML: 5 INJECTION INTRAVENOUS at 20:43

## 2025-01-21 RX ADMIN — ACETAMINOPHEN 650 MG: 325 TABLET ORAL at 15:17

## 2025-01-21 RX ADMIN — PANTOPRAZOLE SODIUM 40 MG: 40 INJECTION, POWDER, LYOPHILIZED, FOR SOLUTION INTRAVENOUS at 08:45

## 2025-01-21 RX ADMIN — BUSPIRONE HYDROCHLORIDE 15 MG: 7.5 TABLET ORAL at 08:44

## 2025-01-21 RX ADMIN — Medication 3 MG: at 20:42

## 2025-01-21 RX ADMIN — ISOSORBIDE MONONITRATE 120 MG: 60 TABLET, EXTENDED RELEASE ORAL at 08:43

## 2025-01-21 RX ADMIN — LOSARTAN POTASSIUM 100 MG: 100 TABLET, FILM COATED ORAL at 08:44

## 2025-01-21 RX ADMIN — PANTOPRAZOLE SODIUM 40 MG: 40 INJECTION, POWDER, LYOPHILIZED, FOR SOLUTION INTRAVENOUS at 20:42

## 2025-01-21 RX ADMIN — ROPINIROLE HYDROCHLORIDE 0.5 MG: 0.25 TABLET, FILM COATED ORAL at 03:32

## 2025-01-21 RX ADMIN — ACETAMINOPHEN 650 MG: 325 TABLET ORAL at 02:35

## 2025-01-21 RX ADMIN — DULOXETINE HYDROCHLORIDE 30 MG: 30 CAPSULE, DELAYED RELEASE ORAL at 08:43

## 2025-01-21 RX ADMIN — DULOXETINE HYDROCHLORIDE 30 MG: 30 CAPSULE, DELAYED RELEASE ORAL at 20:42

## 2025-01-21 RX ADMIN — METOPROLOL SUCCINATE 75 MG: 25 TABLET, EXTENDED RELEASE ORAL at 08:43

## 2025-01-21 RX ADMIN — ATORVASTATIN CALCIUM 20 MG: 10 TABLET, FILM COATED ORAL at 08:44

## 2025-01-21 RX ADMIN — ACETAMINOPHEN 650 MG: 325 TABLET ORAL at 20:42

## 2025-01-21 RX ADMIN — ACETAMINOPHEN 650 MG: 325 TABLET ORAL at 08:44

## 2025-01-21 ASSESSMENT — PAIN SCALES - GENERAL
PAINLEVEL_OUTOF10: 7
PAINLEVEL_OUTOF10: 0
PAINLEVEL_OUTOF10: 4
PAINLEVEL_OUTOF10: 0
PAINLEVEL_OUTOF10: 8
PAINLEVEL_OUTOF10: 9
PAINLEVEL_OUTOF10: 0

## 2025-01-21 ASSESSMENT — PAIN DESCRIPTION - LOCATION
LOCATION: LEG
LOCATION: GENERALIZED
LOCATION: BACK;LEG;HIP
LOCATION: NECK;SHOULDER;BACK

## 2025-01-21 ASSESSMENT — PAIN - FUNCTIONAL ASSESSMENT: PAIN_FUNCTIONAL_ASSESSMENT: PREVENTS OR INTERFERES SOME ACTIVE ACTIVITIES AND ADLS

## 2025-01-21 ASSESSMENT — PAIN DESCRIPTION - DESCRIPTORS
DESCRIPTORS: ACHING;DISCOMFORT

## 2025-01-21 NOTE — PROGRESS NOTES
Inpatient Physical Therapy Evaluation & Treatment    Unit: [facility]  Date:  1/21/2025  Patient Name:    Caren Georges  Admitting diagnosis:  Upper GI bleed [K92.2]  Gastrointestinal hemorrhage, unspecified gastrointestinal hemorrhage type [K92.2]  Admit Date:  1/19/2025  Precautions/Restrictions/WB Status/ Lines/ Wounds/ Oxygen: Fall risk, Bed/chair alarm, Lines (IV), and Telemetry      Pt seen for cotreatment this date due to patient safety, acute illness/injury, and limited functional status information    Treatment Time:  6521-4929  Treatment Number:  1   Timed Code Treatment Minutes: 39 minutes  Total Treatment Minutes:  49  minutes    Patient Stated Goals for Therapy: \" To go home. \"          Discharge Recommendations: SNF  DME needs for discharge: Defer to facility       Therapy recommendation for EMS Transport: can transport by wheelchair    Therapy recommendations for staff:   Assist of 1 for ambulation with use of rolling walker (RW) and gait belt to/from BSC  to/from chair    History of Present Illness:   Per H&P on 1/20/25 from Dr. Steven:    \"Patient is a 79 y.o. female with a PMHx as above who presented to the ED with fatigue with melanotic stools over past months, worse over past few days. No known other source of bleeding. No anticoagulants or NSAID use. Denied any fevers, chills, CP, SOB, cough, N/V, abdominal pain, C/D or urinary changes. \"    EGD performed 1/20/25    Preadmission Environment:   Pt lives with    alone  Home environment:    one story home  Steps to enter first floor:   Ramp  Steps to second floor/basement: N/A  Laundry:     1st floor  Bathroom:     tub/shower unit, grab bars in shower, shower chair , grab bars around toilet, and standard height toilet  Pt sleeps in a:    Recliner  Equipment owned:    rollator, SPC, and shower chair/bench    Preadmission Status:  Pt able to drive: Yes  Pt fully independent with ADLs: Yes  Pt receives assistance from family for: Independent PTA  Pt

## 2025-01-21 NOTE — PROGRESS NOTES
Pt very weak and using walker. Pt stated this morning \" I am so weak my legs feel like they are going to give out\" new order for therapy eval due to pt living at home alone.

## 2025-01-21 NOTE — FLOWSHEET NOTE
01/21/25 0830   Vital Signs   Temp 98.2 °F (36.8 °C)   Temp Source Oral   Pulse 92   Heart Rate Source Monitor   Respirations 17   BP (!) 142/92   MAP (Calculated) 109   BP Location Left upper arm   BP Method Automatic   Patient Position Semi fowlers   Pain Assessment   Pain Assessment 0-10   Pain Level 9   Pain Location Back;Leg;Hip   Pain Descriptors Aching;Discomfort   Non-Pharmaceutical Pain Intervention(s) Rest;Repositioned   Opioid-Induced Sedation   POSS Score 1   Oxygen Therapy   SpO2 97 %   O2 Device None (Room air)     AM assessment completed. No complaints of pain or discomfort voiced. No signs or symptoms of distress noted. Patient tolerated AM medications well. Respirations easy and even. Bed in lowest position, bed alarm in place and functioning properly, bed rails x2 up,  Call light within reach.     Bedside Mobility Assessment Tool (BMAT):     Assessment Level 1- Sit and Shake    1. From a semi-reclined position, ask patient to sit up and rotate to a seated position at the side of the bed. Can use the bedrail.    2. Ask patient to reach out and grab your hand and shake making sure patient reaches across his/her midline.   Pass- Patient is able to come to a seated position, maintain core strength. Maintains seated balance while reaching across midline. Move on to Assessment Level 2.     Assessment Level 2- Stretch and Point   1. With patient in seated position at the side of the bed, have patient place both feet on the floor (or stool) with knees no higher than hips.    2. Ask patient to stretch one leg and straighten the knee, then bend the ankle/flex and point the toes. If appropriate, repeat with the other leg.   Pass- Patient is able to demonstrate appropriate quad strength on intended weight bearing limb(s). Move onto Assessment Level 3.     Assessment Level 3- Stand   1. Ask patient to elevate off the bed or chair (seated to standing) using an assistive device (cane, bedrail).    2. Patient

## 2025-01-21 NOTE — PROGRESS NOTES
IM Progress Note    Date: 01/21/25     GI Bleed, S/P EGD    Subjective:   Pt appears weak, . Has chronic pain .   Chr  right shoulder pain which has been ongoing.  Pt stated pain is getting worse, she has limited movement of that shoulder. Injury in 2019.    Objective:  Vitals:    01/20/25 1324 01/20/25 2035 01/21/25 0232 01/21/25 0830   BP: (!) 130/51 (!) 147/68 122/80 (!) 142/92   Pulse: 77 78 99 92   Resp: 18 18 18 17   Temp: 96.9 °F (36.1 °C) 97.2 °F (36.2 °C) 97.8 °F (36.6 °C) 98.2 °F (36.8 °C)   TempSrc: Infrared Oral Oral Oral   SpO2: 96% 94% 98% 97%   Weight:       Height:           BMI: Body mass index is 38.66 kg/m².  General:  pt appears weak.  Resting. No distress    HEENT: PERRLA. Vision grossly intact. Hard of hearing. Oropharynx clear.  Neck: Supple. No JVD.   CV: RRR. NL S1/S2. No BLE pitting edema.   Pulm: NL effort on RA. CTAB.   GI: +BS x4. Soft. NT/ND.  : No CVA tenderness. No Segovia catheter.  Skin: Intact, warm and dry.  MSK: No gross joint deformities. Full ROM.   Neuro: No focal deficit.   Psych: Calm.    Labs:  CBC:   Recent Labs     01/19/25 2159 01/20/25 0623 01/20/25  1357 01/21/25  0612   WBC 7.5 6.3  --  5.1   HGB 11.2* 9.6* 9.2* 9.0*   HCT 34.3* 29.0* 27.6* 27.2*   MCV 85.1 84.5  --  85.3    196  --  175     BMP:   Recent Labs     01/19/25 2159 01/20/25  0623 01/21/25  0612    136 130*   K 4.5 4.1 3.7   CL 98* 98* 96*   CO2 30 30 26   BUN 15 13 10   CREATININE 0.8 0.8 0.8     LIVER PROFILE:   Recent Labs     01/19/25 2159   AST 21   ALT 10   BILITOT 0.4   ALKPHOS 82     PT/INR:   Recent Labs     01/19/25 2159   PROTIME 12.9   INR 0.95       Results       Procedure Component Value Units Date/Time    COVID-19 & Influenza Combo [[id number]] Collected: 01/19/25 2154    Order Status: Completed Specimen: Nasopharyngeal Swab Updated: 01/19/25 2245     SARS-CoV-2 RNA, RT PCR NOT DETECTED     Comment: Not Detected results do not preclude SARS-CoV-2 infection and  should not

## 2025-01-21 NOTE — FLOWSHEET NOTE
01/20/25 2035   Vital Signs   Temp 97.2 °F (36.2 °C)   Temp Source Oral   Pulse 78   Heart Rate Source Monitor   Respirations 18   BP (!) 147/68   MAP (Calculated) 94   BP Location Left upper arm   BP Method Automatic   Patient Position Semi fowlers   Opioid-Induced Sedation   POSS Score 1   RASS   Mendoza Agitation Sedation Scale (RASS) 0   Oxygen Therapy   SpO2 94 %   Pulse Oximetry Type Intermittent   Pulse Oximeter Device Mode Intermittent   Pulse Oximeter Device Location Left;Finger   O2 Device None (Room air)     Patient A+Ox4, vitals and assessments done at this time. Bed in lowest position, call light within reach.

## 2025-01-21 NOTE — PROGRESS NOTES
INPATIENT PROGRESS NOTE        IDENTIFYING DATA/REASON FOR CONSULTATION   PATIENT:  Caren Georges  MRN:  [id number]  ADMIT DATE: 2025  TIME OF EVALUATION: 2025 10:43 AM  HOSPITAL STAY:   LOS: 1 day   CONSULTING PHYSICIAN: Hesham Munroe MD   REASON FOR CONSULTATION: melena    Subjective:    Patient seen in follow-up.  S/p EGD yesterday. Hgb stable at 9.0.  She denies any hematemesis, hematochezia, melena.  No abdominal pain.  She is expressing that she is hungry.    MEDICATIONS   SCHEDULED:  [Held by provider] aspirin, 81 mg, Daily  busPIRone, 15 mg, BID  DULoxetine, 30 mg, BID  [Held by provider] furosemide, 40 mg, Daily  isosorbide mononitrate, 120 mg, Daily  losartan, 100 mg, Daily  pantoprazole, 40 mg, BID  atorvastatin, 20 mg, Daily  sodium chloride flush, 5-40 mL, 2 times per day  metoprolol succinate, 75 mg, Daily  lidocaine, 1 patch, Daily      FLUIDS/DRIPS:     sodium chloride       PRNs: sodium chloride flush, 5-40 mL, PRN  sodium chloride, , PRN  potassium chloride, 10 mEq, PRN  magnesium sulfate, 2,000 mg, PRN  ondansetron, 4 mg, Q8H PRN   Or  ondansetron, 4 mg, Q6H PRN  melatonin, 3 mg, Nightly PRN  acetaminophen, 650 mg, Q6H PRN   Or  acetaminophen, 650 mg, Q6H PRN      ALLERGIES:    Allergies   Allergen Reactions    Adhesive Tape Itching         PHYSICAL EXAM   VITALS:  BP (!) 142/92   Pulse 92   Temp 98.2 °F (36.8 °C) (Oral)   Resp 17   Ht 1.626 m (5' 4\")   Wt 94.6 kg (208 lb 8 oz)   SpO2 97%   BMI 35.79 kg/m²   TEMPERATURE:  Current - Temp: 98.2 °F (36.8 °C); Max - Temp  Av.4 °F (36.3 °C)  Min: 96.9 °F (36.1 °C)  Max: 98.2 °F (36.8 °C)    Physical Exam:  General appearance: alert, cooperative, no distress, appears stated age  Eyes: Anicteric  Head: Normocephalic, without obvious abnormality  Lungs: clear to auscultation bilaterally, Normal Effort  Heart: regular rate and rhythm, normal S1 and S2, no murmurs or rubs  Abdomen: soft, non-distended, non-tender. Bowel sounds    and kidneys.             Endoscopy  EGD 1/20/2025 with Dr. Gonzáles:   LA Grade A esophagitis with no bleeding. Gastritis, characterized by erythema. Biopsied. 10 cm hiatal hernia. Erosive gastropathy with no bleeding and no stigmata of recent bleeding. Normal second portion of the duodenum.     EGD 12/2019 with Dr. Wisdom:  -normal esophagus, stomach, and duodenum  -hiatal hernia, with mixed sliding and paraesophageal components, large cm in size    ASSESSMENT AND RECOMMENDATIONS   Caren Georges is a 79 y.o. female with a PMH of CHF, chronic back pain, HLD, HTN, IBS-D, Afib not on anticoagulation, stroke, gastric ulcer who presented on 1/19/2025 with black stools.        CTA A/P 1/20/25 with no evidence of acute hemorrhage. 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 Evidence of multiple fluid levels in small bowel, and large bowel, suggestive of acute enteritis. No typical sign of colitis. Moderately prominent retrocardiac hiatal hernia. Atrophic pancreas.      Upon presentation hgb was 11.2, and has trended down to 9.6. Prior to admission hgb was normal. BMP unrevealing. Hemoccult positive. PT/INR normal    S/p EGD 1/20 with LA Grade A esophagitis with no bleeding. Gastritis, characterized by erythema. Biopsied. 10 cm hiatal hernia. Erosive gastropathy with no bleeding and no stigmata of recent bleeding.     IMPRESSION:  Melena   Acute blood loss anemia  History of PUD  Afib not on anticoagulation    RECOMMENDATIONS:    Patient's anemia likely due to lesa's erosions due to large hiatus hernia, suggest BID PPI indefinitely, avoid NSAIDS, replace iron as necessary.  Biopsies pending.  Hgb remains stable.   Will start clear liquid diet, advance as tolerated.  No further workup planned at this time so GI will sign off. Please call us with any questions or concerns.    If you have any questions or need any further information, please feel free to contact us [phone]

## 2025-01-21 NOTE — PROGRESS NOTES
Inpatient Occupational Therapy Evaluation & Treatment    Unit: [facility]  Date:  1/21/2025  Patient Name:    Caren Georges  Admitting diagnosis:  Upper GI bleed [K92.2]  Gastrointestinal hemorrhage, unspecified gastrointestinal hemorrhage type [K92.2]  Admit Date:  1/19/2025  Precautions/Restrictions/WB Status/ Lines/ Wounds/ Oxygen: Fall risk, Bed/chair alarm, Lines (IV), Telemetry, and Isolation Precautions: Contact Plus    Pt seen for cotreatment this date due to patient safety, patient endurance, and limited functional status information    Treatment Time:  0741-1821  Treatment Number:  1  Timed Code Treatment Minutes: 39 minutes  Total Treatment Minutes:  49  minutes    Patient Goals for Therapy: to go home          Discharge Recommendations: SNF  DME needs for discharge: Defer to facility       Therapy recommendations for staff:   Assist of 1 for transfers with use of rolling walker (RW) and gait belt to/from BSC  to/from chair    History of Present Illness: Per Dr. Steven H&P 1/20/25:  \"79 y.o. female with a PMHx as above who presented to the ED with fatigue with melanotic stools over past months, worse over past few days. No known other source of bleeding. No anticoagulants or NSAID use. Denied any fevers, chills, CP, SOB, cough, N/V, abdominal pain, C/D or urinary changes.\"    Upper GI bleed, acute blood loss anemia, peptic ulcer disease  EGD on 1/20/25     Preadmission Environment:   Pt lives with                                         alone  Home environment:                            one story home  Steps to enter first floor:                     Ramp  Steps to second floor/basement:        N/A  Laundry:                                              1st floor  Bathroom:                                           tub/shower unit, grab bars in shower, shower chair , grab bars around toilet, and standard height toilet  Pt sleeps in a:                                     Recliner  Equipment owned:

## 2025-01-22 ENCOUNTER — TELEPHONE (OUTPATIENT)
Dept: FAMILY MEDICINE CLINIC | Age: 80
End: 2025-01-22

## 2025-01-22 VITALS
RESPIRATION RATE: 16 BRPM | SYSTOLIC BLOOD PRESSURE: 120 MMHG | HEIGHT: 64 IN | WEIGHT: 208.5 LBS | OXYGEN SATURATION: 96 % | TEMPERATURE: 98.7 F | BODY MASS INDEX: 35.59 KG/M2 | HEART RATE: 68 BPM | DIASTOLIC BLOOD PRESSURE: 60 MMHG

## 2025-01-22 PROBLEM — D64.9 ANEMIA: Status: ACTIVE | Noted: 2025-01-22

## 2025-01-22 PROBLEM — I50.32 CHRONIC DIASTOLIC CHF (CONGESTIVE HEART FAILURE) (HCC): Status: ACTIVE | Noted: 2025-01-22

## 2025-01-22 PROBLEM — K92.2 GASTROINTESTINAL HEMORRHAGE: Status: ACTIVE | Noted: 2025-01-22

## 2025-01-22 PROBLEM — G89.29 OTHER CHRONIC PAIN: Status: ACTIVE | Noted: 2025-01-22

## 2025-01-22 PROBLEM — I10 PRIMARY HYPERTENSION: Status: ACTIVE | Noted: 2025-01-22

## 2025-01-22 LAB
ANION GAP SERPL CALCULATED.3IONS-SCNC: 11 MMOL/L (ref 3–16)
BUN SERPL-MCNC: 9 MG/DL (ref 7–20)
CALCIUM SERPL-MCNC: 8.2 MG/DL (ref 8.3–10.6)
CHLORIDE SERPL-SCNC: 96 MMOL/L (ref 99–110)
CO2 SERPL-SCNC: 24 MMOL/L (ref 21–32)
CREAT SERPL-MCNC: 0.8 MG/DL (ref 0.6–1.2)
GFR SERPLBLD CREATININE-BSD FMLA CKD-EPI: 75 ML/MIN/{1.73_M2}
GLUCOSE SERPL-MCNC: 89 MG/DL (ref 70–99)
POTASSIUM SERPL-SCNC: 3.7 MMOL/L (ref 3.5–5.1)
SODIUM SERPL-SCNC: 131 MMOL/L (ref 136–145)

## 2025-01-22 PROCEDURE — 80048 BASIC METABOLIC PNL TOTAL CA: CPT

## 2025-01-22 PROCEDURE — 6370000000 HC RX 637 (ALT 250 FOR IP): Performed by: NURSE PRACTITIONER

## 2025-01-22 PROCEDURE — 96376 TX/PRO/DX INJ SAME DRUG ADON: CPT

## 2025-01-22 PROCEDURE — 6360000002 HC RX W HCPCS: Performed by: STUDENT IN AN ORGANIZED HEALTH CARE EDUCATION/TRAINING PROGRAM

## 2025-01-22 PROCEDURE — 36415 COLL VENOUS BLD VENIPUNCTURE: CPT

## 2025-01-22 PROCEDURE — G0378 HOSPITAL OBSERVATION PER HR: HCPCS

## 2025-01-22 PROCEDURE — 2500000003 HC RX 250 WO HCPCS: Performed by: STUDENT IN AN ORGANIZED HEALTH CARE EDUCATION/TRAINING PROGRAM

## 2025-01-22 PROCEDURE — 99238 HOSP IP/OBS DSCHRG MGMT 30/<: CPT | Performed by: INTERNAL MEDICINE

## 2025-01-22 PROCEDURE — 6370000000 HC RX 637 (ALT 250 FOR IP): Performed by: STUDENT IN AN ORGANIZED HEALTH CARE EDUCATION/TRAINING PROGRAM

## 2025-01-22 RX ORDER — METOPROLOL SUCCINATE 25 MG/1
75 TABLET, EXTENDED RELEASE ORAL DAILY
Qty: 60 TABLET | Refills: 1 | Status: SHIPPED | OUTPATIENT
Start: 2025-01-23

## 2025-01-22 RX ORDER — LIDOCAINE 4 G/G
1 PATCH TOPICAL DAILY
COMMUNITY
Start: 2025-01-23

## 2025-01-22 RX ORDER — PANTOPRAZOLE SODIUM 40 MG/1
40 TABLET, DELAYED RELEASE ORAL 2 TIMES DAILY
Qty: 60 TABLET | Refills: 1 | Status: SHIPPED | OUTPATIENT
Start: 2025-01-22

## 2025-01-22 RX ADMIN — METOPROLOL SUCCINATE 75 MG: 25 TABLET, EXTENDED RELEASE ORAL at 08:27

## 2025-01-22 RX ADMIN — BUSPIRONE HYDROCHLORIDE 15 MG: 7.5 TABLET ORAL at 08:27

## 2025-01-22 RX ADMIN — LOSARTAN POTASSIUM 100 MG: 100 TABLET, FILM COATED ORAL at 08:27

## 2025-01-22 RX ADMIN — ACETAMINOPHEN 650 MG: 325 TABLET ORAL at 05:35

## 2025-01-22 RX ADMIN — PANTOPRAZOLE SODIUM 40 MG: 40 INJECTION, POWDER, LYOPHILIZED, FOR SOLUTION INTRAVENOUS at 08:25

## 2025-01-22 RX ADMIN — DULOXETINE HYDROCHLORIDE 30 MG: 30 CAPSULE, DELAYED RELEASE ORAL at 08:28

## 2025-01-22 RX ADMIN — ISOSORBIDE MONONITRATE 120 MG: 60 TABLET, EXTENDED RELEASE ORAL at 08:28

## 2025-01-22 RX ADMIN — ATORVASTATIN CALCIUM 20 MG: 10 TABLET, FILM COATED ORAL at 08:27

## 2025-01-22 RX ADMIN — SODIUM CHLORIDE, PRESERVATIVE FREE 10 ML: 5 INJECTION INTRAVENOUS at 08:25

## 2025-01-22 ASSESSMENT — ENCOUNTER SYMPTOMS
SHORTNESS OF BREATH: 0
EYE REDNESS: 0
DIARRHEA: 0
SINUS PAIN: 0
NAUSEA: 0
CONSTIPATION: 0
EYE PAIN: 0
VOMITING: 0
COUGH: 0
SINUS PRESSURE: 0
ABDOMINAL DISTENTION: 0

## 2025-01-22 ASSESSMENT — PAIN DESCRIPTION - LOCATION
LOCATION: GENERALIZED
LOCATION: GENERALIZED

## 2025-01-22 ASSESSMENT — PAIN DESCRIPTION - DESCRIPTORS
DESCRIPTORS: ACHING;DISCOMFORT
DESCRIPTORS: ACHING;DISCOMFORT

## 2025-01-22 ASSESSMENT — PAIN SCALES - GENERAL
PAINLEVEL_OUTOF10: 5
PAINLEVEL_OUTOF10: 7
PAINLEVEL_OUTOF10: 10

## 2025-01-22 NOTE — ACP (ADVANCE CARE PLANNING)
[facility]  Palliative Medicine Consultation Note      Date Of Admission:1/19/2025  Date of consult: 01/22/25  Seen by PC in the past:  No    Recommendations:        Introduced palliative care and had a voluntary discussion about advance care planning. Palliative care consultation ordered for a goals of care discussion and to discuss code status. Patient was recommended to go to a SNF for therapy but she states that she does not want to do that. Agreeable to Home Health Skilled Therapy. This was relayed to  and she reports that she will get the patient set up with home health. Patient agreeable to allow [facility] to follow along as well. Code status discussed and patient wishes to remain a full-code at this time.       1. Goals of Care/Advanced Care planning/Code status: Full code  2. Pain: Denies  3. SOB: Denies  4. Disposition: Home with home health, HTP to follow    Reason for Consult:         [x]  Goals of Care  [x]  Code Status Discussion/Advanced Care Planning   []  Psychosocial/Family Support  []  Symptom Management  []  Other (Specify)    Requesting Physician: Dr. Tran    CHIEF COMPLAINT:  Melena    History Obtained From:  patient, EMR    History of Present Illness:         Caren Georges is a 79 y.o. female with PMH of arthritis, bronchitis, CHF, chronic back pain, chronic cough, HLD, HTN, IBS with diarrhea, PAF, RLS, stroke, gastric ulcer who [place] [facility] [place]ena. Reported fatigue with melanotic stools over past months, worse over past few days. No known other source of bleeding. No anticoagulants or NSAID use. Last EGD in 12/2019 showed large hiatal hernia with normal esophagus, stomach and duodenum. CTA negative for acute bleed.     Subjective:         Past Medical History:        Diagnosis Date    Arthritis     Bronchitis     CHF (congestive heart failure) (HCC)     Chronic back pain     Chronic cough     Hyperlipidemia     Hypertension     Irritable bowel syndrome

## 2025-01-22 NOTE — FLOWSHEET NOTE
01/22/25 0800   Vital Signs   Temp 98.7 °F (37.1 °C)   Temp Source Oral   Pulse 68   Heart Rate Source Monitor   Respirations 16   /60   MAP (Calculated) 80   BP Location Left upper arm   BP Method Automatic   Patient Position Right side   Pain Assessment   Pain Assessment 0-10   Pain Level 7   Patient's Stated Pain Goal 0 - No pain   Pain Location Generalized   Pain Descriptors Aching;Discomfort   Opioid-Induced Sedation   POSS Score 1   Oxygen Therapy   SpO2 96 %   Oxygen Therapy None (Room air)     Patient alert/awake eating breakfast during medication administration, voices no concerns. Tolerated 100% breakfast.

## 2025-01-22 NOTE — FLOWSHEET NOTE
01/21/25 2040   Vital Signs   Temp 97.8 °F (36.6 °C)   Temp Source Oral   Pulse 70   Heart Rate Source Monitor   Respirations 18   BP (!) 99/57   MAP (Calculated) 71   BP Location Left upper arm   BP Method Automatic   Patient Position Semi fowlers   Opioid-Induced Sedation   POSS Score 1   RASS   Mendoza Agitation Sedation Scale (RASS) 0   Oxygen Therapy   SpO2 96 %   Pulse Oximetry Type Intermittent   Pulse Oximeter Device Mode Intermittent   Pulse Oximeter Device Location Left;Finger   O2 Device None (Room air)      Patient A+Ox4, vitals and assessments done at this time. Bed in lowest position, call light within reach.

## 2025-01-22 NOTE — TELEPHONE ENCOUNTER
[facility] home care called to see if patient was established with the [facility].  Advised that Tasneem Witt did see patient twice for acute appt while working at the [facility] office but patient did not establish care with her.  It looks like she was a patient with Cleveland Loaiza at [facility] but after he left she never established with anyone else.  The care coordinator did speak with her about scheduling NP appt with new provider at the location but patient declined and advised she would call back to schedule

## 2025-01-22 NOTE — DISCHARGE SUMMARY
Hospital Medicine Discharge Summary    Patient: Caren Georges     Gender: female  : 1945   Age: 79 y.o.  MRN: [id number]    Admitting Physician: Jorge Alberto Steven MD  Discharge Physician: Renée Gandhi DO    Code Status: Full Code     Admit Date: 2025   Discharge Date: 2025      Discharge Diagnoses:    Active Hospital Problems    Diagnosis Date Noted    Other chronic pain [G89.29] 2025    Gastrointestinal hemorrhage [K92.2] 2025    Chronic diastolic CHF (congestive heart failure) (HCC) [I50.32] 2025    Primary hypertension [I10] 2025    Upper GI bleed [K92.2] 2025       Condition at Discharge: Stable dc. Patient hoping to go home asap.    Hospital Course:     Acute blood loss anemia  Upper GI bleed  Hx of Peptic Ulcer disease  Retrocardiac Hiatal Hernia  - Reports fatigue with melanotic stools over the past months  - baseline hemoglobin appears to be 12-13  - hemoglobin on admission 11.2---down to 9.6 today   - occult +  - CTA negative for acute bleed  - GI consulted. S/P EGD on . Cont PPI BID  EGD  Impression:         -  LA Grade A esophagitis with no bleeding.          -  Gastritis, characterized by erythema.  Biopsied.          -  10 cm hiatal hernia.          -  Erosive gastropathy with no bleeding and no stigmata of recent             bleeding.          -  Normal second portion of the duodenum.     PAF  Currently SB  - HR down to the 50s at times  - on Toprol 100 mg---hold today, decrease to 75 mg tomorrow and monitor  - was previously on ELiquis--- pt follows with Dr. Ty, she stated he told her to stop taking.    - check TSH - 0.82  - Secondary hypercoagulable state in a patient with atrial fibrillation  - monitor on telemetry       Right shoulder pain  - acute on chronic, pt stated injury in 2019 and pain is ongoing  - she stated she has limited movement to arm      HTN  - continue Imdur and Losartan  - monitor blood pressure      Diastolic CHF  -

## 2025-01-22 NOTE — CARE COORDINATION
Met with pt at bedside. Pt to dc home today. Pt declines SNF. Pt agreeable to [facility]. Requested [facility]. Referral faxed to [facility]. Accepted pt. Staff will call pt with SOC date and time. No further DC or DME needs identified.     IMM- 1/20    O2- 96% RA

## 2025-01-22 NOTE — DISCHARGE INSTR - DIET
Good nutrition is important when healing from an illness, injury, or surgery.  Follow any nutrition recommendations given to you during your hospital stay.   If you were given an oral nutrition supplement while in the hospital, continue to take this supplement at home.  You can take it with meals, in-between meals, and/or before bedtime. These supplements can be purchased at most local grocery stores, pharmacies, and chain [employer]-stores.   If you have any questions about your diet or nutrition, call the hospital and ask for the dietitian.    Dysphagia soft and bite size

## 2025-01-22 NOTE — PROGRESS NOTES
HEART FAILURE CARE PLAN:    Comorbidities Reviewed: Yes   Patient has a past medical history of Arthritis, Bronchitis, CHF (congestive heart failure) ([facility]), Chronic back pain, Chronic cough, Hyperlipidemia, Hypertension, Irritable bowel syndrome with diarrhea, PAF (paroxysmal atrial fibrillation) ([facility]), Restless legs syndrome (RLS), Stroke (cerebrum) ([facility]), and Ulcer, gastric, acute.     Weights Reviewed: Yes   Admission weight: 102.2 kg (225 lb 3.2 oz)   Wt Readings from Last 3 Encounters:   01/20/25 94.6 kg (208 lb 8 oz)   12/18/24 98 kg (216 lb)   11/08/24 99.3 kg (219 lb)     Intake & Output Reviewed: Yes     Intake/Output Summary (Last 24 hours) at 1/22/2025 0941  Last data filed at 1/21/2025 1556  Gross per 24 hour   Intake --   Output 300 ml   Net -300 ml       ECHOCARDIOGRAM Reviewed: Yes   Patient's Ejection Fraction (EF) is greater than 40%     Last resulted 01/10/2023 60%    Medications Reviewed: Yes   SCHEDULED HOSPITAL MEDICATIONS:   [Held by provider] aspirin  81 mg Oral Daily    busPIRone  15 mg Oral BID    DULoxetine  30 mg Oral BID    [Held by provider] furosemide  40 mg Oral Daily    isosorbide mononitrate  120 mg Oral Daily    losartan  100 mg Oral Daily    pantoprazole  40 mg IntraVENous BID    atorvastatin  20 mg Oral Daily    sodium chloride flush  5-40 mL IntraVENous 2 times per day    metoprolol succinate  75 mg Oral Daily    lidocaine  1 patch TransDERmal Daily     HOME MEDICATIONS:  Prior to Admission medications    Medication Sig Start Date End Date Taking? Authorizing Provider   DULoxetine (CYMBALTA) 30 MG extended release capsule Take 1 capsule by mouth 2 times daily   Yes Millie Mandujano MD   furosemide (LASIX) 40 MG tablet Take 1 tablet by mouth daily   Yes Millie Mandujano MD   isosorbide mononitrate (IMDUR) 120 MG extended release tablet Take 1 tablet by mouth daily   Yes Millie Mandujano MD   pantoprazole (PROTONIX) 20 MG tablet Take 1 tablet by mouth every morning

## 2025-01-22 NOTE — PLAN OF CARE
Problem: Chronic Conditions and Co-morbidities  Goal: Patient's chronic conditions and co-morbidity symptoms are monitored and maintained or improved  Outcome: Progressing     Problem: Discharge Planning  Goal: Discharge to home or other facility with appropriate resources  Outcome: Progressing     Problem: Pain  Goal: Verbalizes/displays adequate comfort level or baseline comfort level  Outcome: Progressing     Problem: Safety - Adult  Goal: Free from fall injury  Outcome: Progressing     Problem: Skin/Tissue Integrity  Goal: Absence of new skin breakdown  Description: 1.  Monitor for areas of redness and/or skin breakdown  2.  Assess vascular access sites hourly  3.  Every 4-6 hours minimum:  Change oxygen saturation probe site  4.  Every 4-6 hours:  If on nasal continuous positive airway pressure, respiratory therapy assess nares and determine need for appliance change or resting period.  Outcome: Progressing     Problem: Cardiovascular - Adult  Goal: Maintains optimal cardiac output and hemodynamic stability  Outcome: Progressing  Goal: Absence of cardiac dysrhythmias or at baseline  Outcome: Progressing     Problem: Gastrointestinal - Adult  Goal: Maintains or returns to baseline bowel function  Outcome: Progressing
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
## 2025-01-22 NOTE — PROGRESS NOTES
LDA removed. Writer informed patient that she needed to wait for discharge paperwork to be printed.  Writer went into patients room patient had already left.  Patient not in main lobby. Writer called daughter they refused to come back to hospital.  Writer verbally went over instructions and aware of f/u apts and meds to be picked up at pharmacy.  Patient also refused to have f/u apt made with PCP for CHF.  Patient stated she will make her own apts.  Unable to obtain discharge VS.

## 2025-01-27 ENCOUNTER — OFFICE VISIT (OUTPATIENT)
Dept: FAMILY MEDICINE CLINIC | Age: 80
End: 2025-01-27

## 2025-01-27 VITALS
WEIGHT: 206.4 LBS | OXYGEN SATURATION: 99 % | HEIGHT: 64 IN | BODY MASS INDEX: 35.24 KG/M2 | SYSTOLIC BLOOD PRESSURE: 70 MMHG | HEART RATE: 73 BPM | DIASTOLIC BLOOD PRESSURE: 44 MMHG

## 2025-01-27 DIAGNOSIS — I63.9 CEREBROVASCULAR ACCIDENT (CVA), UNSPECIFIED MECHANISM (HCC): ICD-10-CM

## 2025-01-27 DIAGNOSIS — Z09 HOSPITAL DISCHARGE FOLLOW-UP: ICD-10-CM

## 2025-01-27 DIAGNOSIS — K92.2 UPPER GI BLEED: Primary | ICD-10-CM

## 2025-01-27 DIAGNOSIS — I73.9 PVD (PERIPHERAL VASCULAR DISEASE) (HCC): ICD-10-CM

## 2025-01-27 DIAGNOSIS — M05.79 RHEUMATOID ARTHRITIS INVOLVING MULTIPLE SITES WITH POSITIVE RHEUMATOID FACTOR (HCC): ICD-10-CM

## 2025-01-27 DIAGNOSIS — D64.9 ACUTE ANEMIA: ICD-10-CM

## 2025-01-27 DIAGNOSIS — I50.9 OTHER CONGESTIVE HEART FAILURE (HCC): ICD-10-CM

## 2025-01-27 ASSESSMENT — PATIENT HEALTH QUESTIONNAIRE - PHQ9
1. LITTLE INTEREST OR PLEASURE IN DOING THINGS: NEARLY EVERY DAY
5. POOR APPETITE OR OVEREATING: NOT AT ALL
8. MOVING OR SPEAKING SO SLOWLY THAT OTHER PEOPLE COULD HAVE NOTICED. OR THE OPPOSITE, BEING SO FIGETY OR RESTLESS THAT YOU HAVE BEEN MOVING AROUND A LOT MORE THAN USUAL: NOT AT ALL
2. FEELING DOWN, DEPRESSED OR HOPELESS: NOT AT ALL
SUM OF ALL RESPONSES TO PHQ QUESTIONS 1-9: 4
4. FEELING TIRED OR HAVING LITTLE ENERGY: SEVERAL DAYS
3. TROUBLE FALLING OR STAYING ASLEEP: NOT AT ALL
SUM OF ALL RESPONSES TO PHQ QUESTIONS 1-9: 4
SUM OF ALL RESPONSES TO PHQ QUESTIONS 1-9: 4
10. IF YOU CHECKED OFF ANY PROBLEMS, HOW DIFFICULT HAVE THESE PROBLEMS MADE IT FOR YOU TO DO YOUR WORK, TAKE CARE OF THINGS AT HOME, OR GET ALONG WITH OTHER PEOPLE: SOMEWHAT DIFFICULT
SUM OF ALL RESPONSES TO PHQ9 QUESTIONS 1 & 2: 3
SUM OF ALL RESPONSES TO PHQ QUESTIONS 1-9: 4
9. THOUGHTS THAT YOU WOULD BE BETTER OFF DEAD, OR OF HURTING YOURSELF: NOT AT ALL
6. FEELING BAD ABOUT YOURSELF - OR THAT YOU ARE A FAILURE OR HAVE LET YOURSELF OR YOUR FAMILY DOWN: NOT AT ALL
7. TROUBLE CONCENTRATING ON THINGS, SUCH AS READING THE NEWSPAPER OR WATCHING TELEVISION: NOT AT ALL

## 2025-01-27 NOTE — ASSESSMENT & PLAN NOTE
Symptomatic heart failure due to acute blood loss.  Advised may need hospitalization due to her symptoms.  Patient refuses hospitalization.    Orders:    Non [facility] - External Referral To Home Health    Comprehensive Metabolic Panel

## 2025-01-27 NOTE — ASSESSMENT & PLAN NOTE
Blood work ordered today to reassess after admission.  Medications updated Eliquis and aspirin are discontinued due to GI bleed.  Follow-up as scheduled.    Orders:    Non [facility] - External Referral To Home Health    CBC with Auto Differential    Comprehensive Metabolic Panel

## 2025-01-27 NOTE — ASSESSMENT & PLAN NOTE
Eliquis and aspirin were stopped due to bleeding.  Will continue to monitor for PVD    Orders:    Non [facility] - External Referral To Home Health    Comprehensive Metabolic Panel

## 2025-01-27 NOTE — PROGRESS NOTES
Caren Georges (:  1945) is a 79 y.o. female,Established patient, here for evaluation of the following chief complaint(s):  Follow-Up from Hospital ([facility]-- GI Bleed)         Assessment & Plan  Upper GI bleed  Blood work ordered today to reassess after admission.  Medications updated Eliquis and aspirin are discontinued due to GI bleed.  Follow-up as scheduled.    Orders:    Non [facility] - External Referral To Home Health    CBC with Auto Differential    Comprehensive Metabolic Panel    Acute anemia    Repeat lab work today    Orders:    CBC with Auto Differential    Comprehensive Metabolic Panel    Other congestive heart failure (HCC)    Symptomatic heart failure due to acute blood loss.  Advised may need hospitalization due to her symptoms.  Patient refuses hospitalization.    Orders:    Non [facility] - External Referral To Home Health    Comprehensive Metabolic Panel    PVD (peripheral vascular disease) (HCC)  Eliquis and aspirin were stopped due to bleeding.  Will continue to monitor for PVD    Orders:    Non [facility] - External Referral To Home Health    Comprehensive Metabolic Panel    Rheumatoid arthritis involving multiple sites with positive rheumatoid factor (HCC)    Continue medications as updated from the hospital.  Do not resume home medications unless otherwise specified.         Cerebrovascular accident (CVA), unspecified mechanism (HCC)    Stable will discontinue losartan to allow blood pressure to come back up.           Return in about 1 week (around 2/3/2025).       Subjective   HPI  79-year-old female here for hospital follow-up admitted with upper GI bleed.  States she was not transfused.  Had multiple medications changed.  Patient states no procedures were performed due to her age.  Review of Systems   Denies headache she has chronic blurred vision.  Intermittent chest pain.  Short of breath at rest.  No nausea vomiting diarrhea    Objective   Physical Exam  HENT:      Head:

## 2025-01-27 NOTE — ASSESSMENT & PLAN NOTE
Continue medications as updated from the hospital.  Do not resume home medications unless otherwise specified.

## 2025-01-28 LAB
ALBUMIN SERPL-MCNC: 3.9 G/DL (ref 3.4–5)
ALBUMIN/GLOB SERPL: 1.5 {RATIO} (ref 1.1–2.2)
ALP SERPL-CCNC: 77 U/L (ref 40–129)
ALT SERPL-CCNC: 12 U/L (ref 10–40)
ANION GAP SERPL CALCULATED.3IONS-SCNC: 11 MMOL/L (ref 3–16)
AST SERPL-CCNC: 21 U/L (ref 15–37)
BASOPHILS # BLD: 0 K/UL (ref 0–0.2)
BASOPHILS NFR BLD: 0.7 %
BILIRUB SERPL-MCNC: 0.4 MG/DL (ref 0–1)
BUN SERPL-MCNC: 11 MG/DL (ref 7–20)
CALCIUM SERPL-MCNC: 8.8 MG/DL (ref 8.3–10.6)
CHLORIDE SERPL-SCNC: 100 MMOL/L (ref 99–110)
CO2 SERPL-SCNC: 26 MMOL/L (ref 21–32)
CREAT SERPL-MCNC: 1.3 MG/DL (ref 0.6–1.2)
DEPRECATED RDW RBC AUTO: 14.6 % (ref 12.4–15.4)
EOSINOPHIL # BLD: 0.4 K/UL (ref 0–0.6)
EOSINOPHIL NFR BLD: 8.2 %
GFR SERPLBLD CREATININE-BSD FMLA CKD-EPI: 42 ML/MIN/{1.73_M2}
GLUCOSE SERPL-MCNC: 122 MG/DL (ref 70–99)
HCT VFR BLD AUTO: 31.4 % (ref 36–48)
HGB BLD-MCNC: 10.4 G/DL (ref 12–16)
LYMPHOCYTES # BLD: 1.6 K/UL (ref 1–5.1)
LYMPHOCYTES NFR BLD: 32.7 %
MCH RBC QN AUTO: 27.6 PG (ref 26–34)
MCHC RBC AUTO-ENTMCNC: 33.1 G/DL (ref 31–36)
MCV RBC AUTO: 83.3 FL (ref 80–100)
MONOCYTES # BLD: 0.4 K/UL (ref 0–1.3)
MONOCYTES NFR BLD: 8 %
NEUTROPHILS # BLD: 2.5 K/UL (ref 1.7–7.7)
NEUTROPHILS NFR BLD: 50.4 %
PLATELET # BLD AUTO: 265 K/UL (ref 135–450)
PMV BLD AUTO: 9 FL (ref 5–10.5)
POTASSIUM SERPL-SCNC: 4.7 MMOL/L (ref 3.5–5.1)
PROT SERPL-MCNC: 6.5 G/DL (ref 6.4–8.2)
RBC # BLD AUTO: 3.77 M/UL (ref 4–5.2)
SODIUM SERPL-SCNC: 137 MMOL/L (ref 136–145)
WBC # BLD AUTO: 4.9 K/UL (ref 4–11)

## 2025-03-03 ENCOUNTER — APPOINTMENT (OUTPATIENT)
Dept: GENERAL RADIOLOGY | Age: 80
End: 2025-03-03
Payer: MEDICARE

## 2025-03-03 ENCOUNTER — HOSPITAL ENCOUNTER (EMERGENCY)
Age: 80
Discharge: LEFT BEFORE DISCHARGE | End: 2025-03-03
Attending: EMERGENCY MEDICINE
Payer: MEDICARE

## 2025-03-03 VITALS
DIASTOLIC BLOOD PRESSURE: 68 MMHG | TEMPERATURE: 97 F | BODY MASS INDEX: 34.15 KG/M2 | WEIGHT: 200 LBS | OXYGEN SATURATION: 98 % | HEIGHT: 64 IN | SYSTOLIC BLOOD PRESSURE: 146 MMHG | HEART RATE: 63 BPM | RESPIRATION RATE: 18 BRPM

## 2025-03-03 DIAGNOSIS — R06.00 DYSPNEA, UNSPECIFIED TYPE: Primary | ICD-10-CM

## 2025-03-03 DIAGNOSIS — I50.9 CONGESTIVE HEART FAILURE, UNSPECIFIED HF CHRONICITY, UNSPECIFIED HEART FAILURE TYPE (HCC): ICD-10-CM

## 2025-03-03 DIAGNOSIS — D64.9 ANEMIA, UNSPECIFIED TYPE: ICD-10-CM

## 2025-03-03 LAB
ALBUMIN SERPL-MCNC: 3.4 G/DL (ref 3.4–5)
ALBUMIN/GLOB SERPL: 1 {RATIO} (ref 1.1–2.2)
ALP SERPL-CCNC: 80 U/L (ref 40–129)
ALT SERPL-CCNC: 9 U/L (ref 10–40)
ANION GAP SERPL CALCULATED.3IONS-SCNC: 9 MMOL/L (ref 3–16)
AST SERPL-CCNC: 19 U/L (ref 15–37)
BASOPHILS # BLD: 0 K/UL (ref 0–0.2)
BASOPHILS NFR BLD: 0.7 %
BILIRUB SERPL-MCNC: 0.3 MG/DL (ref 0–1)
BILIRUB UR QL STRIP.AUTO: NEGATIVE
BUN SERPL-MCNC: 11 MG/DL (ref 7–20)
CALCIUM SERPL-MCNC: 8.5 MG/DL (ref 8.3–10.6)
CHLORIDE SERPL-SCNC: 98 MMOL/L (ref 99–110)
CLARITY UR: CLEAR
CO2 SERPL-SCNC: 27 MMOL/L (ref 21–32)
COLOR UR: YELLOW
CREAT SERPL-MCNC: 0.7 MG/DL (ref 0.6–1.2)
DEPRECATED RDW RBC AUTO: 17.2 % (ref 12.4–15.4)
EKG ATRIAL RATE: 70 BPM
EKG DIAGNOSIS: NORMAL
EKG P AXIS: 17 DEGREES
EKG P-R INTERVAL: 162 MS
EKG Q-T INTERVAL: 436 MS
EKG QRS DURATION: 82 MS
EKG QTC CALCULATION (BAZETT): 470 MS
EKG R AXIS: -1 DEGREES
EKG T AXIS: 10 DEGREES
EKG VENTRICULAR RATE: 70 BPM
EOSINOPHIL # BLD: 0.3 K/UL (ref 0–0.6)
EOSINOPHIL NFR BLD: 4.7 %
GFR SERPLBLD CREATININE-BSD FMLA CKD-EPI: 87 ML/MIN/{1.73_M2}
GLUCOSE SERPL-MCNC: 103 MG/DL (ref 70–99)
GLUCOSE UR STRIP.AUTO-MCNC: NEGATIVE MG/DL
HCT VFR BLD AUTO: 26.4 % (ref 36–48)
HGB BLD-MCNC: 8.5 G/DL (ref 12–16)
HGB UR QL STRIP.AUTO: NEGATIVE
KETONES UR STRIP.AUTO-MCNC: NEGATIVE MG/DL
LEUKOCYTE ESTERASE UR QL STRIP.AUTO: NEGATIVE
LYMPHOCYTES # BLD: 1.2 K/UL (ref 1–5.1)
LYMPHOCYTES NFR BLD: 21.6 %
MCH RBC QN AUTO: 24.6 PG (ref 26–34)
MCHC RBC AUTO-ENTMCNC: 32.2 G/DL (ref 31–36)
MCV RBC AUTO: 76.3 FL (ref 80–100)
MONOCYTES # BLD: 0.5 K/UL (ref 0–1.3)
MONOCYTES NFR BLD: 9.8 %
NEUTROPHILS # BLD: 3.4 K/UL (ref 1.7–7.7)
NEUTROPHILS NFR BLD: 63.2 %
NITRITE UR QL STRIP.AUTO: NEGATIVE
NT-PROBNP SERPL-MCNC: 1561 PG/ML (ref 0–449)
PH UR STRIP.AUTO: 7 [PH] (ref 5–8)
PLATELET # BLD AUTO: 250 K/UL (ref 135–450)
PMV BLD AUTO: 7.6 FL (ref 5–10.5)
POTASSIUM SERPL-SCNC: 3.8 MMOL/L (ref 3.5–5.1)
PROT SERPL-MCNC: 6.8 G/DL (ref 6.4–8.2)
PROT UR STRIP.AUTO-MCNC: NEGATIVE MG/DL
RBC # BLD AUTO: 3.46 M/UL (ref 4–5.2)
SODIUM SERPL-SCNC: 134 MMOL/L (ref 136–145)
SP GR UR STRIP.AUTO: 1.01 (ref 1–1.03)
TROPONIN, HIGH SENSITIVITY: 23 NG/L (ref 0–14)
TROPONIN, HIGH SENSITIVITY: 23 NG/L (ref 0–14)
UA COMPLETE W REFLEX CULTURE PNL UR: NORMAL
UA DIPSTICK W REFLEX MICRO PNL UR: NORMAL
URN SPEC COLLECT METH UR: NORMAL
UROBILINOGEN UR STRIP-ACNC: 0.2 E.U./DL
WBC # BLD AUTO: 5.4 K/UL (ref 4–11)

## 2025-03-03 PROCEDURE — 85025 COMPLETE CBC W/AUTO DIFF WBC: CPT

## 2025-03-03 PROCEDURE — 80053 COMPREHEN METABOLIC PANEL: CPT

## 2025-03-03 PROCEDURE — 6360000002 HC RX W HCPCS: Performed by: EMERGENCY MEDICINE

## 2025-03-03 PROCEDURE — 93010 ELECTROCARDIOGRAM REPORT: CPT | Performed by: STUDENT IN AN ORGANIZED HEALTH CARE EDUCATION/TRAINING PROGRAM

## 2025-03-03 PROCEDURE — 71045 X-RAY EXAM CHEST 1 VIEW: CPT

## 2025-03-03 PROCEDURE — 96374 THER/PROPH/DIAG INJ IV PUSH: CPT

## 2025-03-03 PROCEDURE — 93005 ELECTROCARDIOGRAM TRACING: CPT | Performed by: EMERGENCY MEDICINE

## 2025-03-03 PROCEDURE — 6370000000 HC RX 637 (ALT 250 FOR IP): Performed by: EMERGENCY MEDICINE

## 2025-03-03 PROCEDURE — 84484 ASSAY OF TROPONIN QUANT: CPT

## 2025-03-03 PROCEDURE — 83880 ASSAY OF NATRIURETIC PEPTIDE: CPT

## 2025-03-03 PROCEDURE — 36415 COLL VENOUS BLD VENIPUNCTURE: CPT

## 2025-03-03 PROCEDURE — 73502 X-RAY EXAM HIP UNI 2-3 VIEWS: CPT

## 2025-03-03 PROCEDURE — 99285 EMERGENCY DEPT VISIT HI MDM: CPT

## 2025-03-03 PROCEDURE — 81003 URINALYSIS AUTO W/O SCOPE: CPT

## 2025-03-03 RX ORDER — FUROSEMIDE 10 MG/ML
20 INJECTION INTRAMUSCULAR; INTRAVENOUS ONCE
Status: COMPLETED | OUTPATIENT
Start: 2025-03-03 | End: 2025-03-03

## 2025-03-03 RX ORDER — HYDROCODONE BITARTRATE AND ACETAMINOPHEN 5; 325 MG/1; MG/1
1 TABLET ORAL
Status: COMPLETED | OUTPATIENT
Start: 2025-03-03 | End: 2025-03-03

## 2025-03-03 RX ADMIN — HYDROCODONE BITARTRATE AND ACETAMINOPHEN 1 TABLET: 5; 325 TABLET ORAL at 08:20

## 2025-03-03 RX ADMIN — FUROSEMIDE 20 MG: 10 INJECTION, SOLUTION INTRAMUSCULAR; INTRAVENOUS at 11:06

## 2025-03-03 ASSESSMENT — PAIN DESCRIPTION - ORIENTATION: ORIENTATION: RIGHT

## 2025-03-03 ASSESSMENT — PAIN SCALES - GENERAL
PAINLEVEL_OUTOF10: 10
PAINLEVEL_OUTOF10: 10

## 2025-03-03 ASSESSMENT — PAIN DESCRIPTION - DESCRIPTORS: DESCRIPTORS: ACHING

## 2025-03-03 ASSESSMENT — PAIN DESCRIPTION - LOCATION: LOCATION: ARM;HIP

## 2025-03-03 ASSESSMENT — PAIN - FUNCTIONAL ASSESSMENT: PAIN_FUNCTIONAL_ASSESSMENT: 0-10

## 2025-03-03 NOTE — DISCHARGE INSTRUCTIONS
Please return the emergency department with any new or worsening symptoms including but not limited to: Worsening shortness of breath, chest pain, passing out, blood or black in stool.  Please take full-dose of Lasix (40 mg) daily at home as prescribed. Please follow-up with your primary care physician as soon as possible within the next 3 days to ensure your symptoms are improving.    YOU HAVE BEEN DISCHARGED AGAINST MEDICAL ADVICE. RISKS OF AGAINST MEDICAL ADVICE DISCHARGE INCLUDE WORSENING OF CONDITION, UP TO, AND INCLUDING, DEATH. YOU MAY RETURN AT ANY TIME FOR ANY REASON.

## 2025-03-05 ENCOUNTER — OFFICE VISIT (OUTPATIENT)
Dept: FAMILY MEDICINE CLINIC | Age: 80
End: 2025-03-05
Payer: MEDICARE

## 2025-03-05 VITALS
OXYGEN SATURATION: 98 % | WEIGHT: 215.8 LBS | HEIGHT: 64 IN | SYSTOLIC BLOOD PRESSURE: 138 MMHG | DIASTOLIC BLOOD PRESSURE: 70 MMHG | HEART RATE: 96 BPM | BODY MASS INDEX: 36.84 KG/M2

## 2025-03-05 DIAGNOSIS — K13.0 CHEILITIS: ICD-10-CM

## 2025-03-05 DIAGNOSIS — L65.9 ALOPECIA: ICD-10-CM

## 2025-03-05 DIAGNOSIS — I73.9 PVD (PERIPHERAL VASCULAR DISEASE): ICD-10-CM

## 2025-03-05 DIAGNOSIS — I50.9 OTHER CONGESTIVE HEART FAILURE (HCC): ICD-10-CM

## 2025-03-05 DIAGNOSIS — F41.9 ANXIETY: ICD-10-CM

## 2025-03-05 DIAGNOSIS — D64.9 ACUTE ANEMIA: Primary | ICD-10-CM

## 2025-03-05 DIAGNOSIS — I63.9 CEREBROVASCULAR ACCIDENT (CVA), UNSPECIFIED MECHANISM (HCC): ICD-10-CM

## 2025-03-05 LAB
ALBUMIN: 3.8 G/DL
ALP BLD-CCNC: 102 U/L
ALT SERPL-CCNC: 15 U/L
ANION GAP SERPL CALCULATED.3IONS-SCNC: 9.1 MMOL/L
AST SERPL-CCNC: 25 U/L
BASOPHILS ABSOLUTE: ABNORMAL
BASOPHILS RELATIVE PERCENT: 0.9 %
BILIRUB SERPL-MCNC: 0.4 MG/DL (ref 0.1–1.4)
BUN BLDV-MCNC: 11 MG/DL
CALCIUM SERPL-MCNC: 8.7 MG/DL
CHLORIDE BLD-SCNC: 99 MMOL/L
CO2: 34 MMOL/L
CREAT SERPL-MCNC: 0.9 MG/DL
EOSINOPHILS ABSOLUTE: ABNORMAL
EOSINOPHILS RELATIVE PERCENT: 7.1 %
GFR, ESTIMATED: 60
GLUCOSE BLD-MCNC: 159 MG/DL
HCT VFR BLD CALC: 27.1 % (ref 36–46)
HEMOGLOBIN: 8.2 G/DL (ref 12–16)
LYMPHOCYTES ABSOLUTE: 4.1 /ΜL
LYMPHOCYTES RELATIVE PERCENT: 24.6 %
MCH RBC QN AUTO: 23.8 PG
MCHC RBC AUTO-ENTMCNC: 30.3 G/DL
MCV RBC AUTO: 78.8 FL
MONOCYTES ABSOLUTE: ABNORMAL
MONOCYTES RELATIVE PERCENT: 8.9 %
NEUTROPHILS ABSOLUTE: 3.2 /ΜL
NEUTROPHILS RELATIVE PERCENT: 58.1 %
PDW BLD-RTO: 16.1 %
PLATELET # BLD: 259 K/ΜL
PMV BLD AUTO: ABNORMAL FL
POTASSIUM SERPL-SCNC: 4.1 MMOL/L
RBC # BLD: 3.44 10^6/ΜL
SODIUM BLD-SCNC: 138 MMOL/L
TOTAL PROTEIN: 6.9 G/DL (ref 6.4–8.2)
WBC # BLD: 5.5 10^3/ML

## 2025-03-05 PROCEDURE — 3075F SYST BP GE 130 - 139MM HG: CPT | Performed by: FAMILY MEDICINE

## 2025-03-05 PROCEDURE — 1123F ACP DISCUSS/DSCN MKR DOCD: CPT | Performed by: FAMILY MEDICINE

## 2025-03-05 PROCEDURE — 1159F MED LIST DOCD IN RCRD: CPT | Performed by: FAMILY MEDICINE

## 2025-03-05 PROCEDURE — 99214 OFFICE O/P EST MOD 30 MIN: CPT | Performed by: FAMILY MEDICINE

## 2025-03-05 PROCEDURE — 3078F DIAST BP <80 MM HG: CPT | Performed by: FAMILY MEDICINE

## 2025-03-05 RX ORDER — FERROUS SULFATE 325(65) MG
325 TABLET ORAL
Qty: 30 TABLET | Refills: 5 | Status: SHIPPED | OUTPATIENT
Start: 2025-03-05

## 2025-03-05 RX ORDER — FUROSEMIDE 40 MG/1
80 TABLET ORAL DAILY
Qty: 180 TABLET | Refills: 1 | Status: SHIPPED | OUTPATIENT
Start: 2025-03-05 | End: 2025-09-01

## 2025-03-05 RX ORDER — LOSARTAN POTASSIUM 100 MG/1
100 TABLET ORAL EVERY MORNING
COMMUNITY

## 2025-03-05 RX ORDER — TRAMADOL HYDROCHLORIDE 50 MG/1
50 TABLET ORAL DAILY PRN
COMMUNITY

## 2025-03-05 RX ORDER — FLUCONAZOLE 200 MG/1
200 TABLET ORAL DAILY
Qty: 7 TABLET | Refills: 0 | Status: SHIPPED | OUTPATIENT
Start: 2025-03-05 | End: 2025-03-12

## 2025-03-05 RX ORDER — NAPROXEN 250 MG/1
250 TABLET ORAL DAILY
COMMUNITY
End: 2025-03-05 | Stop reason: SINTOL

## 2025-03-05 RX ORDER — METOPROLOL SUCCINATE 25 MG/1
75 TABLET, EXTENDED RELEASE ORAL DAILY
Qty: 270 TABLET | Refills: 0 | Status: SHIPPED | OUTPATIENT
Start: 2025-03-05 | End: 2025-06-03

## 2025-03-05 NOTE — PROGRESS NOTES
Caren Georges (:  1945) is a 79 y.o. female,Established patient, here for evaluation of the following chief complaint(s):  Follow-up, Anxiety, and Fatigue           Assessment & Plan  Acute anemia    Start iron replacement.  Follow-up in a month to recheck.    Orders:    ferrous sulfate (IRON 325) 325 (65 Fe) MG tablet; Take 1 tablet by mouth daily (with breakfast)    Other congestive heart failure (HCC)    Increase Lasix to twice daily.  Follow-up with cardiology.  Restart metoprolol.    Orders:    metoprolol succinate (TOPROL XL) 25 MG extended release tablet; Take 3 tablets by mouth daily    furosemide (LASIX) 40 MG tablet; Take 2 tablets by mouth daily    PVD (peripheral vascular disease)    Stable.  Avoid compression.         Cerebrovascular accident (CVA), unspecified mechanism (HCC)  Stable.  Continue current medication.  Restart metoprolol         Cheilitis  Diflucan as prescribed for 7 days.  Hold simvastatin.  Referred to dermatology.  Use a lip protecting ointment such as Chapstick.    Orders:    fluconazole (DIFLUCAN) 200 MG tablet; Take 1 tablet by mouth daily for 7 days    [facility]    Anxiety    Restart metoprolol    Orders:    metoprolol succinate (TOPROL XL) 25 MG extended release tablet; Take 3 tablets by mouth daily    Alopecia  Take a daily multivitamin referred to dermatology    Orders:    [facility]      Return in about 1 month (around 2025) for anemia, chf.       Subjective   HPI  79-year-old female here for hospital follow-up.  She left [place] from the emergency department was noted to have significant anemia, congestive heart failure and elevated troponin.  A couple of months ago she was admitted with GI bleed.  Review of Systems   Denies headache chest pain complains of shortness of breath.  No nausea or vomiting.  Complains of tremor.    Objective   Physical Exam  HENT:      Head: Normocephalic

## 2025-03-05 NOTE — ASSESSMENT & PLAN NOTE
Increase Lasix to twice daily.  Follow-up with cardiology.  Restart metoprolol.    Orders:    metoprolol succinate (TOPROL XL) 25 MG extended release tablet; Take 3 tablets by mouth daily    furosemide (LASIX) 40 MG tablet; Take 2 tablets by mouth daily

## 2025-03-05 NOTE — ASSESSMENT & PLAN NOTE
Restart metoprolol    Orders:    metoprolol succinate (TOPROL XL) 25 MG extended release tablet; Take 3 tablets by mouth daily

## 2025-03-10 ENCOUNTER — TELEMEDICINE (OUTPATIENT)
Dept: FAMILY MEDICINE CLINIC | Age: 80
End: 2025-03-10
Payer: MEDICARE

## 2025-03-10 ENCOUNTER — TELEPHONE (OUTPATIENT)
Dept: FAMILY MEDICINE CLINIC | Age: 80
End: 2025-03-10

## 2025-03-10 DIAGNOSIS — Z00.00 MEDICARE ANNUAL WELLNESS VISIT, SUBSEQUENT: Primary | ICD-10-CM

## 2025-03-10 PROCEDURE — 1123F ACP DISCUSS/DSCN MKR DOCD: CPT | Performed by: FAMILY MEDICINE

## 2025-03-10 PROCEDURE — G0439 PPPS, SUBSEQ VISIT: HCPCS | Performed by: FAMILY MEDICINE

## 2025-03-10 PROCEDURE — 1159F MED LIST DOCD IN RCRD: CPT | Performed by: FAMILY MEDICINE

## 2025-03-10 ASSESSMENT — PATIENT HEALTH QUESTIONNAIRE - PHQ9
SUM OF ALL RESPONSES TO PHQ QUESTIONS 1-9: 1
SUM OF ALL RESPONSES TO PHQ QUESTIONS 1-9: 1
1. LITTLE INTEREST OR PLEASURE IN DOING THINGS: SEVERAL DAYS
SUM OF ALL RESPONSES TO PHQ QUESTIONS 1-9: 1
2. FEELING DOWN, DEPRESSED OR HOPELESS: NOT AT ALL
SUM OF ALL RESPONSES TO PHQ QUESTIONS 1-9: 1

## 2025-03-10 NOTE — PROGRESS NOTES
Medicare Annual Wellness Visit    Caren Georges is here for Medicare AWV    Assessment & Plan   Medicare annual wellness visit, subsequent     Return in 1 year (on 3/10/2026) for Medicare AWV.     Subjective   Patient stated that she was sent home with oxygen from the hospital. No complaints of SOB.   Patient is requesting oxygen for home    Patient's complete Health Risk Assessment and screening values have been reviewed and are found in Flowsheets. The following problems were reviewed today and where indicated follow up appointments were made and/or referrals ordered.    Positive Risk Factor Screenings with Interventions:    Fall Risk:  Do you feel unsteady or are you worried about falling? : (!) yes (use cane and walker)  2 or more falls in past year?: no  Fall with injury in past year?: no     Interventions:    Reviewed medications, home hazards, visual acuity, and co-morbidities that can increase risk for falls  See AVS for additional education material         Controlled Medication Review:    Today's Pain Level: No data recorded   Opioid Risk: (Low risk score <55) Opioid risk score: 18    Patient is low risk for opioid use disorder or overdose.    Last PDMP Jorge as Reviewed:  Review User Review Instant Review Result   LOR WEBB 11/24/2020  9:49 AM     Reviewed PDMP [1]     Last Controlled Substance Monitoring Documentation      Flowsheet [facility] ED from 5/31/2022 in [facility] Emergency Department   Comments hat and stool collection cup provided, verbalized understanding of collection and returning sample to facility for testing filed at 05/31/2022 5925              General HRA Questions:  Select all that apply: (!) New or Increased Pain (from fall no factures)  Interventions - Pain:  Patient comments: stated that she did go to the hospital  See AVS for additional education material      Inactivity:  On average, how many days per week do you engage in moderate to strenuous exercise (like a brisk walk)?: 0

## 2025-03-10 NOTE — TELEPHONE ENCOUNTER
Patient stated that the hospital sent her home on oxygen.    Patient is requesting an order for oxygen.    Please advise    LOV 3/5/25    NOV 4/7/25

## 2025-03-10 NOTE — TELEPHONE ENCOUNTER
Spoke with Maya at [facility] related to if they are supplying oxygen tank for patient.    Maya sent message and they will contact the office with information related to oxygen tank

## 2025-03-11 ENCOUNTER — CARE COORDINATION (OUTPATIENT)
Dept: CARE COORDINATION | Age: 80
End: 2025-03-11

## 2025-03-11 ENCOUNTER — OFFICE VISIT (OUTPATIENT)
Dept: FAMILY MEDICINE CLINIC | Age: 80
End: 2025-03-11
Payer: MEDICARE

## 2025-03-11 VITALS
BODY MASS INDEX: 37.04 KG/M2 | HEIGHT: 64 IN | SYSTOLIC BLOOD PRESSURE: 136 MMHG | HEART RATE: 60 BPM | OXYGEN SATURATION: 97 % | DIASTOLIC BLOOD PRESSURE: 78 MMHG

## 2025-03-11 DIAGNOSIS — I50.9 OTHER CONGESTIVE HEART FAILURE (HCC): ICD-10-CM

## 2025-03-11 DIAGNOSIS — J44.9 CHRONIC OBSTRUCTIVE PULMONARY DISEASE, UNSPECIFIED COPD TYPE (HCC): ICD-10-CM

## 2025-03-11 DIAGNOSIS — I10 PRIMARY HYPERTENSION: ICD-10-CM

## 2025-03-11 DIAGNOSIS — I48.0 PAROXYSMAL ATRIAL FIBRILLATION (HCC): ICD-10-CM

## 2025-03-11 DIAGNOSIS — S80.12XA HEMATOMA OF LEFT LOWER LEG: ICD-10-CM

## 2025-03-11 DIAGNOSIS — M05.79 RHEUMATOID ARTHRITIS INVOLVING MULTIPLE SITES WITH POSITIVE RHEUMATOID FACTOR (HCC): ICD-10-CM

## 2025-03-11 DIAGNOSIS — I63.9 CEREBROVASCULAR ACCIDENT (CVA), UNSPECIFIED MECHANISM (HCC): ICD-10-CM

## 2025-03-11 DIAGNOSIS — D64.9 ACUTE ANEMIA: ICD-10-CM

## 2025-03-11 DIAGNOSIS — I73.9 PVD (PERIPHERAL VASCULAR DISEASE): ICD-10-CM
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
## 2025-03-11 PROCEDURE — 1159F MED LIST DOCD IN RCRD: CPT | Performed by: FAMILY MEDICINE

## 2025-03-11 PROCEDURE — 96372 THER/PROPH/DIAG INJ SC/IM: CPT | Performed by: FAMILY MEDICINE

## 2025-03-11 PROCEDURE — 3075F SYST BP GE 130 - 139MM HG: CPT | Performed by: FAMILY MEDICINE

## 2025-03-11 PROCEDURE — 1123F ACP DISCUSS/DSCN MKR DOCD: CPT | Performed by: FAMILY MEDICINE

## 2025-03-11 PROCEDURE — 99214 OFFICE O/P EST MOD 30 MIN: CPT | Performed by: FAMILY MEDICINE

## 2025-03-11 PROCEDURE — 3078F DIAST BP <80 MM HG: CPT | Performed by: FAMILY MEDICINE

## 2025-03-11 PROCEDURE — 1160F RVW MEDS BY RX/DR IN RCRD: CPT | Performed by: FAMILY MEDICINE

## 2025-03-11 RX ORDER — KETOROLAC TROMETHAMINE 30 MG/ML
30 INJECTION, SOLUTION INTRAMUSCULAR; INTRAVENOUS ONCE
Status: COMPLETED | OUTPATIENT
Start: 2025-03-11 | End: 2025-03-11

## 2025-03-11 RX ADMIN — KETOROLAC TROMETHAMINE 30 MG: 30 INJECTION, SOLUTION INTRAMUSCULAR; INTRAVENOUS at 11:38

## 2025-03-11 NOTE — PROGRESS NOTES
Caren Georges (:  1945) is a 79 y.o. female,Established patient, here for evaluation of the following chief complaint(s):  Fatigue and Follow-Up from Hospital           Assessment & Plan  Fall, sequela    Patient had a fall at home indicates she was struck by a screen door which caused her to fall off her porch.  evaluation in the emergency department showed no fractures she does have significant bruising and hematoma on the left leg.    Orders:    [facility] -  Referral to Care Management    Hematoma of left lower leg    Expectant management.         Acute anemia    Patient has not started her iron supplements yet.  Encouraged to resume iron 325 once daily.         Other congestive heart failure (HCC)    Follow-up with cardiology continue furosemide    Orders:    [facility] -  Referral to Care Management    PVD (peripheral vascular disease)    Stable continue monitoring         Rheumatoid arthritis involving multiple sites with positive rheumatoid factor (HCC)    Continue current treatment.  Toradol administered today    Orders:    ketorolac (TORADOL) injection 30 mg    [facility] -  Referral to Care Management    Chronic obstructive pulmonary disease, unspecified COPD type (HCC)    Stable continue current treatment    Orders:    [facility] -  Referral to Care Management    Paroxysmal atrial fibrillation (HCC)    Heart rate is regular at present.  Follow-up with cardiology    Orders:    [facility] -  Referral to Care Management    Cerebrovascular accident (CVA), unspecified mechanism (HCC)    Stable given previous GI bleed her antiplatelet therapy was discontinued    Orders:    [facility] -  Referral to Care Management    Primary hypertension    Stable continue current treatment           Return for previously scheduled appointment.       Subjective   HPI  79-year-old female returns for follow-up following ER visit.  She had a fall at home fell off of her porch with bruising to the leg.  Imaging was negative.  Remains significantly

## 2025-03-11 NOTE — ASSESSMENT & PLAN NOTE
Heart rate is regular at present.  Follow-up with cardiology    Orders:    [facility] -  Referral to Care Management

## 2025-03-11 NOTE — CARE COORDINATION
Ambulatory Care Coordination Note     3/11/2025 3:25 PM     Patient outreach attempt by this ACM today to perform care management follow up . ACM was unable to reach the patient by telephone today;   left voice message requesting a return phone call to this ACM.     ACM: Ayla Reed RN     Care Summary Note:     PCP/Specialist follow up:   Future Appointments         Provider Specialty Dept Phone    4/7/2025 10:00 AM Anton Christianson MD Family Medicine [phone]            Follow Up:   Plan for next ACM outreach in approximately 1-2 days  to complete:  - outreach attempt to offer care management services.

## 2025-03-11 NOTE — ASSESSMENT & PLAN NOTE
Stable given previous GI bleed her antiplatelet therapy was discontinued    Orders:    [facility] -  Referral to Care Management

## 2025-03-11 NOTE — ASSESSMENT & PLAN NOTE
Follow-up with cardiology continue furosemide    Orders:    [facility] -  Referral to Care Management

## 2025-03-11 NOTE — ASSESSMENT & PLAN NOTE
Continue current treatment.  Toradol administered today    Orders:    ketorolac (TORADOL) injection 30 mg    [facility] -  Referral to Care Management

## 2025-03-13 ENCOUNTER — CARE COORDINATION (OUTPATIENT)
Dept: CARE COORDINATION | Age: 80
End: 2025-03-13

## 2025-03-13 NOTE — CARE COORDINATION
Ambulatory Care Coordination Note     3/13/2025 9:58 AM     Patient outreach attempt by this ACM today to offer care management services. ACM was unable to reach the patient by telephone today;   left voice message requesting a return phone call to this ACM. Attempt x2     ACM: Laura Shelton RN     Care Summary Note:     PCP/Specialist follow up:   Future Appointments         Provider Specialty Dept Phone    4/7/2025 10:00 AM Anton Christianson MD Family Medicine [phone]            Follow Up:   Plan for next ACM outreach in approximately 1 week to complete:  - outreach attempt to offer care management services.

## 2025-03-19 ENCOUNTER — TELEPHONE (OUTPATIENT)
Dept: FAMILY MEDICINE CLINIC | Age: 80
End: 2025-03-19

## 2025-03-19 NOTE — TELEPHONE ENCOUNTER
Pt is having swelling in her feet for a few days, says she is taking her lasix but its not helping. Stated she took 1 and a half pills, directions are to take 2 daily so she may not be taking correctly. What do you rec?

## 2025-03-21 ENCOUNTER — CARE COORDINATION (OUTPATIENT)
Dept: CARE COORDINATION | Age: 80
End: 2025-03-21

## 2025-03-21 NOTE — CARE COORDINATION
ACM explained role/ program to the patient. Patient declined enrollment into care coordination at this time. ACM encouraged patient to reach out if any needs arise.

## 2025-03-24 ENCOUNTER — CARE COORDINATION (OUTPATIENT)
Dept: CARE COORDINATION | Age: 80
End: 2025-03-24

## 2025-03-24 NOTE — CARE COORDINATION
ACM received incoming call from Angela Norwood, verified on HIPAA form. Angela said she is at the patient house right now. Angela said that patient was trying to throw medications away this weekend. Patient tried to get Hydrocodone-Acetaminophen 5/325mg filled over the weekend because she is having knee pain. ACM did not see that medication prescribed and verified that Tramadol is ordered for pain. Patient has been taking that but it is not helpful. ACM said that Dr. Christianson does not normally prescribe Norco long term for pain. ACM said she would alert PCP for further recommendations.

## 2025-03-24 NOTE — TELEPHONE ENCOUNTER
ACM attempted outreach to patient and daughter felipa unable to reach or leave message at this time.

## 2025-03-24 NOTE — CARE COORDINATION
Patient left voicemail after hours on [facility] phone March 22 @ 0711. [facility] attempted to call patient back March 24 at 10:11AM but unable to reach. [facility] placed call to  Angela verfied on HIPAA unable to reach and left voicemail at 10:14 AM. [facility] placed wellness check call to [facility] dept who said patient had been transported to [facility] at 0830 on March 22. Attempted to call [facility] to confirm patient at hospital but unable to reach anyone.

## 2025-04-07 ENCOUNTER — OFFICE VISIT (OUTPATIENT)
Dept: FAMILY MEDICINE CLINIC | Age: 80
End: 2025-04-07
Payer: MEDICARE

## 2025-04-07 VITALS
SYSTOLIC BLOOD PRESSURE: 150 MMHG | HEIGHT: 64 IN | BODY MASS INDEX: 35.2 KG/M2 | DIASTOLIC BLOOD PRESSURE: 78 MMHG | OXYGEN SATURATION: 97 % | WEIGHT: 206.2 LBS | HEART RATE: 54 BPM

## 2025-04-07 DIAGNOSIS — D64.9 ACUTE ANEMIA: ICD-10-CM

## 2025-04-07 DIAGNOSIS — Z86.73 HISTORY OF STROKE: ICD-10-CM

## 2025-04-07 DIAGNOSIS — M05.79 RHEUMATOID ARTHRITIS INVOLVING MULTIPLE SITES WITH POSITIVE RHEUMATOID FACTOR (HCC): Primary | ICD-10-CM

## 2025-04-07 DIAGNOSIS — I48.0 PAROXYSMAL ATRIAL FIBRILLATION (HCC): ICD-10-CM

## 2025-04-07 DIAGNOSIS — I50.32 CHRONIC DIASTOLIC CHF (CONGESTIVE HEART FAILURE) (HCC): ICD-10-CM

## 2025-04-07 DIAGNOSIS — I73.9 PVD (PERIPHERAL VASCULAR DISEASE): ICD-10-CM

## 2025-04-07 DIAGNOSIS — I10 PRIMARY HYPERTENSION: ICD-10-CM

## 2025-04-07 DIAGNOSIS — K13.0 CHEILITIS: ICD-10-CM

## 2025-04-07 DIAGNOSIS — J44.9 CHRONIC OBSTRUCTIVE PULMONARY DISEASE, UNSPECIFIED COPD TYPE (HCC): ICD-10-CM

## 2025-04-07 PROBLEM — I63.9 STROKE (CEREBRUM) (HCC): Status: RESOLVED | Noted: 2022-10-10 | Resolved: 2025-04-07

## 2025-04-07 PROBLEM — J14: Status: RESOLVED | Noted: 2023-01-21 | Resolved: 2025-04-07

## 2025-04-07 PROBLEM — J18.9 PNEUMONIA DUE TO INFECTIOUS ORGANISM: Status: RESOLVED | Noted: 2023-01-23 | Resolved: 2025-04-07

## 2025-04-07 PROBLEM — E87.1 HYPONATREMIA: Status: RESOLVED | Noted: 2023-01-21 | Resolved: 2025-04-07

## 2025-04-07 LAB
ALBUMIN SERPL-MCNC: 4 G/DL (ref 3.4–5)
ALBUMIN/GLOB SERPL: 1.4 {RATIO} (ref 1.1–2.2)
ALP SERPL-CCNC: 83 U/L (ref 40–129)
ALT SERPL-CCNC: 10 U/L (ref 10–40)
ANION GAP SERPL CALCULATED.3IONS-SCNC: 9 MMOL/L (ref 3–16)
AST SERPL-CCNC: 19 U/L (ref 15–37)
BASOPHILS # BLD: 0.1 K/UL (ref 0–0.2)
BASOPHILS NFR BLD: 1.4 %
BILIRUB SERPL-MCNC: 0.4 MG/DL (ref 0–1)
BUN SERPL-MCNC: 13 MG/DL (ref 7–20)
CALCIUM SERPL-MCNC: 9.2 MG/DL (ref 8.3–10.6)
CHLORIDE SERPL-SCNC: 99 MMOL/L (ref 99–110)
CO2 SERPL-SCNC: 30 MMOL/L (ref 21–32)
CREAT SERPL-MCNC: 0.9 MG/DL (ref 0.6–1.2)
DEPRECATED RDW RBC AUTO: 25.2 % (ref 12.4–15.4)
EOSINOPHIL # BLD: 0.3 K/UL (ref 0–0.6)
EOSINOPHIL NFR BLD: 6.2 %
GFR SERPLBLD CREATININE-BSD FMLA CKD-EPI: 65 ML/MIN/{1.73_M2}
GLUCOSE SERPL-MCNC: 108 MG/DL (ref 70–99)
HCT VFR BLD AUTO: 36 % (ref 36–48)
HGB BLD-MCNC: 11.4 G/DL (ref 12–16)
LYMPHOCYTES # BLD: 1.4 K/UL (ref 1–5.1)
LYMPHOCYTES NFR BLD: 29.6 %
MAGNESIUM SERPL-MCNC: 1.95 MG/DL (ref 1.8–2.4)
MCH RBC QN AUTO: 24.9 PG (ref 26–34)
MCHC RBC AUTO-ENTMCNC: 31.8 G/DL (ref 31–36)
MCV RBC AUTO: 78.3 FL (ref 80–100)
MONOCYTES # BLD: 0.5 K/UL (ref 0–1.3)
MONOCYTES NFR BLD: 11.2 %
NEUTROPHILS # BLD: 2.5 K/UL (ref 1.7–7.7)
NEUTROPHILS NFR BLD: 51.6 %
PLATELET # BLD AUTO: 246 K/UL (ref 135–450)
PMV BLD AUTO: 8.8 FL (ref 5–10.5)
POTASSIUM SERPL-SCNC: 4.4 MMOL/L (ref 3.5–5.1)
PROT SERPL-MCNC: 6.9 G/DL (ref 6.4–8.2)
RBC # BLD AUTO: 4.59 M/UL (ref 4–5.2)
SODIUM SERPL-SCNC: 138 MMOL/L (ref 136–145)
WBC # BLD AUTO: 4.9 K/UL (ref 4–11)

## 2025-04-07 PROCEDURE — 3077F SYST BP >= 140 MM HG: CPT | Performed by: FAMILY MEDICINE

## 2025-04-07 PROCEDURE — 99214 OFFICE O/P EST MOD 30 MIN: CPT | Performed by: FAMILY MEDICINE

## 2025-04-07 PROCEDURE — 36415 COLL VENOUS BLD VENIPUNCTURE: CPT | Performed by: FAMILY MEDICINE

## 2025-04-07 PROCEDURE — 1159F MED LIST DOCD IN RCRD: CPT | Performed by: FAMILY MEDICINE

## 2025-04-07 PROCEDURE — 1123F ACP DISCUSS/DSCN MKR DOCD: CPT | Performed by: FAMILY MEDICINE

## 2025-04-07 PROCEDURE — 3078F DIAST BP <80 MM HG: CPT | Performed by: FAMILY MEDICINE

## 2025-04-07 RX ORDER — ISOSORBIDE MONONITRATE 120 MG/1
120 TABLET, EXTENDED RELEASE ORAL DAILY
Qty: 90 TABLET | Refills: 3 | Status: SHIPPED | OUTPATIENT
Start: 2025-04-07 | End: 2026-04-02

## 2025-04-07 RX ORDER — METOPROLOL SUCCINATE 100 MG/1
100 TABLET, EXTENDED RELEASE ORAL DAILY
Qty: 90 TABLET | Refills: 3 | Status: SHIPPED | OUTPATIENT
Start: 2025-04-07 | End: 2026-04-02

## 2025-04-07 RX ORDER — PETROLATUM,WHITE
OINTMENT IN PACKET (GRAM) TOPICAL
Qty: 10 G | Refills: 3 | Status: SHIPPED | OUTPATIENT
Start: 2025-04-07

## 2025-04-07 RX ORDER — LOSARTAN POTASSIUM 100 MG/1
100 TABLET ORAL EVERY MORNING
Qty: 90 TABLET | Refills: 3 | Status: SHIPPED | OUTPATIENT
Start: 2025-04-07 | End: 2026-04-02

## 2025-04-07 RX ORDER — POTASSIUM CHLORIDE 750 MG/1
TABLET, EXTENDED RELEASE ORAL
COMMUNITY
Start: 2025-04-03 | End: 2025-04-07

## 2025-04-07 RX ORDER — FERROUS SULFATE 325(65) MG
325 TABLET ORAL
Qty: 90 TABLET | Refills: 3 | Status: SHIPPED | OUTPATIENT
Start: 2025-04-07 | End: 2025-04-07 | Stop reason: SDUPTHER

## 2025-04-07 RX ORDER — METOPROLOL SUCCINATE 100 MG/1
100 TABLET, EXTENDED RELEASE ORAL DAILY
COMMUNITY
End: 2025-04-07 | Stop reason: SDUPTHER

## 2025-04-07 RX ORDER — SIMVASTATIN 20 MG
20 TABLET ORAL NIGHTLY
COMMUNITY
End: 2025-04-07 | Stop reason: SDUPTHER

## 2025-04-07 RX ORDER — SIMVASTATIN 20 MG
20 TABLET ORAL NIGHTLY
Qty: 90 TABLET | Refills: 3 | Status: SHIPPED | OUTPATIENT
Start: 2025-04-07 | End: 2026-04-02

## 2025-04-07 RX ORDER — FERROUS SULFATE 325(65) MG
325 TABLET ORAL
Qty: 90 TABLET | Refills: 3 | Status: SHIPPED | OUTPATIENT
Start: 2025-04-07 | End: 2026-04-02

## 2025-04-07 RX ORDER — POTASSIUM CHLORIDE 750 MG/1
10 TABLET, EXTENDED RELEASE ORAL DAILY
Qty: 90 TABLET | Refills: 3 | Status: SHIPPED | OUTPATIENT
Start: 2025-04-07 | End: 2026-04-02

## 2025-04-07 RX ORDER — NAPROXEN 250 MG/1
250 TABLET ORAL DAILY
COMMUNITY

## 2025-04-07 NOTE — PROGRESS NOTES
today.    Orders:    ferrous sulfate (IRON 325) 325 (65 Fe) MG tablet; Take 1 tablet by mouth daily (with breakfast)    Cheilitis    Try to stop the habit of licking your lips.  Use the topical ointment as needed.    Orders:    white petrolatum ointment; Apply topically as needed.      Return in about 1 month (around 5/7/2025) for htn, med check.       Subjective   HPI  80-year-old female here for a follow-up visit.  Indicates she is requesting a power mobility device to help with her activity and movement.  No change in symptoms.    Review of Systems   No headache chest pain shortness of breath nausea vomiting diarrhea    Objective   Physical Exam  HENT:      Head: Normocephalic and atraumatic.   Eyes:      Extraocular Movements: Extraocular movements intact.   Cardiovascular:      Rate and Rhythm: Normal rate and regular rhythm.   Pulmonary:      Effort: Pulmonary effort is normal.      Breath sounds: No wheezing, rhonchi or rales.   Musculoskeletal:         General: Swelling and deformity present.      Cervical back: Neck supple.      Right lower leg: Edema present.   Neurological:      Mental Status: She is alert and oriented to person, place, and time.      Gait: Gait abnormal.                This dictation was generated by a voice recognition computer software.  Although all attempts are made to edit the dictation for accuracy, there may be errors in the transcription that are not intended.    An electronic signature was used to authenticate this note.    --Anton Christianson MD

## 2025-04-07 NOTE — ASSESSMENT & PLAN NOTE
Continue metoprolol refilled today.  Lab work as ordered to recheck blood counts and electrolytes    Orders:    metoprolol succinate (TOPROL XL) 100 MG extended release tablet; Take 1 tablet by mouth daily    CBC with Auto Differential    Comprehensive Metabolic Panel    Magnesium

## 2025-04-07 NOTE — ASSESSMENT & PLAN NOTE
Stable refilled Imdur potassium continue Lasix.    Orders:    isosorbide mononitrate (IMDUR) 120 MG extended release tablet; Take 1 tablet by mouth daily    potassium chloride (KLOR-CON) 10 MEQ extended release tablet; Take 1 tablet by mouth daily    Scooter MISC; by Does not apply route

## 2025-04-07 NOTE — ASSESSMENT & PLAN NOTE
Patient requests a motorized scooter to help with her mobility problems related to arthritis heart disease and history of stroke.  Agree with the need for additional motorized assistance    Orders:    Scooter MISC; by Does not apply route

## 2025-04-07 NOTE — ASSESSMENT & PLAN NOTE
Continue simvastatin    Orders:    simvastatin (ZOCOR) 20 MG tablet; Take 1 tablet by mouth nightly

## 2025-04-07 NOTE — ASSESSMENT & PLAN NOTE
Continue metoprolol losartan.    Orders:    metoprolol succinate (TOPROL XL) 100 MG extended release tablet; Take 1 tablet by mouth daily    losartan (COZAAR) 100 MG tablet; Take 1 tablet by mouth every morning

## 2025-04-08 ENCOUNTER — RESULTS FOLLOW-UP (OUTPATIENT)
Dept: FAMILY MEDICINE CLINIC | Age: 80
End: 2025-04-08

## 2025-04-14 LAB
INR BLD: 1.05
PROTHROMBIN TIME: 11.6
TROPONIN: 0.02

## 2025-05-20 ENCOUNTER — TELEPHONE (OUTPATIENT)
Dept: FAMILY MEDICINE CLINIC | Age: 80
End: 2025-05-20

## 2025-05-22 NOTE — TELEPHONE ENCOUNTER
Tried to call patient.  Number is still not working.  Left message on daughters voicemail to have patient call us.

## 2025-05-28 NOTE — TELEPHONE ENCOUNTER
Pt was hospitalized at [facility], dc'd 05/28, hospital f/u scheduled for 06/03

## 2025-05-29 ENCOUNTER — APPOINTMENT (OUTPATIENT)
Dept: CT IMAGING | Age: 80
DRG: 071 | End: 2025-05-29
Payer: MEDICARE

## 2025-05-29 ENCOUNTER — HOSPITAL ENCOUNTER (INPATIENT)
Age: 80
LOS: 5 days | Discharge: NURSING, REHAB OR LONG-TERM CARE | DRG: 071 | End: 2025-06-03
Attending: STUDENT IN AN ORGANIZED HEALTH CARE EDUCATION/TRAINING PROGRAM | Admitting: STUDENT IN AN ORGANIZED HEALTH CARE EDUCATION/TRAINING PROGRAM
Payer: MEDICARE

## 2025-05-29 ENCOUNTER — APPOINTMENT (OUTPATIENT)
Dept: MRI IMAGING | Age: 80
DRG: 071 | End: 2025-05-29
Payer: MEDICARE

## 2025-05-29 ENCOUNTER — APPOINTMENT (OUTPATIENT)
Dept: GENERAL RADIOLOGY | Age: 80
DRG: 071 | End: 2025-05-29
Payer: MEDICARE

## 2025-05-29 DIAGNOSIS — I10 HYPERTENSION, UNSPECIFIED TYPE: ICD-10-CM

## 2025-05-29 DIAGNOSIS — I50.9 OTHER CONGESTIVE HEART FAILURE (HCC): ICD-10-CM

## 2025-05-29 DIAGNOSIS — R41.82 ALTERED MENTAL STATUS, UNSPECIFIED ALTERED MENTAL STATUS TYPE: ICD-10-CM

## 2025-05-29 DIAGNOSIS — G93.40 ENCEPHALOPATHY ACUTE: ICD-10-CM

## 2025-05-29 DIAGNOSIS — I48.0 PAROXYSMAL ATRIAL FIBRILLATION (HCC): ICD-10-CM
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
## 2025-05-29 LAB
ALBUMIN SERPL-MCNC: 3.3 G/DL (ref 3.4–5)
ALBUMIN/GLOB SERPL: 0.9 {RATIO} (ref 1.1–2.2)
ALP SERPL-CCNC: 82 U/L (ref 40–129)
ALT SERPL-CCNC: 10 U/L (ref 10–40)
AMMONIA PLAS-SCNC: 23 UMOL/L (ref 11–51)
AMPHETAMINES UR QL SCN>1000 NG/ML: NORMAL
ANION GAP SERPL CALCULATED.3IONS-SCNC: 16 MMOL/L (ref 3–16)
APAP SERPL-MCNC: <5 UG/ML (ref 10–30)
AST SERPL-CCNC: 22 U/L (ref 15–37)
BARBITURATES UR QL SCN>200 NG/ML: NORMAL
BASE EXCESS BLDV CALC-SCNC: 4.2 MMOL/L (ref -3–3)
BASOPHILS # BLD: 0.1 K/UL (ref 0–0.2)
BASOPHILS NFR BLD: 1.1 %
BENZODIAZ UR QL SCN>200 NG/ML: NORMAL
BILIRUB SERPL-MCNC: 0.6 MG/DL (ref 0–1)
BILIRUB UR QL STRIP.AUTO: NEGATIVE
BUN SERPL-MCNC: 12 MG/DL (ref 7–20)
CALCIUM SERPL-MCNC: 8.8 MG/DL (ref 8.3–10.6)
CANNABINOIDS UR QL SCN>50 NG/ML: NORMAL
CHLORIDE SERPL-SCNC: 94 MMOL/L (ref 99–110)
CHP ED QC CHECK: YES
CLARITY UR: CLEAR
CO2 BLDV-SCNC: 30 MMOL/L
CO2 SERPL-SCNC: 26 MMOL/L (ref 21–32)
COCAINE UR QL SCN: NORMAL
COHGB MFR BLDV: 51.2 % (ref 0–1.5)
COLOR UR: YELLOW
CREAT SERPL-MCNC: 1.1 MG/DL (ref 0.6–1.2)
DEPRECATED RDW RBC AUTO: 20.7 % (ref 12.4–15.4)
DRUG SCREEN COMMENT UR-IMP: NORMAL
EKG ATRIAL RATE: 65 BPM
EKG DIAGNOSIS: NORMAL
EKG P AXIS: 22 DEGREES
EKG P-R INTERVAL: 180 MS
EKG Q-T INTERVAL: 462 MS
EKG QRS DURATION: 84 MS
EKG QTC CALCULATION (BAZETT): 480 MS
EKG R AXIS: -11 DEGREES
EKG T AXIS: 25 DEGREES
EKG VENTRICULAR RATE: 65 BPM
EOSINOPHIL # BLD: 0.1 K/UL (ref 0–0.6)
EOSINOPHIL NFR BLD: 1.3 %
ETHANOLAMINE SERPL-MCNC: NORMAL MG/DL (ref 0–0.08)
FENTANYL SCREEN, URINE: NORMAL
GFR SERPLBLD CREATININE-BSD FMLA CKD-EPI: 51 ML/MIN/{1.73_M2}
GLUCOSE BLD-MCNC: 107 MG/DL
GLUCOSE BLD-MCNC: 107 MG/DL (ref 70–99)
GLUCOSE SERPL-MCNC: 111 MG/DL (ref 70–99)
GLUCOSE UR STRIP.AUTO-MCNC: NEGATIVE MG/DL
HCO3 BLDV-SCNC: 28.5 MMOL/L (ref 23–29)
HCT VFR BLD AUTO: 38 % (ref 36–48)
HGB BLD-MCNC: 12.3 G/DL (ref 12–16)
HGB UR QL STRIP.AUTO: NEGATIVE
INR PPP: 1.06 (ref 0.85–1.15)
KETONES UR STRIP.AUTO-MCNC: NEGATIVE MG/DL
LACTATE BLDV-SCNC: 1.3 MMOL/L (ref 0.4–2)
LEUKOCYTE ESTERASE UR QL STRIP.AUTO: NEGATIVE
LYMPHOCYTES # BLD: 2.3 K/UL (ref 1–5.1)
LYMPHOCYTES NFR BLD: 32.7 %
MCH RBC QN AUTO: 24.3 PG (ref 26–34)
MCHC RBC AUTO-ENTMCNC: 32.4 G/DL (ref 31–36)
MCV RBC AUTO: 75.1 FL (ref 80–100)
METHADONE UR QL SCN>300 NG/ML: NORMAL
METHGB MFR BLDV: 0 %
MONOCYTES # BLD: 0.7 K/UL (ref 0–1.3)
MONOCYTES NFR BLD: 10.1 %
NEUTROPHILS # BLD: 3.8 K/UL (ref 1.7–7.7)
NEUTROPHILS NFR BLD: 54.8 %
NITRITE UR QL STRIP.AUTO: NEGATIVE
NT-PROBNP SERPL-MCNC: 790 PG/ML (ref 0–449)
O2 THERAPY: ABNORMAL
OPIATES UR QL SCN>300 NG/ML: NORMAL
OXYCODONE UR QL SCN: NORMAL
PCO2 BLDV: 41.8 MMHG (ref 40–50)
PCP UR QL SCN>25 NG/ML: NORMAL
PERFORMED ON: ABNORMAL
PH BLDV: 7.45 [PH] (ref 7.35–7.45)
PH UR STRIP.AUTO: 6.5 [PH] (ref 5–8)
PH UR STRIP: 6.5 [PH]
PLATELET # BLD AUTO: 189 K/UL (ref 135–450)
PMV BLD AUTO: 8.9 FL (ref 5–10.5)
PO2 BLDV: 27.7 MMHG (ref 25–40)
POTASSIUM SERPL-SCNC: 3.7 MMOL/L (ref 3.5–5.1)
PROT SERPL-MCNC: 6.9 G/DL (ref 6.4–8.2)
PROT UR STRIP.AUTO-MCNC: NEGATIVE MG/DL
PROTHROMBIN TIME: 14 SEC (ref 11.9–14.9)
RBC # BLD AUTO: 5.06 M/UL (ref 4–5.2)
SALICYLATES SERPL-MCNC: <0.5 MG/DL (ref 15–30)
SAO2 % BLDV: 55 %
SODIUM SERPL-SCNC: 136 MMOL/L (ref 136–145)
SP GR UR STRIP.AUTO: <=1.005 (ref 1–1.03)
TROPONIN, HIGH SENSITIVITY: 20 NG/L (ref 0–14)
TROPONIN, HIGH SENSITIVITY: 31 NG/L (ref 0–14)
TSH SERPL DL<=0.005 MIU/L-ACNC: 0.88 UIU/ML (ref 0.27–4.2)
UA COMPLETE W REFLEX CULTURE PNL UR: NORMAL
UA DIPSTICK W REFLEX MICRO PNL UR: NORMAL
URN SPEC COLLECT METH UR: NORMAL
UROBILINOGEN UR STRIP-ACNC: 0.2 E.U./DL
WBC # BLD AUTO: 7 K/UL (ref 4–11)

## 2025-05-29 PROCEDURE — 93010 ELECTROCARDIOGRAM REPORT: CPT | Performed by: INTERNAL MEDICINE

## 2025-05-29 PROCEDURE — 82140 ASSAY OF AMMONIA: CPT

## 2025-05-29 PROCEDURE — 83605 ASSAY OF LACTIC ACID: CPT

## 2025-05-29 PROCEDURE — 82803 BLOOD GASES ANY COMBINATION: CPT

## 2025-05-29 PROCEDURE — 80143 DRUG ASSAY ACETAMINOPHEN: CPT

## 2025-05-29 PROCEDURE — 83880 ASSAY OF NATRIURETIC PEPTIDE: CPT

## 2025-05-29 PROCEDURE — 85025 COMPLETE CBC W/AUTO DIFF WBC: CPT

## 2025-05-29 PROCEDURE — 99285 EMERGENCY DEPT VISIT HI MDM: CPT

## 2025-05-29 PROCEDURE — 80307 DRUG TEST PRSMV CHEM ANLYZR: CPT

## 2025-05-29 PROCEDURE — 36415 COLL VENOUS BLD VENIPUNCTURE: CPT

## 2025-05-29 PROCEDURE — 71045 X-RAY EXAM CHEST 1 VIEW: CPT

## 2025-05-29 PROCEDURE — 96372 THER/PROPH/DIAG INJ SC/IM: CPT

## 2025-05-29 PROCEDURE — 96374 THER/PROPH/DIAG INJ IV PUSH: CPT

## 2025-05-29 PROCEDURE — 2060000000 HC ICU INTERMEDIATE R&B

## 2025-05-29 PROCEDURE — 6360000002 HC RX W HCPCS

## 2025-05-29 PROCEDURE — 82077 ASSAY SPEC XCP UR&BREATH IA: CPT

## 2025-05-29 PROCEDURE — 85610 PROTHROMBIN TIME: CPT

## 2025-05-29 PROCEDURE — 6360000002 HC RX W HCPCS: Performed by: STUDENT IN AN ORGANIZED HEALTH CARE EDUCATION/TRAINING PROGRAM

## 2025-05-29 PROCEDURE — 84443 ASSAY THYROID STIM HORMONE: CPT

## 2025-05-29 PROCEDURE — 80179 DRUG ASSAY SALICYLATE: CPT

## 2025-05-29 PROCEDURE — 70496 CT ANGIOGRAPHY HEAD: CPT

## 2025-05-29 PROCEDURE — 80053 COMPREHEN METABOLIC PANEL: CPT

## 2025-05-29 PROCEDURE — 84484 ASSAY OF TROPONIN QUANT: CPT

## 2025-05-29 PROCEDURE — 81003 URINALYSIS AUTO W/O SCOPE: CPT

## 2025-05-29 PROCEDURE — 96375 TX/PRO/DX INJ NEW DRUG ADDON: CPT

## 2025-05-29 PROCEDURE — 6360000004 HC RX CONTRAST MEDICATION: Performed by: STUDENT IN AN ORGANIZED HEALTH CARE EDUCATION/TRAINING PROGRAM

## 2025-05-29 PROCEDURE — 93005 ELECTROCARDIOGRAM TRACING: CPT | Performed by: STUDENT IN AN ORGANIZED HEALTH CARE EDUCATION/TRAINING PROGRAM

## 2025-05-29 PROCEDURE — 70551 MRI BRAIN STEM W/O DYE: CPT

## 2025-05-29 PROCEDURE — 70450 CT HEAD/BRAIN W/O DYE: CPT

## 2025-05-29 RX ORDER — IOPAMIDOL 755 MG/ML
75 INJECTION, SOLUTION INTRAVASCULAR
Status: COMPLETED | OUTPATIENT
Start: 2025-05-29 | End: 2025-05-29

## 2025-05-29 RX ORDER — WATER 10 ML/10ML
INJECTION INTRAMUSCULAR; INTRAVENOUS; SUBCUTANEOUS
Status: DISPENSED
Start: 2025-05-29 | End: 2025-05-30

## 2025-05-29 RX ORDER — LORAZEPAM 2 MG/ML
2 INJECTION INTRAMUSCULAR ONCE
Status: COMPLETED | OUTPATIENT
Start: 2025-05-29 | End: 2025-05-29

## 2025-05-29 RX ORDER — HYDRALAZINE HYDROCHLORIDE 20 MG/ML
5 INJECTION INTRAMUSCULAR; INTRAVENOUS ONCE
Status: COMPLETED | OUTPATIENT
Start: 2025-05-29 | End: 2025-05-29

## 2025-05-29 RX ORDER — HALOPERIDOL 5 MG/ML
2 INJECTION INTRAMUSCULAR ONCE
Status: COMPLETED | OUTPATIENT
Start: 2025-05-29 | End: 2025-05-29

## 2025-05-29 RX ORDER — LORAZEPAM 2 MG/ML
INJECTION INTRAMUSCULAR
Status: COMPLETED
Start: 2025-05-29 | End: 2025-05-29

## 2025-05-29 RX ORDER — HALOPERIDOL 5 MG/ML
3 INJECTION INTRAMUSCULAR ONCE
Status: COMPLETED | OUTPATIENT
Start: 2025-05-29 | End: 2025-05-29

## 2025-05-29 RX ORDER — ZIPRASIDONE MESYLATE 20 MG/ML
20 INJECTION, POWDER, LYOPHILIZED, FOR SOLUTION INTRAMUSCULAR ONCE
Status: COMPLETED | OUTPATIENT
Start: 2025-05-29 | End: 2025-05-29

## 2025-05-29 RX ADMIN — HALOPERIDOL LACTATE 2 MG: 5 INJECTION, SOLUTION INTRAMUSCULAR at 15:11

## 2025-05-29 RX ADMIN — LORAZEPAM 2 MG: 2 INJECTION INTRAMUSCULAR; INTRAVENOUS at 15:30

## 2025-05-29 RX ADMIN — IOPAMIDOL 75 ML: 755 INJECTION, SOLUTION INTRAVENOUS at 15:43

## 2025-05-29 RX ADMIN — HALOPERIDOL LACTATE 3 MG: 5 INJECTION, SOLUTION INTRAMUSCULAR at 15:36

## 2025-05-29 RX ADMIN — HYDRALAZINE HYDROCHLORIDE 5 MG: 20 INJECTION INTRAMUSCULAR; INTRAVENOUS at 22:00

## 2025-05-29 RX ADMIN — LORAZEPAM 2 MG: 2 INJECTION INTRAMUSCULAR at 15:30

## 2025-05-29 RX ADMIN — ZIPRASIDONE MESYLATE 20 MG: 20 INJECTION, POWDER, LYOPHILIZED, FOR SOLUTION INTRAMUSCULAR at 20:08

## 2025-05-29 NOTE — ED PROVIDER NOTES
No acute intracranial abnormality identified within   this limitation.             LABS:   Results for orders placed or performed during the hospital encounter of 05/29/25   CBC with Auto Differential   Result Value Ref Range    WBC 7.0 4.0 - 11.0 K/uL    RBC 5.06 4.00 - 5.20 M/uL    Hemoglobin 12.3 12.0 - 16.0 g/dL    Hematocrit 38.0 36.0 - 48.0 %    MCV 75.1 (L) 80.0 - 100.0 fL    MCH 24.3 (L) 26.0 - 34.0 pg    MCHC 32.4 31.0 - 36.0 g/dL    RDW 20.7 (H) 12.4 - 15.4 %    Platelets 189 135 - 450 K/uL    MPV 8.9 5.0 - 10.5 fL    Neutrophils % 54.8 %    Lymphocytes % 32.7 %    Monocytes % 10.1 %    Eosinophils % 1.3 %    Basophils % 1.1 %    Neutrophils Absolute 3.8 1.7 - 7.7 K/uL    Lymphocytes Absolute 2.3 1.0 - 5.1 K/uL    Monocytes Absolute 0.7 0.0 - 1.3 K/uL    Eosinophils Absolute 0.1 0.0 - 0.6 K/uL    Basophils Absolute 0.1 0.0 - 0.2 K/uL   CMP w/ Reflex to MG   Result Value Ref Range    Sodium 136 136 - 145 mmol/L    Potassium reflex Magnesium 3.7 3.5 - 5.1 mmol/L    Chloride 94 (L) 99 - 110 mmol/L    CO2 26 21 - 32 mmol/L    Anion Gap 16 3 - 16    Glucose 111 (H) 70 - 99 mg/dL    BUN 12 7 - 20 mg/dL    Creatinine 1.1 0.6 - 1.2 mg/dL    Est, Glom Filt Rate 51 (A) >60    Calcium 8.8 8.3 - 10.6 mg/dL    Total Protein 6.9 6.4 - 8.2 g/dL    Albumin 3.3 (L) 3.4 - 5.0 g/dL    Albumin/Globulin Ratio 0.9 (L) 1.1 - 2.2    Total Bilirubin 0.6 0.0 - 1.0 mg/dL    Alkaline Phosphatase 82 40 - 129 U/L    ALT 10 10 - 40 U/L    AST 22 15 - 37 U/L   Protime-INR   Result Value Ref Range    Protime 14.0 11.9 - 14.9 sec    INR 1.06 0.85 - 1.15   Troponin   Result Value Ref Range    Troponin, High Sensitivity 31 (H) 0 - 14 ng/L   Blood Gas, Venous   Result Value Ref Range    pH, Werner 7.452 (H) 7.350 - 7.450    pCO2, Werner 41.8 40.0 - 50.0 mmHg    PO2, Werner 27.7 25.0 - 40.0 mmHg    HCO3, Venous 28.5 23.0 - 29.0 mmol/L    Base Excess, Werner 4.2 (H) -3.0 - 3.0 mmol/L    O2 Sat, Werner 55 Not Established %    Carboxyhemoglobin 51.2 (H) 0.0 -

## 2025-05-29 NOTE — ED NOTES
CODE STROKE    1509: Code Stroke paged overhead, CT called and will be ready upon arrival     1510:  Stroke Team paged.     1511: Call back from  Stroke Team, speaking to Dr. Glover regarding case.
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
## 2025-05-30 LAB
ANION GAP SERPL CALCULATED.3IONS-SCNC: 16 MMOL/L (ref 3–16)
BASOPHILS # BLD: 0.1 K/UL (ref 0–0.2)
BASOPHILS NFR BLD: 1.1 %
BUN SERPL-MCNC: 11 MG/DL (ref 7–20)
CALCIUM SERPL-MCNC: 8.6 MG/DL (ref 8.3–10.6)
CHLORIDE SERPL-SCNC: 94 MMOL/L (ref 99–110)
CO2 SERPL-SCNC: 25 MMOL/L (ref 21–32)
CREAT SERPL-MCNC: 0.7 MG/DL (ref 0.6–1.2)
DEPRECATED RDW RBC AUTO: 20.9 % (ref 12.4–15.4)
EKG ATRIAL RATE: 326 BPM
EKG DIAGNOSIS: NORMAL
EKG Q-T INTERVAL: 312 MS
EKG QRS DURATION: 76 MS
EKG QTC CALCULATION (BAZETT): 491 MS
EKG R AXIS: 43 DEGREES
EKG T AXIS: 181 DEGREES
EKG VENTRICULAR RATE: 149 BPM
EOSINOPHIL # BLD: 0 K/UL (ref 0–0.6)
EOSINOPHIL NFR BLD: 0.7 %
GFR SERPLBLD CREATININE-BSD FMLA CKD-EPI: 87 ML/MIN/{1.73_M2}
GLUCOSE BLD-MCNC: 140 MG/DL (ref 70–99)
GLUCOSE SERPL-MCNC: 99 MG/DL (ref 70–99)
HCT VFR BLD AUTO: 40.1 % (ref 36–48)
HGB BLD-MCNC: 13 G/DL (ref 12–16)
LYMPHOCYTES # BLD: 1.7 K/UL (ref 1–5.1)
LYMPHOCYTES NFR BLD: 25.1 %
MAGNESIUM SERPL-MCNC: 1.84 MG/DL (ref 1.8–2.4)
MCH RBC QN AUTO: 24.2 PG (ref 26–34)
MCHC RBC AUTO-ENTMCNC: 32.4 G/DL (ref 31–36)
MCV RBC AUTO: 74.7 FL (ref 80–100)
MONOCYTES # BLD: 0.7 K/UL (ref 0–1.3)
MONOCYTES NFR BLD: 9.5 %
NEUTROPHILS # BLD: 4.4 K/UL (ref 1.7–7.7)
NEUTROPHILS NFR BLD: 63.6 %
PERFORMED ON: ABNORMAL
PLATELET # BLD AUTO: 174 K/UL (ref 135–450)
PMV BLD AUTO: 8.6 FL (ref 5–10.5)
POTASSIUM SERPL-SCNC: 3.7 MMOL/L (ref 3.5–5.1)
RBC # BLD AUTO: 5.37 M/UL (ref 4–5.2)
SODIUM SERPL-SCNC: 135 MMOL/L (ref 136–145)
TROPONIN, HIGH SENSITIVITY: 30 NG/L (ref 0–14)
WBC # BLD AUTO: 6.9 K/UL (ref 4–11)

## 2025-05-30 PROCEDURE — 2580000003 HC RX 258: Performed by: INTERNAL MEDICINE

## 2025-05-30 PROCEDURE — 97535 SELF CARE MNGMENT TRAINING: CPT

## 2025-05-30 PROCEDURE — 6360000002 HC RX W HCPCS

## 2025-05-30 PROCEDURE — 2580000003 HC RX 258

## 2025-05-30 PROCEDURE — 83735 ASSAY OF MAGNESIUM: CPT

## 2025-05-30 PROCEDURE — 2580000003 HC RX 258: Performed by: STUDENT IN AN ORGANIZED HEALTH CARE EDUCATION/TRAINING PROGRAM

## 2025-05-30 PROCEDURE — 6370000000 HC RX 637 (ALT 250 FOR IP)

## 2025-05-30 PROCEDURE — 76937 US GUIDE VASCULAR ACCESS: CPT

## 2025-05-30 PROCEDURE — 80048 BASIC METABOLIC PNL TOTAL CA: CPT

## 2025-05-30 PROCEDURE — 93010 ELECTROCARDIOGRAM REPORT: CPT | Performed by: INTERNAL MEDICINE

## 2025-05-30 PROCEDURE — 2500000003 HC RX 250 WO HCPCS

## 2025-05-30 PROCEDURE — 99232 SBSQ HOSP IP/OBS MODERATE 35: CPT

## 2025-05-30 PROCEDURE — 97530 THERAPEUTIC ACTIVITIES: CPT

## 2025-05-30 PROCEDURE — 36415 COLL VENOUS BLD VENIPUNCTURE: CPT

## 2025-05-30 PROCEDURE — 2500000003 HC RX 250 WO HCPCS: Performed by: STUDENT IN AN ORGANIZED HEALTH CARE EDUCATION/TRAINING PROGRAM

## 2025-05-30 PROCEDURE — 97166 OT EVAL MOD COMPLEX 45 MIN: CPT

## 2025-05-30 PROCEDURE — 2060000000 HC ICU INTERMEDIATE R&B

## 2025-05-30 PROCEDURE — 84484 ASSAY OF TROPONIN QUANT: CPT

## 2025-05-30 PROCEDURE — 6360000002 HC RX W HCPCS: Performed by: STUDENT IN AN ORGANIZED HEALTH CARE EDUCATION/TRAINING PROGRAM

## 2025-05-30 PROCEDURE — 93005 ELECTROCARDIOGRAM TRACING: CPT | Performed by: INTERNAL MEDICINE

## 2025-05-30 PROCEDURE — 85025 COMPLETE CBC W/AUTO DIFF WBC: CPT

## 2025-05-30 RX ORDER — ONDANSETRON 2 MG/ML
4 INJECTION INTRAMUSCULAR; INTRAVENOUS EVERY 6 HOURS PRN
Status: DISCONTINUED | OUTPATIENT
Start: 2025-05-30 | End: 2025-06-03 | Stop reason: HOSPADM

## 2025-05-30 RX ORDER — FUROSEMIDE 40 MG/1
40 TABLET ORAL DAILY
Status: DISCONTINUED | OUTPATIENT
Start: 2025-05-30 | End: 2025-06-03 | Stop reason: HOSPADM

## 2025-05-30 RX ORDER — DILTIAZEM HYDROCHLORIDE 5 MG/ML
0.25 INJECTION INTRAVENOUS ONCE
Status: DISCONTINUED | OUTPATIENT
Start: 2025-05-30 | End: 2025-05-30

## 2025-05-30 RX ORDER — SODIUM CHLORIDE 0.9 % (FLUSH) 0.9 %
5-40 SYRINGE (ML) INJECTION PRN
Status: DISCONTINUED | OUTPATIENT
Start: 2025-05-30 | End: 2025-06-03 | Stop reason: HOSPADM

## 2025-05-30 RX ORDER — SODIUM CHLORIDE, SODIUM LACTATE, POTASSIUM CHLORIDE, CALCIUM CHLORIDE 600; 310; 30; 20 MG/100ML; MG/100ML; MG/100ML; MG/100ML
INJECTION, SOLUTION INTRAVENOUS CONTINUOUS
Status: DISCONTINUED | OUTPATIENT
Start: 2025-05-30 | End: 2025-05-30

## 2025-05-30 RX ORDER — DIGOXIN 0.25 MG/ML
500 INJECTION INTRAMUSCULAR; INTRAVENOUS ONCE
Status: COMPLETED | OUTPATIENT
Start: 2025-05-30 | End: 2025-05-30

## 2025-05-30 RX ORDER — DEXTROSE MONOHYDRATE AND SODIUM CHLORIDE 5; .45 G/100ML; G/100ML
INJECTION, SOLUTION INTRAVENOUS CONTINUOUS
Status: DISCONTINUED | OUTPATIENT
Start: 2025-05-30 | End: 2025-06-01

## 2025-05-30 RX ORDER — ENOXAPARIN SODIUM 100 MG/ML
40 INJECTION SUBCUTANEOUS DAILY
Status: DISCONTINUED | OUTPATIENT
Start: 2025-05-30 | End: 2025-05-30

## 2025-05-30 RX ORDER — PANTOPRAZOLE SODIUM 40 MG/1
40 TABLET, DELAYED RELEASE ORAL 2 TIMES DAILY
Status: DISCONTINUED | OUTPATIENT
Start: 2025-05-30 | End: 2025-06-03 | Stop reason: HOSPADM

## 2025-05-30 RX ORDER — ACETAMINOPHEN 325 MG/1
650 TABLET ORAL EVERY 6 HOURS PRN
Status: DISCONTINUED | OUTPATIENT
Start: 2025-05-30 | End: 2025-06-03 | Stop reason: HOSPADM

## 2025-05-30 RX ORDER — AMLODIPINE BESYLATE 5 MG/1
5 TABLET ORAL DAILY
Status: DISCONTINUED | OUTPATIENT
Start: 2025-05-30 | End: 2025-05-31

## 2025-05-30 RX ORDER — SODIUM CHLORIDE 9 MG/ML
INJECTION, SOLUTION INTRAVENOUS PRN
Status: DISCONTINUED | OUTPATIENT
Start: 2025-05-30 | End: 2025-06-03 | Stop reason: HOSPADM

## 2025-05-30 RX ORDER — AMLODIPINE BESYLATE 5 MG/1
5 TABLET ORAL DAILY
Status: ON HOLD | COMMUNITY
Start: 2025-05-26 | End: 2025-06-02

## 2025-05-30 RX ORDER — METOPROLOL TARTRATE 1 MG/ML
INJECTION, SOLUTION INTRAVENOUS
Status: DISPENSED
Start: 2025-05-30 | End: 2025-05-30

## 2025-05-30 RX ORDER — METOPROLOL TARTRATE 1 MG/ML
5 INJECTION, SOLUTION INTRAVENOUS ONCE
Status: COMPLETED | OUTPATIENT
Start: 2025-05-30 | End: 2025-05-30

## 2025-05-30 RX ORDER — 0.9 % SODIUM CHLORIDE 0.9 %
500 INTRAVENOUS SOLUTION INTRAVENOUS ONCE
Status: COMPLETED | OUTPATIENT
Start: 2025-05-30 | End: 2025-05-30

## 2025-05-30 RX ORDER — THIAMINE HYDROCHLORIDE 100 MG/ML
100 INJECTION, SOLUTION INTRAMUSCULAR; INTRAVENOUS DAILY
Status: DISCONTINUED | OUTPATIENT
Start: 2025-05-30 | End: 2025-05-31

## 2025-05-30 RX ORDER — ATORVASTATIN CALCIUM 10 MG/1
10 TABLET, FILM COATED ORAL DAILY
Status: DISCONTINUED | OUTPATIENT
Start: 2025-05-30 | End: 2025-06-03 | Stop reason: HOSPADM

## 2025-05-30 RX ORDER — ONDANSETRON 4 MG/1
4 TABLET, ORALLY DISINTEGRATING ORAL EVERY 8 HOURS PRN
Status: DISCONTINUED | OUTPATIENT
Start: 2025-05-30 | End: 2025-06-03 | Stop reason: HOSPADM

## 2025-05-30 RX ORDER — MAGNESIUM SULFATE IN WATER 40 MG/ML
2000 INJECTION, SOLUTION INTRAVENOUS PRN
Status: DISCONTINUED | OUTPATIENT
Start: 2025-05-30 | End: 2025-06-03 | Stop reason: HOSPADM

## 2025-05-30 RX ORDER — SODIUM CHLORIDE 0.9 % (FLUSH) 0.9 %
5-40 SYRINGE (ML) INJECTION EVERY 12 HOURS SCHEDULED
Status: DISCONTINUED | OUTPATIENT
Start: 2025-05-30 | End: 2025-06-03 | Stop reason: HOSPADM

## 2025-05-30 RX ORDER — POTASSIUM CHLORIDE 1500 MG/1
40 TABLET, EXTENDED RELEASE ORAL PRN
Status: DISCONTINUED | OUTPATIENT
Start: 2025-05-30 | End: 2025-06-03 | Stop reason: HOSPADM

## 2025-05-30 RX ORDER — ASPIRIN 81 MG/1
81 TABLET, CHEWABLE ORAL DAILY
Status: DISCONTINUED | OUTPATIENT
Start: 2025-05-30 | End: 2025-06-03 | Stop reason: HOSPADM

## 2025-05-30 RX ORDER — ENOXAPARIN SODIUM 100 MG/ML
1 INJECTION SUBCUTANEOUS 2 TIMES DAILY
Status: DISCONTINUED | OUTPATIENT
Start: 2025-05-30 | End: 2025-06-01

## 2025-05-30 RX ORDER — ACETAMINOPHEN 650 MG/1
650 SUPPOSITORY RECTAL EVERY 6 HOURS PRN
Status: DISCONTINUED | OUTPATIENT
Start: 2025-05-30 | End: 2025-06-03 | Stop reason: HOSPADM

## 2025-05-30 RX ORDER — POTASSIUM CHLORIDE 7.45 MG/ML
10 INJECTION INTRAVENOUS PRN
Status: DISCONTINUED | OUTPATIENT
Start: 2025-05-30 | End: 2025-06-03 | Stop reason: HOSPADM

## 2025-05-30 RX ORDER — LABETALOL HYDROCHLORIDE 5 MG/ML
10 INJECTION, SOLUTION INTRAVENOUS EVERY 6 HOURS PRN
Status: DISCONTINUED | OUTPATIENT
Start: 2025-05-30 | End: 2025-05-31

## 2025-05-30 RX ORDER — DULOXETIN HYDROCHLORIDE 30 MG/1
30 CAPSULE, DELAYED RELEASE ORAL 2 TIMES DAILY
Status: DISCONTINUED | OUTPATIENT
Start: 2025-05-30 | End: 2025-06-03 | Stop reason: HOSPADM

## 2025-05-30 RX ORDER — ENOXAPARIN SODIUM 100 MG/ML
50 INJECTION SUBCUTANEOUS ONCE
Status: DISCONTINUED | OUTPATIENT
Start: 2025-05-30 | End: 2025-05-31

## 2025-05-30 RX ORDER — BUSPIRONE HYDROCHLORIDE 7.5 MG/1
15 TABLET ORAL 2 TIMES DAILY
Status: DISCONTINUED | OUTPATIENT
Start: 2025-05-30 | End: 2025-05-31

## 2025-05-30 RX ORDER — METOPROLOL SUCCINATE 50 MG/1
100 TABLET, EXTENDED RELEASE ORAL DAILY
Status: DISCONTINUED | OUTPATIENT
Start: 2025-05-30 | End: 2025-06-03

## 2025-05-30 RX ORDER — LOSARTAN POTASSIUM 100 MG/1
100 TABLET ORAL EVERY MORNING
Status: DISCONTINUED | OUTPATIENT
Start: 2025-05-30 | End: 2025-05-31

## 2025-05-30 RX ORDER — ISOSORBIDE MONONITRATE 60 MG/1
120 TABLET, EXTENDED RELEASE ORAL DAILY
Status: DISCONTINUED | OUTPATIENT
Start: 2025-05-30 | End: 2025-05-31

## 2025-05-30 RX ORDER — POLYETHYLENE GLYCOL 3350 17 G/17G
17 POWDER, FOR SOLUTION ORAL DAILY PRN
Status: DISCONTINUED | OUTPATIENT
Start: 2025-05-30 | End: 2025-06-03 | Stop reason: HOSPADM

## 2025-05-30 RX ORDER — LABETALOL HYDROCHLORIDE 5 MG/ML
10 INJECTION, SOLUTION INTRAVENOUS EVERY 4 HOURS PRN
Status: DISCONTINUED | OUTPATIENT
Start: 2025-05-30 | End: 2025-05-30

## 2025-05-30 RX ADMIN — DIGOXIN 500 MCG: 0.25 INJECTION INTRAMUSCULAR; INTRAVENOUS at 12:30

## 2025-05-30 RX ADMIN — FUROSEMIDE 40 MG: 40 TABLET ORAL at 09:16

## 2025-05-30 RX ADMIN — ASPIRIN 81 MG: 81 TABLET, CHEWABLE ORAL at 09:16

## 2025-05-30 RX ADMIN — DULOXETINE HYDROCHLORIDE 30 MG: 30 CAPSULE, DELAYED RELEASE ORAL at 09:15

## 2025-05-30 RX ADMIN — ENOXAPARIN SODIUM 40 MG: 100 INJECTION SUBCUTANEOUS at 09:15

## 2025-05-30 RX ADMIN — SODIUM CHLORIDE 500 ML: 0.9 INJECTION, SOLUTION INTRAVENOUS at 12:11

## 2025-05-30 RX ADMIN — THIAMINE HYDROCHLORIDE 100 MG: 100 INJECTION, SOLUTION INTRAMUSCULAR; INTRAVENOUS at 09:15

## 2025-05-30 RX ADMIN — METOPROLOL SUCCINATE 100 MG: 50 TABLET, EXTENDED RELEASE ORAL at 09:16

## 2025-05-30 RX ADMIN — ISOSORBIDE MONONITRATE 120 MG: 30 TABLET, EXTENDED RELEASE ORAL at 09:16

## 2025-05-30 RX ADMIN — ATORVASTATIN CALCIUM 10 MG: 10 TABLET, FILM COATED ORAL at 09:16

## 2025-05-30 RX ADMIN — PANTOPRAZOLE SODIUM 40 MG: 40 TABLET, DELAYED RELEASE ORAL at 09:16

## 2025-05-30 RX ADMIN — METOPROLOL TARTRATE 5 MG: 5 INJECTION INTRAVENOUS at 11:41

## 2025-05-30 RX ADMIN — LOSARTAN POTASSIUM 100 MG: 100 TABLET, FILM COATED ORAL at 09:16

## 2025-05-30 RX ADMIN — SODIUM CHLORIDE, PRESERVATIVE FREE 10 ML: 5 INJECTION INTRAVENOUS at 21:20

## 2025-05-30 RX ADMIN — DEXTROSE AND SODIUM CHLORIDE: 5; .45 INJECTION, SOLUTION INTRAVENOUS at 18:14

## 2025-05-30 RX ADMIN — SODIUM CHLORIDE, SODIUM LACTATE, POTASSIUM CHLORIDE, AND CALCIUM CHLORIDE: .6; .31; .03; .02 INJECTION, SOLUTION INTRAVENOUS at 01:01

## 2025-05-30 RX ADMIN — ENOXAPARIN SODIUM 90 MG: 100 INJECTION SUBCUTANEOUS at 21:13

## 2025-05-30 RX ADMIN — BUSPIRONE HYDROCHLORIDE 15 MG: 10 TABLET ORAL at 09:16

## 2025-05-30 RX ADMIN — AMLODIPINE BESYLATE 5 MG: 5 TABLET ORAL at 09:16

## 2025-05-30 ASSESSMENT — LIFESTYLE VARIABLES: HOW MANY STANDARD DRINKS CONTAINING ALCOHOL DO YOU HAVE ON A TYPICAL DAY: PATIENT DOES NOT DRINK

## 2025-05-30 ASSESSMENT — PAIN SCALES - GENERAL
PAINLEVEL_OUTOF10: 0
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
## 2025-05-30 NOTE — ED NOTES
1239 added Dr. Downing as provider to cardiology consult.    1241 called cardiology for routine consult for a fib rvr w/ hypotension ordered by GWENDOLYN Foster. No callback required.

## 2025-05-30 NOTE — SIGNIFICANT EVENT
Notified by RN of patient heart rate in the 150s.  Upon bedside examination, patient more confused, slightly agitated.  EKG performed showing atrial fibrillation with RVR.  Straight cath performed on patient with about 800 cc removed.    -Given 5 mg IV Lopressor with minimal improvement of HR to 110s to 130s.    - Mag and trop ordered and pending.  -increase Lovenox to therapeutic dosing and monitor hemoglobin.     HR back in 150s range.  - Cardizem bolus plus gtt. ordered.    Prior to Cardizem being given, BP was recycled, systolic 70s to 80s.  - Cardizem drip discontinued/was not started  - 500 cc bolus ordered.  - discussed case unofficially with cardiology, given A-fib RVR with hypotension, 500 mg IV digoxin ordered at this time.      Mayra Hu PA-C 5/30/2025 12:17 PM     Patient converted to NSR, rate controlled. Will dc cardiology consult at this time.  -will consult cardiology over the weekend if goes back into a fib.     Mayra Hu PA-C 5/30/2025 1:25 PM

## 2025-05-31 ENCOUNTER — APPOINTMENT (OUTPATIENT)
Age: 80
DRG: 071 | End: 2025-05-31
Attending: INTERNAL MEDICINE
Payer: MEDICARE

## 2025-05-31 LAB
ANION GAP SERPL CALCULATED.3IONS-SCNC: 13 MMOL/L (ref 3–16)
BASOPHILS # BLD: 0.1 K/UL (ref 0–0.2)
BASOPHILS NFR BLD: 1.1 %
BUN SERPL-MCNC: 11 MG/DL (ref 7–20)
CALCIUM SERPL-MCNC: 8.2 MG/DL (ref 8.3–10.6)
CHLORIDE SERPL-SCNC: 95 MMOL/L (ref 99–110)
CO2 SERPL-SCNC: 28 MMOL/L (ref 21–32)
CREAT SERPL-MCNC: 0.8 MG/DL (ref 0.6–1.2)
DEPRECATED RDW RBC AUTO: 20.9 % (ref 12.4–15.4)
ECHO AO ASC DIAM: 3.5 CM
ECHO AO ASCENDING AORTA INDEX: 1.77 CM/M2
ECHO AO ROOT DIAM: 4 CM
ECHO AO ROOT INDEX: 2.02 CM/M2
ECHO AV MEAN GRADIENT: 6 MMHG
ECHO AV MEAN VELOCITY: 1.1 M/S
ECHO AV PEAK GRADIENT: 11 MMHG
ECHO AV PEAK VELOCITY: 1.6 M/S
ECHO AV VELOCITY RATIO: 0.81
ECHO AV VTI: 34.9 CM
ECHO BSA: 2.05 M2
ECHO EST RA PRESSURE: 8 MMHG
ECHO LA AREA 2C: 27.5 CM2
ECHO LA AREA 4C: 30.8 CM2
ECHO LA MAJOR AXIS: 7.8 CM
ECHO LA MINOR AXIS: 7 CM
ECHO LA VOL BP: 101 ML (ref 22–52)
ECHO LA VOL MOD A2C: 89 ML (ref 22–52)
ECHO LA VOL MOD A4C: 104 ML (ref 22–52)
ECHO LA VOL/BSA BIPLANE: 51 ML/M2 (ref 16–34)
ECHO LA VOLUME INDEX MOD A2C: 45 ML/M2 (ref 16–34)
ECHO LA VOLUME INDEX MOD A4C: 53 ML/M2 (ref 16–34)
ECHO LV E' LATERAL VELOCITY: 4.57 CM/S
ECHO LV E' SEPTAL VELOCITY: 4.03 CM/S
ECHO LV EDV A2C: 119 ML
ECHO LV EDV A4C: 86 ML
ECHO LV EDV INDEX A4C: 43 ML/M2
ECHO LV EDV NDEX A2C: 60 ML/M2
ECHO LV EJECTION FRACTION 3D: 65 %
ECHO LV EJECTION FRACTION A2C: 64 %
ECHO LV EJECTION FRACTION A4C: 61 %
ECHO LV EJECTION FRACTION BIPLANE: 63 % (ref 55–100)
ECHO LV ESV A2C: 43 ML
ECHO LV ESV A4C: 33 ML
ECHO LV ESV INDEX A2C: 22 ML/M2
ECHO LV ESV INDEX A4C: 17 ML/M2
ECHO LV FRACTIONAL SHORTENING: 36 % (ref 28–44)
ECHO LV INTERNAL DIMENSION DIASTOLE INDEX: 2.12 CM/M2
ECHO LV INTERNAL DIMENSION DIASTOLIC: 4.2 CM (ref 3.9–5.3)
ECHO LV INTERNAL DIMENSION SYSTOLIC INDEX: 1.36 CM/M2
ECHO LV INTERNAL DIMENSION SYSTOLIC: 2.7 CM
ECHO LV IVSD: 1.4 CM (ref 0.6–0.9)
ECHO LV MASS 2D: 200.6 G (ref 67–162)
ECHO LV MASS INDEX 2D: 101.3 G/M2 (ref 43–95)
ECHO LV POSTERIOR WALL DIASTOLIC: 1.2 CM (ref 0.6–0.9)
ECHO LV RELATIVE WALL THICKNESS RATIO: 0.57
ECHO LVOT AV VTI INDEX: 0.82
ECHO LVOT MEAN GRADIENT: 3 MMHG
ECHO LVOT PEAK GRADIENT: 6 MMHG
ECHO LVOT PEAK VELOCITY: 1.3 M/S
ECHO LVOT VTI: 28.7 CM
ECHO MV A VELOCITY: 1.61 M/S
ECHO MV E DECELERATION TIME (DT): 239 MS
ECHO MV E VELOCITY: 1.02 M/S
ECHO MV E/A RATIO: 0.63
ECHO MV E/E' LATERAL: 22.32
ECHO MV E/E' RATIO (AVERAGED): 23.81
ECHO MV E/E' SEPTAL: 25.31
ECHO MV LVOT VTI INDEX: 1.56
ECHO MV MAX VELOCITY: 1.5 M/S
ECHO MV MEAN GRADIENT: 3 MMHG
ECHO MV MEAN VELOCITY: 0.8 M/S
ECHO MV PEAK GRADIENT: 9 MMHG
ECHO MV VTI: 44.7 CM
ECHO PV MAX VELOCITY: 1.2 M/S
ECHO PV PEAK GRADIENT: 5 MMHG
ECHO RIGHT VENTRICULAR SYSTOLIC PRESSURE (RVSP): 48 MMHG
ECHO RV BASAL DIMENSION: 4.2 CM
ECHO RV FREE WALL PEAK S': 20.9 CM/S
ECHO RV LONGITUDINAL DIMENSION: 7.1 CM
ECHO RV MID DIMENSION: 3.3 CM
ECHO RV TAPSE: 2.2 CM (ref 1.7–?)
ECHO TV REGURGITANT MAX VELOCITY: 3.18 M/S
ECHO TV REGURGITANT PEAK GRADIENT: 40 MMHG
EOSINOPHIL # BLD: 0.1 K/UL (ref 0–0.6)
EOSINOPHIL NFR BLD: 2.3 %
GFR SERPLBLD CREATININE-BSD FMLA CKD-EPI: 74 ML/MIN/{1.73_M2}
GLUCOSE BLD-MCNC: 108 MG/DL (ref 70–99)
GLUCOSE SERPL-MCNC: 102 MG/DL (ref 70–99)
HCT VFR BLD AUTO: 36.3 % (ref 36–48)
HGB BLD-MCNC: 12.1 G/DL (ref 12–16)
LYMPHOCYTES # BLD: 1.7 K/UL (ref 1–5.1)
LYMPHOCYTES NFR BLD: 29.9 %
MAGNESIUM SERPL-MCNC: 1.42 MG/DL (ref 1.8–2.4)
MCH RBC QN AUTO: 24.8 PG (ref 26–34)
MCHC RBC AUTO-ENTMCNC: 33.3 G/DL (ref 31–36)
MCV RBC AUTO: 74.6 FL (ref 80–100)
MONOCYTES # BLD: 0.7 K/UL (ref 0–1.3)
MONOCYTES NFR BLD: 12.2 %
NEUTROPHILS # BLD: 3.1 K/UL (ref 1.7–7.7)
NEUTROPHILS NFR BLD: 54.5 %
PERFORMED ON: ABNORMAL
PLATELET # BLD AUTO: 175 K/UL (ref 135–450)
PMV BLD AUTO: 8.9 FL (ref 5–10.5)
POTASSIUM SERPL-SCNC: 3.2 MMOL/L (ref 3.5–5.1)
RBC # BLD AUTO: 4.87 M/UL (ref 4–5.2)
SODIUM SERPL-SCNC: 136 MMOL/L (ref 136–145)
WBC # BLD AUTO: 5.6 K/UL (ref 4–11)

## 2025-05-31 PROCEDURE — 93306 TTE W/DOPPLER COMPLETE: CPT | Performed by: INTERNAL MEDICINE

## 2025-05-31 PROCEDURE — 97530 THERAPEUTIC ACTIVITIES: CPT

## 2025-05-31 PROCEDURE — 97162 PT EVAL MOD COMPLEX 30 MIN: CPT

## 2025-05-31 PROCEDURE — 2500000003 HC RX 250 WO HCPCS: Performed by: STUDENT IN AN ORGANIZED HEALTH CARE EDUCATION/TRAINING PROGRAM

## 2025-05-31 PROCEDURE — 6370000000 HC RX 637 (ALT 250 FOR IP): Performed by: INTERNAL MEDICINE

## 2025-05-31 PROCEDURE — 6360000002 HC RX W HCPCS: Performed by: INTERNAL MEDICINE

## 2025-05-31 PROCEDURE — 93306 TTE W/DOPPLER COMPLETE: CPT

## 2025-05-31 PROCEDURE — 97116 GAIT TRAINING THERAPY: CPT

## 2025-05-31 PROCEDURE — 99233 SBSQ HOSP IP/OBS HIGH 50: CPT | Performed by: INTERNAL MEDICINE

## 2025-05-31 PROCEDURE — 6360000002 HC RX W HCPCS: Performed by: STUDENT IN AN ORGANIZED HEALTH CARE EDUCATION/TRAINING PROGRAM

## 2025-05-31 PROCEDURE — 83735 ASSAY OF MAGNESIUM: CPT

## 2025-05-31 PROCEDURE — 6360000002 HC RX W HCPCS

## 2025-05-31 PROCEDURE — 80048 BASIC METABOLIC PNL TOTAL CA: CPT

## 2025-05-31 PROCEDURE — 6370000000 HC RX 637 (ALT 250 FOR IP): Performed by: STUDENT IN AN ORGANIZED HEALTH CARE EDUCATION/TRAINING PROGRAM

## 2025-05-31 PROCEDURE — 36415 COLL VENOUS BLD VENIPUNCTURE: CPT

## 2025-05-31 PROCEDURE — 2060000000 HC ICU INTERMEDIATE R&B

## 2025-05-31 PROCEDURE — 6370000000 HC RX 637 (ALT 250 FOR IP): Performed by: NURSE PRACTITIONER

## 2025-05-31 PROCEDURE — 85025 COMPLETE CBC W/AUTO DIFF WBC: CPT

## 2025-05-31 PROCEDURE — 6370000000 HC RX 637 (ALT 250 FOR IP)

## 2025-05-31 RX ORDER — BUSPIRONE HYDROCHLORIDE 10 MG/1
10 TABLET ORAL 3 TIMES DAILY
Status: DISCONTINUED | OUTPATIENT
Start: 2025-05-31 | End: 2025-06-03 | Stop reason: HOSPADM

## 2025-05-31 RX ORDER — CIPROFLOXACIN 500 MG/1
500 TABLET, FILM COATED ORAL 2 TIMES DAILY
Status: ON HOLD | COMMUNITY
End: 2025-06-02 | Stop reason: HOSPADM

## 2025-05-31 RX ORDER — MAGNESIUM SULFATE IN WATER 40 MG/ML
2000 INJECTION, SOLUTION INTRAVENOUS ONCE
Status: COMPLETED | OUTPATIENT
Start: 2025-05-31 | End: 2025-05-31

## 2025-05-31 RX ORDER — QUETIAPINE FUMARATE 25 MG/1
25 TABLET, FILM COATED ORAL NIGHTLY
Status: DISCONTINUED | OUTPATIENT
Start: 2025-05-31 | End: 2025-06-03 | Stop reason: HOSPADM

## 2025-05-31 RX ORDER — ASPIRIN 81 MG/1
81 TABLET, CHEWABLE ORAL DAILY
COMMUNITY

## 2025-05-31 RX ORDER — ISOSORBIDE MONONITRATE 30 MG/1
30 TABLET, EXTENDED RELEASE ORAL 2 TIMES DAILY
Status: DISCONTINUED | OUTPATIENT
Start: 2025-05-31 | End: 2025-06-03 | Stop reason: HOSPADM

## 2025-05-31 RX ORDER — LOSARTAN POTASSIUM 50 MG/1
50 TABLET ORAL EVERY MORNING
Status: DISCONTINUED | OUTPATIENT
Start: 2025-06-01 | End: 2025-06-01

## 2025-05-31 RX ADMIN — THIAMINE HYDROCHLORIDE 100 MG: 100 INJECTION, SOLUTION INTRAMUSCULAR; INTRAVENOUS at 08:52

## 2025-05-31 RX ADMIN — ENOXAPARIN SODIUM 90 MG: 100 INJECTION SUBCUTANEOUS at 08:52

## 2025-05-31 RX ADMIN — BUSPIRONE HYDROCHLORIDE 10 MG: 10 TABLET ORAL at 20:17

## 2025-05-31 RX ADMIN — DULOXETINE HYDROCHLORIDE 30 MG: 30 CAPSULE, DELAYED RELEASE ORAL at 20:17

## 2025-05-31 RX ADMIN — POTASSIUM BICARBONATE 40 MEQ: 782 TABLET, EFFERVESCENT ORAL at 10:01

## 2025-05-31 RX ADMIN — ATORVASTATIN CALCIUM 10 MG: 10 TABLET, FILM COATED ORAL at 10:00

## 2025-05-31 RX ADMIN — ENOXAPARIN SODIUM 90 MG: 100 INJECTION SUBCUTANEOUS at 20:17

## 2025-05-31 RX ADMIN — BUSPIRONE HYDROCHLORIDE 10 MG: 10 TABLET ORAL at 13:18

## 2025-05-31 RX ADMIN — MAGNESIUM SULFATE HEPTAHYDRATE 2000 MG: 40 INJECTION, SOLUTION INTRAVENOUS at 10:12

## 2025-05-31 RX ADMIN — ISOSORBIDE MONONITRATE 30 MG: 30 TABLET, EXTENDED RELEASE ORAL at 20:17

## 2025-05-31 RX ADMIN — PANTOPRAZOLE SODIUM 40 MG: 40 TABLET, DELAYED RELEASE ORAL at 20:17

## 2025-05-31 RX ADMIN — SODIUM CHLORIDE, PRESERVATIVE FREE 10 ML: 5 INJECTION INTRAVENOUS at 08:53

## 2025-05-31 RX ADMIN — METOPROLOL SUCCINATE 100 MG: 50 TABLET, EXTENDED RELEASE ORAL at 09:58

## 2025-05-31 RX ADMIN — SODIUM CHLORIDE, PRESERVATIVE FREE 10 ML: 5 INJECTION INTRAVENOUS at 20:27

## 2025-05-31 RX ADMIN — ASPIRIN 81 MG: 81 TABLET, CHEWABLE ORAL at 10:00

## 2025-05-31 RX ADMIN — QUETIAPINE FUMARATE 25 MG: 25 TABLET ORAL at 20:17

## 2025-05-31 RX ADMIN — PANTOPRAZOLE SODIUM 40 MG: 40 TABLET, DELAYED RELEASE ORAL at 10:00

## 2025-05-31 RX ADMIN — POLYETHYLENE GLYCOL (3350) 17 G: 17 POWDER, FOR SOLUTION ORAL at 14:27

## 2025-05-31 ASSESSMENT — PAIN SCALES - GENERAL: PAINLEVEL_OUTOF10: 0

## 2025-05-31 NOTE — PLAN OF CARE
Problem: Safety - Adult  Goal: Free from fall injury  5/31/2025 0900 by Vimal Aguiar RN  Outcome: Progressing  5/30/2025 2134 by Lisa Abreu RN  Outcome: Progressing     Problem: Chronic Conditions and Co-morbidities  Goal: Patient's chronic conditions and co-morbidity symptoms are monitored and maintained or improved  5/31/2025 0900 by Vimal Aguiar RN  Outcome: Progressing  5/30/2025 2134 by Lisa Abreu RN  Outcome: Progressing     Problem: Discharge Planning  Goal: Discharge to home or other facility with appropriate resources  5/31/2025 0900 by Vimal Aguiar RN  Outcome: Progressing  5/30/2025 2134 by Lisa Abreu RN  Outcome: Progressing     Problem: Pain  Goal: Verbalizes/displays adequate comfort level or baseline comfort level  5/31/2025 0900 by Vimal Aguiar RN  Outcome: Progressing  5/30/2025 2134 by Lisa Abreu RN  Outcome: Progressing     Problem: Skin/Tissue Integrity  Goal: Skin integrity remains intact  Description: 1.  Monitor for areas of redness and/or skin breakdown2.  Assess vascular access sites hourly3.  Every 4-6 hours minimum:  Change oxygen saturation probe site4.  Every 4-6 hours:  If on nasal continuous positive airway pressure, respiratory therapy assess nares and determine need for appliance change or resting period  5/31/2025 0900 by Vimal Aguiar RN  Outcome: Progressing  5/30/2025 2134 by Lisa Abreu RN  Outcome: Progressing     Problem: ABCDS Injury Assessment  Goal: Absence of physical injury  Outcome: Progressing
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
## 2025-05-31 NOTE — FLOWSHEET NOTE
05/31/25 0737   Vital Signs   Temp 99.3 °F (37.4 °C)   Temp Source Oral   Pulse 52   Heart Rate Source Monitor   Respirations 16   /82   MAP (Calculated) 98   BP Location Left upper arm   BP Method Automatic   Patient Position Semi fowlers   Oxygen Therapy   SpO2 97 %   O2 Device None (Room air)     Pt. Resting in bed. Call light in reach. Shift assessment completed see flow sheet. Denies any needs at this time. Will continue to monitor.

## 2025-06-01 LAB
ANION GAP SERPL CALCULATED.3IONS-SCNC: 11 MMOL/L (ref 3–16)
BASOPHILS # BLD: 0.1 K/UL (ref 0–0.2)
BASOPHILS NFR BLD: 1.3 %
BUN SERPL-MCNC: 9 MG/DL (ref 7–20)
CALCIUM SERPL-MCNC: 8.3 MG/DL (ref 8.3–10.6)
CHLORIDE SERPL-SCNC: 96 MMOL/L (ref 99–110)
CO2 SERPL-SCNC: 28 MMOL/L (ref 21–32)
CREAT SERPL-MCNC: 0.7 MG/DL (ref 0.6–1.2)
DEPRECATED RDW RBC AUTO: 20.7 % (ref 12.4–15.4)
EOSINOPHIL # BLD: 0.3 K/UL (ref 0–0.6)
EOSINOPHIL NFR BLD: 4 %
GFR SERPLBLD CREATININE-BSD FMLA CKD-EPI: 87 ML/MIN/{1.73_M2}
GLUCOSE SERPL-MCNC: 118 MG/DL (ref 70–99)
HCT VFR BLD AUTO: 38 % (ref 36–48)
HGB BLD-MCNC: 12.6 G/DL (ref 12–16)
LYMPHOCYTES # BLD: 1.9 K/UL (ref 1–5.1)
LYMPHOCYTES NFR BLD: 28.6 %
MAGNESIUM SERPL-MCNC: 1.75 MG/DL (ref 1.8–2.4)
MCH RBC QN AUTO: 24.8 PG (ref 26–34)
MCHC RBC AUTO-ENTMCNC: 33.1 G/DL (ref 31–36)
MCV RBC AUTO: 75 FL (ref 80–100)
MONOCYTES # BLD: 0.6 K/UL (ref 0–1.3)
MONOCYTES NFR BLD: 8.8 %
NEUTROPHILS # BLD: 3.9 K/UL (ref 1.7–7.7)
NEUTROPHILS NFR BLD: 57.3 %
PLATELET # BLD AUTO: 177 K/UL (ref 135–450)
PMV BLD AUTO: 8.8 FL (ref 5–10.5)
POTASSIUM SERPL-SCNC: 3.5 MMOL/L (ref 3.5–5.1)
RBC # BLD AUTO: 5.07 M/UL (ref 4–5.2)
SODIUM SERPL-SCNC: 135 MMOL/L (ref 136–145)
WBC # BLD AUTO: 6.8 K/UL (ref 4–11)

## 2025-06-01 PROCEDURE — 6370000000 HC RX 637 (ALT 250 FOR IP)

## 2025-06-01 PROCEDURE — 6370000000 HC RX 637 (ALT 250 FOR IP): Performed by: INTERNAL MEDICINE

## 2025-06-01 PROCEDURE — 6360000002 HC RX W HCPCS

## 2025-06-01 PROCEDURE — 99233 SBSQ HOSP IP/OBS HIGH 50: CPT | Performed by: INTERNAL MEDICINE

## 2025-06-01 PROCEDURE — 36415 COLL VENOUS BLD VENIPUNCTURE: CPT

## 2025-06-01 PROCEDURE — 6370000000 HC RX 637 (ALT 250 FOR IP): Performed by: STUDENT IN AN ORGANIZED HEALTH CARE EDUCATION/TRAINING PROGRAM

## 2025-06-01 PROCEDURE — 2500000003 HC RX 250 WO HCPCS: Performed by: STUDENT IN AN ORGANIZED HEALTH CARE EDUCATION/TRAINING PROGRAM

## 2025-06-01 PROCEDURE — 83735 ASSAY OF MAGNESIUM: CPT

## 2025-06-01 PROCEDURE — 2060000000 HC ICU INTERMEDIATE R&B

## 2025-06-01 PROCEDURE — 6370000000 HC RX 637 (ALT 250 FOR IP): Performed by: NURSE PRACTITIONER

## 2025-06-01 PROCEDURE — 85025 COMPLETE CBC W/AUTO DIFF WBC: CPT

## 2025-06-01 PROCEDURE — 92610 EVALUATE SWALLOWING FUNCTION: CPT

## 2025-06-01 PROCEDURE — 80048 BASIC METABOLIC PNL TOTAL CA: CPT

## 2025-06-01 RX ORDER — LOSARTAN POTASSIUM 100 MG/1
100 TABLET ORAL EVERY MORNING
Status: DISCONTINUED | OUTPATIENT
Start: 2025-06-02 | End: 2025-06-03 | Stop reason: HOSPADM

## 2025-06-01 RX ORDER — ENOXAPARIN SODIUM 100 MG/ML
40 INJECTION SUBCUTANEOUS DAILY
Status: DISCONTINUED | OUTPATIENT
Start: 2025-06-02 | End: 2025-06-03 | Stop reason: HOSPADM

## 2025-06-01 RX ORDER — AMLODIPINE BESYLATE 5 MG/1
5 TABLET ORAL NIGHTLY
Status: DISCONTINUED | OUTPATIENT
Start: 2025-06-01 | End: 2025-06-03 | Stop reason: HOSPADM

## 2025-06-01 RX ADMIN — ACETAMINOPHEN 650 MG: 325 TABLET ORAL at 07:42

## 2025-06-01 RX ADMIN — QUETIAPINE FUMARATE 25 MG: 25 TABLET ORAL at 20:01

## 2025-06-01 RX ADMIN — DULOXETINE HYDROCHLORIDE 30 MG: 30 CAPSULE, DELAYED RELEASE ORAL at 20:01

## 2025-06-01 RX ADMIN — BUSPIRONE HYDROCHLORIDE 10 MG: 10 TABLET ORAL at 07:44

## 2025-06-01 RX ADMIN — ISOSORBIDE MONONITRATE 30 MG: 30 TABLET, EXTENDED RELEASE ORAL at 07:43

## 2025-06-01 RX ADMIN — PANTOPRAZOLE SODIUM 40 MG: 40 TABLET, DELAYED RELEASE ORAL at 07:44

## 2025-06-01 RX ADMIN — LOSARTAN POTASSIUM 50 MG: 50 TABLET, FILM COATED ORAL at 07:44

## 2025-06-01 RX ADMIN — AMLODIPINE BESYLATE 5 MG: 5 TABLET ORAL at 20:01

## 2025-06-01 RX ADMIN — ENOXAPARIN SODIUM 90 MG: 100 INJECTION SUBCUTANEOUS at 07:43

## 2025-06-01 RX ADMIN — DULOXETINE HYDROCHLORIDE 30 MG: 30 CAPSULE, DELAYED RELEASE ORAL at 07:44

## 2025-06-01 RX ADMIN — ASPIRIN 81 MG: 81 TABLET, CHEWABLE ORAL at 07:43

## 2025-06-01 RX ADMIN — ATORVASTATIN CALCIUM 10 MG: 10 TABLET, FILM COATED ORAL at 07:44

## 2025-06-01 RX ADMIN — PANTOPRAZOLE SODIUM 40 MG: 40 TABLET, DELAYED RELEASE ORAL at 20:01

## 2025-06-01 RX ADMIN — SODIUM CHLORIDE, PRESERVATIVE FREE 10 ML: 5 INJECTION INTRAVENOUS at 20:00

## 2025-06-01 RX ADMIN — METOPROLOL SUCCINATE 100 MG: 50 TABLET, EXTENDED RELEASE ORAL at 07:43

## 2025-06-01 RX ADMIN — BUSPIRONE HYDROCHLORIDE 10 MG: 10 TABLET ORAL at 20:01

## 2025-06-01 RX ADMIN — ONDANSETRON 4 MG: 4 TABLET, ORALLY DISINTEGRATING ORAL at 07:48

## 2025-06-01 RX ADMIN — ISOSORBIDE MONONITRATE 30 MG: 30 TABLET, EXTENDED RELEASE ORAL at 20:01

## 2025-06-01 RX ADMIN — BUSPIRONE HYDROCHLORIDE 10 MG: 10 TABLET ORAL at 13:04

## 2025-06-01 ASSESSMENT — PAIN DESCRIPTION - DESCRIPTORS: DESCRIPTORS: ACHING

## 2025-06-01 ASSESSMENT — PAIN DESCRIPTION - LOCATION: LOCATION: HEAD

## 2025-06-01 ASSESSMENT — PAIN DESCRIPTION - PAIN TYPE: TYPE: ACUTE PAIN

## 2025-06-01 ASSESSMENT — PAIN SCALES - GENERAL
PAINLEVEL_OUTOF10: 3
PAINLEVEL_OUTOF10: 3

## 2025-06-01 NOTE — PLAN OF CARE
Problem: Safety - Adult  Goal: Free from fall injury  6/1/2025 0753 by Vimal Aguiar RN  Outcome: Progressing  5/31/2025 2039 by Lisa Abreu RN  Outcome: Progressing     Problem: Chronic Conditions and Co-morbidities  Goal: Patient's chronic conditions and co-morbidity symptoms are monitored and maintained or improved  6/1/2025 0753 by Vimal Aguiar RN  Outcome: Progressing  5/31/2025 2039 by Lisa Abreu RN  Outcome: Progressing     Problem: Discharge Planning  Goal: Discharge to home or other facility with appropriate resources  6/1/2025 0753 by Vimal Aguiar RN  Outcome: Progressing  5/31/2025 2039 by Lisa Abreu RN  Outcome: Progressing     Problem: Pain  Goal: Verbalizes/displays adequate comfort level or baseline comfort level  6/1/2025 0753 by Vimal Aguiar RN  Outcome: Progressing  5/31/2025 2039 by Lisa Abreu RN  Outcome: Progressing     Problem: Skin/Tissue Integrity  Goal: Skin integrity remains intact  Description: 1.  Monitor for areas of redness and/or skin breakdown2.  Assess vascular access sites hourly3.  Every 4-6 hours minimum:  Change oxygen saturation probe site4.  Every 4-6 hours:  If on nasal continuous positive airway pressure, respiratory therapy assess nares and determine need for appliance change or resting period  6/1/2025 0753 by Vimal Aguiar RN  Outcome: Progressing  5/31/2025 2039 by Lisa Abreu RN  Outcome: Progressing     Problem: ABCDS Injury Assessment  Goal: Absence of physical injury  6/1/2025 0753 by Vimal Aguiar RN  Outcome: Progressing  5/31/2025 2039 by Lisa Abreu RN  Outcome: Progressing

## 2025-06-01 NOTE — PLAN OF CARE
SLP completed evaluation. Please refer to notes in EMR.   Winsome Velasco M.S. [facility]-SLP #[id number]  Speech Language Pathologist

## 2025-06-01 NOTE — PLAN OF CARE
Problem: Safety - Adult  Goal: Free from fall injury  5/31/2025 2039 by Lisa Abreu RN  Outcome: Progressing  5/31/2025 0900 by Vimal Aguiar RN  Outcome: Progressing     Problem: Chronic Conditions and Co-morbidities  Goal: Patient's chronic conditions and co-morbidity symptoms are monitored and maintained or improved  5/31/2025 2039 by Lisa Abreu RN  Outcome: Progressing  5/31/2025 0900 by Vimal Aguiar RN  Outcome: Progressing     Problem: Discharge Planning  Goal: Discharge to home or other facility with appropriate resources  5/31/2025 2039 by Lisa Abreu RN  Outcome: Progressing  5/31/2025 0900 by Vimal Aguiar RN  Outcome: Progressing     Problem: Pain  Goal: Verbalizes/displays adequate comfort level or baseline comfort level  5/31/2025 2039 by Lisa Abreu RN  Outcome: Progressing  5/31/2025 0900 by Vimal Aguiar RN  Outcome: Progressing     Problem: Skin/Tissue Integrity  Goal: Skin integrity remains intact  Description: 1.  Monitor for areas of redness and/or skin breakdown2.  Assess vascular access sites hourly3.  Every 4-6 hours minimum:  Change oxygen saturation probe site4.  Every 4-6 hours:  If on nasal continuous positive airway pressure, respiratory therapy assess nares and determine need for appliance change or resting period  5/31/2025 2039 by Lisa Abreu RN  Outcome: Progressing  5/31/2025 0900 by Vimal Aguiar RN  Outcome: Progressing     Problem: ABCDS Injury Assessment  Goal: Absence of physical injury  5/31/2025 2039 by Lisa Abreu RN  Outcome: Progressing  5/31/2025 0900 by Vimal Aguiar RN  Outcome: Progressing

## 2025-06-01 NOTE — FLOWSHEET NOTE
06/1945   Vital Signs   Temp 97.7 °F (36.5 °C)   Temp Source Oral   Pulse 56   Heart Rate Source Monitor   Respirations 16   /84   MAP (Calculated) 96   BP Location Left upper arm   BP Method Automatic   Patient Position Semi kendralers   Pain Assessment   Pain Assessment None - Denies Pain   Care Plan - Pain Goals   Verbalizes/displays adequate comfort level or baseline comfort level Encourage patient to monitor pain and request assistance   Opioid-Induced Sedation   POSS Score 1   RASS   Mendoza Agitation Sedation Scale (RASS) 0   Oxygen Therapy   SpO2 95 %   Pulse Oximetry Type Continuous   Pulse Oximeter Device Mode Continuous   Pulse Oximeter Device Location Right;Finger   O2 Device None (Room air)   O2 Flow Rate (L/min) 0 L/min   Oxygen Therapy None (Room air)     Pt resting comfortably in bed. Avasys in use. Bed alarm on, call light within reach. No needs expressed at this time. Will continue to monitor.

## 2025-06-01 NOTE — FLOWSHEET NOTE
06/01/25 0722   Vital Signs   Temp 97.8 °F (36.6 °C)   Temp Source Axillary   Pulse 80   Heart Rate Source Monitor   Respirations 16   BP (!) 188/76   MAP (Calculated) 113   BP Location Left upper arm   BP Method Automatic   Patient Position Sitting   Oxygen Therapy   SpO2 94 %   O2 Device None (Room air)     Pt. Resting in bed. Call light in reach. Shift assessment completed see flow sheet. Denies any needs at this time. Will continue to monitor.

## 2025-06-02 ENCOUNTER — APPOINTMENT (OUTPATIENT)
Dept: GENERAL RADIOLOGY | Age: 80
DRG: 071 | End: 2025-06-02
Payer: MEDICARE

## 2025-06-02 LAB
EKG ATRIAL RATE: 312 BPM
EKG DIAGNOSIS: NORMAL
EKG Q-T INTERVAL: 304 MS
EKG QRS DURATION: 86 MS
EKG QTC CALCULATION (BAZETT): 420 MS
EKG R AXIS: 2 DEGREES
EKG T AXIS: 268 DEGREES
EKG VENTRICULAR RATE: 115 BPM

## 2025-06-02 PROCEDURE — 6360000002 HC RX W HCPCS: Performed by: INTERNAL MEDICINE

## 2025-06-02 PROCEDURE — 2500000003 HC RX 250 WO HCPCS: Performed by: STUDENT IN AN ORGANIZED HEALTH CARE EDUCATION/TRAINING PROGRAM

## 2025-06-02 PROCEDURE — 6370000000 HC RX 637 (ALT 250 FOR IP): Performed by: NURSE PRACTITIONER

## 2025-06-02 PROCEDURE — 2060000000 HC ICU INTERMEDIATE R&B

## 2025-06-02 PROCEDURE — 6370000000 HC RX 637 (ALT 250 FOR IP): Performed by: STUDENT IN AN ORGANIZED HEALTH CARE EDUCATION/TRAINING PROGRAM

## 2025-06-02 PROCEDURE — 72100 X-RAY EXAM L-S SPINE 2/3 VWS: CPT

## 2025-06-02 PROCEDURE — 6370000000 HC RX 637 (ALT 250 FOR IP): Performed by: INTERNAL MEDICINE

## 2025-06-02 PROCEDURE — 93010 ELECTROCARDIOGRAM REPORT: CPT | Performed by: INTERNAL MEDICINE

## 2025-06-02 PROCEDURE — 6370000000 HC RX 637 (ALT 250 FOR IP)

## 2025-06-02 PROCEDURE — 93005 ELECTROCARDIOGRAM TRACING: CPT | Performed by: INTERNAL MEDICINE

## 2025-06-02 RX ORDER — FUROSEMIDE 40 MG/1
40 TABLET ORAL DAILY
Qty: 90 TABLET | Refills: 1
Start: 2025-06-02 | End: 2025-11-29

## 2025-06-02 RX ORDER — LIDOCAINE 4 G/G
1 PATCH TOPICAL DAILY
Qty: 15 EACH | Refills: 0
Start: 2025-06-02

## 2025-06-02 RX ORDER — AMLODIPINE BESYLATE 5 MG/1
5 TABLET ORAL NIGHTLY
Qty: 30 TABLET | Refills: 3
Start: 2025-06-02 | End: 2025-09-08

## 2025-06-02 RX ORDER — QUETIAPINE FUMARATE 25 MG/1
25 TABLET, FILM COATED ORAL NIGHTLY
Qty: 60 TABLET | Refills: 3
Start: 2025-06-02

## 2025-06-02 RX ORDER — LIDOCAINE 4 G/G
1 PATCH TOPICAL DAILY
Status: DISCONTINUED | OUTPATIENT
Start: 2025-06-02 | End: 2025-06-03 | Stop reason: HOSPADM

## 2025-06-02 RX ORDER — ISOSORBIDE MONONITRATE 30 MG/1
30 TABLET, EXTENDED RELEASE ORAL 2 TIMES DAILY
Qty: 60 TABLET | Refills: 0
Start: 2025-06-02

## 2025-06-02 RX ADMIN — BUSPIRONE HYDROCHLORIDE 10 MG: 10 TABLET ORAL at 10:25

## 2025-06-02 RX ADMIN — DULOXETINE HYDROCHLORIDE 30 MG: 30 CAPSULE, DELAYED RELEASE ORAL at 20:47

## 2025-06-02 RX ADMIN — ASPIRIN 81 MG: 81 TABLET, CHEWABLE ORAL at 10:25

## 2025-06-02 RX ADMIN — ACETAMINOPHEN 650 MG: 325 TABLET ORAL at 02:22

## 2025-06-02 RX ADMIN — BUSPIRONE HYDROCHLORIDE 10 MG: 10 TABLET ORAL at 20:47

## 2025-06-02 RX ADMIN — PANTOPRAZOLE SODIUM 40 MG: 40 TABLET, DELAYED RELEASE ORAL at 10:25

## 2025-06-02 RX ADMIN — METOPROLOL SUCCINATE 100 MG: 50 TABLET, EXTENDED RELEASE ORAL at 10:25

## 2025-06-02 RX ADMIN — LOSARTAN POTASSIUM 100 MG: 100 TABLET, FILM COATED ORAL at 10:25

## 2025-06-02 RX ADMIN — QUETIAPINE FUMARATE 25 MG: 25 TABLET ORAL at 20:47

## 2025-06-02 RX ADMIN — ATORVASTATIN CALCIUM 10 MG: 10 TABLET, FILM COATED ORAL at 10:25

## 2025-06-02 RX ADMIN — AMLODIPINE BESYLATE 5 MG: 5 TABLET ORAL at 20:48

## 2025-06-02 RX ADMIN — PANTOPRAZOLE SODIUM 40 MG: 40 TABLET, DELAYED RELEASE ORAL at 20:47

## 2025-06-02 RX ADMIN — ISOSORBIDE MONONITRATE 30 MG: 30 TABLET, EXTENDED RELEASE ORAL at 20:47

## 2025-06-02 RX ADMIN — FUROSEMIDE 40 MG: 40 TABLET ORAL at 10:25

## 2025-06-02 RX ADMIN — ISOSORBIDE MONONITRATE 30 MG: 30 TABLET, EXTENDED RELEASE ORAL at 10:25

## 2025-06-02 RX ADMIN — SODIUM CHLORIDE, PRESERVATIVE FREE 10 ML: 5 INJECTION INTRAVENOUS at 10:27

## 2025-06-02 RX ADMIN — BUSPIRONE HYDROCHLORIDE 10 MG: 10 TABLET ORAL at 14:20

## 2025-06-02 RX ADMIN — SODIUM CHLORIDE, PRESERVATIVE FREE 10 ML: 5 INJECTION INTRAVENOUS at 20:52

## 2025-06-02 RX ADMIN — ENOXAPARIN SODIUM 40 MG: 100 INJECTION SUBCUTANEOUS at 10:25

## 2025-06-02 RX ADMIN — DULOXETINE HYDROCHLORIDE 30 MG: 30 CAPSULE, DELAYED RELEASE ORAL at 10:25

## 2025-06-02 ASSESSMENT — PAIN DESCRIPTION - DESCRIPTORS
DESCRIPTORS: ACHING
DESCRIPTORS: ACHING

## 2025-06-02 ASSESSMENT — PAIN SCALES - GENERAL
PAINLEVEL_OUTOF10: 3
PAINLEVEL_OUTOF10: 7
PAINLEVEL_OUTOF10: 2
PAINLEVEL_OUTOF10: 5

## 2025-06-02 ASSESSMENT — PAIN DESCRIPTION - ORIENTATION
ORIENTATION: LOWER
ORIENTATION: LOWER;MID
ORIENTATION: MID;ANTERIOR

## 2025-06-02 ASSESSMENT — PAIN DESCRIPTION - ONSET
ONSET: GRADUAL
ONSET: GRADUAL

## 2025-06-02 ASSESSMENT — PAIN DESCRIPTION - LOCATION
LOCATION: BACK
LOCATION: BACK
LOCATION: HEAD

## 2025-06-02 ASSESSMENT — PAIN DESCRIPTION - FREQUENCY
FREQUENCY: INTERMITTENT
FREQUENCY: INTERMITTENT

## 2025-06-02 ASSESSMENT — PAIN DESCRIPTION - PAIN TYPE
TYPE: ACUTE PAIN
TYPE: ACUTE PAIN
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
## 2025-06-02 NOTE — FLOWSHEET NOTE
06/02/25 0000   Vital Signs   Temp 97.3 °F (36.3 °C)   Temp Source Axillary   Pulse 55   Heart Rate Source Monitor   Respirations 16   BP (!) 141/64   MAP (Calculated) 90   BP Location Left upper arm   BP Method Automatic   Patient Position Semi fowlers   Oxygen Therapy   SpO2 93 %   O2 Device None (Room air)

## 2025-06-02 NOTE — CARE COORDINATION
Pt is confused. Likely has dementia per psych. Spoke to Dtr who is ZACK who states she would like pt to eventually return home but agrees that she needs some therapy.     Dtr, Angela, states [facility] would be her first choice at this time.     Referral called to [facility]. Awaiting return call       1014: Accepted to [facility]. Precert started

## 2025-06-02 NOTE — PLAN OF CARE
Problem: Safety - Adult  Goal: Free from fall injury  Outcome: Progressing     Problem: Chronic Conditions and Co-morbidities  Goal: Patient's chronic conditions and co-morbidity symptoms are monitored and maintained or improved  Outcome: Progressing  Flowsheets (Taken 6/1/2025 1941)  Care Plan - Patient's Chronic Conditions and Co-Morbidity Symptoms are Monitored and Maintained or Improved: Monitor and assess patient's chronic conditions and comorbid symptoms for stability, deterioration, or improvement     Problem: Discharge Planning  Goal: Discharge to home or other facility with appropriate resources  Outcome: Progressing  Flowsheets (Taken 6/1/2025 1941)  Discharge to home or other facility with appropriate resources: Identify barriers to discharge with patient and caregiver     Problem: Pain  Goal: Verbalizes/displays adequate comfort level or baseline comfort level  Outcome: Progressing  Flowsheets (Taken 6/1/2025 1945)  Verbalizes/displays adequate comfort level or baseline comfort level: Encourage patient to monitor pain and request assistance     Problem: Skin/Tissue Integrity  Goal: Skin integrity remains intact  Description: 1.  Monitor for areas of redness and/or skin breakdown2.  Assess vascular access sites hourly3.  Every 4-6 hours minimum:  Change oxygen saturation probe site4.  Every 4-6 hours:  If on nasal continuous positive airway pressure, respiratory therapy assess nares and determine need for appliance change or resting period  Outcome: Progressing  Flowsheets (Taken 6/1/2025 1941)  Skin Integrity Remains Intact: Monitor for areas of redness and/or skin breakdown     Problem: ABCDS Injury Assessment  Goal: Absence of physical injury  Outcome: Progressing

## 2025-06-02 NOTE — FLOWSHEET NOTE
06/02/25 0425   Vital Signs   Temp 97.3 °F (36.3 °C)   Temp Source Axillary   Pulse 57   Heart Rate Source Monitor   Respirations 20   /61   MAP (Calculated) 85   BP Location Left upper arm   BP Method Automatic   Patient Position Semi dontawlers   Pain Assessment   Pain Assessment None - Denies Pain   Care Plan - Pain Goals   Verbalizes/displays adequate comfort level or baseline comfort level Encourage patient to monitor pain and request assistance   Oxygen Therapy   SpO2 94 %   Pulse Oximetry Type Intermittent   Pulse Oximeter Device Mode Intermittent   Pulse Oximeter Device Location Left;Finger   O2 Device None (Room air)   O2 Flow Rate (L/min) 0 L/min   Oxygen Therapy None (Room air)

## 2025-06-02 NOTE — FLOWSHEET NOTE
06/02/25 1023   Vital Signs   Pulse 56   Heart Rate Source Monitor   BP (!) 135/52   MAP (Calculated) 80   BP Location Right upper arm   BP Method Automatic   Patient Position Sitting;Up in chair   Pain Assessment   Pain Assessment 0-10   Pain Level 7   Patient's Stated Pain Goal 0 - No pain   Pain Location Back   Pain Orientation Lower;Mid   Pain Descriptors Aching   Functional Pain Assessment Activities are not prevented   Pain Type Acute pain   Pain Frequency Intermittent   Pain Onset Gradual   Non-Pharmaceutical Pain Intervention(s) Rest;Repositioned     AM assessment complete. Pt a&o x4. Intermittent confusion. Up in chair. Lidocaine patch to lower back per orders d/t increased pain. No edema. LCTA but diminished. RA. Call light and bedside table within reach.

## 2025-06-02 NOTE — FLOWSHEET NOTE
06/02/25 0210   Vital Signs   Temp 98.1 °F (36.7 °C)   Temp Source Axillary   Pulse 94   Heart Rate Source Monitor   Respirations 18   /63   MAP (Calculated) 87   Oxygen Therapy   SpO2 94 %   Pulse Oximetry Type Continuous   Pulse Oximeter Device Mode Continuous   Pulse Oximeter Device Location Right;Finger   O2 Device None (Room air)   O2 Flow Rate (L/min) 0 L/min   Oxygen Therapy None (Room air)

## 2025-06-03 VITALS
TEMPERATURE: 98.5 F | BODY MASS INDEX: 33.6 KG/M2 | OXYGEN SATURATION: 97 % | RESPIRATION RATE: 18 BRPM | DIASTOLIC BLOOD PRESSURE: 82 MMHG | SYSTOLIC BLOOD PRESSURE: 174 MMHG | HEIGHT: 64 IN | WEIGHT: 196.8 LBS | HEART RATE: 72 BPM

## 2025-06-03 PROCEDURE — 6370000000 HC RX 637 (ALT 250 FOR IP): Performed by: NURSE PRACTITIONER

## 2025-06-03 PROCEDURE — 6370000000 HC RX 637 (ALT 250 FOR IP): Performed by: INTERNAL MEDICINE

## 2025-06-03 PROCEDURE — 6360000002 HC RX W HCPCS: Performed by: INTERNAL MEDICINE

## 2025-06-03 PROCEDURE — 2500000003 HC RX 250 WO HCPCS: Performed by: STUDENT IN AN ORGANIZED HEALTH CARE EDUCATION/TRAINING PROGRAM

## 2025-06-03 PROCEDURE — 6370000000 HC RX 637 (ALT 250 FOR IP): Performed by: STUDENT IN AN ORGANIZED HEALTH CARE EDUCATION/TRAINING PROGRAM

## 2025-06-03 PROCEDURE — 6370000000 HC RX 637 (ALT 250 FOR IP)

## 2025-06-03 RX ORDER — METOPROLOL SUCCINATE 50 MG/1
50 TABLET, EXTENDED RELEASE ORAL DAILY
Status: DISCONTINUED | OUTPATIENT
Start: 2025-06-04 | End: 2025-06-03

## 2025-06-03 RX ORDER — METOPROLOL SUCCINATE 50 MG/1
50 TABLET, EXTENDED RELEASE ORAL DAILY
Qty: 30 TABLET | Refills: 3
Start: 2025-06-04

## 2025-06-03 RX ORDER — METOPROLOL SUCCINATE 50 MG/1
50 TABLET, EXTENDED RELEASE ORAL DAILY
Qty: 30 TABLET | Refills: 3
Start: 2025-06-04 | End: 2025-06-03 | Stop reason: HOSPADM

## 2025-06-03 RX ORDER — METOPROLOL SUCCINATE 50 MG/1
50 TABLET, EXTENDED RELEASE ORAL DAILY
Status: DISCONTINUED | OUTPATIENT
Start: 2025-06-03 | End: 2025-06-03 | Stop reason: HOSPADM

## 2025-06-03 RX ADMIN — PANTOPRAZOLE SODIUM 40 MG: 40 TABLET, DELAYED RELEASE ORAL at 09:41

## 2025-06-03 RX ADMIN — ISOSORBIDE MONONITRATE 30 MG: 30 TABLET, EXTENDED RELEASE ORAL at 09:41

## 2025-06-03 RX ADMIN — SODIUM CHLORIDE, PRESERVATIVE FREE 10 ML: 5 INJECTION INTRAVENOUS at 09:34

## 2025-06-03 RX ADMIN — ASPIRIN 81 MG: 81 TABLET, CHEWABLE ORAL at 09:41

## 2025-06-03 RX ADMIN — FUROSEMIDE 40 MG: 40 TABLET ORAL at 09:41

## 2025-06-03 RX ADMIN — DULOXETINE HYDROCHLORIDE 30 MG: 30 CAPSULE, DELAYED RELEASE ORAL at 09:41

## 2025-06-03 RX ADMIN — LOSARTAN POTASSIUM 100 MG: 100 TABLET, FILM COATED ORAL at 09:41

## 2025-06-03 RX ADMIN — BUSPIRONE HYDROCHLORIDE 10 MG: 10 TABLET ORAL at 09:41

## 2025-06-03 RX ADMIN — ATORVASTATIN CALCIUM 10 MG: 10 TABLET, FILM COATED ORAL at 09:41

## 2025-06-03 RX ADMIN — ENOXAPARIN SODIUM 40 MG: 100 INJECTION SUBCUTANEOUS at 09:34

## 2025-06-03 RX ADMIN — ACETAMINOPHEN 650 MG: 325 TABLET ORAL at 05:53

## 2025-06-03 RX ADMIN — METOPROLOL SUCCINATE 50 MG: 50 TABLET, EXTENDED RELEASE ORAL at 11:19

## 2025-06-03 ASSESSMENT — PAIN DESCRIPTION - LOCATION
LOCATION: BACK
LOCATION: HEAD

## 2025-06-03 ASSESSMENT — PAIN SCALES - GENERAL
PAINLEVEL_OUTOF10: 0
PAINLEVEL_OUTOF10: 5
PAINLEVEL_OUTOF10: 3
PAINLEVEL_OUTOF10: 5

## 2025-06-03 ASSESSMENT — PAIN DESCRIPTION - FREQUENCY: FREQUENCY: INTERMITTENT

## 2025-06-03 ASSESSMENT — PAIN DESCRIPTION - ORIENTATION
ORIENTATION: LOWER
ORIENTATION: UPPER

## 2025-06-03 ASSESSMENT — PAIN DESCRIPTION - DESCRIPTORS: DESCRIPTORS: ACHING

## 2025-06-03 ASSESSMENT — PAIN DESCRIPTION - PAIN TYPE: TYPE: ACUTE PAIN

## 2025-06-03 ASSESSMENT — PAIN - FUNCTIONAL ASSESSMENT: PAIN_FUNCTIONAL_ASSESSMENT: PREVENTS OR INTERFERES SOME ACTIVE ACTIVITIES AND ADLS

## 2025-06-03 ASSESSMENT — PAIN DESCRIPTION - ONSET: ONSET: ON-GOING

## 2025-06-03 ASSESSMENT — PAIN SCALES - WONG BAKER: WONGBAKER_NUMERICALRESPONSE: HURTS A LITTLE BIT

## 2025-06-03 NOTE — PLAN OF CARE
Problem: Safety - Adult  Goal: Free from fall injury  Outcome: Progressing  Flowsheets (Taken 6/3/2025 0024)  Free From Fall Injury: Instruct family/caregiver on patient safety     Problem: Pain  Goal: Verbalizes/displays adequate comfort level or baseline comfort level  Outcome: Progressing  Flowsheets (Taken 6/3/2025 0024)  Verbalizes/displays adequate comfort level or baseline comfort level:   Encourage patient to monitor pain and request assistance   Assess pain using appropriate pain scale     Problem: ABCDS Injury Assessment  Goal: Absence of physical injury  Outcome: Progressing  Flowsheets (Taken 6/3/2025 0024)  Absence of Physical Injury: Implement safety measures based on patient assessment
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
## 2025-06-03 NOTE — FLOWSHEET NOTE
06/02/25 2044   Vital Signs   Temp 97.9 °F (36.6 °C)   Temp Source Oral   Pulse 60   Heart Rate Source Monitor   Respirations 16   BP (!) 140/45   MAP (Calculated) 77   MAP (mmHg) 71   BP Location Left upper arm   BP Method Automatic   Patient Position Sitting   Oxygen Therapy   SpO2 93 %   O2 Device None (Room air)     Assessment done and in flowsheets.  Patient is alert and oriented x4.  Telesitter inside for safety - patient impulsive.  Call light within reach.

## 2025-06-03 NOTE — CARE COORDINATION
DISCHARGE ORDER  Date/Time 6/3/2025 9:36 AM  Completed by: Melody Cruz RN, Case Management    Patient Name: Caren Georges    : 1945      Admit order Date and Status:ip   Noted discharge order. (verify MD's last order for status of admission/Traditional Medicare 3 [place] Inpatient qualifying stay required for SNF)    Confirmed discharge plan with:              Patient:  Yes              When pt confirms DC plan does any support person need to be contacted by CM Yes if yes who___teresa___                      Discharge to Facility: [facility]   Facility phone number for staff giving report: [phone]   Pre-certification completed: yes   Hospital Exemption Notification (HENS) completed: yes   Discharge orders and Continuity of Care faxed to facility:  [facility]      Transportation:               Medical Transport explained with choice list offered to pt/family.                Choice:(no preference)  Agency used: [facility]   time:   1230      Pt/family/Nursing/Facility aware of  time:   Yes Names: melody, [facility], trista, caren, basil  Ambulance form completed:  yes:      Date Last IMM Given:     Comments:DC to SNF. Facility aware, rn aware, pt and family aware (trista). Transportation arranged. No additional DC or DME needs identified    Pt is being d/c'd to [facility] today. Pt's O2 sats are 97% on ra.    Discharge timeout done with rn, cm, pt. All discharge needs and concerns addressed.    Discharging nurse to complete EZ, reconcile AVS, and place final copy with patient's discharge packet. Discharging RN to ensure that written prescriptions for  Level II medications are sent with patient to the facility as per protocol.

## 2025-06-03 NOTE — CARE COORDINATION
Follow-Up copy of Important Message from Medicare (IMM2) has been explained to patient and/or designated healthcare decision maker (HCDM). Pt and/or HCDM aware that patient is permitted to stay an additional 4 hours prior to discharge to consider an appeal if they feel as though they are being discharged too soon. Patient may discharge as planned if chooses to do so.  Patient/HCDM voice no other concerns or questions regarding this process.

## 2025-06-03 NOTE — FLOWSHEET NOTE
06/03/25 0716   Vital Signs   Temp 98.2 °F (36.8 °C)   Temp Source Oral   Pulse 80   Heart Rate Source Monitor   Respirations 18   BP (!) 143/88   MAP (Calculated) 106   BP Location Right upper arm   BP Method Automatic   Patient Position Sitting   Pain Assessment   Pain Assessment 0-10   Pain Level 5   Pain Location Back   Pain Orientation Lower   Oxygen Therapy   SpO2 97 %   O2 Device None (Room air)     AM assessment complete. Pt a&o x2 today. Daughter Cheryl called for update. No edema. LCTA. RA. C/o lower back pain, lidocaine patch applied. Call light and bedside table within reach.

## 2025-06-04 ENCOUNTER — TELEPHONE (OUTPATIENT)
Dept: FAMILY MEDICINE CLINIC | Age: 80
End: 2025-06-04

## 2025-06-04 NOTE — TELEPHONE ENCOUNTER
Care Transitions Initial Follow Up Call    Outreach made within 2 business days of discharge: Yes    Patient: Caren Georges Patient : 1945   MRN: [id number]  Reason for Admission: Altered Mental Status  Discharge Date: 6/3/25       Spoke with: Skilled Nursing Facility     Discharge department/facility: [facility] Patient Contact:  Was patient able to fill all prescriptions: No: Skilled Nursing Facility   Was patient instructed to bring all medications to the follow-up visit: No: Skilled Nursing Facility   Is patient taking all medications as directed in the discharge summary? Skilled Nursing Facility   Does patient understand their discharge instructions: No: Skilled Nursing Facility   Does patient have questions or concerns that need addressed prior to 7-14 day follow up office visit: No Skilled Nursing Facility     Additional needs identified to be addressed with provider  Skilled Nursing Facility              Scheduled appointment with PCP within 7-14 days    Follow Up  No future appointments.    Treas Valencia MA

## 2025-06-06 LAB
A/G RATIO: 1.2 RATIO (ref 0.8–2.6)
ALBUMIN: 3.3 G/DL (ref 3.5–5.2)
ALP BLD-CCNC: 76 U/L (ref 23–144)
ALT SERPL-CCNC: 12 U/L (ref 0–60)
AST SERPL-CCNC: 21 U/L (ref 0–55)
BASOPHILS ABSOLUTE: 0.1 K/UL (ref 0–0.3)
BASOPHILS RELATIVE PERCENT: 1 % (ref 0–2)
BILIRUB SERPL-MCNC: 0.3 MG/DL (ref 0–1.2)
BUN / CREAT RATIO: 11 (ref 7–25)
BUN BLDV-MCNC: 8 MG/DL (ref 3–29)
CALCIUM SERPL-MCNC: 8.7 MG/DL (ref 8.5–10.5)
CHLORIDE BLD-SCNC: 100 MEQ/L (ref 96–110)
CO2: 31 MEQ/L (ref 19–32)
CREAT SERPL-MCNC: 0.7 MG/DL (ref 0.5–1.2)
DIFFERENTIAL COUNT: ABNORMAL
EOSINOPHILS ABSOLUTE: 0.5 K/UL (ref 0–0.5)
EOSINOPHILS RELATIVE PERCENT: 8.3 % (ref 0–5)
ESTIMATED GLOMERULAR FILTRATION RATE CREATININE EQUATION: 87 MLS/MIN/1.73M2
FASTING STATUS: ABNORMAL
GLOBULIN: 2.8 G/DL (ref 1.9–3.6)
GLUCOSE BLD-MCNC: 98 MG/DL (ref 70–99)
HCT VFR BLD CALC: 38.1 % (ref 34–49)
HEMOGLOBIN: 11.3 G/DL (ref 11.2–15.7)
IMMATURE GRANS (ABS): 0 K/UL (ref 0–0.1)
IMMATURE GRANULOCYTES %: 0.3 %
LYMPHOCYTES ABSOLUTE: 2.4 K/UL (ref 0.9–4.1)
LYMPHOCYTES RELATIVE PERCENT: 38.6 % (ref 14–51)
MCH RBC QN AUTO: 24.3 PG (ref 26–34)
MCHC RBC AUTO-ENTMCNC: 29.7 G/DL (ref 30.7–35.5)
MCV RBC AUTO: 81.9 FL (ref 80–100)
MONOCYTES ABSOLUTE: 0.6 K/UL (ref 0.2–1)
MONOCYTES RELATIVE PERCENT: 9.6 % (ref 4–12)
NEUTROPHILS ABSOLUTE: 2.7 K/UL (ref 1.8–7.5)
NEUTROPHILS RELATIVE PERCENT: 42.2 % (ref 42–80)
PDW BLD-RTO: 17.3 %
PLATELET # BLD: 221 K/UL (ref 140–400)
PMV BLD AUTO: 10.5 FL (ref 7.2–11.7)
POTASSIUM SERPL-SCNC: 4.3 MEQ/L (ref 3.4–5.3)
RBC # BLD: 4.65 M/UL (ref 3.95–5.26)
RETICULOCYTE ABSOLUTE COUNT: 0 /100 WBC
SODIUM BLD-SCNC: 141 MEQ/L (ref 135–148)
TOTAL PROTEIN: 6.1 G/DL (ref 6–8.3)
WBC # BLD: 6.3 K/UL (ref 3.5–10.9)

## 2025-06-20 ENCOUNTER — TELEPHONE (OUTPATIENT)
Dept: FAMILY MEDICINE CLINIC | Age: 80
End: 2025-06-20

## 2025-06-20 NOTE — TELEPHONE ENCOUNTER
Claudia with [facility] calling and states that she sees Caren and Caren informed her that she sees people in her house at night.  Patient lives alone but this has happened a few times after patient got home from the hospital.

## 2025-06-23 NOTE — TELEPHONE ENCOUNTER
Called Claudia with [facility] she states she is unsure if pt sees anyone states she see pt this morning and will ask pt and give a return call

## (undated) DEVICE — CONMED SCOPE SAVER BITE BLOCK, 20X27 MM: Brand: SCOPE SAVER

## (undated) DEVICE — ENDO CARRY-ON PROCEDURE KIT INCLUDES SUCTION TUBING, LUBRICANT, GAUZE, BIOHAZARD STICKER, TRANSPORT PAD AND INTERCEPT BEDSIDE KIT.: Brand: ENDO CARRY-ON PROCEDURE KIT

## (undated) DEVICE — ENDOSCOPIC KIT 2 12 FT OP4 DE2 GWN SYR

## (undated) DEVICE — YANKAUER,BULB TIP,W/O VENT,RIGID,STERILE: Brand: MEDLINE

## (undated) DEVICE — ELECTRODE ECG MONITR FOAM TEAR DROP ADLT RED

## (undated) DEVICE — FORCEP BX STD CAP 240CM RAD JAW 4

## (undated) DEVICE — CANNULA,OXY,ADULT,SUPERSOFT,W/7'TUB,SC: Brand: MEDLINE INDUSTRIES, INC.